# Patient Record
Sex: FEMALE | Race: WHITE | Employment: OTHER | URBAN - METROPOLITAN AREA
[De-identification: names, ages, dates, MRNs, and addresses within clinical notes are randomized per-mention and may not be internally consistent; named-entity substitution may affect disease eponyms.]

---

## 2017-12-21 PROBLEM — E43 SEVERE PROTEIN-CALORIE MALNUTRITION (HCC): Chronic | Status: ACTIVE | Noted: 2017-12-21

## 2017-12-21 PROBLEM — K52.9 ENTERITIS: Status: ACTIVE | Noted: 2017-12-21

## 2018-04-07 ENCOUNTER — HOSPITAL ENCOUNTER (EMERGENCY)
Age: 78
Discharge: HOME | End: 2018-04-07
Attending: EMERGENCY MEDICINE
Payer: MEDICARE

## 2018-04-07 ENCOUNTER — APPOINTMENT (OUTPATIENT)
Dept: ULTRASOUND IMAGING | Age: 78
End: 2018-04-07
Payer: MEDICARE

## 2018-04-07 ENCOUNTER — APPOINTMENT (OUTPATIENT)
Dept: GENERAL RADIOLOGY | Age: 78
End: 2018-04-07
Payer: MEDICARE

## 2018-04-07 VITALS
OXYGEN SATURATION: 96 % | BODY MASS INDEX: 21.38 KG/M2 | HEART RATE: 94 BPM | DIASTOLIC BLOOD PRESSURE: 57 MMHG | RESPIRATION RATE: 16 BRPM | HEIGHT: 66 IN | TEMPERATURE: 98.3 F | WEIGHT: 133 LBS | SYSTOLIC BLOOD PRESSURE: 115 MMHG

## 2018-04-07 DIAGNOSIS — I82.412 ACUTE DEEP VEIN THROMBOSIS (DVT) OF FEMORAL VEIN OF LEFT LOWER EXTREMITY (HCC): Primary | ICD-10-CM

## 2018-04-07 LAB
ALBUMIN SERPL-MCNC: 2.3 G/DL (ref 3.5–5.2)
ALP BLD-CCNC: 103 U/L (ref 35–104)
ALT SERPL-CCNC: 8 U/L (ref 0–32)
ANION GAP SERPL CALCULATED.3IONS-SCNC: 11 MMOL/L (ref 7–16)
AST SERPL-CCNC: 10 U/L (ref 0–31)
BILIRUB SERPL-MCNC: 0.3 MG/DL (ref 0–1.2)
BUN BLDV-MCNC: 10 MG/DL (ref 8–23)
CALCIUM SERPL-MCNC: 8.5 MG/DL (ref 8.6–10.2)
CEA: 5.2 NG/ML (ref 0–5.2)
CHLORIDE BLD-SCNC: 95 MMOL/L (ref 98–107)
CO2: 28 MMOL/L (ref 22–29)
CREAT SERPL-MCNC: 0.7 MG/DL (ref 0.5–1)
GFR AFRICAN AMERICAN: >60
GFR NON-AFRICAN AMERICAN: >60 ML/MIN/1.73
GLUCOSE BLD-MCNC: 99 MG/DL (ref 74–109)
HCT VFR BLD CALC: 33 % (ref 34–48)
HEMOGLOBIN: 10.7 G/DL (ref 11.5–15.5)
MCH RBC QN AUTO: 29.2 PG (ref 26–35)
MCHC RBC AUTO-ENTMCNC: 32.4 % (ref 32–34.5)
MCV RBC AUTO: 89.9 FL (ref 80–99.9)
PDW BLD-RTO: 15.2 FL (ref 11.5–15)
PLATELET # BLD: 329 E9/L (ref 130–450)
PMV BLD AUTO: 8.7 FL (ref 7–12)
POTASSIUM SERPL-SCNC: 3.3 MMOL/L (ref 3.5–5)
RBC # BLD: 3.67 E12/L (ref 3.5–5.5)
SODIUM BLD-SCNC: 134 MMOL/L (ref 132–146)
TOTAL PROTEIN: 5.7 G/DL (ref 6.4–8.3)
TSH SERPL DL<=0.05 MIU/L-ACNC: 4.62 UIU/ML (ref 0.27–4.2)
WBC # BLD: 10 E9/L (ref 4.5–11.5)

## 2018-04-07 PROCEDURE — 73502 X-RAY EXAM HIP UNI 2-3 VIEWS: CPT

## 2018-04-07 PROCEDURE — 85027 COMPLETE CBC AUTOMATED: CPT

## 2018-04-07 PROCEDURE — 6360000002 HC RX W HCPCS: Performed by: INTERNAL MEDICINE

## 2018-04-07 PROCEDURE — 80053 COMPREHEN METABOLIC PANEL: CPT

## 2018-04-07 PROCEDURE — 93971 EXTREMITY STUDY: CPT

## 2018-04-07 PROCEDURE — 82378 CARCINOEMBRYONIC ANTIGEN: CPT

## 2018-04-07 PROCEDURE — 99284 EMERGENCY DEPT VISIT MOD MDM: CPT

## 2018-04-07 PROCEDURE — 96372 THER/PROPH/DIAG INJ SC/IM: CPT

## 2018-04-07 PROCEDURE — 84443 ASSAY THYROID STIM HORMONE: CPT

## 2018-04-07 PROCEDURE — 6370000000 HC RX 637 (ALT 250 FOR IP): Performed by: EMERGENCY MEDICINE

## 2018-04-07 PROCEDURE — 36415 COLL VENOUS BLD VENIPUNCTURE: CPT

## 2018-04-07 RX ORDER — POTASSIUM CHLORIDE 20 MEQ/1
40 TABLET, EXTENDED RELEASE ORAL ONCE
Status: COMPLETED | OUTPATIENT
Start: 2018-04-07 | End: 2018-04-07

## 2018-04-07 RX ADMIN — POTASSIUM CHLORIDE 40 MEQ: 20 TABLET, EXTENDED RELEASE ORAL at 20:22

## 2018-04-07 RX ADMIN — APIXABAN 10 MG: 5 TABLET, FILM COATED ORAL at 20:45

## 2018-04-07 RX ADMIN — ENOXAPARIN SODIUM 60 MG: 60 INJECTION SUBCUTANEOUS at 19:34

## 2018-04-07 ASSESSMENT — ENCOUNTER SYMPTOMS
CHEST TIGHTNESS: 0
SHORTNESS OF BREATH: 0
COUGH: 0

## 2018-06-25 ENCOUNTER — OFFICE VISIT (OUTPATIENT)
Dept: SURGERY | Age: 78
End: 2018-06-25

## 2018-06-25 ENCOUNTER — APPOINTMENT (OUTPATIENT)
Dept: CT IMAGING | Age: 78
DRG: 329 | End: 2018-06-25
Attending: SURGERY
Payer: MEDICARE

## 2018-06-25 ENCOUNTER — HOSPITAL ENCOUNTER (INPATIENT)
Age: 78
LOS: 9 days | Discharge: NURSING, REHAB OR LONG-TERM CARE | DRG: 329 | End: 2018-07-04
Attending: SURGERY | Admitting: SURGERY
Payer: MEDICARE

## 2018-06-25 ENCOUNTER — APPOINTMENT (OUTPATIENT)
Dept: ULTRASOUND IMAGING | Age: 78
DRG: 329 | End: 2018-06-25
Attending: SURGERY
Payer: MEDICARE

## 2018-06-25 VITALS
SYSTOLIC BLOOD PRESSURE: 104 MMHG | HEIGHT: 66 IN | BODY MASS INDEX: 20.57 KG/M2 | DIASTOLIC BLOOD PRESSURE: 59 MMHG | RESPIRATION RATE: 12 BRPM | WEIGHT: 128 LBS | HEART RATE: 96 BPM | TEMPERATURE: 98.7 F

## 2018-06-25 DIAGNOSIS — K63.2 ENTERO-ENTERIC FISTULA: Primary | ICD-10-CM

## 2018-06-25 DIAGNOSIS — K55.9 ISCHEMIC BOWEL DISEASE (HCC): Primary | ICD-10-CM

## 2018-06-25 PROBLEM — T73.3XXA FATIGUE DUE TO EXCESSIVE EXERTION: Status: ACTIVE | Noted: 2018-06-25

## 2018-06-25 PROBLEM — R41.82 ALTERED MENTAL STATUS: Status: ACTIVE | Noted: 2018-06-25

## 2018-06-25 LAB
ANION GAP SERPL CALCULATED.3IONS-SCNC: 10 MMOL/L (ref 7–16)
BUN BLDV-MCNC: 9 MG/DL (ref 8–23)
CALCIUM SERPL-MCNC: 9.4 MG/DL (ref 8.6–10.2)
CHLORIDE BLD-SCNC: 93 MMOL/L (ref 98–107)
CO2: 30 MMOL/L (ref 22–29)
CREAT SERPL-MCNC: 0.6 MG/DL (ref 0.5–1)
EKG ATRIAL RATE: 82 BPM
EKG P AXIS: 70 DEGREES
EKG P-R INTERVAL: 122 MS
EKG Q-T INTERVAL: 404 MS
EKG QRS DURATION: 82 MS
EKG QTC CALCULATION (BAZETT): 472 MS
EKG R AXIS: 62 DEGREES
EKG T AXIS: 68 DEGREES
EKG VENTRICULAR RATE: 82 BPM
GFR AFRICAN AMERICAN: >60
GFR NON-AFRICAN AMERICAN: >60 ML/MIN/1.73
GLUCOSE BLD-MCNC: 103 MG/DL (ref 74–109)
HCT VFR BLD CALC: 29.5 % (ref 34–48)
HEMOGLOBIN: 9.3 G/DL (ref 11.5–15.5)
MCH RBC QN AUTO: 25.9 PG (ref 26–35)
MCHC RBC AUTO-ENTMCNC: 31.5 % (ref 32–34.5)
MCV RBC AUTO: 82.2 FL (ref 80–99.9)
PDW BLD-RTO: 16.6 FL (ref 11.5–15)
PLATELET # BLD: 558 E9/L (ref 130–450)
PMV BLD AUTO: 8.2 FL (ref 7–12)
POTASSIUM SERPL-SCNC: 3.9 MMOL/L (ref 3.5–5)
RBC # BLD: 3.59 E12/L (ref 3.5–5.5)
SODIUM BLD-SCNC: 133 MMOL/L (ref 132–146)
WBC # BLD: 12.7 E9/L (ref 4.5–11.5)

## 2018-06-25 PROCEDURE — 2060000000 HC ICU INTERMEDIATE R&B

## 2018-06-25 PROCEDURE — 74177 CT ABD & PELVIS W/CONTRAST: CPT

## 2018-06-25 PROCEDURE — 85027 COMPLETE CBC AUTOMATED: CPT

## 2018-06-25 PROCEDURE — 2580000003 HC RX 258: Performed by: SURGERY

## 2018-06-25 PROCEDURE — 93971 EXTREMITY STUDY: CPT

## 2018-06-25 PROCEDURE — 6370000000 HC RX 637 (ALT 250 FOR IP): Performed by: INTERNAL MEDICINE

## 2018-06-25 PROCEDURE — 99223 1ST HOSP IP/OBS HIGH 75: CPT | Performed by: SURGERY

## 2018-06-25 PROCEDURE — 6360000002 HC RX W HCPCS: Performed by: SURGERY

## 2018-06-25 PROCEDURE — C9113 INJ PANTOPRAZOLE SODIUM, VIA: HCPCS | Performed by: INTERNAL MEDICINE

## 2018-06-25 PROCEDURE — 80048 BASIC METABOLIC PNL TOTAL CA: CPT

## 2018-06-25 PROCEDURE — 99999 PR OFFICE/OUTPT VISIT,PROCEDURE ONLY: CPT | Performed by: SURGERY

## 2018-06-25 PROCEDURE — 6360000004 HC RX CONTRAST MEDICATION: Performed by: RADIOLOGY

## 2018-06-25 PROCEDURE — 93005 ELECTROCARDIOGRAM TRACING: CPT | Performed by: INTERNAL MEDICINE

## 2018-06-25 PROCEDURE — 36415 COLL VENOUS BLD VENIPUNCTURE: CPT

## 2018-06-25 PROCEDURE — 6360000002 HC RX W HCPCS: Performed by: INTERNAL MEDICINE

## 2018-06-25 RX ORDER — ONDANSETRON 2 MG/ML
4 INJECTION INTRAMUSCULAR; INTRAVENOUS EVERY 6 HOURS PRN
Status: DISCONTINUED | OUTPATIENT
Start: 2018-06-25 | End: 2018-07-04 | Stop reason: HOSPADM

## 2018-06-25 RX ORDER — ACETAMINOPHEN AND CODEINE PHOSPHATE 300; 30 MG/1; MG/1
0.5 TABLET ORAL 4 TIMES DAILY
Status: DISCONTINUED | OUTPATIENT
Start: 2018-06-25 | End: 2018-06-28

## 2018-06-25 RX ORDER — SODIUM CHLORIDE 0.9 % (FLUSH) 0.9 %
10 SYRINGE (ML) INJECTION EVERY 12 HOURS SCHEDULED
Status: DISCONTINUED | OUTPATIENT
Start: 2018-06-25 | End: 2018-07-04 | Stop reason: HOSPADM

## 2018-06-25 RX ORDER — ACETAMINOPHEN 325 MG/1
650 TABLET ORAL EVERY 4 HOURS PRN
Status: DISCONTINUED | OUTPATIENT
Start: 2018-06-25 | End: 2018-07-04 | Stop reason: HOSPADM

## 2018-06-25 RX ORDER — PANTOPRAZOLE SODIUM 40 MG/10ML
40 INJECTION, POWDER, LYOPHILIZED, FOR SOLUTION INTRAVENOUS DAILY
Status: DISCONTINUED | OUTPATIENT
Start: 2018-06-25 | End: 2018-07-04 | Stop reason: HOSPADM

## 2018-06-25 RX ORDER — SODIUM CHLORIDE, SODIUM LACTATE, POTASSIUM CHLORIDE, CALCIUM CHLORIDE 600; 310; 30; 20 MG/100ML; MG/100ML; MG/100ML; MG/100ML
INJECTION, SOLUTION INTRAVENOUS CONTINUOUS
Status: DISCONTINUED | OUTPATIENT
Start: 2018-06-25 | End: 2018-07-02

## 2018-06-25 RX ORDER — SODIUM CHLORIDE 0.9 % (FLUSH) 0.9 %
10 SYRINGE (ML) INJECTION PRN
Status: DISCONTINUED | OUTPATIENT
Start: 2018-06-25 | End: 2018-07-04 | Stop reason: HOSPADM

## 2018-06-25 RX ORDER — ZINC OXIDE
OINTMENT (GRAM) TOPICAL 2 TIMES DAILY PRN
Status: DISCONTINUED | OUTPATIENT
Start: 2018-06-25 | End: 2018-07-04 | Stop reason: HOSPADM

## 2018-06-25 RX ADMIN — IOPAMIDOL 80 ML: 755 INJECTION, SOLUTION INTRAVENOUS at 20:32

## 2018-06-25 RX ADMIN — ENOXAPARIN SODIUM 40 MG: 40 INJECTION SUBCUTANEOUS at 17:41

## 2018-06-25 RX ADMIN — IOHEXOL 50 ML: 240 INJECTION, SOLUTION INTRATHECAL; INTRAVASCULAR; INTRAVENOUS; ORAL at 20:31

## 2018-06-25 RX ADMIN — SERTRALINE HYDROCHLORIDE 50 MG: 50 TABLET ORAL at 21:38

## 2018-06-25 RX ADMIN — PANTOPRAZOLE SODIUM 40 MG: 40 INJECTION, POWDER, FOR SOLUTION INTRAVENOUS at 21:38

## 2018-06-25 RX ADMIN — ACETAMINOPHEN AND CODEINE PHOSPHATE 0.5 TABLET: 300; 30 TABLET ORAL at 21:38

## 2018-06-25 RX ADMIN — Medication 10 ML: at 21:38

## 2018-06-25 RX ADMIN — SODIUM CHLORIDE, POTASSIUM CHLORIDE, SODIUM LACTATE AND CALCIUM CHLORIDE: 600; 310; 30; 20 INJECTION, SOLUTION INTRAVENOUS at 17:41

## 2018-06-25 ASSESSMENT — PAIN DESCRIPTION - LOCATION
LOCATION: ABDOMEN;BACK;HIP;LEG
LOCATION: ABDOMEN;BACK;HIP

## 2018-06-25 ASSESSMENT — PAIN DESCRIPTION - DESCRIPTORS
DESCRIPTORS: ACHING;DISCOMFORT

## 2018-06-25 ASSESSMENT — PAIN SCALES - GENERAL
PAINLEVEL_OUTOF10: 4
PAINLEVEL_OUTOF10: 7
PAINLEVEL_OUTOF10: 4

## 2018-06-25 ASSESSMENT — PAIN DESCRIPTION - FREQUENCY
FREQUENCY: INTERMITTENT

## 2018-06-25 ASSESSMENT — PAIN DESCRIPTION - PAIN TYPE
TYPE: ACUTE PAIN;CHRONIC PAIN
TYPE: ACUTE PAIN;SURGICAL PAIN
TYPE: ACUTE PAIN;CHRONIC PAIN

## 2018-06-25 NOTE — FLOWSHEET NOTE
Inpatient Wound Care  Initial evaluation    Admit Date: 6/25/2018  2:41 PM    Reason for consult:  Skin care    Significant history:    Past Medical History:   Diagnosis Date    Cancer [facility])     Ischemic bowel disease ([facility] [address]     Macular degeneration     PAF (paroxysmal atrial fibrillation) ([facility] [address]     Tinnitus        Wound history:      Findings:    Pt is incontinent of urine  Had some stool leakage also  Coccyx is red blanchable  Inner groins sls red  Pt uses desitin at home    Pt has healed abd wound has a scar  Not open no redness    Impression:    Redness     Interventions in place:    She wears depends  She uses desitin at home  Pt uses a dsd over abd scar      Plan:  Cont desitin   Use waffle chair pad      Ivonne Velazquez 6/25/2018 3:51 PM

## 2018-06-25 NOTE — H&P
acetaminophen (TYLENOL) tablet 650 mg  650 mg Oral Q4H PRN Opal Bettencourt MD        ondansetron [facility]) injection 4 mg  4 mg Intravenous Q6H PRN Opal Bettencourt MD        enoxaparin (LOVENOX) injection 40 mg  40 mg Subcutaneous Daily Opal Bettencourt MD        lactated ringers infusion   Intravenous Continuous Opal Bettencourt MD           Allergies   Allergen Reactions    Aspirin Other (See Comments)     tinnitus    Tape Carson Galas Tape] Rash       The patient has a family history that is negative for severe cardiovascular or respiratory issues, negative for reaction to anesthesia. Social History     Social History    Marital status:      Spouse name: N/A    Number of children: N/A    Years of education: N/A     Occupational History    Not on file. Social History Main Topics    Smoking status: Former Smoker     Years: 20.00     Types: Cigarettes    Smokeless tobacco: Never Used      Comment: quit at age 44     Alcohol use No      Comment: socially    Drug use: No    Sexual activity: Not on file     Other Topics Concern    Not on file     Social History Narrative    Lives in [facility] with  (Glynn Cavazos / Bed). Worked in an office.                Review of Systems  Review of Systems -  General ROS: negative for - chills, fatigue or malaise  ENT ROS: negative for - hearing change, nasal congestion or nasal discharge  Allergy and Immunology ROS: negative for - hives, itchy/watery eyes or nasal congestion  Hematological and Lymphatic ROS: negative for - blood clots, blood transfusions, bruising or fatigue  Endocrine ROS: negative for - malaise/lethargy, mood swings, palpitations or polydipsia/polyuria  Respiratory ROS: negative for - sputum changes, stridor, tachypnea or wheezing  Cardiovascular ROS: negative for - irregular heartbeat, loss of consciousness, murmur or orthopnea  Gastrointestinal ROS: negative for - constipation, diarrhea, gas/bloating, heartburn or

## 2018-06-25 NOTE — CONSULTS
Consult Note    Reason for Consult:  Medical management    Requesting Physician:  Sneha Larson MD    HISTORY OF PRESENT ILLNESS:    The patient is a [de-identified] y.o. female who presents with a history of some increased lower abdominal pressure/discomfort for about 2 weeks. Patient is also had decreased appetite and increased diarrhea. She has a history of chronic loose stools. She recently noticed some drainage and what appears to be prolapse from her vagina. The patient's daughter is at the bedside and the case discussed. The patient has complained of some chills at home but no fever. She denies any chest pain or unusual shortness of breath. The case was discussed with both Dr. Jayme Walter over the phone today. Past Medical History:   Diagnosis Date    Cancer [facility])     colorectal     History of blood transfusion     Hx of blood clots     dvt, on eliquis    [facility]schem[facility] ([facility] [address]     Macular degeneration     MDRO (multiple drug resistant organisms) resistance     PAF (paroxysmal atrial fibrillation) (HCC)     Tinnitus        Past Surgical History:   Procedure Laterality Date    ABDOMEN SURGERY      colostomy & reversal    CHOLECYSTECTOMY  07    COLONOSCOPY      DILATATION, ESOPHAGUS      HAND SURGERY      carpel tunnel rigth hand    HYSTERECTOMY      ILEOSTOMY OR JEJUNOSTOMY      OTHER SURGICAL HISTORY  11/06/2017    diagnostic laparoscopy with exploratory laparotomy and reduction of internal hernia and closure of mesenteric defect, oversewing of multiple serosal tears    ALICIA AND BSO         Prior to Admission medications    Medication Sig Start Date End Date Taking?  Authorizing Provider   apixaban (ELIQUIS) 5 MG TABS tablet 10 mg BID for 1 week, then 5 mg BID  Patient taking differently: 5 mg 2 times daily 10 mg BID for 1 week, then 5 mg BID 4/7/18   Marisel Ellsworth,    acetaminophen (TYLENOL) 325 MG tablet Take 2 tablets by mouth every 4 hours as needed for Pain or Fever 12/26/17   i2O Water Lance Cox,    loperamide (IMODIUM) 2 MG capsule Take 2 capsules by mouth 3 times daily  Patient taking differently: Take 4 mg by mouth 4 times daily  12/26/17   Jennifer Fuentes DO   ondansetron (ZOFRAN) 8 MG tablet Take 0.5 tablets by mouth every 8 hours as needed for Nausea or Vomiting 12/26/17   Jennifer Fuentes DO   sertraline (ZOLOFT) 50 MG tablet Take 50 mg by mouth daily    Historical Provider, MD   pantoprazole (PROTONIX) 40 MG tablet Take 1 tablet by mouth every morning (before breakfast)  Patient taking differently: Take 40 mg by mouth as needed (heartburn)  12/20/17   Jennifer Fuentes DO       Scheduled Meds:   sodium chloride flush  10 mL Intravenous 2 times per day    enoxaparin  40 mg Subcutaneous Daily     Continuous Infusions:   lactated ringers 75 mL/hr at 06/25/18 1741     PRN Meds:zinc oxide, sodium chloride flush, acetaminophen, ondansetron    Allergies   Allergen Reactions    Aspirin Other (See Comments)     tinnitus    Tape Starling Geralds Tape] Rash       Social History     Social History    Marital status:      Spouse name: N/A    Number of children: N/A    Years of education: N/A     Occupational History    Not on file. Social History Main Topics    Smoking status: Former Smoker     Years: 20.00     Types: Cigarettes    Smokeless tobacco: Never Used      Comment: quit at age 44     Alcohol use No      Comment: socially    Drug use: No    Sexual activity: Not on file     Other Topics Concern    Not on file     Social History Narrative    Lives in [place] with  (Kathy Whitten / iD). Worked in an office.            Family History   Problem Relation Age of Onset    Arthritis Mother     Kidney Disease Father     Cancer Father        Review Of Systems:   CONSTITUTIONAL:  + chills  EYES:  negative  HEENT:  negative  RESPIRATORY:  negative  CARDIOVASCULAR:  negative  GASTROINTESTINAL:  positive for diarrhea and lower abdominal pain  GENITOURINARY:  positive for frequency,

## 2018-06-26 LAB
ALBUMIN SERPL-MCNC: 2.3 G/DL (ref 3.5–5.2)
ALP BLD-CCNC: 90 U/L (ref 35–104)
ALT SERPL-CCNC: 11 U/L (ref 0–32)
ANION GAP SERPL CALCULATED.3IONS-SCNC: 11 MMOL/L (ref 7–16)
AST SERPL-CCNC: 13 U/L (ref 0–31)
BILIRUB SERPL-MCNC: 0.3 MG/DL (ref 0–1.2)
BUN BLDV-MCNC: 7 MG/DL (ref 8–23)
CALCIUM SERPL-MCNC: 9 MG/DL (ref 8.6–10.2)
CHLORIDE BLD-SCNC: 97 MMOL/L (ref 98–107)
CO2: 28 MMOL/L (ref 22–29)
CREAT SERPL-MCNC: 0.7 MG/DL (ref 0.5–1)
GFR AFRICAN AMERICAN: >60
GFR NON-AFRICAN AMERICAN: >60 ML/MIN/1.73
GLUCOSE BLD-MCNC: 100 MG/DL (ref 74–109)
HCT VFR BLD CALC: 26.3 % (ref 34–48)
HCT VFR BLD CALC: 27.4 % (ref 34–48)
HEMOGLOBIN: 8.2 G/DL (ref 11.5–15.5)
HEMOGLOBIN: 8.5 G/DL (ref 11.5–15.5)
INR BLD: 1.3
IRON SATURATION: 13 % (ref 15–50)
IRON: 20 MCG/DL (ref 37–145)
MAGNESIUM: 2.2 MG/DL (ref 1.6–2.6)
MCH RBC QN AUTO: 25.8 PG (ref 26–35)
MCHC RBC AUTO-ENTMCNC: 31 % (ref 32–34.5)
MCV RBC AUTO: 83 FL (ref 80–99.9)
PDW BLD-RTO: 16.6 FL (ref 11.5–15)
PHOSPHORUS: 3.7 MG/DL (ref 2.5–4.5)
PLATELET # BLD: 530 E9/L (ref 130–450)
PMV BLD AUTO: 8.4 FL (ref 7–12)
POTASSIUM REFLEX MAGNESIUM: 3.2 MMOL/L (ref 3.5–5)
PREALBUMIN: 6 MG/DL (ref 20–40)
PROTHROMBIN TIME: 14.4 SEC (ref 9.3–12.4)
RBC # BLD: 3.3 E12/L (ref 3.5–5.5)
SODIUM BLD-SCNC: 136 MMOL/L (ref 132–146)
TOTAL IRON BINDING CAPACITY: 154 MCG/DL (ref 250–450)
TOTAL PROTEIN: 6.2 G/DL (ref 6.4–8.3)
WBC # BLD: 10.6 E9/L (ref 4.5–11.5)

## 2018-06-26 PROCEDURE — 2060000000 HC ICU INTERMEDIATE R&B

## 2018-06-26 PROCEDURE — 84134 ASSAY OF PREALBUMIN: CPT

## 2018-06-26 PROCEDURE — 36415 COLL VENOUS BLD VENIPUNCTURE: CPT

## 2018-06-26 PROCEDURE — 85014 HEMATOCRIT: CPT

## 2018-06-26 PROCEDURE — 85018 HEMOGLOBIN: CPT

## 2018-06-26 PROCEDURE — 80053 COMPREHEN METABOLIC PANEL: CPT

## 2018-06-26 PROCEDURE — 2580000003 HC RX 258: Performed by: SURGERY

## 2018-06-26 PROCEDURE — 6370000000 HC RX 637 (ALT 250 FOR IP): Performed by: INTERNAL MEDICINE

## 2018-06-26 PROCEDURE — 85027 COMPLETE CBC AUTOMATED: CPT

## 2018-06-26 PROCEDURE — 83550 IRON BINDING TEST: CPT

## 2018-06-26 PROCEDURE — 2580000003 HC RX 258: Performed by: INTERNAL MEDICINE

## 2018-06-26 PROCEDURE — 83735 ASSAY OF MAGNESIUM: CPT

## 2018-06-26 PROCEDURE — 85610 PROTHROMBIN TIME: CPT

## 2018-06-26 PROCEDURE — C9113 INJ PANTOPRAZOLE SODIUM, VIA: HCPCS | Performed by: INTERNAL MEDICINE

## 2018-06-26 PROCEDURE — 6360000002 HC RX W HCPCS: Performed by: INTERNAL MEDICINE

## 2018-06-26 PROCEDURE — 93306 TTE W/DOPPLER COMPLETE: CPT

## 2018-06-26 PROCEDURE — 83540 ASSAY OF IRON: CPT

## 2018-06-26 PROCEDURE — 84100 ASSAY OF PHOSPHORUS: CPT

## 2018-06-26 PROCEDURE — 99232 SBSQ HOSP IP/OBS MODERATE 35: CPT | Performed by: SURGERY

## 2018-06-26 RX ORDER — POTASSIUM CHLORIDE 20 MEQ/1
20 TABLET, EXTENDED RELEASE ORAL 2 TIMES DAILY WITH MEALS
Status: COMPLETED | OUTPATIENT
Start: 2018-06-26 | End: 2018-06-27

## 2018-06-26 RX ADMIN — ENOXAPARIN SODIUM 50 MG: 60 INJECTION SUBCUTANEOUS at 05:48

## 2018-06-26 RX ADMIN — IRON DEXTRAN 25 MG: 50 INJECTION INTRAMUSCULAR; INTRAVENOUS at 15:12

## 2018-06-26 RX ADMIN — PANTOPRAZOLE SODIUM 40 MG: 40 INJECTION, POWDER, FOR SOLUTION INTRAVENOUS at 09:54

## 2018-06-26 RX ADMIN — SODIUM CHLORIDE, POTASSIUM CHLORIDE, SODIUM LACTATE AND CALCIUM CHLORIDE: 600; 310; 30; 20 INJECTION, SOLUTION INTRAVENOUS at 22:26

## 2018-06-26 RX ADMIN — Medication 10 ML: at 09:54

## 2018-06-26 RX ADMIN — ACETAMINOPHEN AND CODEINE PHOSPHATE 0.5 TABLET: 300; 30 TABLET ORAL at 17:33

## 2018-06-26 RX ADMIN — ACETAMINOPHEN AND CODEINE PHOSPHATE 0.5 TABLET: 300; 30 TABLET ORAL at 09:47

## 2018-06-26 RX ADMIN — ACETAMINOPHEN AND CODEINE PHOSPHATE 0.5 TABLET: 300; 30 TABLET ORAL at 14:13

## 2018-06-26 RX ADMIN — IRON DEXTRAN 75 MG: 50 INJECTION INTRAMUSCULAR; INTRAVENOUS at 16:01

## 2018-06-26 RX ADMIN — SODIUM CHLORIDE, POTASSIUM CHLORIDE, SODIUM LACTATE AND CALCIUM CHLORIDE: 600; 310; 30; 20 INJECTION, SOLUTION INTRAVENOUS at 05:53

## 2018-06-26 RX ADMIN — ACETAMINOPHEN AND CODEINE PHOSPHATE 0.5 TABLET: 300; 30 TABLET ORAL at 20:39

## 2018-06-26 RX ADMIN — SERTRALINE HYDROCHLORIDE 50 MG: 50 TABLET ORAL at 09:47

## 2018-06-26 RX ADMIN — ENOXAPARIN SODIUM 50 MG: 60 INJECTION SUBCUTANEOUS at 17:33

## 2018-06-26 RX ADMIN — POTASSIUM CHLORIDE 20 MEQ: 20 TABLET, EXTENDED RELEASE ORAL at 14:13

## 2018-06-26 RX ADMIN — Medication 10 ML: at 20:38

## 2018-06-26 ASSESSMENT — PAIN SCALES - GENERAL
PAINLEVEL_OUTOF10: 3
PAINLEVEL_OUTOF10: 3
PAINLEVEL_OUTOF10: 10
PAINLEVEL_OUTOF10: 3

## 2018-06-26 ASSESSMENT — PAIN DESCRIPTION - DESCRIPTORS
DESCRIPTORS: ACHING;DISCOMFORT;SORE
DESCRIPTORS: BURNING;DISCOMFORT
DESCRIPTORS: SORE

## 2018-06-26 ASSESSMENT — PAIN DESCRIPTION - PAIN TYPE
TYPE: CHRONIC PAIN;SURGICAL PAIN
TYPE: CHRONIC PAIN
TYPE: CHRONIC PAIN

## 2018-06-26 ASSESSMENT — PAIN DESCRIPTION - LOCATION
LOCATION: LABIA
LOCATION: ABDOMEN
LOCATION: LABIA

## 2018-06-26 ASSESSMENT — PAIN DESCRIPTION - FREQUENCY: FREQUENCY: INTERMITTENT

## 2018-06-26 NOTE — CONSULTS
Cardiology Consult    The patient is a [de-identified] y.o. female who presents with a history of some increased lower abdominal pressure/discomfort for about 2 weeks. Patient is also had decreased appetite and increased diarrhea. She has a history of chronic loose stools. She recently noticed some drainage and what appears to be prolapse from her vagina. The patient's daughter is at the bedside and the case discussed. The patient has complained of some chills at home but no fever. She denies any chest pain or unusual shortness of breath. The case was discussed with both Dr. Pato Green over the phone today. Past Medical History        Past Medical History:   Diagnosis Date    Cancer [facility])       colorectal     History of blood transfusion      Hx of blood clots       dvt, on eliquis    Ischemic bowel disease ([address]      Macular degeneration      MDRO (multiple drug resistant organisms) resistance      PAF (paroxysmal atrial fibrillation) (HCC)      Tinnitus              Past Surgical History   Past Surgical History:   Procedure Laterality Date    ABDOMEN SURGERY         colostomy & reversal    CHOLECYSTECTOMY   07    COLONOSCOPY        DILATATION, ESOPHAGUS        HAND SURGERY         carpel tunnel rigth hand    HYSTERECTOMY        ILEOSTOMY OR JEJUNOSTOMY        OTHER SURGICAL HISTORY   11/06/2017     diagnostic laparoscopy with exploratory laparotomy and reduction of internal hernia and closure of mesenteric defect, oversewing of multiple serosal tears    ALICIA AND BSO                Home Medications           Prior to Admission medications    Medication Sig Start Date End Date Taking?  Authorizing Provider   apixaban (ELIQUIS) 5 MG TABS tablet 10 mg BID for 1 week, then 5 mg BID  Patient taking differently: 5 mg 2 times daily 10 mg BID for 1 week, then 5 mg BID 4/7/18     Bishop Beckford, DO   acetaminophen (TYLENOL) 325 MG tablet Take 2 tablets by mouth every 4 hours as needed for Pain or Fever 12/26/17     Huma Worrell,    loperamide (IMODIUM) 2 MG capsule Take 2 capsules by mouth 3 times daily  Patient taking differently: Take 4 mg by mouth 4 times daily  12/26/17     Huma Worrell DO   ondansetron (ZOFRAN) 8 MG tablet Take 0.5 tablets by mouth every 8 hours as needed for Nausea or Vomiting 12/26/17     Huma Worrell DO   sertraline (ZOLOFT) 50 MG tablet Take 50 mg by mouth daily       Historical Provider, MD   pantoprazole (PROTONIX) 40 MG tablet Take 1 tablet by mouth every morning (before breakfast)  Patient taking differently: Take 40 mg by mouth as needed (heartburn)  12/20/17     Huma Worrell DO            Scheduled Meds:  Scheduled Medications    sodium chloride flush  10 mL Intravenous 2 times per day    enoxaparin  40 mg Subcutaneous Daily         Continuous Infusions:  Infusions Meds    lactated ringers 75 mL/hr at 06/25/18 1741         PRN Meds:  PRN Medications   zinc oxide, sodium chloride flush, acetaminophen, ondansetron              Allergies   Allergen Reactions    Aspirin Other (See Comments)       tinnitus    Tape Miroslava Frock Tape] Rash         Social History   Social History            Social History    Marital status:        Spouse name: N/A    Number of children: N/A    Years of education: N/A          Occupational History    Not on file.             Social History Main Topics    Smoking status: Former Smoker       Years: 20.00       Types: Cigarettes    Smokeless tobacco: Never Used         Comment: quit at age 44     Alcohol use No         Comment: socially    Drug use: No    Sexual activity: Not on file           Other Topics Concern    Not on file          Social History Narrative     Lives in [facility] with  (Farida Come / Bed).     Worked in an office.                  Family History         Family History   Problem Relation Age of Onset    Arthritis Mother      Kidney Disease Father      Cancer Father              Review Of Systems:

## 2018-06-26 NOTE — PROGRESS NOTES
PHOS 3.7 06/26/2018     PT/INR:    Lab Results   Component Value Date    PROTIME 14.4 06/26/2018    INR 1.3 06/26/2018     Troponin:    Lab Results   Component Value Date    TROPONINI 0.01 12/21/2017     U/A:    Lab Results   Component Value Date    COLORU Yellow 12/24/2017    PROTEINU Negative 12/24/2017    PHUR 6.5 12/24/2017    WBCUA 10-20 12/24/2017    RBCUA NONE 12/24/2017    BACTERIA FEW 12/24/2017    CLARITYU Clear 12/24/2017    SPECGRAV <=1.005 12/24/2017    LEUKOCYTESUR LARGE 12/24/2017    UROBILINOGEN 0.2 12/24/2017    BILIRUBINUR Negative 12/24/2017    BLOODU Negative 12/24/2017    GLUCOSEU Negative 12/24/2017     HgBA1c:  No results found for: LABA1C  FLP:    Lab Results   Component Value Date    TRIG 100 11/21/2017    HDL 58.0 07/18/2013    LDLCALC 157 07/18/2013     TSH:    Lab Results   Component Value Date    TSH 4.620 04/07/2018     LIPASE:    Lab Results   Component Value Date    LIPASE 78 12/22/2017       No results for input(s): GLUMET in the last 72 hours. CT ABDOMEN PELVIS W IV CONTRAST Additional Contrast? Oral   Final Result   1. Abnormal appearance of the distal sigmoid colon and rectum with   abnormal wall thickening. Inflammatory changes extend to the adjacent   loops of small bowel and there is distal small bowel wall thickening. There is no small bowel obstruction. .   2. Perirectal phlegmon and development of multiple tiny perirectal   abscesses. The largest is seen on the left and measures 2 cm. 3. 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 .   4. Left hydroureteronephrosis. Due to the inflammatory process within   the pelvis the distal left ureter is obstructed and is not well seen. THIS IS AN ABNORMAL REPORT. PLEASE TAKE APPROPRIATE MEASURES. This report will be called to the floor by radiology personnel.       US DUP LOWER

## 2018-06-26 NOTE — CONSULTS
Department of Gynecology  Attending Consult Note      Reason for Consult:  Possible vaginal prolapse  Requesting Physician:  Dr Rosilyn Goldmann:   Altered mental status    History obtained from patient, electronic medical record    HISTORY OF PRESENT ILLNESS:                   The patient is a [de-identified] y.o. female with significant past medical history of rectal/colon cancer who presents with altered mental status and worsening physicial conditioning. She had a prior ALICIA-BSO with her cancer operation. She was seen by Dr Fang Koroma in the office and was noted to have fecal incontinence. On CT scan she was noted to have a colo-vaginal fistua. On exam, she has a swollen RIGHT labia and a small amount of stool in the vagina.   She had pelvic radiation post     Past Medical History:        Diagnosis Date    Cancer [facility])     colorectal     History of blood transfusion     Hx of blood clots     dvt, on eliquis    [facility] ([facility] [address]     Macular degeneration     MDRO (multiple drug resistant organisms) resistance     PAF (paroxysmal atrial fibrillation) (HCC)     Tinnitus      Past Surgical History:        Procedure Laterality Date    ABDOMEN SURGERY      colostomy & reversal    CHOLECYSTECTOMY  07    COLONOSCOPY      DILATATION, ESOPHAGUS      HAND SURGERY      carpel tunnel rigth hand    HYSTERECTOMY      ILEOSTOMY OR JEJUNOSTOMY      OTHER SURGICAL HISTORY  11/06/2017    diagnostic laparoscopy with exploratory laparotomy and reduction of internal hernia and closure of mesenteric defect, oversewing of multiple serosal tears    ALICIA AND BSO           OB History   No data available       Medications Prior to Admission:   Prescriptions Prior to Admission: apixaban (ELIQUIS) 5 MG TABS tablet, 10 mg BID for 1 week, then 5 mg BID (Patient taking differently: 5 mg 2 times daily 10 mg BID for 1 week, then 5 mg BID)  acetaminophen (TYLENOL) 325 MG tablet, Take 2 tablets by mouth every 4 hours as

## 2018-06-27 ENCOUNTER — APPOINTMENT (OUTPATIENT)
Dept: INTERVENTIONAL RADIOLOGY/VASCULAR | Age: 78
DRG: 329 | End: 2018-06-27
Attending: SURGERY
Payer: MEDICARE

## 2018-06-27 LAB
ANION GAP SERPL CALCULATED.3IONS-SCNC: 6 MMOL/L (ref 7–16)
ANISOCYTOSIS: ABNORMAL
BASOPHILS ABSOLUTE: 0 E9/L (ref 0–0.2)
BASOPHILS RELATIVE PERCENT: 0.2 % (ref 0–2)
BUN BLDV-MCNC: 7 MG/DL (ref 8–23)
CALCIUM SERPL-MCNC: 8.6 MG/DL (ref 8.6–10.2)
CHLORIDE BLD-SCNC: 100 MMOL/L (ref 98–107)
CO2: 31 MMOL/L (ref 22–29)
CREAT SERPL-MCNC: 0.6 MG/DL (ref 0.5–1)
EOSINOPHILS ABSOLUTE: 0 E9/L (ref 0.05–0.5)
EOSINOPHILS RELATIVE PERCENT: 0 % (ref 0–6)
GFR AFRICAN AMERICAN: >60
GFR NON-AFRICAN AMERICAN: >60 ML/MIN/1.73
GLUCOSE BLD-MCNC: 101 MG/DL (ref 74–109)
HCT VFR BLD CALC: 25.3 % (ref 34–48)
HEMOGLOBIN: 7.8 G/DL (ref 11.5–15.5)
HYPOCHROMIA: ABNORMAL
INR BLD: 1
LYMPHOCYTES ABSOLUTE: 0.84 E9/L (ref 1.5–4)
LYMPHOCYTES RELATIVE PERCENT: 14 % (ref 20–42)
MCH RBC QN AUTO: 25.9 PG (ref 26–35)
MCHC RBC AUTO-ENTMCNC: 30.8 % (ref 32–34.5)
MCV RBC AUTO: 84.1 FL (ref 80–99.9)
MONOCYTES ABSOLUTE: 0.48 E9/L (ref 0.1–0.95)
MONOCYTES RELATIVE PERCENT: 7.5 % (ref 2–12)
MYELOCYTE PERCENT: 0.5 % (ref 0–0)
NEUTROPHILS ABSOLUTE: 4.74 E9/L (ref 1.8–7.3)
NEUTROPHILS RELATIVE PERCENT: 78 % (ref 43–80)
PDW BLD-RTO: 16.6 FL (ref 11.5–15)
PLATELET # BLD: 447 E9/L (ref 130–450)
PMV BLD AUTO: 8.5 FL (ref 7–12)
POLYCHROMASIA: ABNORMAL
POTASSIUM SERPL-SCNC: 3.5 MMOL/L (ref 3.5–5)
PROTHROMBIN TIME: 11.8 SEC (ref 9.3–12.4)
RBC # BLD: 3.01 E12/L (ref 3.5–5.5)
SODIUM BLD-SCNC: 137 MMOL/L (ref 132–146)
WBC # BLD: 6 E9/L (ref 4.5–11.5)

## 2018-06-27 PROCEDURE — 02HV33Z INSERTION OF INFUSION DEVICE INTO SUPERIOR VENA CAVA, PERCUTANEOUS APPROACH: ICD-10-PCS | Performed by: RADIOLOGY

## 2018-06-27 PROCEDURE — 36415 COLL VENOUS BLD VENIPUNCTURE: CPT

## 2018-06-27 PROCEDURE — 87324 CLOSTRIDIUM AG IA: CPT

## 2018-06-27 PROCEDURE — 2580000003 HC RX 258: Performed by: INTERNAL MEDICINE

## 2018-06-27 PROCEDURE — C9113 INJ PANTOPRAZOLE SODIUM, VIA: HCPCS | Performed by: INTERNAL MEDICINE

## 2018-06-27 PROCEDURE — 6360000002 HC RX W HCPCS: Performed by: INTERNAL MEDICINE

## 2018-06-27 PROCEDURE — 6370000000 HC RX 637 (ALT 250 FOR IP): Performed by: INTERNAL MEDICINE

## 2018-06-27 PROCEDURE — 36569 INSJ PICC 5 YR+ W/O IMAGING: CPT | Performed by: RADIOLOGY

## 2018-06-27 PROCEDURE — B5181ZA FLUOROSCOPY OF SUPERIOR VENA CAVA USING LOW OSMOLAR CONTRAST, GUIDANCE: ICD-10-PCS | Performed by: RADIOLOGY

## 2018-06-27 PROCEDURE — 85025 COMPLETE CBC W/AUTO DIFF WBC: CPT

## 2018-06-27 PROCEDURE — 76937 US GUIDE VASCULAR ACCESS: CPT | Performed by: RADIOLOGY

## 2018-06-27 PROCEDURE — 2580000003 HC RX 258: Performed by: SURGERY

## 2018-06-27 PROCEDURE — 2060000000 HC ICU INTERMEDIATE R&B

## 2018-06-27 PROCEDURE — C1751 CATH, INF, PER/CENT/MIDLINE: HCPCS

## 2018-06-27 PROCEDURE — 94150 VITAL CAPACITY TEST: CPT

## 2018-06-27 PROCEDURE — 80048 BASIC METABOLIC PNL TOTAL CA: CPT

## 2018-06-27 PROCEDURE — 85610 PROTHROMBIN TIME: CPT

## 2018-06-27 PROCEDURE — B548ZZA ULTRASONOGRAPHY OF SUPERIOR VENA CAVA, GUIDANCE: ICD-10-PCS | Performed by: RADIOLOGY

## 2018-06-27 PROCEDURE — 77001 FLUOROGUIDE FOR VEIN DEVICE: CPT | Performed by: RADIOLOGY

## 2018-06-27 RX ORDER — HEPARIN SODIUM (PORCINE) LOCK FLUSH IV SOLN 100 UNIT/ML 100 UNIT/ML
100 SOLUTION INTRAVENOUS PRN
Status: DISCONTINUED | OUTPATIENT
Start: 2018-06-27 | End: 2018-07-04 | Stop reason: HOSPADM

## 2018-06-27 RX ORDER — POTASSIUM CHLORIDE 29.8 MG/ML
20 INJECTION INTRAVENOUS
Status: COMPLETED | OUTPATIENT
Start: 2018-06-28 | End: 2018-06-28

## 2018-06-27 RX ORDER — IPRATROPIUM BROMIDE AND ALBUTEROL SULFATE 2.5; .5 MG/3ML; MG/3ML
1 SOLUTION RESPIRATORY (INHALATION) EVERY 4 HOURS PRN
Status: DISCONTINUED | OUTPATIENT
Start: 2018-06-27 | End: 2018-07-04 | Stop reason: HOSPADM

## 2018-06-27 RX ORDER — METOPROLOL TARTRATE 5 MG/5ML
5 INJECTION INTRAVENOUS
Status: DISCONTINUED | OUTPATIENT
Start: 2018-06-28 | End: 2018-06-28

## 2018-06-27 RX ADMIN — Medication 10 ML: at 08:49

## 2018-06-27 RX ADMIN — PANTOPRAZOLE SODIUM 40 MG: 40 INJECTION, POWDER, FOR SOLUTION INTRAVENOUS at 08:49

## 2018-06-27 RX ADMIN — ACETAMINOPHEN AND CODEINE PHOSPHATE 0.5 TABLET: 300; 30 TABLET ORAL at 08:49

## 2018-06-27 RX ADMIN — IRON DEXTRAN 100 MG: 50 INJECTION INTRAMUSCULAR; INTRAVENOUS at 12:40

## 2018-06-27 RX ADMIN — ACETAMINOPHEN AND CODEINE PHOSPHATE 0.5 TABLET: 300; 30 TABLET ORAL at 18:28

## 2018-06-27 RX ADMIN — POTASSIUM CHLORIDE 20 MEQ: 20 TABLET, EXTENDED RELEASE ORAL at 08:49

## 2018-06-27 RX ADMIN — SERTRALINE HYDROCHLORIDE 50 MG: 50 TABLET ORAL at 08:49

## 2018-06-27 RX ADMIN — SODIUM CHLORIDE, POTASSIUM CHLORIDE, SODIUM LACTATE AND CALCIUM CHLORIDE: 600; 310; 30; 20 INJECTION, SOLUTION INTRAVENOUS at 19:38

## 2018-06-27 ASSESSMENT — PAIN SCALES - GENERAL
PAINLEVEL_OUTOF10: 2
PAINLEVEL_OUTOF10: 2
PAINLEVEL_OUTOF10: 0

## 2018-06-27 ASSESSMENT — PAIN DESCRIPTION - LOCATION: LOCATION: LABIA

## 2018-06-27 ASSESSMENT — PAIN DESCRIPTION - PAIN TYPE: TYPE: CHRONIC PAIN

## 2018-06-27 NOTE — PROGRESS NOTES
Inpatient Wound Care    Admit Date: 6/25/2018  2:41 PM    Reason for consult:  Skin care    Significant history:    Past Medical History:   Diagnosis Date    Cancer [facility])     colorectal     History of blood transfusion     Hx of blood clots     dvt, on eliquis    Ischemic bowel disease ([facility] [address]     Macular degeneration     MDRO (multiple drug resistant organisms) resistance     PAF (paroxysmal atrial fibrillation) (HCC)     Tinnitus        Wound history:      Findings:    Perineal area red moist inner buttocks red  Pt has fecal incontinence  Bilateral heels are pink      Impression:    redness    Interventions in place:    Low air loss bed      Plan:  Cont desitin prn      Calixto Navarro 6/27/2018 12:04 PM

## 2018-06-27 NOTE — PROGRESS NOTES
PT/INR:    Lab Results   Component Value Date    PROTIME 11.8 06/27/2018    INR 1.0 06/27/2018     Troponin:    Lab Results   Component Value Date    TROPONINI 0.01 12/21/2017     U/A:    Lab Results   Component Value Date    COLORU Yellow 12/24/2017    PROTEINU Negative 12/24/2017    PHUR 6.5 12/24/2017    WBCUA 10-20 12/24/2017    RBCUA NONE 12/24/2017    BACTERIA FEW 12/24/2017    CLARITYU Clear 12/24/2017    SPECGRAV <=1.005 12/24/2017    LEUKOCYTESUR LARGE 12/24/2017    UROBILINOGEN 0.2 12/24/2017    BILIRUBINUR Negative 12/24/2017    BLOODU Negative 12/24/2017    GLUCOSEU Negative 12/24/2017     HgBA1c:  No results found for: LABA1C  FLP:    Lab Results   Component Value Date    TRIG 100 11/21/2017    HDL 58.0 07/18/2013    LDLCALC 157 07/18/2013     TSH:    Lab Results   Component Value Date    TSH 4.620 04/07/2018     LIPASE:    Lab Results   Component Value Date    LIPASE 78 12/22/2017       No results for input(s): GLUMET in the last 72 hours. CT ABDOMEN PELVIS W IV CONTRAST Additional Contrast? Oral   Final Result   1. Abnormal appearance of the distal sigmoid colon and rectum with   abnormal wall thickening. Inflammatory changes extend to the adjacent   loops of small bowel and there is distal small bowel wall thickening. There is no small bowel obstruction. .   2. Perirectal phlegmon and development of multiple tiny perirectal   abscesses. The largest is seen on the left and measures 2 cm. 3. 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 .   4. Left hydroureteronephrosis. Due to the inflammatory process within   the pelvis the distal left ureter is obstructed and is not well seen. THIS IS AN ABNORMAL REPORT. PLEASE TAKE APPROPRIATE MEASURES. This report will be called to the floor by radiology personnel.       US DUP LOWER EXTREMITY LEFT JEFERSON Final Result   1. Minimal residual chronic deep venous thrombosis seen throughout the   veins of the left lower extremity. . Flow is now appreciated on today's   exam. No flow was noted on the patient's previous examination of   4/7/2018. IR FLUORO GUIDED CVA DEVICE PLACEMENT    (Results Pending)   IR ULTRASOUND GUIDANCE VASCULAR ACCESS    (Results Pending)        sodium chloride flush  10 mL Intravenous 2 times per day    enoxaparin  50 mg Subcutaneous BID    pantoprazole  40 mg Intravenous Daily    sertraline  50 mg Oral Daily    acetaminophen-codeine  0.5 tablet Oral 4x Daily        Assessment; Active Problems:    Acute colovaginal fistula    Iron deficiency anemia with chronic disease    Altered mental status    Fatigue due to excessive exertion    History of PAF (paroxysmal atrial fibrillation) (HCC)    Macular degeneration    Ischemic bowel disease (HCC)    Hx of left femoral DVT -improved    History of severe ischemic bowel with emergent bowel resection November 2017    History of Cancer -colorectal    Hypokalemia-resolved      Plan:   Dr. Kendy Mitchell on consult  IV iron infusion  Labs in a.m. See orders.         Suze Marinelli DO  11:11 AM  6/27/2018

## 2018-06-27 NOTE — PROCEDURES
[address]                                  ECHOCARDIOGRAM    PATIENT NAME: Shreyas Mederos                      :        1940  MED REC NO:   [id number]                            ROOM:       2353  ACCOUNT NO:   [de-identified]                           ADMIT DATE: 2018  PROVIDER:     Evi Cool MD    STUDY PERFORMED:  2018    INDICATIONS:  Preop surgical evaluation, paroxysmal atrial fibrillation,  protein and calorie malnutrition, dyspnea, chemotherapy history. 2D MEASUREMENTS:  Left ventricular outflow tract 1.9 cm, right ventricle  diastole 1.7. Left ventricle diastole 3.7, systole 2.5. Septal and  posterior wall thickness of the left ventricle 0.8 cm. Left atrial  dimension 2.4. Left atrial area 8.6 cm2. Right atrial area 7.6 cm2. Aortic root diameter 3.0 cm. Aortic valve cusp separation 1.7 cm. IMPRESSION:  1. All cardiac chamber sizes including aortic root size are within normal  limits. 2.  The left ventricle shows normal wall thicknesses. Ejection fraction is  mildly hyperdynamic at 68%. No focal wall motion abnormality is seen. There is diastolic filling dysfunction stage 1.  3.  The mitral valve is structurally normal, shows a maximal gradient of  5.2 mmHg, mitral valve area 3.9 cm2 by pressure half time. There is no  significant mitral stenosis. There is trace mitral regurgitation only. 4.  The aortic valve is structurally normal, shows a maximal gradient of  5.5 mmHg. Aortic valve area 2.5 cm2. No aortic stenosis nor insufficiency  is seen. 5.  There is no pulmonic stenosis nor insufficiency. There is trace  tricuspid regurgitation only. Pulmonary pressures estimated at 28 mmHg top  normal to perhaps mild pulmonary hypertension. 6.  No significant pericardial effusion nor any definite intracavitary mass  or thrombus or shunt is identified.         MASON Abigail Zuleta MD    D: 06/26/2018 23:34:54       T: 06/27/2018 0:24:01     [id number]  Job#: [id number]     Doc#: [id number]    CC:  DO Lashawn Dickerson MD Jody Grad, MD

## 2018-06-27 NOTE — PROGRESS NOTES
12/09/2017    BASOPCT 0.5 12/25/2017    MONOSABS 0.78 12/25/2017    LYMPHSABS 1.17 12/25/2017    EOSABS 0.01 12/25/2017    BASOSABS 0.04 12/25/2017     Platelets:    Lab Results   Component Value Date     06/26/2018     CMP:    Lab Results   Component Value Date     06/26/2018    K 3.2 06/26/2018    CL 97 06/26/2018    CO2 28 06/26/2018    BUN 7 06/26/2018    CREATININE 0.7 06/26/2018    GFRAA >60 06/26/2018    LABGLOM >60 06/26/2018    GLUCOSE 100 06/26/2018    PROT 6.2 06/26/2018    LABALBU 2.3 06/26/2018    CALCIUM 9.0 06/26/2018    BILITOT 0.3 06/26/2018    ALKPHOS 90 06/26/2018    AST 13 06/26/2018    ALT 11 06/26/2018     BMP:    Lab Results   Component Value Date     06/26/2018    K 3.2 06/26/2018    CL 97 06/26/2018    CO2 28 06/26/2018    BUN 7 06/26/2018    LABALBU 2.3 06/26/2018    CREATININE 0.7 06/26/2018    CALCIUM 9.0 06/26/2018    GFRAA >60 06/26/2018    LABGLOM >60 06/26/2018    GLUCOSE 100 06/26/2018     Hepatic Function Panel:    Lab Results   Component Value Date    ALKPHOS 90 06/26/2018    ALT 11 06/26/2018    AST 13 06/26/2018    PROT 6.2 06/26/2018    BILITOT 0.3 06/26/2018    BILIDIR 0.4 11/12/2017    IBILI 0.3 11/12/2017    LABALBU 2.3 06/26/2018     Albumin:    Lab Results   Component Value Date    LABALBU 2.3 06/26/2018     Calcium:    Lab Results   Component Value Date    CALCIUM 9.0 06/26/2018     Ionized Calcium:  No results found for: IONCA  Magnesium:    Lab Results   Component Value Date    MG 2.2 06/26/2018     Phosphorus:    Lab Results   Component Value Date    PHOS 3.7 06/26/2018     Last 3 Troponin:    Lab Results   Component Value Date    TROPONINI 0.01 12/21/2017    TROPONINI 0.02 12/19/2017    TROPONINI 0.01 11/06/2017     FLP:    Lab Results   Component Value Date    TRIG 100 11/21/2017    HDL 58.0 07/18/2013    LDLCALC 157 07/18/2013     TSH:    Lab Results   Component Value Date    TSH 4.620 04/07/2018        Patient Active Problem List   Diagnosis   

## 2018-06-28 ENCOUNTER — ANESTHESIA EVENT (OUTPATIENT)
Dept: OPERATING ROOM | Age: 78
DRG: 329 | End: 2018-06-28
Payer: MEDICARE

## 2018-06-28 ENCOUNTER — ANESTHESIA (OUTPATIENT)
Dept: OPERATING ROOM | Age: 78
DRG: 329 | End: 2018-06-28
Payer: MEDICARE

## 2018-06-28 VITALS
RESPIRATION RATE: 13 BRPM | TEMPERATURE: 97.2 F | DIASTOLIC BLOOD PRESSURE: 54 MMHG | SYSTOLIC BLOOD PRESSURE: 113 MMHG | OXYGEN SATURATION: 100 %

## 2018-06-28 LAB
ABO/RH: NORMAL
ALBUMIN SERPL-MCNC: 2.3 G/DL (ref 3.5–5.2)
ALP BLD-CCNC: 76 U/L (ref 35–104)
ALT SERPL-CCNC: 8 U/L (ref 0–32)
ANION GAP SERPL CALCULATED.3IONS-SCNC: 6 MMOL/L (ref 7–16)
ANISOCYTOSIS: ABNORMAL
ANTIBODY SCREEN: NORMAL
AST SERPL-CCNC: 8 U/L (ref 0–31)
BASOPHILS ABSOLUTE: 0 E9/L (ref 0–0.2)
BASOPHILS RELATIVE PERCENT: 0.1 % (ref 0–2)
BILIRUB SERPL-MCNC: 0.2 MG/DL (ref 0–1.2)
BLOOD BANK DISPENSE STATUS: NORMAL
BLOOD BANK PRODUCT CODE: NORMAL
BPU ID: NORMAL
BUN BLDV-MCNC: 5 MG/DL (ref 8–23)
CALCIUM SERPL-MCNC: 8.5 MG/DL (ref 8.6–10.2)
CHLORIDE BLD-SCNC: 103 MMOL/L (ref 98–107)
CO2: 29 MMOL/L (ref 22–29)
CREAT SERPL-MCNC: 0.5 MG/DL (ref 0.5–1)
DESCRIPTION BLOOD BANK: NORMAL
EOSINOPHILS ABSOLUTE: 0 E9/L (ref 0.05–0.5)
EOSINOPHILS RELATIVE PERCENT: 0 % (ref 0–6)
GFR AFRICAN AMERICAN: >60
GFR NON-AFRICAN AMERICAN: >60 ML/MIN/1.73
GLUCOSE BLD-MCNC: 93 MG/DL (ref 74–109)
HCT VFR BLD CALC: 25.8 % (ref 34–48)
HEMOGLOBIN: 7.9 G/DL (ref 11.5–15.5)
HYPOCHROMIA: ABNORMAL
IRON SATURATION: 27 % (ref 15–50)
IRON: 44 MCG/DL (ref 37–145)
LYMPHOCYTES ABSOLUTE: 0.78 E9/L (ref 1.5–4)
LYMPHOCYTES RELATIVE PERCENT: 9.1 % (ref 20–42)
MCH RBC QN AUTO: 25.6 PG (ref 26–35)
MCHC RBC AUTO-ENTMCNC: 30.6 % (ref 32–34.5)
MCV RBC AUTO: 83.8 FL (ref 80–99.9)
MONOCYTES ABSOLUTE: 0.35 E9/L (ref 0.1–0.95)
MONOCYTES RELATIVE PERCENT: 4.1 % (ref 2–12)
NEUTROPHILS ABSOLUTE: 7.57 E9/L (ref 1.8–7.3)
NEUTROPHILS RELATIVE PERCENT: 86.8 % (ref 43–80)
ORGANISM: ABNORMAL
PDW BLD-RTO: 16.6 FL (ref 11.5–15)
PLATELET # BLD: 462 E9/L (ref 130–450)
PMV BLD AUTO: 8.3 FL (ref 7–12)
POLYCHROMASIA: ABNORMAL
POTASSIUM SERPL-SCNC: 4.2 MMOL/L (ref 3.5–5)
RBC # BLD: 3.08 E12/L (ref 3.5–5.5)
SODIUM BLD-SCNC: 138 MMOL/L (ref 132–146)
TOTAL IRON BINDING CAPACITY: 165 MCG/DL (ref 250–450)
TOTAL PROTEIN: 5.5 G/DL (ref 6.4–8.3)
WBC # BLD: 8.7 E9/L (ref 4.5–11.5)

## 2018-06-28 PROCEDURE — 6360000002 HC RX W HCPCS: Performed by: SURGERY

## 2018-06-28 PROCEDURE — 83540 ASSAY OF IRON: CPT

## 2018-06-28 PROCEDURE — 83550 IRON BINDING TEST: CPT

## 2018-06-28 PROCEDURE — 94150 VITAL CAPACITY TEST: CPT

## 2018-06-28 PROCEDURE — 36415 COLL VENOUS BLD VENIPUNCTURE: CPT

## 2018-06-28 PROCEDURE — 6370000000 HC RX 637 (ALT 250 FOR IP): Performed by: INTERNAL MEDICINE

## 2018-06-28 PROCEDURE — 3600000014 HC SURGERY LEVEL 4 ADDTL 15MIN: Performed by: SURGERY

## 2018-06-28 PROCEDURE — 86900 BLOOD TYPING SEROLOGIC ABO: CPT

## 2018-06-28 PROCEDURE — 86850 RBC ANTIBODY SCREEN: CPT

## 2018-06-28 PROCEDURE — P9016 RBC LEUKOCYTES REDUCED: HCPCS

## 2018-06-28 PROCEDURE — 6360000002 HC RX W HCPCS: Performed by: INTERNAL MEDICINE

## 2018-06-28 PROCEDURE — 2060000000 HC ICU INTERMEDIATE R&B

## 2018-06-28 PROCEDURE — 2580000003 HC RX 258: Performed by: NURSE ANESTHETIST, CERTIFIED REGISTERED

## 2018-06-28 PROCEDURE — 3700000001 HC ADD 15 MINUTES (ANESTHESIA): Performed by: SURGERY

## 2018-06-28 PROCEDURE — 85025 COMPLETE CBC W/AUTO DIFF WBC: CPT

## 2018-06-28 PROCEDURE — 44188 LAP COLOSTOMY: CPT | Performed by: SURGERY

## 2018-06-28 PROCEDURE — 2709999900 HC NON-CHARGEABLE SUPPLY: Performed by: SURGERY

## 2018-06-28 PROCEDURE — 0D1N4Z4 BYPASS SIGMOID COLON TO CUTANEOUS, PERCUTANEOUS ENDOSCOPIC APPROACH: ICD-10-PCS | Performed by: SURGERY

## 2018-06-28 PROCEDURE — 86923 COMPATIBILITY TEST ELECTRIC: CPT

## 2018-06-28 PROCEDURE — 7100000000 HC PACU RECOVERY - FIRST 15 MIN: Performed by: SURGERY

## 2018-06-28 PROCEDURE — 36592 COLLECT BLOOD FROM PICC: CPT

## 2018-06-28 PROCEDURE — C9113 INJ PANTOPRAZOLE SODIUM, VIA: HCPCS | Performed by: INTERNAL MEDICINE

## 2018-06-28 PROCEDURE — 0DNW4ZZ RELEASE PERITONEUM, PERCUTANEOUS ENDOSCOPIC APPROACH: ICD-10-PCS | Performed by: SURGERY

## 2018-06-28 PROCEDURE — 80053 COMPREHEN METABOLIC PANEL: CPT

## 2018-06-28 PROCEDURE — 6370000000 HC RX 637 (ALT 250 FOR IP): Performed by: SURGERY

## 2018-06-28 PROCEDURE — 6360000002 HC RX W HCPCS: Performed by: NURSE ANESTHETIST, CERTIFIED REGISTERED

## 2018-06-28 PROCEDURE — 2500000003 HC RX 250 WO HCPCS: Performed by: SURGERY

## 2018-06-28 PROCEDURE — 2580000003 HC RX 258: Performed by: SURGERY

## 2018-06-28 PROCEDURE — 3600000004 HC SURGERY LEVEL 4 BASE: Performed by: SURGERY

## 2018-06-28 PROCEDURE — 2500000003 HC RX 250 WO HCPCS: Performed by: NURSE ANESTHETIST, CERTIFIED REGISTERED

## 2018-06-28 PROCEDURE — 7100000001 HC PACU RECOVERY - ADDTL 15 MIN: Performed by: SURGERY

## 2018-06-28 PROCEDURE — 3700000000 HC ANESTHESIA ATTENDED CARE: Performed by: SURGERY

## 2018-06-28 PROCEDURE — 86901 BLOOD TYPING SEROLOGIC RH(D): CPT

## 2018-06-28 RX ORDER — BUPIVACAINE HYDROCHLORIDE AND EPINEPHRINE 2.5; 5 MG/ML; UG/ML
INJECTION, SOLUTION EPIDURAL; INFILTRATION; INTRACAUDAL; PERINEURAL PRN
Status: DISCONTINUED | OUTPATIENT
Start: 2018-06-28 | End: 2018-06-28 | Stop reason: HOSPADM

## 2018-06-28 RX ORDER — TRAMADOL HYDROCHLORIDE 50 MG/1
25 TABLET ORAL EVERY 6 HOURS PRN
Status: DISCONTINUED | OUTPATIENT
Start: 2018-06-28 | End: 2018-07-04 | Stop reason: HOSPADM

## 2018-06-28 RX ORDER — METOPROLOL TARTRATE 5 MG/5ML
INJECTION INTRAVENOUS PRN
Status: DISCONTINUED | OUTPATIENT
Start: 2018-06-28 | End: 2018-06-28 | Stop reason: SDUPTHER

## 2018-06-28 RX ORDER — TRAMADOL HYDROCHLORIDE 50 MG/1
50 TABLET ORAL EVERY 6 HOURS PRN
Status: DISCONTINUED | OUTPATIENT
Start: 2018-06-28 | End: 2018-07-04 | Stop reason: HOSPADM

## 2018-06-28 RX ORDER — MORPHINE SULFATE 2 MG/ML
2 INJECTION, SOLUTION INTRAMUSCULAR; INTRAVENOUS EVERY 4 HOURS PRN
Status: DISCONTINUED | OUTPATIENT
Start: 2018-06-28 | End: 2018-07-04 | Stop reason: HOSPADM

## 2018-06-28 RX ORDER — MORPHINE SULFATE 4 MG/ML
4 INJECTION, SOLUTION INTRAMUSCULAR; INTRAVENOUS EVERY 4 HOURS PRN
Status: DISCONTINUED | OUTPATIENT
Start: 2018-06-28 | End: 2018-07-04 | Stop reason: HOSPADM

## 2018-06-28 RX ORDER — LIDOCAINE HYDROCHLORIDE 20 MG/ML
INJECTION, SOLUTION INFILTRATION; PERINEURAL PRN
Status: DISCONTINUED | OUTPATIENT
Start: 2018-06-28 | End: 2018-06-28 | Stop reason: SDUPTHER

## 2018-06-28 RX ORDER — ONDANSETRON 2 MG/ML
INJECTION INTRAMUSCULAR; INTRAVENOUS PRN
Status: DISCONTINUED | OUTPATIENT
Start: 2018-06-28 | End: 2018-06-28 | Stop reason: SDUPTHER

## 2018-06-28 RX ORDER — FENTANYL CITRATE 50 UG/ML
INJECTION, SOLUTION INTRAMUSCULAR; INTRAVENOUS PRN
Status: DISCONTINUED | OUTPATIENT
Start: 2018-06-28 | End: 2018-06-28 | Stop reason: SDUPTHER

## 2018-06-28 RX ORDER — 0.9 % SODIUM CHLORIDE 0.9 %
250 INTRAVENOUS SOLUTION INTRAVENOUS ONCE
Status: COMPLETED | OUTPATIENT
Start: 2018-06-28 | End: 2018-06-29

## 2018-06-28 RX ORDER — PROPOFOL 10 MG/ML
INJECTION, EMULSION INTRAVENOUS PRN
Status: DISCONTINUED | OUTPATIENT
Start: 2018-06-28 | End: 2018-06-28 | Stop reason: SDUPTHER

## 2018-06-28 RX ORDER — GLYCOPYRROLATE 1 MG/5 ML
SYRINGE (ML) INTRAVENOUS PRN
Status: DISCONTINUED | OUTPATIENT
Start: 2018-06-28 | End: 2018-06-28 | Stop reason: SDUPTHER

## 2018-06-28 RX ORDER — EPHEDRINE SULFATE 50 MG/ML
INJECTION INTRAVENOUS PRN
Status: DISCONTINUED | OUTPATIENT
Start: 2018-06-28 | End: 2018-06-28 | Stop reason: SDUPTHER

## 2018-06-28 RX ORDER — SODIUM CHLORIDE, SODIUM LACTATE, POTASSIUM CHLORIDE, CALCIUM CHLORIDE 600; 310; 30; 20 MG/100ML; MG/100ML; MG/100ML; MG/100ML
INJECTION, SOLUTION INTRAVENOUS CONTINUOUS PRN
Status: DISCONTINUED | OUTPATIENT
Start: 2018-06-28 | End: 2018-06-28 | Stop reason: SDUPTHER

## 2018-06-28 RX ORDER — METOPROLOL TARTRATE 5 MG/5ML
INJECTION INTRAVENOUS PRN
Status: DISCONTINUED | OUTPATIENT
Start: 2018-06-28 | End: 2018-06-28

## 2018-06-28 RX ORDER — METOPROLOL TARTRATE 5 MG/5ML
5 INJECTION INTRAVENOUS
Status: DISPENSED | OUTPATIENT
Start: 2018-06-28 | End: 2018-06-29

## 2018-06-28 RX ORDER — DEXAMETHASONE SODIUM PHOSPHATE 10 MG/ML
INJECTION, SOLUTION INTRAMUSCULAR; INTRAVENOUS PRN
Status: DISCONTINUED | OUTPATIENT
Start: 2018-06-28 | End: 2018-06-28 | Stop reason: SDUPTHER

## 2018-06-28 RX ORDER — NEOSTIGMINE METHYLSULFATE 0.5 MG/ML
INJECTION, SOLUTION INTRAVENOUS PRN
Status: DISCONTINUED | OUTPATIENT
Start: 2018-06-28 | End: 2018-06-28 | Stop reason: SDUPTHER

## 2018-06-28 RX ORDER — ROCURONIUM BROMIDE 10 MG/ML
INJECTION, SOLUTION INTRAVENOUS PRN
Status: DISCONTINUED | OUTPATIENT
Start: 2018-06-28 | End: 2018-06-28 | Stop reason: SDUPTHER

## 2018-06-28 RX ADMIN — METOPROLOL TARTRATE 5 MG: 5 INJECTION, SOLUTION INTRAVENOUS at 15:25

## 2018-06-28 RX ADMIN — DEXAMETHASONE SODIUM PHOSPHATE 10 MG: 10 INJECTION, SOLUTION INTRAMUSCULAR; INTRAVENOUS at 15:56

## 2018-06-28 RX ADMIN — LIDOCAINE HYDROCHLORIDE 100 MG: 20 INJECTION, SOLUTION INFILTRATION; PERINEURAL at 15:27

## 2018-06-28 RX ADMIN — POTASSIUM CHLORIDE 20 MEQ: 29.8 INJECTION, SOLUTION INTRAVENOUS at 02:03

## 2018-06-28 RX ADMIN — FENTANYL CITRATE 100 MCG: 50 INJECTION, SOLUTION INTRAMUSCULAR; INTRAVENOUS at 15:27

## 2018-06-28 RX ADMIN — PROPOFOL 140 MG: 10 INJECTION, EMULSION INTRAVENOUS at 15:27

## 2018-06-28 RX ADMIN — Medication 250 MG: at 22:33

## 2018-06-28 RX ADMIN — Medication 30 MG: at 15:27

## 2018-06-28 RX ADMIN — NEOSTIGMINE METHYLSULFATE 3 MG: 0.5 INJECTION INTRAVENOUS at 16:25

## 2018-06-28 RX ADMIN — Medication 10 ML: at 09:28

## 2018-06-28 RX ADMIN — SERTRALINE HYDROCHLORIDE 50 MG: 50 TABLET ORAL at 09:27

## 2018-06-28 RX ADMIN — PHENYLEPHRINE HYDROCHLORIDE 100 MCG: 10 INJECTION INTRAMUSCULAR; INTRAVENOUS; SUBCUTANEOUS at 15:36

## 2018-06-28 RX ADMIN — SODIUM CHLORIDE, POTASSIUM CHLORIDE, SODIUM LACTATE AND CALCIUM CHLORIDE: 600; 310; 30; 20 INJECTION, SOLUTION INTRAVENOUS at 18:27

## 2018-06-28 RX ADMIN — SODIUM CHLORIDE, POTASSIUM CHLORIDE, SODIUM LACTATE AND CALCIUM CHLORIDE: 600; 310; 30; 20 INJECTION, SOLUTION INTRAVENOUS at 15:20

## 2018-06-28 RX ADMIN — EPHEDRINE SULFATE 5 MG: 50 INJECTION, SOLUTION INTRAVENOUS at 15:53

## 2018-06-28 RX ADMIN — POTASSIUM CHLORIDE 20 MEQ: 29.8 INJECTION, SOLUTION INTRAVENOUS at 04:14

## 2018-06-28 RX ADMIN — PANTOPRAZOLE SODIUM 40 MG: 40 INJECTION, POWDER, FOR SOLUTION INTRAVENOUS at 09:28

## 2018-06-28 RX ADMIN — ENOXAPARIN SODIUM 50 MG: 60 INJECTION SUBCUTANEOUS at 18:26

## 2018-06-28 RX ADMIN — TRAMADOL HYDROCHLORIDE 50 MG: 50 TABLET, FILM COATED ORAL at 18:26

## 2018-06-28 RX ADMIN — SODIUM CHLORIDE, POTASSIUM CHLORIDE, SODIUM LACTATE AND CALCIUM CHLORIDE: 600; 310; 30; 20 INJECTION, SOLUTION INTRAVENOUS at 13:14

## 2018-06-28 RX ADMIN — MORPHINE SULFATE 2 MG: 2 INJECTION, SOLUTION INTRAMUSCULAR; INTRAVENOUS at 22:32

## 2018-06-28 RX ADMIN — PHENYLEPHRINE HYDROCHLORIDE 100 MCG: 10 INJECTION INTRAMUSCULAR; INTRAVENOUS; SUBCUTANEOUS at 15:33

## 2018-06-28 RX ADMIN — EPHEDRINE SULFATE 10 MG: 50 INJECTION, SOLUTION INTRAVENOUS at 15:56

## 2018-06-28 RX ADMIN — ONDANSETRON 4 MG: 2 INJECTION, SOLUTION INTRAMUSCULAR; INTRAVENOUS at 15:56

## 2018-06-28 RX ADMIN — Medication 10 ML: at 22:32

## 2018-06-28 RX ADMIN — Medication 0.6 MG: at 16:25

## 2018-06-28 RX ADMIN — ACETAMINOPHEN AND CODEINE PHOSPHATE 0.5 TABLET: 300; 30 TABLET ORAL at 09:27

## 2018-06-28 ASSESSMENT — PULMONARY FUNCTION TESTS
PIF_VALUE: 20
PIF_VALUE: 17
PIF_VALUE: 18
PIF_VALUE: 12
PIF_VALUE: 19
PIF_VALUE: 18
PIF_VALUE: 2
PIF_VALUE: 3
PIF_VALUE: 13
PIF_VALUE: 13
PIF_VALUE: 11
PIF_VALUE: 16
PIF_VALUE: 15
PIF_VALUE: 16
PIF_VALUE: 3
PIF_VALUE: 18
PIF_VALUE: 11
PIF_VALUE: 3
PIF_VALUE: 11
PIF_VALUE: 4
PIF_VALUE: 4
PIF_VALUE: 2
PIF_VALUE: 3
PIF_VALUE: 18
PIF_VALUE: 11
PIF_VALUE: 13
PIF_VALUE: 17
PIF_VALUE: 15
PIF_VALUE: 3
PIF_VALUE: 14
PIF_VALUE: 14
PIF_VALUE: 17
PIF_VALUE: 14
PIF_VALUE: 1
PIF_VALUE: 14
PIF_VALUE: 17
PIF_VALUE: 13
PIF_VALUE: 3
PIF_VALUE: 12
PIF_VALUE: 16
PIF_VALUE: 13
PIF_VALUE: 3
PIF_VALUE: 18
PIF_VALUE: 2
PIF_VALUE: 3
PIF_VALUE: 3
PIF_VALUE: 15
PIF_VALUE: 11
PIF_VALUE: 14
PIF_VALUE: 16
PIF_VALUE: 17
PIF_VALUE: 19
PIF_VALUE: 20
PIF_VALUE: 20
PIF_VALUE: 17
PIF_VALUE: 18
PIF_VALUE: 17
PIF_VALUE: 10
PIF_VALUE: 1
PIF_VALUE: 13
PIF_VALUE: 19
PIF_VALUE: 17
PIF_VALUE: 18
PIF_VALUE: 3
PIF_VALUE: 17
PIF_VALUE: 14
PIF_VALUE: 17
PIF_VALUE: 3
PIF_VALUE: 3

## 2018-06-28 ASSESSMENT — PAIN SCALES - GENERAL
PAINLEVEL_OUTOF10: 0
PAINLEVEL_OUTOF10: 7
PAINLEVEL_OUTOF10: 0
PAINLEVEL_OUTOF10: 10
PAINLEVEL_OUTOF10: 0
PAINLEVEL_OUTOF10: 6

## 2018-06-28 ASSESSMENT — PAIN DESCRIPTION - FREQUENCY: FREQUENCY: INTERMITTENT

## 2018-06-28 ASSESSMENT — PAIN DESCRIPTION - LOCATION: LOCATION: ABDOMEN

## 2018-06-28 ASSESSMENT — PAIN DESCRIPTION - DESCRIPTORS: DESCRIPTORS: DISCOMFORT;ACHING

## 2018-06-28 ASSESSMENT — PAIN DESCRIPTION - ORIENTATION: ORIENTATION: LOWER

## 2018-06-28 ASSESSMENT — PAIN DESCRIPTION - PAIN TYPE: TYPE: ACUTE PAIN;SURGICAL PAIN

## 2018-06-28 NOTE — PROGRESS NOTES
Patient seen and examined. There was no family at the bedside today. The patient has some mild lower abdominal discomfort. No complaints of unusual SOB, nausea, vomiting, chest pain, dizziness, diarrhea or constipation. BP (!) 148/67   Pulse 89   Temp 98.1 °F (36.7 °C) (Oral)   Resp 16   Ht 5' 6\" (1.676 m)   Wt 115 lb (52.2 kg)   SpO2 99%   BMI 18.56 kg/m²     HEENT: negative to gross visual examination  Heart: regular rate and rhythm  Lungs: Coarse breath sounds to auscultation without rhonchi or wheezing  Abdomen: soft, positive bowel sounds, mild discomfort on palpation in lower abdomen, no hepatosplenomegaly, mild guarding,no rebound, rigidity  Ext: no clubbing, cyanosis, erythema, edema  Neuro: alert and oriented.  No gross deficits    CBC with Differential:    Lab Results   Component Value Date    WBC 8.7 06/28/2018    RBC 3.08 06/28/2018    HGB 7.9 06/28/2018    HCT 25.8 06/28/2018     06/28/2018    MCV 83.8 06/28/2018    MCH 25.6 06/28/2018    MCHC 30.6 06/28/2018    RDW 16.6 06/28/2018    NRBC 0.5 11/30/2017    BLASTSPCT 1.0 11/06/2017    METASPCT 1.5 12/13/2017    LYMPHOPCT 9.1 06/28/2018    PROMYELOPCT 0.5 11/26/2017    MONOPCT 4.1 06/28/2018    MYELOPCT 0.5 06/27/2018    BASOPCT 0.1 06/28/2018    MONOSABS 0.35 06/28/2018    LYMPHSABS 0.78 06/28/2018    EOSABS 0.00 06/28/2018    BASOSABS 0.00 06/28/2018     CMP:    Lab Results   Component Value Date     06/28/2018    K 4.2 06/28/2018    K 3.2 06/26/2018     06/28/2018    CO2 29 06/28/2018    BUN 5 06/28/2018    CREATININE 0.5 06/28/2018    GFRAA >60 06/28/2018    LABGLOM >60 06/28/2018    GLUCOSE 93 06/28/2018    PROT 5.5 06/28/2018    LABALBU 2.3 06/28/2018    CALCIUM 8.5 06/28/2018    BILITOT 0.2 06/28/2018    ALKPHOS 76 06/28/2018    AST 8 06/28/2018    ALT 8 06/28/2018     Magnesium:    Lab Results   Component Value Date    MG 2.2 06/26/2018     Phosphorus:    Lab Results   Component Value Date    PHOS 3.7 06/26/2018

## 2018-06-28 NOTE — PROGRESS NOTES
PROMYELOPCT 0.5 11/26/2017    MONOPCT 7.5 06/27/2018    MYELOPCT 0.5 06/27/2018    BASOPCT 0.2 06/27/2018    MONOSABS 0.48 06/27/2018    LYMPHSABS 0.84 06/27/2018    EOSABS 0.00 06/27/2018    BASOSABS 0.00 06/27/2018     Platelets:    Lab Results   Component Value Date     06/27/2018     CMP:    Lab Results   Component Value Date     06/27/2018    K 3.5 06/27/2018    K 3.2 06/26/2018     06/27/2018    CO2 31 06/27/2018    BUN 7 06/27/2018    CREATININE 0.6 06/27/2018    GFRAA >60 06/27/2018    LABGLOM >60 06/27/2018    GLUCOSE 101 06/27/2018    PROT 6.2 06/26/2018    LABALBU 2.3 06/26/2018    CALCIUM 8.6 06/27/2018    BILITOT 0.3 06/26/2018    ALKPHOS 90 06/26/2018    AST 13 06/26/2018    ALT 11 06/26/2018     BMP:    Lab Results   Component Value Date     06/27/2018    K 3.5 06/27/2018    K 3.2 06/26/2018     06/27/2018    CO2 31 06/27/2018    BUN 7 06/27/2018    LABALBU 2.3 06/26/2018    CREATININE 0.6 06/27/2018    CALCIUM 8.6 06/27/2018    GFRAA >60 06/27/2018    LABGLOM >60 06/27/2018    GLUCOSE 101 06/27/2018     Hepatic Function Panel:    Lab Results   Component Value Date    ALKPHOS 90 06/26/2018    ALT 11 06/26/2018    AST 13 06/26/2018    PROT 6.2 06/26/2018    BILITOT 0.3 06/26/2018    BILIDIR 0.4 11/12/2017    IBILI 0.3 11/12/2017    LABALBU 2.3 06/26/2018     Albumin:    Lab Results   Component Value Date    LABALBU 2.3 06/26/2018     Calcium:    Lab Results   Component Value Date    CALCIUM 8.6 06/27/2018     Ionized Calcium:  No results found for: IONCA  Magnesium:    Lab Results   Component Value Date    MG 2.2 06/26/2018     Phosphorus:    Lab Results   Component Value Date    PHOS 3.7 06/26/2018     Last 3 Troponin:    Lab Results   Component Value Date    TROPONINI 0.01 12/21/2017    TROPONINI 0.02 12/19/2017    TROPONINI 0.01 11/06/2017     FLP:    Lab Results   Component Value Date    TRIG 100 11/21/2017    HDL 58.0 07/18/2013    LDLCALC 157 07/18/2013     TSH:

## 2018-06-29 LAB
ALBUMIN SERPL-MCNC: 2.2 G/DL (ref 3.5–5.2)
ALP BLD-CCNC: 71 U/L (ref 35–104)
ALT SERPL-CCNC: 8 U/L (ref 0–32)
ANION GAP SERPL CALCULATED.3IONS-SCNC: 10 MMOL/L (ref 7–16)
AST SERPL-CCNC: 11 U/L (ref 0–31)
BILIRUB SERPL-MCNC: 0.3 MG/DL (ref 0–1.2)
BUN BLDV-MCNC: 8 MG/DL (ref 8–23)
CALCIUM SERPL-MCNC: 8.5 MG/DL (ref 8.6–10.2)
CHLORIDE BLD-SCNC: 95 MMOL/L (ref 98–107)
CO2: 29 MMOL/L (ref 22–29)
CREAT SERPL-MCNC: 0.6 MG/DL (ref 0.5–1)
GFR AFRICAN AMERICAN: >60
GFR NON-AFRICAN AMERICAN: >60 ML/MIN/1.73
GLUCOSE BLD-MCNC: 109 MG/DL (ref 74–109)
HCT VFR BLD CALC: 28.8 % (ref 34–48)
HEMOGLOBIN: 9 G/DL (ref 11.5–15.5)
MCH RBC QN AUTO: 25.9 PG (ref 26–35)
MCHC RBC AUTO-ENTMCNC: 31.3 % (ref 32–34.5)
MCV RBC AUTO: 82.8 FL (ref 80–99.9)
PDW BLD-RTO: 17.2 FL (ref 11.5–15)
PLATELET # BLD: 423 E9/L (ref 130–450)
PMV BLD AUTO: 8.5 FL (ref 7–12)
POTASSIUM SERPL-SCNC: 3.6 MMOL/L (ref 3.5–5)
RBC # BLD: 3.48 E12/L (ref 3.5–5.5)
SODIUM BLD-SCNC: 134 MMOL/L (ref 132–146)
TOTAL PROTEIN: 5.2 G/DL (ref 6.4–8.3)
WBC # BLD: 12.1 E9/L (ref 4.5–11.5)

## 2018-06-29 PROCEDURE — 6370000000 HC RX 637 (ALT 250 FOR IP): Performed by: SURGERY

## 2018-06-29 PROCEDURE — 36415 COLL VENOUS BLD VENIPUNCTURE: CPT

## 2018-06-29 PROCEDURE — 6370000000 HC RX 637 (ALT 250 FOR IP): Performed by: INTERNAL MEDICINE

## 2018-06-29 PROCEDURE — G8982 BODY POS GOAL STATUS: HCPCS | Performed by: PHYSICAL THERAPIST

## 2018-06-29 PROCEDURE — 2580000003 HC RX 258: Performed by: INTERNAL MEDICINE

## 2018-06-29 PROCEDURE — C9113 INJ PANTOPRAZOLE SODIUM, VIA: HCPCS | Performed by: INTERNAL MEDICINE

## 2018-06-29 PROCEDURE — 2060000000 HC ICU INTERMEDIATE R&B

## 2018-06-29 PROCEDURE — 36430 TRANSFUSION BLD/BLD COMPNT: CPT

## 2018-06-29 PROCEDURE — 99024 POSTOP FOLLOW-UP VISIT: CPT | Performed by: SURGERY

## 2018-06-29 PROCEDURE — G8981 BODY POS CURRENT STATUS: HCPCS | Performed by: PHYSICAL THERAPIST

## 2018-06-29 PROCEDURE — G8988 SELF CARE GOAL STATUS: HCPCS

## 2018-06-29 PROCEDURE — 6360000002 HC RX W HCPCS: Performed by: SURGERY

## 2018-06-29 PROCEDURE — 97535 SELF CARE MNGMENT TRAINING: CPT

## 2018-06-29 PROCEDURE — 85027 COMPLETE CBC AUTOMATED: CPT

## 2018-06-29 PROCEDURE — 80053 COMPREHEN METABOLIC PANEL: CPT

## 2018-06-29 PROCEDURE — 94150 VITAL CAPACITY TEST: CPT

## 2018-06-29 PROCEDURE — 2580000003 HC RX 258: Performed by: SURGERY

## 2018-06-29 PROCEDURE — 97166 OT EVAL MOD COMPLEX 45 MIN: CPT

## 2018-06-29 PROCEDURE — 6360000002 HC RX W HCPCS: Performed by: INTERNAL MEDICINE

## 2018-06-29 PROCEDURE — 36592 COLLECT BLOOD FROM PICC: CPT

## 2018-06-29 PROCEDURE — G8987 SELF CARE CURRENT STATUS: HCPCS

## 2018-06-29 PROCEDURE — 97161 PT EVAL LOW COMPLEX 20 MIN: CPT | Performed by: PHYSICAL THERAPIST

## 2018-06-29 RX ORDER — FENTANYL 12 UG/H
1 PATCH TRANSDERMAL
Status: DISCONTINUED | OUTPATIENT
Start: 2018-06-29 | End: 2018-07-03

## 2018-06-29 RX ADMIN — Medication 10 ML: at 09:49

## 2018-06-29 RX ADMIN — SODIUM CHLORIDE, POTASSIUM CHLORIDE, SODIUM LACTATE AND CALCIUM CHLORIDE: 600; 310; 30; 20 INJECTION, SOLUTION INTRAVENOUS at 23:12

## 2018-06-29 RX ADMIN — MORPHINE SULFATE 2 MG: 2 INJECTION, SOLUTION INTRAMUSCULAR; INTRAVENOUS at 09:53

## 2018-06-29 RX ADMIN — SODIUM CHLORIDE 250 ML: 9 INJECTION, SOLUTION INTRAVENOUS at 00:05

## 2018-06-29 RX ADMIN — Medication 250 MG: at 04:57

## 2018-06-29 RX ADMIN — Medication 250 MG: at 11:21

## 2018-06-29 RX ADMIN — TRAMADOL HYDROCHLORIDE 25 MG: 50 TABLET, FILM COATED ORAL at 20:35

## 2018-06-29 RX ADMIN — ENOXAPARIN SODIUM 50 MG: 60 INJECTION SUBCUTANEOUS at 17:33

## 2018-06-29 RX ADMIN — TRAMADOL HYDROCHLORIDE 25 MG: 50 TABLET, FILM COATED ORAL at 02:36

## 2018-06-29 RX ADMIN — PANTOPRAZOLE SODIUM 40 MG: 40 INJECTION, POWDER, FOR SOLUTION INTRAVENOUS at 09:49

## 2018-06-29 RX ADMIN — ENOXAPARIN SODIUM 50 MG: 60 INJECTION SUBCUTANEOUS at 05:43

## 2018-06-29 RX ADMIN — Medication 250 MG: at 22:23

## 2018-06-29 RX ADMIN — Medication 250 MG: at 17:33

## 2018-06-29 RX ADMIN — MORPHINE SULFATE 2 MG: 2 INJECTION, SOLUTION INTRAMUSCULAR; INTRAVENOUS at 23:55

## 2018-06-29 RX ADMIN — TRAMADOL HYDROCHLORIDE 50 MG: 50 TABLET, FILM COATED ORAL at 11:22

## 2018-06-29 RX ADMIN — Medication 10 ML: at 23:55

## 2018-06-29 RX ADMIN — MORPHINE SULFATE 2 MG: 2 INJECTION, SOLUTION INTRAMUSCULAR; INTRAVENOUS at 18:24

## 2018-06-29 RX ADMIN — SODIUM CHLORIDE, POTASSIUM CHLORIDE, SODIUM LACTATE AND CALCIUM CHLORIDE: 600; 310; 30; 20 INJECTION, SOLUTION INTRAVENOUS at 11:21

## 2018-06-29 RX ADMIN — SERTRALINE HYDROCHLORIDE 50 MG: 50 TABLET ORAL at 09:49

## 2018-06-29 ASSESSMENT — PAIN SCALES - GENERAL
PAINLEVEL_OUTOF10: 0
PAINLEVEL_OUTOF10: 6
PAINLEVEL_OUTOF10: 6
PAINLEVEL_OUTOF10: 7
PAINLEVEL_OUTOF10: 7
PAINLEVEL_OUTOF10: 6
PAINLEVEL_OUTOF10: 6
PAINLEVEL_OUTOF10: 7

## 2018-06-29 ASSESSMENT — PAIN DESCRIPTION - FREQUENCY
FREQUENCY: INTERMITTENT
FREQUENCY: INTERMITTENT

## 2018-06-29 ASSESSMENT — PAIN DESCRIPTION - PAIN TYPE
TYPE: ACUTE PAIN;SURGICAL PAIN
TYPE: ACUTE PAIN;SURGICAL PAIN

## 2018-06-29 ASSESSMENT — PAIN DESCRIPTION - DESCRIPTORS
DESCRIPTORS: STABBING;DISCOMFORT;ACHING
DESCRIPTORS: STABBING;DISCOMFORT;ACHING

## 2018-06-29 ASSESSMENT — PAIN DESCRIPTION - ORIENTATION
ORIENTATION: LOWER
ORIENTATION: LOWER

## 2018-06-29 ASSESSMENT — PAIN DESCRIPTION - LOCATION
LOCATION: ABDOMEN
LOCATION: ABDOMEN

## 2018-06-29 NOTE — PROGRESS NOTES
Patient seen and examined. Patient's  was at the bedside today. The patient has some generalized upper body discomfort continuously and increase lower abdominal discomfort postop intermittently. No complaints of unusual SOB, nausea, vomiting, chest pain, dizziness, diarrhea or constipation. /61   Pulse 88   Temp 97.5 °F (36.4 °C) (Oral)   Resp 15   Ht 5' 6\" (1.676 m)   Wt 115 lb (52.2 kg)   SpO2 96%   BMI 18.56 kg/m²     HEENT: negative to gross visual examination  Heart: regular rate and rhythm  Lungs: Coarse breath sounds to auscultation without rhonchi or wheezing  Abdomen: Postop  Ext: no clubbing, cyanosis, erythema, edema  Neuro: alert and oriented.  No gross deficits    CBC with Differential:    Lab Results   Component Value Date    WBC 12.1 06/29/2018    RBC 3.48 06/29/2018    HGB 9.0 06/29/2018    HCT 28.8 06/29/2018     06/29/2018    MCV 82.8 06/29/2018    MCH 25.9 06/29/2018    MCHC 31.3 06/29/2018    RDW 17.2 06/29/2018    NRBC 0.5 11/30/2017    BLASTSPCT 1.0 11/06/2017    METASPCT 1.5 12/13/2017    LYMPHOPCT 9.1 06/28/2018    PROMYELOPCT 0.5 11/26/2017    MONOPCT 4.1 06/28/2018    MYELOPCT 0.5 06/27/2018    BASOPCT 0.1 06/28/2018    MONOSABS 0.35 06/28/2018    LYMPHSABS 0.78 06/28/2018    EOSABS 0.00 06/28/2018    BASOSABS 0.00 06/28/2018     CMP:    Lab Results   Component Value Date     06/29/2018    K 3.6 06/29/2018    K 3.2 06/26/2018    CL 95 06/29/2018    CO2 29 06/29/2018    BUN 8 06/29/2018    CREATININE 0.6 06/29/2018    GFRAA >60 06/29/2018    LABGLOM >60 06/29/2018    GLUCOSE 109 06/29/2018    PROT 5.2 06/29/2018    LABALBU 2.2 06/29/2018    CALCIUM 8.5 06/29/2018    BILITOT 0.3 06/29/2018    ALKPHOS 71 06/29/2018    AST 11 06/29/2018    ALT 8 06/29/2018     Magnesium:    Lab Results   Component Value Date    MG 2.2 06/26/2018     Phosphorus:    Lab Results   Component Value Date    PHOS 3.7 06/26/2018     PT/INR:    Lab Results   Component Value Date PROTIME 11.8 06/27/2018    INR 1.0 06/27/2018     Troponin:    Lab Results   Component Value Date    TROPONINI 0.01 12/21/2017     U/A:    Lab Results   Component Value Date    COLORU Yellow 12/24/2017    PROTEINU Negative 12/24/2017    PHUR 6.5 12/24/2017    WBCUA 10-20 12/24/2017    RBCUA NONE 12/24/2017    BACTERIA FEW 12/24/2017    CLARITYU Clear 12/24/2017    SPECGRAV <=1.005 12/24/2017    LEUKOCYTESUR LARGE 12/24/2017    UROBILINOGEN 0.2 12/24/2017    BILIRUBINUR Negative 12/24/2017    BLOODU Negative 12/24/2017    GLUCOSEU Negative 12/24/2017     HgBA1c:  No results found for: LABA1C  FLP:    Lab Results   Component Value Date    TRIG 100 11/21/2017    HDL 58.0 07/18/2013    LDLCALC 157 07/18/2013     TSH:    Lab Results   Component Value Date    TSH 4.620 04/07/2018     LIPASE:    Lab Results   Component Value Date    LIPASE 78 12/22/2017       No results for input(s): GLUMET in the last 72 hours. IR ULTRASOUND GUIDANCE VASCULAR ACCESS   Final Result   Ultrasound guidance was provided during placement of a   PICC line. IR FLUORO GUIDED CVA DEVICE PLACEMENT   Final Result   Successful placement of a 5 Japanese double-lumen Power   PICC line using ultrasound guidance via the right brachial  vein. CT ABDOMEN PELVIS W IV CONTRAST Additional Contrast? Oral   Final Result   1. Abnormal appearance of the distal sigmoid colon and rectum with   abnormal wall thickening. Inflammatory changes extend to the adjacent   loops of small bowel and there is distal small bowel wall thickening. There is no small bowel obstruction. .   2. Perirectal phlegmon and development of multiple tiny perirectal   abscesses. The largest is seen on the left and measures 2 cm. 3. 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 .   4. Left hydroureteronephrosis.  Due to the inflammatory process within   the pelvis the distal left ureter is obstructed and is not well seen. THIS IS AN ABNORMAL REPORT. PLEASE TAKE APPROPRIATE MEASURES. This report will be called to the floor by radiology personnel. US DUP LOWER EXTREMITY LEFT JEFERSON   Final Result   1. Minimal residual chronic deep venous thrombosis seen throughout the   veins of the left lower extremity. . Flow is now appreciated on today's   exam. No flow was noted on the patient's previous examination of   4/7/2018.  fentanyl  1 patch Transdermal Q72H    fentaNYL  1 patch Transdermal Q72H    vancomycin  250 mg Oral 4 times per day    sodium chloride flush  10 mL Intravenous 2 times per day    enoxaparin  50 mg Subcutaneous BID    pantoprazole  40 mg Intravenous Daily    sertraline  50 mg Oral Daily        Assessment; Active Problems:    Acute colovaginal fistula-Postop day 1    Iron deficiency anemia with chronic disease    Altered mental status    Fatigue due to excessive exertion    History of PAF (paroxysmal atrial fibrillation) (HCC)    Macular degeneration    Ischemic bowel disease (HCC)    Hx of left femoral DVT -improved    History of severe ischemic bowel with emergent bowel resection November 2017    History of Cancer -colorectal    Hypokalemia-resolved    + C diff       Plan:   Dr. Thais Hopkins on consult  Vancomycin orally started 6/28  Duragesic patch 25 mcg every 72 hours  Labs in a.m. See orders.         Wilson Kraft DO  1:24 PM  6/29/2018

## 2018-06-29 NOTE — OP NOTE
blunt dissection. Once this was freed up,  colon was then brought up to the abdominal wall and aperture was made  through _____ enlarging one of our trocar sites to approximately 2.5 cm. The colon was then matured to the abdominal wall with Vicryl sutures. Wounds were irrigated. The trocar sites were closed with 4-0 Monocryl and  Dermaflex was placed on the other incisions. There was an ostomy _____  placed on a new diverting colostomy.         Randall Evans MD    D: 06/28/2018 16:31:37       T: 06/29/2018 1:07:59     [email or web address]  Job#: [id number]     Doc#: [id number]    CC:

## 2018-06-29 NOTE — PLAN OF CARE
Problem: Falls - Risk of:  Goal: Will remain free from falls  Will remain free from falls   Outcome: Ongoing      Problem: Pain:  Goal: Pain level will decrease  Pain level will decrease   Outcome: Ongoing    Goal: Control of acute pain  Control of acute pain   Outcome: Ongoing      Problem: Fluid Volume - Imbalance:  Goal: Absence of imbalanced fluid volume signs and symptoms  Absence of imbalanced fluid volume signs and symptoms   Outcome: Ongoing

## 2018-06-29 NOTE — PROGRESS NOTES
Occupational Therapy  Occupational Therapy Initial Assessment    Date:2018  Patient Name: Celi Mckinley  MRN: [id number]  : 1940  ROOM #: 0629/0629-01    Placement recommendation: subacute  Vs HHOT with  supervision and assist   Equipment prescriptions needed:     AM-PAC Daily Activity Inpatient   How much help for putting on and taking off regular lower body clothing?: Total  How much help for Bathing?: A Lot  How much help for Toileting?: A Lot  How much help for putting on and taking off regular upper body clothing?: A Lot  How much help for taking care of personal grooming?: A Lot  How much help for eating meals?: A Little  AM-PAC Inpatient Daily Activity Raw Score: 12  AM-PAC Inpatient ADL T-Scale Score : 30.6  ADL Inpatient CMS 0-100% Score: 66.57  ADL Inpatient CMS G-Code Modifier : CL      Diagnosis / Problem List: No diagnosis found. Patient Active Problem List   Diagnosis    [facility] [address]    Pneumatosis intestinalis    Macular degeneration    Enteritis    Severe protein-calorie malnutrition (HCC)    Macular degeneration    PAF (paroxysmal atrial fibrillation) (HCC)    Ischemic bowel disease (HCC)    Altered mental status    Fatigue due to excessive exertion    PAF (paroxysmal atrial fibrillation) ([address]    Macular degeneration    Ischemic bowel disease ([address]    Hx of blood clots    Cancer ([address]      Past Medical History:   Past Medical History:   Diagnosis Date    Cancer ([address]     colorectal     History of blood transfusion     Hx of blood clots     dvt, on eliquis    Ischemic bowel disease ([address]     Macular degeneration     MDRO (multiple drug resistant organisms) resistance     PAF (paroxysmal atrial fibrillation) (HCC)     Tinnitus          The admitting diagnosis and active problem list as listed above have been reviewed prior to the initiation of this evaluation. Nursing cleared the patient for evaluation. Patient is agreeable to evaluation.     Precautions:

## 2018-06-29 NOTE — PROGRESS NOTES
Physical Therapy    Physical Therapy     Room # 0629/0629-01   PHYSICAL THERAPY INITIAL EVALUATION  Tentative placement recommendation: subacute vs home p.t. pending progress and patient's family's ability to assist.      Equipment prescriptions needed:   none  Plan of care: Patient will be seen  daily for therapeutic exercise, functional retraining, endurance activities, balance exercises, family and patient education. AM-PAC Basic Mobility    Key: total(1); a lot (2); a little (3): none (4)  How much help from another person is needed. ..  2 1. Turning from your back to your side while in a flat bed without using bed rails?    2 2. Moving from lying on your back to sitting on the side of a flat bed w/o using bed rails? 2 3. Moving to and from a bed to a chair or wheelchair?     2 4. Standing up from a chair using your arms?    1 5. To walk in a hospital room?    1 6. Climbing 3-5 steps with a railing? Raw score:  10/24    Order: evaluate and treat     No diagnosis found.   Past medical history:       Diagnosis Date    Cancer [facility])     colorectal     History of blood transfusion     Hx of blood clots     dvt, on eliquis    Ischemic [facility] disease ([facility] [address]     Macular degeneration     MDRO (multiple drug resistant organisms) resistance     PAF (paroxysmal atrial fibrillation) (HCC)     Tinnitus    ;      Procedure Laterality Date    ABDOMEN SURGERY      colostomy & reversal    CHOLECYSTECTOMY  07    COLONOSCOPY      DILATATION, ESOPHAGUS      HAND SURGERY      carpel tunnel rigth hand    HYSTERECTOMY      ILEOSTOMY OR JEJUNOSTOMY      OTHER SURGICAL HISTORY  11/06/2017    diagnostic laparoscopy with exploratory laparotomy and reduction of internal hernia and closure of mesenteric defect, oversewing of multiple serosal tears    ME LAP,SURG,COLECT,TOT,W/PROCTECT,W/ILEOST N/A 6/28/2018    LAPAROSCOPIC DIVERTING COLOSTOMY performed by Fabien Shetty MD at [address] [place] [address] The admitting diagnosis and active problem list as listed above have been reviewed prior to the initiation of this evaluation. Nursing cleared the patient for evaluation. In bed only  Patient is agreeable to evaluation. Precautions: falls  , s/p colostomy-colostomy (have emptied prior to treatment to avoid tension on seal)    Social history: Patient lives with family   in a single family home with No steps to enter      However one flight to bed and bath  Equipment owned: straight cane,  wheeled walker    Mentation: alert, cooperative, oriented x 3 and follows directions,  hard of hearing   Prior level of function: indep with wheeled walker   ROM: within functional limits   STRENGTH: 3/5  PAIN: (measured on a visual analog scale with 0=no pain and 10=excruciating pain) 3/10. FUNCTIONAL ASSESSMENT   Bed Mobility- roll mod a    Edema: yes - Left lower extremity  Previous dvt  Endurance: poor fatigues easily    Treatment: evaluation;  Performed a/arom all extremities/joints/planes  Patient/family education:effects of immobilty      Rehab Potential: good  for baseline  Patients Goal: get back home  ASSESSMENT  Patient exhibits decreased  strength, endurance,  balance, coordination impairing functional mobility. Goals to be met in 3 days. Bed mobility-Minimal assists    Transfers-Minimal assists    Ambulate with wheeled walker min a for 40 feet  Patient will be able to dangle with min assist for 10 minutes     Increase strength in affected muscle groups by 1/3 grade  Increase balance to allow for improvement towards functional goals. Increase endurance to allow for improvement towards functional goals.

## 2018-06-29 NOTE — FLOWSHEET NOTE
Inpatient Wound Care    Admit Date: 6/25/2018  2:41 PM    Reason for consult:  Skin care    Significant history:    Past Medical History:   Diagnosis Date    Cancer [facility])     colorectal     History of blood transfusion     Hx of blood clots     dvt, on eliquis    Ischemic bowel disease ([address]     Macular degeneration     MDRO (multiple drug resistant organisms) resistance     PAF (paroxysmal atrial fibrillation) (HCC)     Tinnitus        Wound history:      Findings:       06/29/18 1026   Skin Integrity Site 3   Skin Integrity Location 3 Redness   Location 3 coccyx   Skin Integrity Site 4   Skin Integrity Location 4 Redness   Location 4 bilateral buttocks   inner buttocks is red worse on left side  Coccyx is red blanchabel  Pt skin is fragile  Pt cont to have some rectal tan drainage  Gemini rectal area red        Impression:    Redness excoriation buttocks     Interventions in place:    Low air loss bed  Premium pad  Explained to pt need to turn  inst on pressure relief measures  Informed Dr Coco Boo re drainage and skin redness      Plan:  Cont low air loss bed  Comfort glide turning system  Forma heel protectors    Cont destitin      Lizet Celeste 6/29/2018 10:30 AM

## 2018-06-29 NOTE — PROGRESS NOTES
WBC 8.7 06/28/2018    RBC 3.08 06/28/2018    HGB 7.9 06/28/2018    HCT 25.8 06/28/2018     06/28/2018    MCV 83.8 06/28/2018    MCH 25.6 06/28/2018    MCHC 30.6 06/28/2018    RDW 16.6 06/28/2018    NRBC 0.5 11/30/2017    BLASTSPCT 1.0 11/06/2017    METASPCT 1.5 12/13/2017    LYMPHOPCT 9.1 06/28/2018    PROMYELOPCT 0.5 11/26/2017    MONOPCT 4.1 06/28/2018    MYELOPCT 0.5 06/27/2018    BASOPCT 0.1 06/28/2018    MONOSABS 0.35 06/28/2018    LYMPHSABS 0.78 06/28/2018    EOSABS 0.00 06/28/2018    BASOSABS 0.00 06/28/2018     Platelets:    Lab Results   Component Value Date     06/28/2018     CMP:    Lab Results   Component Value Date     06/28/2018    K 4.2 06/28/2018    K 3.2 06/26/2018     06/28/2018    CO2 29 06/28/2018    BUN 5 06/28/2018    CREATININE 0.5 06/28/2018    GFRAA >60 06/28/2018    LABGLOM >60 06/28/2018    GLUCOSE 93 06/28/2018    PROT 5.5 06/28/2018    LABALBU 2.3 06/28/2018    CALCIUM 8.5 06/28/2018    BILITOT 0.2 06/28/2018    ALKPHOS 76 06/28/2018    AST 8 06/28/2018    ALT 8 06/28/2018     BMP:    Lab Results   Component Value Date     06/28/2018    K 4.2 06/28/2018    K 3.2 06/26/2018     06/28/2018    CO2 29 06/28/2018    BUN 5 06/28/2018    LABALBU 2.3 06/28/2018    CREATININE 0.5 06/28/2018    CALCIUM 8.5 06/28/2018    GFRAA >60 06/28/2018    LABGLOM >60 06/28/2018    GLUCOSE 93 06/28/2018     Hepatic Function Panel:    Lab Results   Component Value Date    ALKPHOS 76 06/28/2018    ALT 8 06/28/2018    AST 8 06/28/2018    PROT 5.5 06/28/2018    BILITOT 0.2 06/28/2018    BILIDIR 0.4 11/12/2017    IBILI 0.3 11/12/2017    LABALBU 2.3 06/28/2018     Albumin:    Lab Results   Component Value Date    LABALBU 2.3 06/28/2018     Calcium:    Lab Results   Component Value Date    CALCIUM 8.5 06/28/2018     Ionized Calcium:  No results found for: IONCA  Magnesium:    Lab Results   Component Value Date    MG 2.2 06/26/2018     Phosphorus:    Lab Results   Component Value Date    PHOS 3.7 06/26/2018     Last 3 Troponin:    Lab Results   Component Value Date    TROPONINI 0.01 12/21/2017    TROPONINI 0.02 12/19/2017    TROPONINI 0.01 11/06/2017     FLP:    Lab Results   Component Value Date    TRIG 100 11/21/2017    HDL 58.0 07/18/2013    LDLCALC 157 07/18/2013     TSH:    Lab Results   Component Value Date    TSH 4.620 04/07/2018        Patient Active Problem List   Diagnosis    [facility] [address]    Pneumatosis intestinalis    Macular degeneration    Enteritis    Severe protein-calorie malnutrition (HCC)    Macular degeneration    PAF (paroxysmal atrial fibrillation) (HCC)    Ischemic bowel disease (HCC)    Altered mental status    Fatigue due to excessive exertion    PAF (paroxysmal atrial fibrillation) (HCC)    Macular degeneration    Ischemic bowel disease ([facility] [address]    Hx of blood clots    Cancer (HCC)     Assessment/Plan:  1. Colovaginal fistula  2. Rectal Ca s/p radiation/chemo/resection [facility]  3. S/P ischemic bowel with emergency intervention Dr Paris Davies 11/17  4. History of proximal atrial fibrillation currently sinus rhythm  5. History of extensive DVT left leg-April 2018  6. History of ischemic bowel with resection in November 2017  7. History of prior tobacco use  8. Normocytic anemia  9. Hypoalbuminemia-Protein and calorie malnutrition  10. Failure to thrive  11. Anemia - multifactorial; likely iron deficiency  12. High dose immodium use for chronic loose stool/incontinence  13.  R/O hypophosphatemia     -IV fluids  -Moe Olivera; Terri Yee on case   -Labs in a.m.  -Repeat venous Doppler of left leg  -Resume home meds  -Pending CT of the abdomen and pelvis and further eval by Dr Paris Davies and Dr Olimpia Aranda  Stat BMP; CBC  Echocardiogram reviewed; good EF; no significant valvular dz  Ekg stable  See orders  Discussed with Dr Kyle Olivera and Dr Paris Davies    Had a  diverting Colostomy today with Dr Paris Davies with good results  Anemia worse-transfuse 1 unit  Hopefully will heal well with improved nutrition  Consider short term hyperalimentation-protein  Had preop metoprolol  No A Fib thus far; K+ better; iron better

## 2018-06-29 NOTE — PROGRESS NOTES
Value Date    MG 2.2 06/26/2018     Phosphorus:    Lab Results   Component Value Date    PHOS 3.7 06/26/2018     Last 3 Troponin:    Lab Results   Component Value Date    TROPONINI 0.01 12/21/2017    TROPONINI 0.02 12/19/2017    TROPONINI 0.01 11/06/2017     FLP:    Lab Results   Component Value Date    TRIG 100 11/21/2017    HDL 58.0 07/18/2013    LDLCALC 157 07/18/2013     TSH:    Lab Results   Component Value Date    TSH 4.620 04/07/2018        Patient Active Problem List   Diagnosis    Rectal cancer ([facility] [address]    Pneumatosis intestinalis    Macular degeneration    Enteritis    Severe protein-calorie malnutrition (HCC)    Macular degeneration    PAF (paroxysmal atrial fibrillation) (HCC)    Ischemic bowel disease (HCC)    Altered mental status    Fatigue due to excessive exertion    PAF (paroxysmal atrial fibrillation) (HCC)    Macular degeneration    Ischemic bowel disease ([facility] [address]    Hx of blood clots    Cancer (HCC)     Assessment/Plan:  1. Colovaginal fistula  2. Rectal Ca s/p radiation/chemo/resection [facility]  3. S/P ischemic bowel with emergency intervention Dr Dm Hill 11/17  4. History of proximal atrial fibrillation currently sinus rhythm  5. History of extensive DVT left leg-April 2018  6. History of ischemic bowel with resection in November 2017  7. History of prior tobacco use  8. Normocytic anemia  9. Hypoalbuminemia-Protein and calorie malnutrition  10. Failure to thrive  11. Anemia - multifactorial; likely iron deficiency  12. High dose immodium use for chronic loose stool/incontinence  13.  R/O hypophosphatemia     -IV fluids  -Moe Tracey; Sen Ugarte on case   -Labs in a.m.  -Repeat venous Doppler of left leg  -Resume home meds  -Pending CT of the abdomen and pelvis and further eval by Dr Dm Hill and Dr Coffey Given  Stat BMP; CBC  Echocardiogram reviewed; good EF; no significant valvular dz  Ekg stable  See orders  Discussed with Dr Hugo Tracey and Dr Dm Hill    Had a  diverting Colostomy with Dr Dm Hill

## 2018-06-29 NOTE — CARE COORDINATION
Case Management: Social Work/Discharge Planning:     SW met with Pt and Pt's  Jez Faria) at bedside and introduced self and role. Pt resided with her  and used ww to ambulate PTA. Hx of [address] [employer] [address] SW reviewed PT recommendation for [facility] vs [facility], and Pt's  stated discharge plan was undetermined at this time. SW will continue to follow and remain available to assist with transition of care needs.    Electronically signed by DUSTY Funk on 6/29/2018 at 3:26 PM

## 2018-06-30 LAB
ANION GAP SERPL CALCULATED.3IONS-SCNC: 6 MMOL/L (ref 7–16)
ANISOCYTOSIS: ABNORMAL
BASOPHILS ABSOLUTE: 0 E9/L (ref 0–0.2)
BASOPHILS RELATIVE PERCENT: 0 % (ref 0–2)
BUN BLDV-MCNC: 10 MG/DL (ref 8–23)
CALCIUM SERPL-MCNC: 8.6 MG/DL (ref 8.6–10.2)
CHLORIDE BLD-SCNC: 100 MMOL/L (ref 98–107)
CO2: 31 MMOL/L (ref 22–29)
CREAT SERPL-MCNC: 0.6 MG/DL (ref 0.5–1)
EOSINOPHILS ABSOLUTE: 0 E9/L (ref 0.05–0.5)
EOSINOPHILS RELATIVE PERCENT: 0 % (ref 0–6)
GFR AFRICAN AMERICAN: >60
GFR NON-AFRICAN AMERICAN: >60 ML/MIN/1.73
GLUCOSE BLD-MCNC: 113 MG/DL (ref 74–109)
HCT VFR BLD CALC: 26.5 % (ref 34–48)
HEMOGLOBIN: 8.4 G/DL (ref 11.5–15.5)
HYPOCHROMIA: ABNORMAL
LYMPHOCYTES ABSOLUTE: 1.51 E9/L (ref 1.5–4)
LYMPHOCYTES RELATIVE PERCENT: 14 % (ref 20–42)
MCH RBC QN AUTO: 25.8 PG (ref 26–35)
MCHC RBC AUTO-ENTMCNC: 31.7 % (ref 32–34.5)
MCV RBC AUTO: 81.5 FL (ref 80–99.9)
MONOCYTES ABSOLUTE: 0.43 E9/L (ref 0.1–0.95)
MONOCYTES RELATIVE PERCENT: 4 % (ref 2–12)
NEUTROPHILS ABSOLUTE: 8.86 E9/L (ref 1.8–7.3)
NEUTROPHILS RELATIVE PERCENT: 82 % (ref 43–80)
PDW BLD-RTO: 17.3 FL (ref 11.5–15)
PLATELET # BLD: 380 E9/L (ref 130–450)
PMV BLD AUTO: 8.5 FL (ref 7–12)
POLYCHROMASIA: ABNORMAL
POTASSIUM SERPL-SCNC: 3.3 MMOL/L (ref 3.5–5)
RBC # BLD: 3.25 E12/L (ref 3.5–5.5)
SODIUM BLD-SCNC: 137 MMOL/L (ref 132–146)
TRIGL SERPL-MCNC: 88 MG/DL (ref 0–149)
WBC # BLD: 10.8 E9/L (ref 4.5–11.5)

## 2018-06-30 PROCEDURE — 84478 ASSAY OF TRIGLYCERIDES: CPT

## 2018-06-30 PROCEDURE — 6370000000 HC RX 637 (ALT 250 FOR IP): Performed by: SURGERY

## 2018-06-30 PROCEDURE — C9113 INJ PANTOPRAZOLE SODIUM, VIA: HCPCS | Performed by: INTERNAL MEDICINE

## 2018-06-30 PROCEDURE — 6360000002 HC RX W HCPCS: Performed by: INTERNAL MEDICINE

## 2018-06-30 PROCEDURE — 2580000003 HC RX 258: Performed by: INTERNAL MEDICINE

## 2018-06-30 PROCEDURE — 2580000003 HC RX 258: Performed by: SURGERY

## 2018-06-30 PROCEDURE — 85025 COMPLETE CBC W/AUTO DIFF WBC: CPT

## 2018-06-30 PROCEDURE — 36415 COLL VENOUS BLD VENIPUNCTURE: CPT

## 2018-06-30 PROCEDURE — 2060000000 HC ICU INTERMEDIATE R&B

## 2018-06-30 PROCEDURE — 80048 BASIC METABOLIC PNL TOTAL CA: CPT

## 2018-06-30 PROCEDURE — 2500000003 HC RX 250 WO HCPCS: Performed by: INTERNAL MEDICINE

## 2018-06-30 PROCEDURE — 6370000000 HC RX 637 (ALT 250 FOR IP): Performed by: INTERNAL MEDICINE

## 2018-06-30 PROCEDURE — 6360000002 HC RX W HCPCS: Performed by: SURGERY

## 2018-06-30 RX ORDER — POTASSIUM CHLORIDE 20 MEQ/1
20 TABLET, EXTENDED RELEASE ORAL ONCE
Status: COMPLETED | OUTPATIENT
Start: 2018-06-30 | End: 2018-06-30

## 2018-06-30 RX ADMIN — Medication 250 MG: at 05:00

## 2018-06-30 RX ADMIN — BENZOCAINE AND MENTHOL: 15; 3.6 LOZENGE ORAL at 14:09

## 2018-06-30 RX ADMIN — Medication 10 ML: at 09:31

## 2018-06-30 RX ADMIN — Medication 250 MG: at 16:31

## 2018-06-30 RX ADMIN — POTASSIUM CHLORIDE: 2 INJECTION, SOLUTION, CONCENTRATE INTRAVENOUS at 17:40

## 2018-06-30 RX ADMIN — SODIUM CHLORIDE, POTASSIUM CHLORIDE, SODIUM LACTATE AND CALCIUM CHLORIDE: 600; 310; 30; 20 INJECTION, SOLUTION INTRAVENOUS at 14:07

## 2018-06-30 RX ADMIN — PANTOPRAZOLE SODIUM 40 MG: 40 INJECTION, POWDER, FOR SOLUTION INTRAVENOUS at 09:31

## 2018-06-30 RX ADMIN — ENOXAPARIN SODIUM 50 MG: 60 INJECTION SUBCUTANEOUS at 18:44

## 2018-06-30 RX ADMIN — Medication 250 MG: at 11:17

## 2018-06-30 RX ADMIN — POTASSIUM CHLORIDE 20 MEQ: 20 TABLET, EXTENDED RELEASE ORAL at 11:18

## 2018-06-30 RX ADMIN — ONDANSETRON 4 MG: 2 INJECTION, SOLUTION INTRAMUSCULAR; INTRAVENOUS at 19:59

## 2018-06-30 RX ADMIN — Medication 250 MG: at 23:00

## 2018-06-30 RX ADMIN — Medication 10 ML: at 20:01

## 2018-06-30 RX ADMIN — I.V. FAT EMULSION 250 ML: 20 EMULSION INTRAVENOUS at 18:32

## 2018-06-30 RX ADMIN — SERTRALINE HYDROCHLORIDE 50 MG: 50 TABLET ORAL at 09:31

## 2018-06-30 RX ADMIN — ENOXAPARIN SODIUM 50 MG: 60 INJECTION SUBCUTANEOUS at 05:43

## 2018-06-30 ASSESSMENT — PAIN SCALES - GENERAL
PAINLEVEL_OUTOF10: 0
PAINLEVEL_OUTOF10: 0

## 2018-06-30 NOTE — PLAN OF CARE
Problem: Falls - Risk of:  Goal: Will remain free from falls  Will remain free from falls   Outcome: Met This Shift    Goal: Absence of physical injury  Absence of physical injury   Outcome: Met This Shift      Problem: Pain:  Goal: Pain level will decrease  Pain level will decrease   Outcome: Met This Shift    Goal: Control of acute pain  Control of acute pain   Outcome: Met This Shift    Goal: Control of chronic pain  Control of chronic pain   Outcome: Met This Shift      Problem: Fluid Volume - Imbalance:  Goal: Absence of imbalanced fluid volume signs and symptoms  Absence of imbalanced fluid volume signs and symptoms   Outcome: Met This Shift

## 2018-06-30 NOTE — PROGRESS NOTES
Subjective: The patient is awake and alert. No problems overnight. Denies chest pain, angina, and dyspnea. Denies abdominal pain. Anorexic. No nausea or vomiting.     Current Facility-Administered Medications: PN-Adult 2-in-1 Central Line (Standard), , Intravenous, Continuous TPN  fat emulsion 20 % infusion 250 mL, 250 mL, Intravenous, Daily  benzocaine-menthol (CEPACOL SORE THROAT) lozenge, , Oral, Q2H PRN  fentaNYL (DURAGESIC) 12 MCG/HR 1 patch, 1 patch, Transdermal, Q72H  [START ON 7/2/2018] fentanyl (DURAGESIC) patch REMOVAL, 1 patch, Transdermal, Q72H  vancomycin (VANCOCIN) oral solution 250 mg, 250 mg, Oral, 4 times per day  traMADol (ULTRAM) tablet 25 mg, 25 mg, Oral, Q6H PRN **OR** traMADol (ULTRAM) tablet 50 mg, 50 mg, Oral, Q6H PRN  morphine (PF) injection 2 mg, 2 mg, Intravenous, Q4H PRN **OR** morphine (PF) injection 4 mg, 4 mg, Intravenous, Q4H PRN  ipratropium-albuterol (DUONEB) nebulizer solution 1 ampule, 1 ampule, Inhalation, Q4H PRN  heparin flush 100 UNIT/ML injection 100 Units, 100 Units, Intercatheter, PRN  zinc oxide (DESITIN) 40 % ointment, , Topical, BID PRN  sodium chloride flush 0.9 % injection 10 mL, 10 mL, Intravenous, 2 times per day  sodium chloride flush 0.9 % injection 10 mL, 10 mL, Intravenous, PRN  acetaminophen (TYLENOL) tablet 650 mg, 650 mg, Oral, Q4H PRN  ondansetron (ZOFRAN) injection 4 mg, 4 mg, Intravenous, Q6H PRN  lactated ringers infusion, , Intravenous, Continuous  enoxaparin (LOVENOX) injection 50 mg, 50 mg, Subcutaneous, BID  pantoprazole (PROTONIX) injection 40 mg, 40 mg, Intravenous, Daily  sertraline (ZOLOFT) tablet 50 mg, 50 mg, Oral, Daily  perflutren lipid microspheres (DEFINITY) injection 1.65 mg, 1.5 mL, Intravenous, ONCE PRN    Objective:    BP (!) 118/58   Pulse 92   Temp 98.2 °F (36.8 °C) (Oral)   Resp 16   Ht 5' 6\" (1.676 m)   Wt 115 lb 12.8 oz (52.5 kg)   SpO2 97%   BMI 18.69 kg/m²   In: 1121.3 [I.V.:1121.3]  Out: 1000    In: 1121.3   Out:

## 2018-06-30 NOTE — PROGRESS NOTES
inflammatory process within   the pelvis the distal left ureter is obstructed and is not well seen. THIS IS AN ABNORMAL REPORT. PLEASE TAKE APPROPRIATE MEASURES. This report will be called to the floor by radiology personnel. US DUP LOWER EXTREMITY LEFT JEFERSON   Final Result   1. Minimal residual chronic deep venous thrombosis seen throughout the   veins of the left lower extremity. . Flow is now appreciated on today's   exam. No flow was noted on the patient's previous examination of   4/7/2018.  potassium chloride  20 mEq Oral Once    fentaNYL  1 patch Transdermal Q72H    [START ON 7/2/2018] fentanyl  1 patch Transdermal Q72H    vancomycin  250 mg Oral 4 times per day    sodium chloride flush  10 mL Intravenous 2 times per day    enoxaparin  50 mg Subcutaneous BID    pantoprazole  40 mg Intravenous Daily    sertraline  50 mg Oral Daily        Assessment; Active Problems:    Acute colovaginal fistula-Postop day 1    Iron deficiency anemia with chronic disease    Altered mental status    Fatigue due to excessive exertion    History of PAF (paroxysmal atrial fibrillation) (HCC)    Macular degeneration    Ischemic bowel disease (HCC)    Hx of left femoral DVT -improved    History of severe ischemic bowel with emergent bowel resection November 2017    History of Cancer -colorectal    Hypokalemia-resolved    + C diff       Plan:   Dr. Bijan Rausch on consult  Start TPN  Oral calorie count on Monday  Vancomycin orally started 6/28  Duragesic patch 25 mcg every 72 hours  Labs in a.m. See orders.         Carol Neumann DO  10:56 AM  6/30/2018

## 2018-07-01 LAB
ALBUMIN SERPL-MCNC: 1.8 G/DL (ref 3.5–5.2)
ALP BLD-CCNC: 63 U/L (ref 35–104)
ALT SERPL-CCNC: 7 U/L (ref 0–32)
ANION GAP SERPL CALCULATED.3IONS-SCNC: 9 MMOL/L (ref 7–16)
AST SERPL-CCNC: 9 U/L (ref 0–31)
BILIRUB SERPL-MCNC: <0.2 MG/DL (ref 0–1.2)
BUN BLDV-MCNC: 8 MG/DL (ref 8–23)
CALCIUM SERPL-MCNC: 8.2 MG/DL (ref 8.6–10.2)
CHLORIDE BLD-SCNC: 98 MMOL/L (ref 98–107)
CO2: 31 MMOL/L (ref 22–29)
CREAT SERPL-MCNC: 0.5 MG/DL (ref 0.5–1)
GFR AFRICAN AMERICAN: >60
GFR NON-AFRICAN AMERICAN: >60 ML/MIN/1.73
GLUCOSE BLD-MCNC: 98 MG/DL (ref 74–109)
HCT VFR BLD CALC: 24.9 % (ref 34–48)
HEMOGLOBIN: 8.6 G/DL (ref 11.5–15.5)
MAGNESIUM: 1.8 MG/DL (ref 1.6–2.6)
MCH RBC QN AUTO: 29.6 PG (ref 26–35)
MCHC RBC AUTO-ENTMCNC: 34.5 % (ref 32–34.5)
MCV RBC AUTO: 85.6 FL (ref 80–99.9)
METER GLUCOSE: 121 MG/DL (ref 70–110)
METER GLUCOSE: 146 MG/DL (ref 70–110)
PDW BLD-RTO: 17.6 FL (ref 11.5–15)
PHOSPHORUS: 1.9 MG/DL (ref 2.5–4.5)
PLATELET # BLD: 374 E9/L (ref 130–450)
PMV BLD AUTO: 8.8 FL (ref 7–12)
POTASSIUM SERPL-SCNC: 3.8 MMOL/L (ref 3.5–5)
RBC # BLD: 2.91 E12/L (ref 3.5–5.5)
SODIUM BLD-SCNC: 138 MMOL/L (ref 132–146)
TOTAL PROTEIN: 5 G/DL (ref 6.4–8.3)
WBC # BLD: 8.3 E9/L (ref 4.5–11.5)

## 2018-07-01 PROCEDURE — 85027 COMPLETE CBC AUTOMATED: CPT

## 2018-07-01 PROCEDURE — C9113 INJ PANTOPRAZOLE SODIUM, VIA: HCPCS | Performed by: INTERNAL MEDICINE

## 2018-07-01 PROCEDURE — 6370000000 HC RX 637 (ALT 250 FOR IP): Performed by: INTERNAL MEDICINE

## 2018-07-01 PROCEDURE — 36592 COLLECT BLOOD FROM PICC: CPT

## 2018-07-01 PROCEDURE — 84100 ASSAY OF PHOSPHORUS: CPT

## 2018-07-01 PROCEDURE — 6360000002 HC RX W HCPCS: Performed by: INTERNAL MEDICINE

## 2018-07-01 PROCEDURE — 2580000003 HC RX 258: Performed by: SURGERY

## 2018-07-01 PROCEDURE — 2060000000 HC ICU INTERMEDIATE R&B

## 2018-07-01 PROCEDURE — 36415 COLL VENOUS BLD VENIPUNCTURE: CPT

## 2018-07-01 PROCEDURE — 82962 GLUCOSE BLOOD TEST: CPT

## 2018-07-01 PROCEDURE — 80053 COMPREHEN METABOLIC PANEL: CPT

## 2018-07-01 PROCEDURE — 83735 ASSAY OF MAGNESIUM: CPT

## 2018-07-01 PROCEDURE — 2500000003 HC RX 250 WO HCPCS: Performed by: INTERNAL MEDICINE

## 2018-07-01 RX ORDER — FUROSEMIDE 10 MG/ML
20 INJECTION INTRAMUSCULAR; INTRAVENOUS ONCE
Status: COMPLETED | OUTPATIENT
Start: 2018-07-01 | End: 2018-07-01

## 2018-07-01 RX ADMIN — Medication 10 ML: at 09:13

## 2018-07-01 RX ADMIN — Medication 250 MG: at 22:55

## 2018-07-01 RX ADMIN — I.V. FAT EMULSION 250 ML: 20 EMULSION INTRAVENOUS at 18:25

## 2018-07-01 RX ADMIN — PANTOPRAZOLE SODIUM 40 MG: 40 INJECTION, POWDER, FOR SOLUTION INTRAVENOUS at 09:13

## 2018-07-01 RX ADMIN — ENOXAPARIN SODIUM 50 MG: 60 INJECTION SUBCUTANEOUS at 18:31

## 2018-07-01 RX ADMIN — Medication 250 MG: at 16:20

## 2018-07-01 RX ADMIN — FUROSEMIDE 20 MG: 10 INJECTION, SOLUTION INTRAMUSCULAR; INTRAVENOUS at 16:20

## 2018-07-01 RX ADMIN — SERTRALINE HYDROCHLORIDE 50 MG: 50 TABLET ORAL at 09:13

## 2018-07-01 RX ADMIN — POTASSIUM CHLORIDE: 2 INJECTION, SOLUTION, CONCENTRATE INTRAVENOUS at 18:23

## 2018-07-01 RX ADMIN — Medication 250 MG: at 03:56

## 2018-07-01 RX ADMIN — ENOXAPARIN SODIUM 50 MG: 60 INJECTION SUBCUTANEOUS at 06:17

## 2018-07-01 RX ADMIN — Medication 250 MG: at 11:25

## 2018-07-01 ASSESSMENT — PAIN SCALES - GENERAL
PAINLEVEL_OUTOF10: 6
PAINLEVEL_OUTOF10: 0
PAINLEVEL_OUTOF10: 3

## 2018-07-01 ASSESSMENT — PAIN DESCRIPTION - PAIN TYPE
TYPE: SURGICAL PAIN
TYPE: CHRONIC PAIN;ACUTE PAIN

## 2018-07-01 ASSESSMENT — PAIN DESCRIPTION - LOCATION
LOCATION: ABDOMEN
LOCATION: ABDOMEN

## 2018-07-01 ASSESSMENT — PAIN DESCRIPTION - FREQUENCY: FREQUENCY: INTERMITTENT

## 2018-07-01 ASSESSMENT — PAIN DESCRIPTION - ORIENTATION: ORIENTATION: LOWER

## 2018-07-01 NOTE — PROGRESS NOTES
(1.676 m)   Wt 115 lb 12.8 oz (52.5 kg)   SpO2 94%   BMI 18.69 kg/m²   In: 2257.6 [I.V.:1442.9]  Out: 2250    In: 2257.6   Out: 2250 [Urine:2250]   RRR, no murmurs, no gallops, or rubs.   CTA bilaterally, no wheeze, rales or rhonchi  bowel sounds present, tender, nondistended, no masses  Colostomy in place  No clubbing, cyanosis, or edema  No neuro changes     CBC with Differential:    Lab Results   Component Value Date    WBC 8.3 07/01/2018    RBC 2.91 07/01/2018    HGB 8.6 07/01/2018    HCT 24.9 07/01/2018     07/01/2018    MCV 85.6 07/01/2018    MCH 29.6 07/01/2018    MCHC 34.5 07/01/2018    RDW 17.6 07/01/2018    NRBC 0.5 11/30/2017    BLASTSPCT 1.0 11/06/2017    METASPCT 1.5 12/13/2017    LYMPHOPCT 14.0 06/30/2018    PROMYELOPCT 0.5 11/26/2017    MONOPCT 4.0 06/30/2018    MYELOPCT 0.5 06/27/2018    BASOPCT 0.0 06/30/2018    MONOSABS 0.43 06/30/2018    LYMPHSABS 1.51 06/30/2018    EOSABS 0.00 06/30/2018    BASOSABS 0.00 06/30/2018     Platelets:    Lab Results   Component Value Date     07/01/2018     CMP:    Lab Results   Component Value Date     07/01/2018    K 3.8 07/01/2018    K 3.2 06/26/2018    CL 98 07/01/2018    CO2 31 07/01/2018    BUN 8 07/01/2018    CREATININE 0.5 07/01/2018    GFRAA >60 07/01/2018    LABGLOM >60 07/01/2018    GLUCOSE 98 07/01/2018    PROT 5.0 07/01/2018    LABALBU 1.8 07/01/2018    CALCIUM 8.2 07/01/2018    BILITOT <0.2 07/01/2018    ALKPHOS 63 07/01/2018    AST 9 07/01/2018    ALT 7 07/01/2018     BMP:    Lab Results   Component Value Date     07/01/2018    K 3.8 07/01/2018    K 3.2 06/26/2018    CL 98 07/01/2018    CO2 31 07/01/2018    BUN 8 07/01/2018    LABALBU 1.8 07/01/2018    CREATININE 0.5 07/01/2018    CALCIUM 8.2 07/01/2018    GFRAA >60 07/01/2018    LABGLOM >60 07/01/2018    GLUCOSE 98 07/01/2018     Hepatic Function Panel:    Lab Results   Component Value Date    ALKPHOS 63 07/01/2018    ALT 7 07/01/2018    AST 9 07/01/2018    PROT 5.0 07/01/2018    BILITOT <0.2 07/01/2018    BILIDIR 0.4 11/12/2017    IBILI 0.3 11/12/2017    LABALBU 1.8 07/01/2018     Albumin:    Lab Results   Component Value Date    LABALBU 1.8 07/01/2018     Calcium:    Lab Results   Component Value Date    CALCIUM 8.2 07/01/2018     Ionized Calcium:  No results found for: IONCA  Magnesium:    Lab Results   Component Value Date    MG 1.8 07/01/2018     Phosphorus:    Lab Results   Component Value Date    PHOS 1.9 07/01/2018     Last 3 Troponin:    Lab Results   Component Value Date    TROPONINI 0.01 12/21/2017    TROPONINI 0.02 12/19/2017    TROPONINI 0.01 11/06/2017     FLP:    Lab Results   Component Value Date    TRIG 88 06/30/2018    HDL 58.0 07/18/2013    LDLCALC 157 07/18/2013     TSH:    Lab Results   Component Value Date    TSH 4.620 04/07/2018        Patient Active Problem List   Diagnosis    Rectal cancer ([address]    Pneumatosis intestinalis    Macular degeneration    Enteritis    Severe protein-calorie malnutrition (HCC)    Macular degeneration    PAF (paroxysmal atrial fibrillation) (HCC)    Ischemic bowel disease (HCC)    Altered mental status    Fatigue due to excessive exertion    PAF (paroxysmal atrial fibrillation) (HCC)    Macular degeneration    Ischemic bowel disease ([address]    Hx of blood clots    Cancer (HCC)     Assessment/Plan:  1. Colovaginal fistula  2. Rectal Ca s/p radiation/chemo/resection [facility]  3. S/P ischemic bowel with emergency intervention Dr Michele Sherwood 11/17  4. History of proximal atrial fibrillation currently sinus rhythm  5. History of extensive DVT left leg-April 2018  6. History of ischemic bowel with resection in November 2017  7. History of prior tobacco use  8. Normocytic anemia  9. Hypoalbuminemia-Protein and calorie malnutrition  10. Failure to thrive  11. Inability to ambulate;weakness  12. Anemia - multifactorial; likely iron deficiency  13.  High dose immodium use for chronic loose stool/incontinence       -IV fluids  -Drs

## 2018-07-01 NOTE — PROGRESS NOTES
07/01/2018     PT/INR:    Lab Results   Component Value Date    PROTIME 11.8 06/27/2018    INR 1.0 06/27/2018     Troponin:    Lab Results   Component Value Date    TROPONINI 0.01 12/21/2017     U/A:    Lab Results   Component Value Date    COLORU Yellow 12/24/2017    PROTEINU Negative 12/24/2017    PHUR 6.5 12/24/2017    WBCUA 10-20 12/24/2017    RBCUA NONE 12/24/2017    BACTERIA FEW 12/24/2017    CLARITYU Clear 12/24/2017    SPECGRAV <=1.005 12/24/2017    LEUKOCYTESUR LARGE 12/24/2017    UROBILINOGEN 0.2 12/24/2017    BILIRUBINUR Negative 12/24/2017    BLOODU Negative 12/24/2017    GLUCOSEU Negative 12/24/2017     HgBA1c:  No results found for: LABA1C  FLP:    Lab Results   Component Value Date    TRIG 88 06/30/2018    HDL 58.0 07/18/2013    LDLCALC 157 07/18/2013     TSH:    Lab Results   Component Value Date    TSH 4.620 04/07/2018     LIPASE:    Lab Results   Component Value Date    LIPASE 78 12/22/2017       Recent Labs      07/01/18   1235   GLUMET  121*       IR ULTRASOUND GUIDANCE VASCULAR ACCESS   Final Result   Ultrasound guidance was provided during placement of a   PICC line. IR FLUORO GUIDED CVA DEVICE PLACEMENT   Final Result   Successful placement of a 5 Setswana double-lumen Power   PICC line using ultrasound guidance via the right brachial  vein. CT ABDOMEN PELVIS W IV CONTRAST Additional Contrast? Oral   Final Result   1. Abnormal appearance of the distal sigmoid colon and rectum with   abnormal wall thickening. Inflammatory changes extend to the adjacent   loops of small bowel and there is distal small bowel wall thickening. There is no small bowel obstruction. .   2. Perirectal phlegmon and development of multiple tiny perirectal   abscesses. The largest is seen on the left and measures 2 cm.    3. There is a colovaginal fistula and there is oral contrast extending   from the right lateral aspect of the rectum with direct communication   with the posterior wall of the vaginal

## 2018-07-01 NOTE — PROGRESS NOTES
General Surgery Progress Note  Kathryn Hoskins MD, MS    Patient's Name/Date of Birth: Alex Murphy / 1940    Date: July 1, 2018     Surgeon: Alyce Nelson MD    Chief Complaint: abd pain    Patient Active Problem List   Diagnosis    Rectal cancer ([address]    Pneumatosis intestinalis    Macular degeneration    Enteritis    Severe protein-calorie malnutrition ([address]    Macular degeneration    PAF (paroxysmal atrial fibrillation) ([address]    Ischemic bowel disease ([address]    Altered mental status    Fatigue due to excessive exertion    PAF (paroxysmal atrial fibrillation) ([address]    Macular degeneration    Ischemic bowel disease ([address]    Hx of blood clots    Cancer ([address]       Subjective: Complains only of trouble eating and describes oral discomfort. Some belching, no nausea    Objective:  /66   Pulse 91   Temp 98 °F (36.7 °C) (Oral)   Resp 16   Ht 5' 6\" (1.676 m)   Wt 115 lb 12.8 oz (52.5 kg)   SpO2 94%   BMI 18.69 kg/m²   Labs:  Recent Labs      06/29/18   0735  06/30/18   0538  07/01/18   0400   WBC  12.1*  10.8  8.3   HGB  9.0*  8.4*  8.6*   HCT  28.8*  26.5*  24.9*     Lab Results   Component Value Date    CREATININE 0.5 07/01/2018    BUN 8 07/01/2018     07/01/2018    K 3.8 07/01/2018    CL 98 07/01/2018    CO2 31 (H) 07/01/2018     No results for input(s): LIPASE, AMYLASE in the last 72 hours.       General appearance:  NAD  Head: NCAT, PERRLA, EOMI, red conjunctiva  Neck: supple, no masses  Lungs: Equal chest rise bilateral  Heart: Reg rate  Abdomen: soft, mild, tender appropriately, normotympanic, no guarding, no peritoneal signs, incision C/D/I, LLQ stoma with stool and air in bag  Skin; no lesions  Gu: no cva tenderness  Extremities: extremities normal, atraumatic, no cyanosis or edema      Assessment/Plan:  Alex Murphy is a [de-identified] y.o. female with rectal anastamotic leak, pelvic abscess, rectovaginal fistula, s/p diverting colostomy, expected postop

## 2018-07-02 ENCOUNTER — APPOINTMENT (OUTPATIENT)
Dept: GENERAL RADIOLOGY | Age: 78
DRG: 329 | End: 2018-07-02
Attending: SURGERY
Payer: MEDICARE

## 2018-07-02 LAB
ABO/RH: NORMAL
ALBUMIN SERPL-MCNC: 2.1 G/DL (ref 3.5–5.2)
ALP BLD-CCNC: 66 U/L (ref 35–104)
ALT SERPL-CCNC: 8 U/L (ref 0–32)
ANION GAP SERPL CALCULATED.3IONS-SCNC: 6 MMOL/L (ref 7–16)
ANTIBODY SCREEN: NORMAL
AST SERPL-CCNC: 14 U/L (ref 0–31)
BILIRUB SERPL-MCNC: <0.2 MG/DL (ref 0–1.2)
BUN BLDV-MCNC: 10 MG/DL (ref 8–23)
CALCIUM SERPL-MCNC: 8.3 MG/DL (ref 8.6–10.2)
CHLORIDE BLD-SCNC: 96 MMOL/L (ref 98–107)
CO2: 34 MMOL/L (ref 22–29)
CREAT SERPL-MCNC: 0.4 MG/DL (ref 0.5–1)
GFR AFRICAN AMERICAN: >60
GFR NON-AFRICAN AMERICAN: >60 ML/MIN/1.73
GLUCOSE BLD-MCNC: 115 MG/DL (ref 74–109)
HCT VFR BLD CALC: 26.6 % (ref 34–48)
HEMOGLOBIN: 8 G/DL (ref 11.5–15.5)
MCH RBC QN AUTO: 25 PG (ref 26–35)
MCHC RBC AUTO-ENTMCNC: 30.1 % (ref 32–34.5)
MCV RBC AUTO: 83.1 FL (ref 80–99.9)
METER GLUCOSE: 134 MG/DL (ref 70–110)
METER GLUCOSE: 145 MG/DL (ref 70–110)
METER GLUCOSE: 158 MG/DL (ref 70–110)
PDW BLD-RTO: 17.5 FL (ref 11.5–15)
PLATELET # BLD: 327 E9/L (ref 130–450)
PMV BLD AUTO: 8.7 FL (ref 7–12)
POTASSIUM SERPL-SCNC: 3.7 MMOL/L (ref 3.5–5)
RBC # BLD: 3.2 E12/L (ref 3.5–5.5)
SODIUM BLD-SCNC: 136 MMOL/L (ref 132–146)
TOTAL PROTEIN: 5.4 G/DL (ref 6.4–8.3)
WBC # BLD: 10.5 E9/L (ref 4.5–11.5)

## 2018-07-02 PROCEDURE — C9113 INJ PANTOPRAZOLE SODIUM, VIA: HCPCS | Performed by: INTERNAL MEDICINE

## 2018-07-02 PROCEDURE — 2500000003 HC RX 250 WO HCPCS: Performed by: INTERNAL MEDICINE

## 2018-07-02 PROCEDURE — 86850 RBC ANTIBODY SCREEN: CPT

## 2018-07-02 PROCEDURE — 36415 COLL VENOUS BLD VENIPUNCTURE: CPT

## 2018-07-02 PROCEDURE — 2580000003 HC RX 258: Performed by: SURGERY

## 2018-07-02 PROCEDURE — 6370000000 HC RX 637 (ALT 250 FOR IP): Performed by: INTERNAL MEDICINE

## 2018-07-02 PROCEDURE — 92611 MOTION FLUOROSCOPY/SWALLOW: CPT | Performed by: SPEECH-LANGUAGE PATHOLOGIST

## 2018-07-02 PROCEDURE — 82962 GLUCOSE BLOOD TEST: CPT

## 2018-07-02 PROCEDURE — 86901 BLOOD TYPING SEROLOGIC RH(D): CPT

## 2018-07-02 PROCEDURE — 2580000003 HC RX 258: Performed by: INTERNAL MEDICINE

## 2018-07-02 PROCEDURE — 86900 BLOOD TYPING SEROLOGIC ABO: CPT

## 2018-07-02 PROCEDURE — 80053 COMPREHEN METABOLIC PANEL: CPT

## 2018-07-02 PROCEDURE — 6360000002 HC RX W HCPCS: Performed by: INTERNAL MEDICINE

## 2018-07-02 PROCEDURE — 92526 ORAL FUNCTION THERAPY: CPT | Performed by: SPEECH-LANGUAGE PATHOLOGIST

## 2018-07-02 PROCEDURE — 74230 X-RAY XM SWLNG FUNCJ C+: CPT

## 2018-07-02 PROCEDURE — 2060000000 HC ICU INTERMEDIATE R&B

## 2018-07-02 PROCEDURE — 85027 COMPLETE CBC AUTOMATED: CPT

## 2018-07-02 RX ORDER — 0.9 % SODIUM CHLORIDE 0.9 %
250 INTRAVENOUS SOLUTION INTRAVENOUS ONCE
Status: COMPLETED | OUTPATIENT
Start: 2018-07-02 | End: 2018-07-03

## 2018-07-02 RX ADMIN — Medication 250 MG: at 11:41

## 2018-07-02 RX ADMIN — METRONIDAZOLE 500 MG: 500 INJECTION, SOLUTION INTRAVENOUS at 23:57

## 2018-07-02 RX ADMIN — POTASSIUM CHLORIDE: 2 INJECTION, SOLUTION, CONCENTRATE INTRAVENOUS at 17:29

## 2018-07-02 RX ADMIN — Medication 10 ML: at 09:21

## 2018-07-02 RX ADMIN — Medication 10 ML: at 23:58

## 2018-07-02 RX ADMIN — METRONIDAZOLE 500 MG: 500 INJECTION, SOLUTION INTRAVENOUS at 13:45

## 2018-07-02 RX ADMIN — ENOXAPARIN SODIUM 50 MG: 60 INJECTION SUBCUTANEOUS at 17:29

## 2018-07-02 RX ADMIN — BARIUM SULFATE 88 G: 960 POWDER, FOR SUSPENSION ORAL at 10:31

## 2018-07-02 RX ADMIN — SODIUM CHLORIDE, POTASSIUM CHLORIDE, SODIUM LACTATE AND CALCIUM CHLORIDE: 600; 310; 30; 20 INJECTION, SOLUTION INTRAVENOUS at 05:46

## 2018-07-02 RX ADMIN — PANTOPRAZOLE SODIUM 40 MG: 40 INJECTION, POWDER, FOR SOLUTION INTRAVENOUS at 09:20

## 2018-07-02 RX ADMIN — SERTRALINE HYDROCHLORIDE 50 MG: 50 TABLET ORAL at 09:20

## 2018-07-02 RX ADMIN — DARBEPOETIN ALFA 100 MCG: 100 INJECTION, SOLUTION INTRAVENOUS; SUBCUTANEOUS at 23:59

## 2018-07-02 RX ADMIN — BARIUM SULFATE 45 G: 0.6 CREAM ORAL at 10:30

## 2018-07-02 RX ADMIN — Medication 250 MG: at 04:37

## 2018-07-02 RX ADMIN — ENOXAPARIN SODIUM 50 MG: 60 INJECTION SUBCUTANEOUS at 05:46

## 2018-07-02 RX ADMIN — Medication 250 MG: at 17:29

## 2018-07-02 RX ADMIN — I.V. FAT EMULSION 250 ML: 20 EMULSION INTRAVENOUS at 23:56

## 2018-07-02 ASSESSMENT — PAIN SCALES - GENERAL
PAINLEVEL_OUTOF10: 4
PAINLEVEL_OUTOF10: 0
PAINLEVEL_OUTOF10: 8

## 2018-07-02 ASSESSMENT — PAIN DESCRIPTION - FREQUENCY
FREQUENCY: INTERMITTENT
FREQUENCY: INTERMITTENT

## 2018-07-02 ASSESSMENT — PAIN DESCRIPTION - PAIN TYPE: TYPE: SURGICAL PAIN

## 2018-07-02 ASSESSMENT — PAIN DESCRIPTION - LOCATION: LOCATION: ABDOMEN

## 2018-07-02 NOTE — PROGRESS NOTES
Inpatient wound care note  Buttocks coccyx area red  Inner groins are red  Pt cont to have brown stool from vaginial area    Using purwick system    Pt onlow airloss bed  Has turning system inplace  inst pt re need to turn in bed

## 2018-07-02 NOTE — PROGRESS NOTES
Physical Therapy    0629/0629-01    Patient unavailable for physical therapy treatment due to OOR 9:45 AM.

## 2018-07-02 NOTE — CARE COORDINATION
Case Management: Social Work/Discharge Planning:     Per rounds, SW requested [facility] review to determine if Pt meets LTAC criteria as possible discharge plan. SW to follow up with Pt and Pt's family regarding preference of LTAC facility if Pt meets criteria and is accepted.   Electronically signed by DUSTY Cervantes on 7/2/2018 at 2:08 PM

## 2018-07-02 NOTE — PROGRESS NOTES
Esophageal clearance in the upright position could not be assessed due to logistical reasons not related to physiologic impairment. Laryngeal Penetration and Aspiration:  Aspiration was not observed in today's study with Cookie, Pudding-thick, Nectar-thick, Thin. Trace penetration was observed with small cup sip of thin liquid, but cleared by the end of the swallow. Penetration was not observed with a larger, more natural cup sip of thin liquid wash following solid cookie trial. Penetration was not observed with other trial consistencies. SUBJECTIVE ASSESSMENT/PATIENT REPORT:  Symptoms reported by patient: (check all that apply)  Drooling             Coughing           Choking           extubated             Pain during swallowing             Globus sensation           History of aspiration             and/or pneumonia Difficulty swallowing pills               Comments: Patient reported difficulty with taste, not her swallow function.     Mental status:    Alert          x Responsive          x  Cooperative          x    Reduced cognition               Lethargic               Impulsive             Uncooperative               Combative            Unresponsive             Current respiratory status:  Room air             Nasal cannula          x O2 mask            Non-rebreather mask             Tracheostomy,             room air Tracheostomy,             supplemental O2  BiPAP           Vent dependent             Current nutritional status:  [x] Oral   [] NPO  Oral mechanism examination:     [x] Adequate lingual/labial strength [] Generalized oral weakness    [] Left labiobuccal weakness  [] Right labiobuccal weakness    [] Left lingual deviation  [] Right lingual deviation     [] Oral apraxia             [] CNT  Dentition:    [x] Natural    [x] Missing teeth/teeth in poor repair    [] Edentulous    [] Dentures    [] Implants  Vocal quality prior to PO intake:    [x] WFL  [] Weak  [] Wet  Factors (paroxysmal atrial fibrillation) (HCC)    Macular degeneration    [facility] ([facility]    Hx of blood clots    Cancer ([facility]       Malnutrition indicators have been identified?   no  Dietitian consulted? no     Leti Bell  Speech Language Pathology Student    Robert Munoz MSCCC/SLP  Speech Language Pathologist  [phone]

## 2018-07-02 NOTE — PROGRESS NOTES
ET Nurse  Admit Date: 6/25/2018  2:41 PM    Reason for consult:  New colostomy    Significant history:    Past Medical History:   Diagnosis Date    Cancer [facility])     colorectal     History of blood transfusion     Hx of blood clots     dvt, on eliquis    Ischemic [facility] disease [facility]     Macular degeneration     MDRO (multiple drug resistant organisms) resistance     PAF (paroxysmal atrial fibrillation) (HCC)     Tinnitus        Stoma assessment:    Stoma pink yellowish color  Peristomal skin is sl red    Plan:    Applied one piece lainey custom cut pouch 8651  Pt states she may want a 2 piece system  Will cont to teach  Pt did have an ostomy before      Marzena Griffin 7/2/2018 12:17 PM

## 2018-07-02 NOTE — PROGRESS NOTES
BMI 19.32 kg/m²   In: 2366.5 [P.O.:240]  Out: 1000    In: 2366.5   Out: 1000 [Urine:1000]   RRR, no murmurs, no gallops, or rubs.   CTA bilaterally, no wheeze, rales or rhonchi  bowel sounds present, tender, nondistended, no masses  Colostomy in place  No clubbing, cyanosis, or edema  No neuro changes     CBC with Differential:    Lab Results   Component Value Date    WBC 10.5 07/02/2018    RBC 3.20 07/02/2018    HGB 8.0 07/02/2018    HCT 26.6 07/02/2018     07/02/2018    MCV 83.1 07/02/2018    MCH 25.0 07/02/2018    MCHC 30.1 07/02/2018    RDW 17.5 07/02/2018    NRBC 0.5 11/30/2017    BLASTSPCT 1.0 11/06/2017    METASPCT 1.5 12/13/2017    LYMPHOPCT 14.0 06/30/2018    PROMYELOPCT 0.5 11/26/2017    MONOPCT 4.0 06/30/2018    MYELOPCT 0.5 06/27/2018    BASOPCT 0.0 06/30/2018    MONOSABS 0.43 06/30/2018    LYMPHSABS 1.51 06/30/2018    EOSABS 0.00 06/30/2018    BASOSABS 0.00 06/30/2018     Platelets:    Lab Results   Component Value Date     07/02/2018     CMP:    Lab Results   Component Value Date     07/02/2018    K 3.7 07/02/2018    K 3.2 06/26/2018    CL 96 07/02/2018    CO2 34 07/02/2018    BUN 10 07/02/2018    CREATININE 0.4 07/02/2018    GFRAA >60 07/02/2018    LABGLOM >60 07/02/2018    GLUCOSE 115 07/02/2018    PROT 5.4 07/02/2018    LABALBU 2.1 07/02/2018    CALCIUM 8.3 07/02/2018    BILITOT <0.2 07/02/2018    ALKPHOS 66 07/02/2018    AST 14 07/02/2018    ALT 8 07/02/2018     BMP:    Lab Results   Component Value Date     07/02/2018    K 3.7 07/02/2018    K 3.2 06/26/2018    CL 96 07/02/2018    CO2 34 07/02/2018    BUN 10 07/02/2018    LABALBU 2.1 07/02/2018    CREATININE 0.4 07/02/2018    CALCIUM 8.3 07/02/2018    GFRAA >60 07/02/2018    LABGLOM >60 07/02/2018    GLUCOSE 115 07/02/2018     Hepatic Function Panel:    Lab Results   Component Value Date    ALKPHOS 66 07/02/2018    ALT 8 07/02/2018    AST 14 07/02/2018    PROT 5.4 07/02/2018    BILITOT <0.2 07/02/2018    BILIDIR 0.4 11/12/2017    IBILI 0.3 11/12/2017    LABALBU 2.1 07/02/2018     Albumin:    Lab Results   Component Value Date    LABALBU 2.1 07/02/2018     Calcium:    Lab Results   Component Value Date    CALCIUM 8.3 07/02/2018     Ionized Calcium:  No results found for: IONCA  Magnesium:    Lab Results   Component Value Date    MG 1.8 07/01/2018     Phosphorus:    Lab Results   Component Value Date    PHOS 1.9 07/01/2018     Last 3 Troponin:    Lab Results   Component Value Date    TROPONINI 0.01 12/21/2017    TROPONINI 0.02 12/19/2017    TROPONINI 0.01 11/06/2017     FLP:    Lab Results   Component Value Date    TRIG 88 06/30/2018    HDL 58.0 07/18/2013    LDLCALC 157 07/18/2013     TSH:    Lab Results   Component Value Date    TSH 4.620 04/07/2018        Patient Active Problem List   Diagnosis    Rectal cancer ([address]    Pneumatosis intestinalis    Macular degeneration    Enteritis    Severe protein-calorie malnutrition (HCC)    Macular degeneration    PAF (paroxysmal atrial fibrillation) (HCC)    Ischemic bowel disease (HCC)    Altered mental status    Fatigue due to excessive exertion    PAF (paroxysmal atrial fibrillation) (HCC)    Macular degeneration    Ischemic bowel disease ([address]    Hx of blood clots    Cancer (HCC)     Assessment/Plan:  1. Colovaginal fistula  2. Rectal Ca s/p radiation/chemo/resection [facility]  3. S/P ischemic bowel with emergency intervention Dr Beverly Castro 11/17  4. History of proximal atrial fibrillation currently sinus rhythm  5. History of extensive DVT left leg-April 2018  6. History of ischemic bowel with resection in November 2017  7. History of prior tobacco use  8. Normocytic anemia; worsening  9. Hypoalbuminemia-Protein and calorie malnutrition  10. Failure to thrive  11. Inability to ambulate;weakness  12. Anemia - multifactorial; likely iron deficiency  13.  High dose immodium use for chronic loose stool/incontinence       -IV fluids  -Moe Harris on case   -Labs in a.m.  -Repeat venous Doppler of left leg  -Resume home meds  -Pending CT of the abdomen and pelvis and further eval by Dr Estela Peterson and Dr Giovanny Shook  Stat BMP; CBC  Echocardiogram reviewed; good EF; no significant valvular dz  Ekg stable  See orders  Discussed with Dr Bernhard Aase and Dr Estela Peterson    Had a  diverting Colostomy with Dr Estela Peterson with good results  Anemia worsening; BM suppression  Hopefully will heal well with improved nutrition  Hyperalimentation started; severe-protein/calorie malnutrition  No A Fib thus far; K+ better; iron better  Will transition back to eliquis once gi function stabilizes  Inability to ambulate  Failure to thrive at home  Consider marinol for anorexia  Will need LTAC/rehab for 4-6 weeks  Transfuse 1n unit  Procrit

## 2018-07-03 LAB
ANION GAP SERPL CALCULATED.3IONS-SCNC: 7 MMOL/L (ref 7–16)
BASOPHILS ABSOLUTE: 0.02 E9/L (ref 0–0.2)
BASOPHILS RELATIVE PERCENT: 0.2 % (ref 0–2)
BLOOD BANK DISPENSE STATUS: NORMAL
BLOOD BANK PRODUCT CODE: NORMAL
BPU ID: NORMAL
BUN BLDV-MCNC: 12 MG/DL (ref 8–23)
CALCIUM SERPL-MCNC: 8.1 MG/DL (ref 8.6–10.2)
CHLORIDE BLD-SCNC: 98 MMOL/L (ref 98–107)
CO2: 30 MMOL/L (ref 22–29)
CREAT SERPL-MCNC: 0.4 MG/DL (ref 0.5–1)
DESCRIPTION BLOOD BANK: NORMAL
EOSINOPHILS ABSOLUTE: 0.01 E9/L (ref 0.05–0.5)
EOSINOPHILS RELATIVE PERCENT: 0.1 % (ref 0–6)
GFR AFRICAN AMERICAN: >60
GFR NON-AFRICAN AMERICAN: >60 ML/MIN/1.73
GLUCOSE BLD-MCNC: 102 MG/DL (ref 74–109)
HCT VFR BLD CALC: 29.9 % (ref 34–48)
HEMOGLOBIN: 9.8 G/DL (ref 11.5–15.5)
IMMATURE GRANULOCYTES #: 0.18 E9/L
IMMATURE GRANULOCYTES %: 1.6 % (ref 0–5)
LYMPHOCYTES ABSOLUTE: 1.69 E9/L (ref 1.5–4)
LYMPHOCYTES RELATIVE PERCENT: 15.1 % (ref 20–42)
MCH RBC QN AUTO: 27 PG (ref 26–35)
MCHC RBC AUTO-ENTMCNC: 32.8 % (ref 32–34.5)
MCV RBC AUTO: 82.4 FL (ref 80–99.9)
METER GLUCOSE: 103 MG/DL (ref 70–110)
METER GLUCOSE: 127 MG/DL (ref 70–110)
METER GLUCOSE: 129 MG/DL (ref 70–110)
METER GLUCOSE: 165 MG/DL (ref 70–110)
MONOCYTES ABSOLUTE: 0.71 E9/L (ref 0.1–0.95)
MONOCYTES RELATIVE PERCENT: 6.3 % (ref 2–12)
NEUTROPHILS ABSOLUTE: 8.61 E9/L (ref 1.8–7.3)
NEUTROPHILS RELATIVE PERCENT: 76.7 % (ref 43–80)
PDW BLD-RTO: 16.5 FL (ref 11.5–15)
PLATELET # BLD: 313 E9/L (ref 130–450)
PMV BLD AUTO: 8.4 FL (ref 7–12)
POTASSIUM SERPL-SCNC: 3.4 MMOL/L (ref 3.5–5)
RBC # BLD: 3.63 E12/L (ref 3.5–5.5)
SODIUM BLD-SCNC: 135 MMOL/L (ref 132–146)
WBC # BLD: 11.2 E9/L (ref 4.5–11.5)

## 2018-07-03 PROCEDURE — C9113 INJ PANTOPRAZOLE SODIUM, VIA: HCPCS | Performed by: INTERNAL MEDICINE

## 2018-07-03 PROCEDURE — 80048 BASIC METABOLIC PNL TOTAL CA: CPT

## 2018-07-03 PROCEDURE — 6370000000 HC RX 637 (ALT 250 FOR IP): Performed by: INTERNAL MEDICINE

## 2018-07-03 PROCEDURE — 36592 COLLECT BLOOD FROM PICC: CPT

## 2018-07-03 PROCEDURE — 36415 COLL VENOUS BLD VENIPUNCTURE: CPT

## 2018-07-03 PROCEDURE — 36430 TRANSFUSION BLD/BLD COMPNT: CPT

## 2018-07-03 PROCEDURE — 2500000003 HC RX 250 WO HCPCS: Performed by: INTERNAL MEDICINE

## 2018-07-03 PROCEDURE — 6360000002 HC RX W HCPCS: Performed by: INTERNAL MEDICINE

## 2018-07-03 PROCEDURE — 82962 GLUCOSE BLOOD TEST: CPT

## 2018-07-03 PROCEDURE — 97110 THERAPEUTIC EXERCISES: CPT

## 2018-07-03 PROCEDURE — 86923 COMPATIBILITY TEST ELECTRIC: CPT

## 2018-07-03 PROCEDURE — 2060000000 HC ICU INTERMEDIATE R&B

## 2018-07-03 PROCEDURE — 85025 COMPLETE CBC W/AUTO DIFF WBC: CPT

## 2018-07-03 PROCEDURE — 2580000003 HC RX 258: Performed by: SURGERY

## 2018-07-03 PROCEDURE — P9016 RBC LEUKOCYTES REDUCED: HCPCS

## 2018-07-03 PROCEDURE — 2580000003 HC RX 258: Performed by: INTERNAL MEDICINE

## 2018-07-03 RX ADMIN — SODIUM CHLORIDE 250 ML: 9 INJECTION, SOLUTION INTRAVENOUS at 02:45

## 2018-07-03 RX ADMIN — Medication 250 MG: at 00:00

## 2018-07-03 RX ADMIN — I.V. FAT EMULSION 250 ML: 20 EMULSION INTRAVENOUS at 18:08

## 2018-07-03 RX ADMIN — Medication 10 ML: at 09:25

## 2018-07-03 RX ADMIN — POTASSIUM CHLORIDE: 2 INJECTION, SOLUTION, CONCENTRATE INTRAVENOUS at 18:08

## 2018-07-03 RX ADMIN — METRONIDAZOLE 500 MG: 500 INJECTION, SOLUTION INTRAVENOUS at 09:26

## 2018-07-03 RX ADMIN — SERTRALINE HYDROCHLORIDE 50 MG: 50 TABLET ORAL at 09:26

## 2018-07-03 RX ADMIN — Medication 250 MG: at 09:26

## 2018-07-03 RX ADMIN — METRONIDAZOLE 500 MG: 500 INJECTION, SOLUTION INTRAVENOUS at 16:36

## 2018-07-03 RX ADMIN — Medication 250 MG: at 04:28

## 2018-07-03 RX ADMIN — ENOXAPARIN SODIUM 50 MG: 60 INJECTION SUBCUTANEOUS at 18:08

## 2018-07-03 RX ADMIN — ENOXAPARIN SODIUM 50 MG: 60 INJECTION SUBCUTANEOUS at 06:48

## 2018-07-03 RX ADMIN — Medication 250 MG: at 16:36

## 2018-07-03 RX ADMIN — PANTOPRAZOLE SODIUM 40 MG: 40 INJECTION, POWDER, FOR SOLUTION INTRAVENOUS at 09:26

## 2018-07-03 ASSESSMENT — PAIN SCALES - GENERAL
PAINLEVEL_OUTOF10: 0

## 2018-07-03 NOTE — PROGRESS NOTES
11/12/2017    LABALBU 2.1 07/02/2018     Albumin:    Lab Results   Component Value Date    LABALBU 2.1 07/02/2018     Calcium:    Lab Results   Component Value Date    CALCIUM 8.1 07/03/2018     Ionized Calcium:  No results found for: IONCA  Magnesium:    Lab Results   Component Value Date    MG 1.8 07/01/2018     Phosphorus:    Lab Results   Component Value Date    PHOS 1.9 07/01/2018     Last 3 Troponin:    Lab Results   Component Value Date    TROPONINI 0.01 12/21/2017    TROPONINI 0.02 12/19/2017    TROPONINI 0.01 11/06/2017     FLP:    Lab Results   Component Value Date    TRIG 88 06/30/2018    HDL 58.0 07/18/2013    LDLCALC 157 07/18/2013     TSH:    Lab Results   Component Value Date    TSH 4.620 04/07/2018        Patient Active Problem List   Diagnosis    [facility] ([facility]    Pneumatosis intestinalis    Macular degeneration    Enteritis    Severe protein-calorie malnutrition (HCC)    Macular degeneration    PAF (paroxysmal atrial fibrillation) (HCC)    Ischemic bowel disease (HCC)    Altered mental status    Fatigue due to excessive exertion    PAF (paroxysmal atrial fibrillation) (HCC)    Macular degeneration    Ischemic bowel disease ([address]    Hx of blood clots    Cancer (HCC)     Assessment/Plan:  1. Colovaginal fistula  2. Rectal Ca s/p radiation/chemo/resection [facility]  3. S/P ischemic bowel with emergency intervention Dr Dakota Bush 11/17  4. History of proximal atrial fibrillation currently sinus rhythm  5. History of extensive DVT left leg-April 2018  6. History of ischemic bowel with resection in November 2017  7. History of prior tobacco use  8. Normocytic anemia; worsening  9. Hypoalbuminemia-Protein and calorie malnutrition  10. Failure to thrive  11. Inability to ambulate;weakness  12. Anemia - multifactorial; likely iron deficiency  13.  High dose immodium use for chronic loose stool/incontinence       -IV fluids  -Moe Davis on case   -Labs in a.m.  -Repeat venous Doppler

## 2018-07-03 NOTE — PROGRESS NOTES
ET Nurse  Admit Date: 6/25/2018  2:41 PM    Reason for consult:  colostomy    Significant history:    Past Medical History:   Diagnosis Date    Cancer [facility])     colorectal     History of blood transfusion     Hx of blood clots     dvt, on eliquis    Ischemic bowel disease ([address]     Macular degeneration     MDRO (multiple drug resistant organisms) resistance     PAF (paroxysmal atrial fibrillation) (HCC)     Tinnitus        Stoma assessment:    Stoma pale red  Functioning for soft stool    Pt cont to have a sm amt of stool from vaginal area  periarea sl red    Cont to use destitin  Pt remains on a hospital bed    Plan:    Gave supplies for discharge  [place] 2 piece 21/4 wafer [id number]  [place] pouch 21/4 [id number]  Ostomy paste    Plan for pt to go to [address] [place] [address] 7/3/2018 2:17 PM

## 2018-07-03 NOTE — PROGRESS NOTES
Patient family at the bedside stated they removed the fentanyl patch, stating she is too lethargic. Dr. Sang Lea on floor, family notified doctor of their concern and requested it be discontinued.      Naheed Jett RN, BSN

## 2018-07-03 NOTE — PROGRESS NOTES
Patient seen and examined. Patient's daughter and  was at the bedside today. The patient has continued lower abdominal discomfort today-mostly gas pains and cramping and essentially no appetite. The patient was very sleepy and groggy today so the fentanyl patch was removed even that was only 12 mcg Swallowing study was done today and was normal.  No complaints of unusual SOB,  vomiting, chest pain, dizziness or constipation. BP (!) 144/79   Pulse 95   Temp 98.4 °F (36.9 °C) (Oral)   Resp 16   Ht 5' 6\" (1.676 m)   Wt 119 lb 3.2 oz (54.1 kg)   SpO2 95%   BMI 19.24 kg/m²     HEENT: negative to gross visual examination  Heart: regular rate and rhythm  Lungs: Coarse breath sounds to auscultation without rhonchi or wheezing  Abdomen: Postop, hypoactive bowel sounds, mild distention,+ discomfort  Ext: no clubbing, cyanosis, erythema, edema  Neuro: alert and oriented X 3.  No gross deficits    CBC with Differential:    Lab Results   Component Value Date    WBC 11.2 07/03/2018    RBC 3.63 07/03/2018    HGB 9.8 07/03/2018    HCT 29.9 07/03/2018     07/03/2018    MCV 82.4 07/03/2018    MCH 27.0 07/03/2018    MCHC 32.8 07/03/2018    RDW 16.5 07/03/2018    NRBC 0.5 11/30/2017    BLASTSPCT 1.0 11/06/2017    METASPCT 1.5 12/13/2017    LYMPHOPCT 15.1 07/03/2018    PROMYELOPCT 0.5 11/26/2017    MONOPCT 6.3 07/03/2018    MYELOPCT 0.5 06/27/2018    BASOPCT 0.2 07/03/2018    MONOSABS 0.71 07/03/2018    LYMPHSABS 1.69 07/03/2018    EOSABS 0.01 07/03/2018    BASOSABS 0.02 07/03/2018     CMP:    Lab Results   Component Value Date     07/03/2018    K 3.4 07/03/2018    K 3.2 06/26/2018    CL 98 07/03/2018    CO2 30 07/03/2018    BUN 12 07/03/2018    CREATININE 0.4 07/03/2018    GFRAA >60 07/03/2018    LABGLOM >60 07/03/2018    GLUCOSE 102 07/03/2018    PROT 5.4 07/02/2018    LABALBU 2.1 07/02/2018    CALCIUM 8.1 07/03/2018    BILITOT <0.2 07/02/2018    ALKPHOS 66 07/02/2018    AST 14 07/02/2018    ALT 8 07/02/2018     Magnesium:    Lab Results   Component Value Date    MG 1.8 07/01/2018     Phosphorus:    Lab Results   Component Value Date    PHOS 1.9 07/01/2018     PT/INR:    Lab Results   Component Value Date    PROTIME 11.8 06/27/2018    INR 1.0 06/27/2018     Troponin:    Lab Results   Component Value Date    TROPONINI 0.01 12/21/2017     U/A:    Lab Results   Component Value Date    COLORU Yellow 12/24/2017    PROTEINU Negative 12/24/2017    PHUR 6.5 12/24/2017    WBCUA 10-20 12/24/2017    RBCUA NONE 12/24/2017    BACTERIA FEW 12/24/2017    CLARITYU Clear 12/24/2017    SPECGRAV <=1.005 12/24/2017    LEUKOCYTESUR LARGE 12/24/2017    UROBILINOGEN 0.2 12/24/2017    BILIRUBINUR Negative 12/24/2017    BLOODU Negative 12/24/2017    GLUCOSEU Negative 12/24/2017     HgBA1c:  No results found for: LABA1C  FLP:    Lab Results   Component Value Date    TRIG 88 06/30/2018    HDL 58.0 07/18/2013    LDLCALC 157 07/18/2013     TSH:    Lab Results   Component Value Date    TSH 4.620 04/07/2018     LIPASE:    Lab Results   Component Value Date    LIPASE 78 12/22/2017       Recent Labs      07/01/18   1235  07/01/18   1646  07/02/18   0440  07/02/18   1142  07/02/18   2353  07/03/18   0647  07/03/18   1150   GLUMET  121*  146*  145*  158*  134*  103  129*       FL MODIFIED BARIUM SWALLOW W VIDEO   Final Result   1. No evidence of aspiration. 2. Please see speech therapist's report for a detailed description of   findings. IR ULTRASOUND GUIDANCE VASCULAR ACCESS   Final Result   Ultrasound guidance was provided during placement of a   PICC line. IR FLUORO GUIDED CVA DEVICE PLACEMENT   Final Result   Successful placement of a 5 [place] double-lumen Power   PICC line using ultrasound guidance via the right brachial  vein. CT ABDOMEN PELVIS W IV CONTRAST Additional Contrast? Oral   Final Result   1. Abnormal appearance of the distal sigmoid colon and rectum with   abnormal wall thickening.  Inflammatory count on Monday  Vancomycin orally started 6/28  Duragesic patch 25 mcg every 72 hours  Labs in a.m. Transfer to [facility] if ok with other Dr    See orders.         Cara Cruz DO  2:04 PM  7/3/2018

## 2018-07-03 NOTE — PLAN OF CARE
Problem: Falls - Risk of:  Goal: Will remain free from falls  Will remain free from falls   Outcome: Met This Shift    Goal: Absence of physical injury  Absence of physical injury   Outcome: Met This Shift      Problem: Risk for Impaired Skin Integrity  Goal: Tissue integrity - skin and mucous membranes  Structural intactness and normal physiological function of skin and  mucous membranes.    Outcome: Met This Shift      Problem: Pain:  Goal: Pain level will decrease  Pain level will decrease   Outcome: Met This Shift    Goal: Control of acute pain  Control of acute pain   Outcome: Met This Shift    Goal: Control of chronic pain  Control of chronic pain   Outcome: Met This Shift      Problem: Fluid Volume - Imbalance:  Goal: Absence of imbalanced fluid volume signs and symptoms  Absence of imbalanced fluid volume signs and symptoms   Outcome: Met This Shift      Problem: Nutrition  Goal: Optimal nutrition therapy  Outcome: Met This Shift

## 2018-07-03 NOTE — PROGRESS NOTES
of fruit. Comment: Call light left by patient. A: Roni. Exercises with encouragement to participate. P: Continue with physical therapy   Skyler Dempsey PTA    Goals to be met in 3 days. Bed mobility-Minimal assists    Transfers-Minimal assists    Ambulate with wheeled walker min a for 40 feet  Patient will be able to dangle with min assist for 10 minutes     Increase strength in affected muscle groups by 1/3 grade  Increase balance to allow for improvement towards functional goals. Increase endurance to allow for improvement towards functional goals.

## 2018-07-03 NOTE — PROGRESS NOTES
Patient skin intact, redness on sacrum, coccyx, and julieta- area. Patient continues to have stool from vagina, and mixed in with urine, but seems to be clearing up.      Martell Richardson RN, BSN

## 2018-07-03 NOTE — PROGRESS NOTES
GENERAL SURGERY  DAILY PROGRESS NOTE  7/3/2018    Subjective:  No issues overnight. Feels much better today. Denies pain. Having increased ostomy output. Cleared by SLP for gen diet. Objective:  /74   Pulse 97   Temp 98.8 °F (37.1 °C) (Oral)   Resp 16   Ht 5' 6\" (1.676 m)   Wt 119 lb 3.2 oz (54.1 kg)   SpO2 95%   BMI 19.24 kg/m²     GENERAL:  Laying in bed, awake, alert, cooperative, no apparent distress  HEAD: Normocephalic, atraumatic  EYES: No sclera icterus, pupils equal  LUNGS:  No increased work of breathing  CARDIOVASCULAR:  Reg rate  ABDOMEN:  Soft, TTP near stoma site, ostomy in left abdomen stoma pink and patent liquid stool and air in bag. non-distended  EXTREMITIES: No edema or swelling  SKIN: Warm and dry    Assessment/Plan:  [de-identified] y.o. female s/p lap YUMIKO and diverting colostomy for rectovaginal fistula, post op ileus resolved  - Severe protein calorie malnutrition: cleared by SLP for gen diet. Dietary consult for supplements, continue short term TPN, start to wean as tolerates diet. - Acute blood loss anemia: transfused 1 unit per cards yesterday. Trend hgb.   - C.diff colitis: continue PO vanc, flagyl  - hx a fib: cards consulted on therapeutic lovenox. - DVT: Therapeutic lovenox, rpt US shows improvement  - Deconditioning: PT/OT consult, will likely need rehab at D/c  - Pain control: Fent patch, morphine prn, tramadol  - enterostomal therapy consulted for new colostomy.    - trend labs and replace electrolytes    Electronically signed by Vandana Gabriel MD on 7/3/2018 at 6:46 AM

## 2018-07-04 VITALS
OXYGEN SATURATION: 97 % | RESPIRATION RATE: 18 BRPM | BODY MASS INDEX: 19.48 KG/M2 | HEART RATE: 90 BPM | WEIGHT: 121.2 LBS | HEIGHT: 66 IN | TEMPERATURE: 98 F | SYSTOLIC BLOOD PRESSURE: 122 MMHG | DIASTOLIC BLOOD PRESSURE: 71 MMHG

## 2018-07-04 LAB
ANION GAP SERPL CALCULATED.3IONS-SCNC: 10 MMOL/L (ref 7–16)
BUN BLDV-MCNC: 10 MG/DL (ref 8–23)
CALCIUM SERPL-MCNC: 8.2 MG/DL (ref 8.6–10.2)
CHLORIDE BLD-SCNC: 101 MMOL/L (ref 98–107)
CO2: 28 MMOL/L (ref 22–29)
CREAT SERPL-MCNC: 0.4 MG/DL (ref 0.5–1)
GFR AFRICAN AMERICAN: >60
GFR NON-AFRICAN AMERICAN: >60 ML/MIN/1.73
GLUCOSE BLD-MCNC: 111 MG/DL (ref 74–109)
MAGNESIUM: 2 MG/DL (ref 1.6–2.6)
METER GLUCOSE: 132 MG/DL (ref 70–110)
METER GLUCOSE: 142 MG/DL (ref 70–110)
PHOSPHORUS: 2.1 MG/DL (ref 2.5–4.5)
POTASSIUM SERPL-SCNC: 3 MMOL/L (ref 3.5–5)
SODIUM BLD-SCNC: 139 MMOL/L (ref 132–146)

## 2018-07-04 PROCEDURE — 36415 COLL VENOUS BLD VENIPUNCTURE: CPT

## 2018-07-04 PROCEDURE — 80048 BASIC METABOLIC PNL TOTAL CA: CPT

## 2018-07-04 PROCEDURE — 6370000000 HC RX 637 (ALT 250 FOR IP): Performed by: INTERNAL MEDICINE

## 2018-07-04 PROCEDURE — 82962 GLUCOSE BLOOD TEST: CPT

## 2018-07-04 PROCEDURE — 2580000003 HC RX 258: Performed by: SURGERY

## 2018-07-04 PROCEDURE — 84100 ASSAY OF PHOSPHORUS: CPT

## 2018-07-04 PROCEDURE — 83735 ASSAY OF MAGNESIUM: CPT

## 2018-07-04 PROCEDURE — C9113 INJ PANTOPRAZOLE SODIUM, VIA: HCPCS | Performed by: INTERNAL MEDICINE

## 2018-07-04 PROCEDURE — 99024 POSTOP FOLLOW-UP VISIT: CPT | Performed by: SURGERY

## 2018-07-04 PROCEDURE — 6370000000 HC RX 637 (ALT 250 FOR IP): Performed by: SURGERY

## 2018-07-04 PROCEDURE — 2500000003 HC RX 250 WO HCPCS: Performed by: INTERNAL MEDICINE

## 2018-07-04 PROCEDURE — 6360000002 HC RX W HCPCS: Performed by: INTERNAL MEDICINE

## 2018-07-04 RX ORDER — ZINC OXIDE
OINTMENT (GRAM) TOPICAL
Status: ON HOLD | DISCHARGE
Start: 2018-07-04 | End: 2018-10-29 | Stop reason: HOSPADM

## 2018-07-04 RX ORDER — MORPHINE SULFATE 4 MG/ML
4 INJECTION, SOLUTION INTRAMUSCULAR; INTRAVENOUS EVERY 4 HOURS PRN
Refills: 0 | Status: SHIPPED | OUTPATIENT
Start: 2018-07-04 | End: 2018-07-13

## 2018-07-04 RX ORDER — POTASSIUM CHLORIDE 7.45 MG/ML
10 INJECTION INTRAVENOUS
Status: DISPENSED | OUTPATIENT
Start: 2018-07-04 | End: 2018-07-04

## 2018-07-04 RX ORDER — ONDANSETRON 2 MG/ML
4 INJECTION INTRAMUSCULAR; INTRAVENOUS EVERY 6 HOURS PRN
Qty: 84 ML | Status: ON HOLD | DISCHARGE
Start: 2018-07-04 | End: 2018-10-29 | Stop reason: HOSPADM

## 2018-07-04 RX ORDER — TRAMADOL HYDROCHLORIDE 50 MG/1
25 TABLET ORAL EVERY 6 HOURS PRN
Status: SHIPPED | OUTPATIENT
Start: 2018-07-04 | End: 2018-07-14

## 2018-07-04 RX ORDER — IPRATROPIUM BROMIDE AND ALBUTEROL SULFATE 2.5; .5 MG/3ML; MG/3ML
3 SOLUTION RESPIRATORY (INHALATION) EVERY 4 HOURS PRN
Qty: 360 ML | Refills: 0 | Status: ON HOLD
Start: 2018-07-04 | End: 2018-10-29 | Stop reason: HOSPADM

## 2018-07-04 RX ORDER — TRAMADOL HYDROCHLORIDE 50 MG/1
50 TABLET ORAL EVERY 6 HOURS PRN
Status: SHIPPED | OUTPATIENT
Start: 2018-07-04 | End: 2018-07-14

## 2018-07-04 RX ORDER — PANTOPRAZOLE SODIUM 40 MG/10ML
40 INJECTION, POWDER, LYOPHILIZED, FOR SOLUTION INTRAVENOUS DAILY
Status: ON HOLD | DISCHARGE
Start: 2018-07-05 | End: 2018-10-29 | Stop reason: HOSPADM

## 2018-07-04 RX ORDER — MORPHINE SULFATE 2 MG/ML
2 INJECTION, SOLUTION INTRAMUSCULAR; INTRAVENOUS EVERY 4 HOURS PRN
Refills: 0 | Status: SHIPPED | OUTPATIENT
Start: 2018-07-04 | End: 2018-07-13

## 2018-07-04 RX ADMIN — POTASSIUM CHLORIDE 10 MEQ: 10 INJECTION, SOLUTION INTRAVENOUS at 17:46

## 2018-07-04 RX ADMIN — Medication 250 MG: at 05:27

## 2018-07-04 RX ADMIN — POTASSIUM CHLORIDE 10 MEQ: 10 INJECTION, SOLUTION INTRAVENOUS at 15:58

## 2018-07-04 RX ADMIN — Medication 250 MG: at 17:47

## 2018-07-04 RX ADMIN — ENOXAPARIN SODIUM 50 MG: 60 INJECTION SUBCUTANEOUS at 05:28

## 2018-07-04 RX ADMIN — Medication 250 MG: at 09:54

## 2018-07-04 RX ADMIN — SERTRALINE HYDROCHLORIDE 50 MG: 50 TABLET ORAL at 09:53

## 2018-07-04 RX ADMIN — PANTOPRAZOLE SODIUM 40 MG: 40 INJECTION, POWDER, FOR SOLUTION INTRAVENOUS at 09:53

## 2018-07-04 RX ADMIN — Medication: at 09:53

## 2018-07-04 RX ADMIN — METRONIDAZOLE 500 MG: 500 INJECTION, SOLUTION INTRAVENOUS at 09:53

## 2018-07-04 RX ADMIN — METRONIDAZOLE 500 MG: 500 INJECTION, SOLUTION INTRAVENOUS at 00:43

## 2018-07-04 RX ADMIN — Medication 10 ML: at 09:53

## 2018-07-04 NOTE — PROGRESS NOTES
distal small bowel wall thickening. There is no small bowel obstruction. .   2. Perirectal phlegmon and development of multiple tiny perirectal   abscesses. The largest is seen on the left and measures 2 cm. 3. 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 .   4. Left hydroureteronephrosis. Due to the inflammatory process within   the pelvis the distal left ureter is obstructed and is not well seen. THIS IS AN ABNORMAL REPORT. PLEASE TAKE APPROPRIATE MEASURES. This report will be called to the floor by radiology personnel. US DUP LOWER EXTREMITY LEFT JEFERSON   Final Result   1. Minimal residual chronic deep venous thrombosis seen throughout the   veins of the left lower extremity. . Flow is now appreciated on today's   exam. No flow was noted on the patient's previous examination of   4/7/2018.  potassium chloride  10 mEq Intravenous Q1H    metroNIDAZOLE  500 mg Intravenous Q8H    darbepoetin latia-polysorbate  100 mcg Subcutaneous Weekly    vancomycin  250 mg Oral 4 times per day    sodium chloride flush  10 mL Intravenous 2 times per day    enoxaparin  50 mg Subcutaneous BID    pantoprazole  40 mg Intravenous Daily    sertraline  50 mg Oral Daily        Assessment;   Active Problems:    Acute colovaginal fistula-Postop day 1    Iron deficiency anemia with chronic disease    Altered mental status-Resolved    Fatigue/weakness    Severe protein, caloric malnutrition    History of PAF (paroxysmal atrial fibrillation) (HCC)    Macular degeneration    Ischemic bowel disease (HCC)    Hx of left femoral DVT -improved    History of severe ischemic bowel with emergent bowel resection November 2017    History of Cancer -colorectal    Hypokalemia-resolved    + C diff    Hypokalemia      Plan:   Dr. Torres Alu on consult  TPN- 75 cc/hr and

## 2018-07-18 ENCOUNTER — ANESTHESIA (OUTPATIENT)
Dept: ENDOSCOPY | Age: 78
End: 2018-07-18

## 2018-07-18 ENCOUNTER — ANESTHESIA EVENT (OUTPATIENT)
Dept: ENDOSCOPY | Age: 78
End: 2018-07-18

## 2018-07-18 VITALS — OXYGEN SATURATION: 100 % | SYSTOLIC BLOOD PRESSURE: 116 MMHG | DIASTOLIC BLOOD PRESSURE: 61 MMHG

## 2018-07-18 PROCEDURE — 2580000003 HC RX 258: Performed by: NURSE ANESTHETIST, CERTIFIED REGISTERED

## 2018-07-18 PROCEDURE — 6360000002 HC RX W HCPCS: Performed by: NURSE ANESTHETIST, CERTIFIED REGISTERED

## 2018-07-18 RX ORDER — PROPOFOL 10 MG/ML
INJECTION, EMULSION INTRAVENOUS PRN
Status: DISCONTINUED | OUTPATIENT
Start: 2018-07-18 | End: 2018-07-18 | Stop reason: SDUPTHER

## 2018-07-18 RX ORDER — SODIUM CHLORIDE 9 MG/ML
INJECTION, SOLUTION INTRAVENOUS CONTINUOUS PRN
Status: DISCONTINUED | OUTPATIENT
Start: 2018-07-18 | End: 2018-07-18 | Stop reason: SDUPTHER

## 2018-07-18 RX ADMIN — PROPOFOL 100 MG: 10 INJECTION, EMULSION INTRAVENOUS at 09:41

## 2018-07-18 RX ADMIN — SODIUM CHLORIDE: 9 INJECTION, SOLUTION INTRAVENOUS at 09:41

## 2018-07-18 NOTE — ANESTHESIA PRE PROCEDURE
Department of Anesthesiology  Preprocedure Note       Name:  Catie Turner   Age:  [de-identified] y.o.  :  1940                                          MRN:  [id number]         Date:  2018      Surgeon: Helena Guillen):  Jennifer Fuentes DO    Procedure: Procedure(s):  EGD ESOPHAGOGASTRODUODENOSCOPY       Medications prior to admission:   Prior to Admission medications    Medication Sig Start Date End Date Taking? Authorizing Provider   ipratropium-albuterol (DUONEB) 0.5-2.5 (3) MG/3ML SOLN nebulizer solution Inhale 3 mLs into the lungs every 4 hours as needed for Shortness of Breath 18   Jennifer Fuentes DO   enoxaparin (LOVENOX) 60 MG/0.6ML injection Inject 0.5 mLs into the skin 2 times daily 18   Jennifer Fuentes DO   ondansetron [facility]) 4 MG/2ML injection Infuse 2 mLs intravenously every 6 hours as needed for Nausea 18   Jennifer Fuentes DO   zinc oxide (DESITIN) 40 % ointment Apply topically as needed. 18   Jennifer Fuentes DO   darbepoetin latia-polysorbate (ARANESP) 100 MCG/0.5ML SOSY injection Inject 0.5 mLs into the skin once a week 18   Jennifer Fuentes DO   metroNIDAZOLE in NaCl (FLAGYL) 5 mg/mL IVPB Infuse 500 mg intravenously every 8 hours 18   Jennifer Fuentes DO   vancomycin (VANCOCIN) 50 mg/mL oral solution Take 5 mLs by mouth every 8 hours 18   Jennifer Fuentes DO   pantoprazole (PROTONIX) 40 MG injection Infuse 40 mg intravenously daily 18   Jennifer Fuentes DO   acetaminophen (TYLENOL) 325 MG tablet Take 2 tablets by mouth every 4 hours as needed for Pain or Fever 17   Jennifer Fuentes DO   sertraline (ZOLOFT) 50 MG tablet Take 50 mg by mouth daily    Historical Provider, MD       Current medications:    No current facility-administered medications for this encounter. Allergies:     Allergies   Allergen Reactions    Aspirin Other (See Comments)     tinnitus    Tape Starling Geralds Tape] Rash       Problem List:    Patient Active Problem List   Diagnosis Code    Rectal cancer ([facility] C20    Pneumatosis intestinalis K63.89    Macular degeneration H35.30    Enteritis K52.9    Severe protein-calorie malnutrition (HCC) E43    Macular degeneration H35.30    PAF (paroxysmal atrial fibrillation) (HCC) I48.0    Ischemic bowel disease (HCC) K55.9    Altered mental status R41.82    Fatigue due to excessive exertion T73. 3XXA    PAF (paroxysmal atrial fibrillation) (HCC) I48.0    Macular degeneration H35.30    Ischemic bowel disease (HCC) K55.9    Hx of blood clots Z86.718    Cancer (HCC) C80.1       Past Medical History:        Diagnosis Date    Cancer ([facility]     colorectal     History of blood transfusion     Hx of blood clots     dvt, on eliquis    [facility]     Macular degeneration     MDRO (multiple drug resistant organisms) resistance     PAF (paroxysmal atrial fibrillation) (HCC)     Tinnitus        Past Surgical History:        Procedure Laterality Date    ABDOMEN SURGERY      colostomy & reversal    CHOLECYSTECTOMY  07    COLONOSCOPY      DILATATION, ESOPHAGUS      HAND SURGERY      carpel tunnel rigth hand    HYSTERECTOMY      ILEOSTOMY OR JEJUNOSTOMY      OTHER SURGICAL HISTORY  11/06/2017    diagnostic laparoscopy with exploratory laparotomy and reduction of internal hernia and closure of mesenteric defect, oversewing of multiple serosal tears    SC LAP,SURG,COLECT,TOT,W/PROCTECT,W/ILEOST N/A 6/28/2018    LAPAROSCOPIC DIVERTING COLOSTOMY performed by Yuliet Vargas MD at [facility]         Social History:    Social History   Substance Use Topics    Smoking status: Former Smoker     Years: 20.00     Types: Cigarettes    Smokeless tobacco: Never Used      Comment: quit at age 44     Alcohol use No      Comment: socially                                Counseling given: Not Answered      Vital Signs (Current): There were no vitals filed for this visit.                                            BP Readings from Last 3 Encounters:   07/04/18 122/71   06/28/18 (!) 113/54   06/25/18 (!) 104/59       NPO Status:                                                                                 BMI:   Wt Readings from Last 3 Encounters:   07/04/18 121 lb 3.2 oz (55 kg)   06/25/18 128 lb (58.1 kg)   04/07/18 133 lb (60.3 kg)     There is no height or weight on file to calculate BMI.    CBC:   Lab Results   Component Value Date    WBC 6.0 07/18/2018    RBC 4.09 07/18/2018    HGB 11.0 07/18/2018    HCT 36.2 07/18/2018    MCV 88.5 07/18/2018    RDW 20.9 07/18/2018     07/18/2018       CMP:   Lab Results   Component Value Date     07/18/2018    K 4.7 07/18/2018    K 3.2 06/26/2018    CL 99 07/18/2018    CO2 30 07/18/2018    BUN 31 07/18/2018    CREATININE 0.6 07/18/2018    GFRAA >60 07/18/2018    LABGLOM >60 07/18/2018    GLUCOSE 135 07/18/2018    PROT 6.7 07/18/2018    CALCIUM 9.3 07/18/2018    BILITOT 0.2 07/18/2018    ALKPHOS 109 07/18/2018    AST 12 07/18/2018    ALT 8 07/18/2018       POC Tests: No results for input(s): POCGLU, POCNA, POCK, POCCL, POCBUN, POCHEMO, POCHCT in the last 72 hours.     Coags:   Lab Results   Component Value Date    PROTIME 10.8 07/18/2018    INR 1.0 07/18/2018    APTT 32.5 11/12/2017       HCG (If Applicable): No results found for: PREGTESTUR, PREGSERUM, HCG, HCGQUANT     ABGs: No results found for: PHART, PO2ART, VBE6MYO, AXM8RCL, BEART, T4KRPSNA     Type & Screen (If Applicable):  No results found for: MAIRA [facility]    Anesthesia Evaluation  Patient summary reviewed and Nursing notes reviewed no history of anesthetic complications:   Airway: Mallampati: II  TM distance: >3 FB   Neck ROM: full  Mouth opening: > = 3 FB Dental: normal exam         Pulmonary:Negative Pulmonary ROS breath sounds clear to auscultation                             Cardiovascular:    (+) dysrhythmias (PAF (paroxysmal atrial fibrillation)):,         Rhythm: regular  Rate: normal

## 2018-08-17 ENCOUNTER — HOSPITAL ENCOUNTER (OUTPATIENT)
Age: 78
Discharge: HOME | End: 2018-08-19
Payer: MEDICARE

## 2018-08-17 LAB
ALBUMIN SERPL-MCNC: 3.3 G/DL (ref 3.5–5.2)
ALP BLD-CCNC: 102 U/L (ref 35–104)
ALT SERPL-CCNC: 12 U/L (ref 0–32)
ANION GAP SERPL CALCULATED.3IONS-SCNC: 12 MMOL/L (ref 7–16)
AST SERPL-CCNC: 12 U/L (ref 0–31)
BILIRUB SERPL-MCNC: 0.3 MG/DL (ref 0–1.2)
BUN BLDV-MCNC: 12 MG/DL (ref 8–23)
CALCIUM SERPL-MCNC: 10 MG/DL (ref 8.6–10.2)
CHLORIDE BLD-SCNC: 102 MMOL/L (ref 98–107)
CO2: 28 MMOL/L (ref 22–29)
CREAT SERPL-MCNC: 0.8 MG/DL (ref 0.5–1)
GFR AFRICAN AMERICAN: >60
GFR NON-AFRICAN AMERICAN: >60 ML/MIN/1.73
GLUCOSE BLD-MCNC: 92 MG/DL (ref 74–109)
HCT VFR BLD CALC: 32.7 % (ref 34–48)
HEMOGLOBIN: 10.2 G/DL (ref 11.5–15.5)
MCH RBC QN AUTO: 28.1 PG (ref 26–35)
MCHC RBC AUTO-ENTMCNC: 31.2 % (ref 32–34.5)
MCV RBC AUTO: 90.1 FL (ref 80–99.9)
PDW BLD-RTO: 18.3 FL (ref 11.5–15)
PLATELET # BLD: 318 E9/L (ref 130–450)
PMV BLD AUTO: 9.9 FL (ref 7–12)
POTASSIUM SERPL-SCNC: 3.8 MMOL/L (ref 3.5–5)
RBC # BLD: 3.63 E12/L (ref 3.5–5.5)
SODIUM BLD-SCNC: 142 MMOL/L (ref 132–146)
TOTAL PROTEIN: 7 G/DL (ref 6.4–8.3)
WBC # BLD: 8.4 E9/L (ref 4.5–11.5)

## 2018-08-17 PROCEDURE — 85027 COMPLETE CBC AUTOMATED: CPT

## 2018-08-17 PROCEDURE — 36415 COLL VENOUS BLD VENIPUNCTURE: CPT

## 2018-08-17 PROCEDURE — 80053 COMPREHEN METABOLIC PANEL: CPT

## 2018-09-16 ENCOUNTER — HOSPITAL ENCOUNTER (OUTPATIENT)
Age: 78
Discharge: HOME | End: 2018-09-18
Payer: MEDICARE

## 2018-09-16 LAB
ALBUMIN SERPL-MCNC: 2.9 G/DL (ref 3.5–5.2)
ALP BLD-CCNC: 80 U/L (ref 35–104)
ALT SERPL-CCNC: 5 U/L (ref 0–32)
ANION GAP SERPL CALCULATED.3IONS-SCNC: 14 MMOL/L (ref 7–16)
AST SERPL-CCNC: 11 U/L (ref 0–31)
BASOPHILS ABSOLUTE: 0.01 E9/L (ref 0–0.2)
BASOPHILS RELATIVE PERCENT: 0.1 % (ref 0–2)
BILIRUB SERPL-MCNC: 0.3 MG/DL (ref 0–1.2)
BUN BLDV-MCNC: 10 MG/DL (ref 8–23)
CALCIUM SERPL-MCNC: 9.5 MG/DL (ref 8.6–10.2)
CHLORIDE BLD-SCNC: 96 MMOL/L (ref 98–107)
CO2: 26 MMOL/L (ref 22–29)
CREAT SERPL-MCNC: 0.7 MG/DL (ref 0.5–1)
EOSINOPHILS ABSOLUTE: 0 E9/L (ref 0.05–0.5)
EOSINOPHILS RELATIVE PERCENT: 0 % (ref 0–6)
GFR AFRICAN AMERICAN: >60
GFR NON-AFRICAN AMERICAN: >60 ML/MIN/1.73
GLUCOSE BLD-MCNC: 96 MG/DL (ref 74–109)
HCT VFR BLD CALC: 34.4 % (ref 34–48)
HEMOGLOBIN: 10.6 G/DL (ref 11.5–15.5)
IMMATURE GRANULOCYTES #: 0.07 E9/L
IMMATURE GRANULOCYTES %: 0.6 % (ref 0–5)
LYMPHOCYTES ABSOLUTE: 1.44 E9/L (ref 1.5–4)
LYMPHOCYTES RELATIVE PERCENT: 12.5 % (ref 20–42)
MCH RBC QN AUTO: 26.7 PG (ref 26–35)
MCHC RBC AUTO-ENTMCNC: 30.8 % (ref 32–34.5)
MCV RBC AUTO: 86.6 FL (ref 80–99.9)
MONOCYTES ABSOLUTE: 1.15 E9/L (ref 0.1–0.95)
MONOCYTES RELATIVE PERCENT: 10 % (ref 2–12)
NEUTROPHILS ABSOLUTE: 8.83 E9/L (ref 1.8–7.3)
NEUTROPHILS RELATIVE PERCENT: 76.8 % (ref 43–80)
PDW BLD-RTO: 16.2 FL (ref 11.5–15)
PLATELET # BLD: 447 E9/L (ref 130–450)
PMV BLD AUTO: 8.9 FL (ref 7–12)
POTASSIUM SERPL-SCNC: 3.9 MMOL/L (ref 3.5–5)
RBC # BLD: 3.97 E12/L (ref 3.5–5.5)
SODIUM BLD-SCNC: 136 MMOL/L (ref 132–146)
TOTAL PROTEIN: 6.8 G/DL (ref 6.4–8.3)
WBC # BLD: 11.5 E9/L (ref 4.5–11.5)

## 2018-09-16 PROCEDURE — 80053 COMPREHEN METABOLIC PANEL: CPT

## 2018-09-16 PROCEDURE — 85025 COMPLETE CBC W/AUTO DIFF WBC: CPT

## 2018-10-16 ENCOUNTER — HOSPITAL ENCOUNTER (INPATIENT)
Age: 78
LOS: 12 days | Discharge: NURSING, REHAB OR LONG-TERM CARE | DRG: 329 | End: 2018-10-29
Attending: EMERGENCY MEDICINE | Admitting: INTERNAL MEDICINE
Payer: MEDICARE

## 2018-10-16 DIAGNOSIS — K63.1 PERFORATED SIGMOID COLON (HCC): Primary | ICD-10-CM

## 2018-10-16 LAB
EKG ATRIAL RATE: 87 BPM
EKG P AXIS: 65 DEGREES
EKG P-R INTERVAL: 106 MS
EKG Q-T INTERVAL: 368 MS
EKG QRS DURATION: 74 MS
EKG QTC CALCULATION (BAZETT): 442 MS
EKG R AXIS: 64 DEGREES
EKG T AXIS: 89 DEGREES
EKG VENTRICULAR RATE: 87 BPM

## 2018-10-16 PROCEDURE — 36415 COLL VENOUS BLD VENIPUNCTURE: CPT

## 2018-10-16 PROCEDURE — 82565 ASSAY OF CREATININE: CPT

## 2018-10-16 PROCEDURE — 84132 ASSAY OF SERUM POTASSIUM: CPT

## 2018-10-16 PROCEDURE — 2580000003 HC RX 258: Performed by: EMERGENCY MEDICINE

## 2018-10-16 PROCEDURE — 82435 ASSAY OF BLOOD CHLORIDE: CPT

## 2018-10-16 PROCEDURE — 84520 ASSAY OF UREA NITROGEN: CPT

## 2018-10-16 PROCEDURE — 94761 N-INVAS EAR/PLS OXIMETRY MLT: CPT

## 2018-10-16 PROCEDURE — 85025 COMPLETE CBC W/AUTO DIFF WBC: CPT

## 2018-10-16 PROCEDURE — 83605 ASSAY OF LACTIC ACID: CPT

## 2018-10-16 PROCEDURE — 83690 ASSAY OF LIPASE: CPT

## 2018-10-16 PROCEDURE — 82374 ASSAY BLOOD CARBON DIOXIDE: CPT

## 2018-10-16 PROCEDURE — 80053 COMPREHEN METABOLIC PANEL: CPT

## 2018-10-16 PROCEDURE — 99285 EMERGENCY DEPT VISIT HI MDM: CPT

## 2018-10-16 PROCEDURE — 85014 HEMATOCRIT: CPT

## 2018-10-16 PROCEDURE — 84484 ASSAY OF TROPONIN QUANT: CPT

## 2018-10-16 PROCEDURE — 96372 THER/PROPH/DIAG INJ SC/IM: CPT

## 2018-10-16 PROCEDURE — 82947 ASSAY GLUCOSE BLOOD QUANT: CPT

## 2018-10-16 PROCEDURE — 87040 BLOOD CULTURE FOR BACTERIA: CPT

## 2018-10-16 PROCEDURE — 84295 ASSAY OF SERUM SODIUM: CPT

## 2018-10-16 RX ORDER — 0.9 % SODIUM CHLORIDE 0.9 %
1000 INTRAVENOUS SOLUTION INTRAVENOUS ONCE
Status: COMPLETED | OUTPATIENT
Start: 2018-10-16 | End: 2018-10-17

## 2018-10-16 RX ORDER — PROMETHAZINE HYDROCHLORIDE 25 MG/ML
25 INJECTION, SOLUTION INTRAMUSCULAR; INTRAVENOUS ONCE
Status: COMPLETED | OUTPATIENT
Start: 2018-10-16 | End: 2018-10-17

## 2018-10-16 RX ADMIN — SODIUM CHLORIDE 1000 ML: 900 INJECTION, SOLUTION INTRAVENOUS at 23:16

## 2018-10-16 ASSESSMENT — ENCOUNTER SYMPTOMS
CHEST TIGHTNESS: 0
NAUSEA: 1
DIARRHEA: 0
COUGH: 0
VOMITING: 1
ABDOMINAL DISTENTION: 1
WHEEZING: 0
BACK PAIN: 0
ABDOMINAL PAIN: 1
SORE THROAT: 0
SHORTNESS OF BREATH: 0

## 2018-10-16 ASSESSMENT — PAIN DESCRIPTION - DESCRIPTORS: DESCRIPTORS: PRESSURE

## 2018-10-16 ASSESSMENT — PAIN DESCRIPTION - PAIN TYPE: TYPE: ACUTE PAIN

## 2018-10-16 ASSESSMENT — PAIN DESCRIPTION - ORIENTATION: ORIENTATION: LOWER

## 2018-10-16 ASSESSMENT — PAIN SCALES - GENERAL: PAINLEVEL_OUTOF10: 1

## 2018-10-16 ASSESSMENT — PAIN DESCRIPTION - LOCATION: LOCATION: ABDOMEN

## 2018-10-17 ENCOUNTER — APPOINTMENT (OUTPATIENT)
Dept: CT IMAGING | Age: 78
DRG: 329 | End: 2018-10-17
Payer: MEDICARE

## 2018-10-17 ENCOUNTER — APPOINTMENT (OUTPATIENT)
Dept: GENERAL RADIOLOGY | Age: 78
DRG: 329 | End: 2018-10-17
Payer: MEDICARE

## 2018-10-17 PROBLEM — K63.1 PERFORATION OF SIGMOID COLON (HCC): Status: ACTIVE | Noted: 2018-10-17

## 2018-10-17 LAB
ALBUMIN SERPL-MCNC: 2.5 G/DL (ref 3.5–5.2)
ALP BLD-CCNC: 179 U/L (ref 35–104)
ALT SERPL-CCNC: 28 U/L (ref 0–32)
ANION GAP SERPL CALCULATED.3IONS-SCNC: 14 MMOL/L (ref 7–16)
ANION GAP SERPL CALCULATED.3IONS-SCNC: 15 MMOL/L (ref 7–16)
ANISOCYTOSIS: ABNORMAL
ANISOCYTOSIS: ABNORMAL
AST SERPL-CCNC: 32 U/L (ref 0–31)
BASOPHILS ABSOLUTE: 0 E9/L (ref 0–0.2)
BASOPHILS ABSOLUTE: 0 E9/L (ref 0–0.2)
BASOPHILS RELATIVE PERCENT: 0.3 % (ref 0–2)
BASOPHILS RELATIVE PERCENT: 0.7 % (ref 0–2)
BILIRUB SERPL-MCNC: 0.3 MG/DL (ref 0–1.2)
BUN BLDV-MCNC: 25 MG/DL (ref 8–23)
BUN BLDV-MCNC: 25 MG/DL (ref 8–23)
CALCIUM SERPL-MCNC: 9.1 MG/DL (ref 8.6–10.2)
CALCIUM SERPL-MCNC: 9.6 MG/DL (ref 8.6–10.2)
CHLORIDE BLD-SCNC: 94 MMOL/L (ref 98–107)
CHLORIDE BLD-SCNC: 95 MMOL/L (ref 98–107)
CO2: 24 MMOL/L (ref 22–29)
CO2: 25 MMOL/L (ref 22–29)
CREAT SERPL-MCNC: 0.5 MG/DL (ref 0.5–1)
CREAT SERPL-MCNC: 0.7 MG/DL (ref 0.5–1)
EOSINOPHILS ABSOLUTE: 0 E9/L (ref 0.05–0.5)
EOSINOPHILS ABSOLUTE: 0 E9/L (ref 0.05–0.5)
EOSINOPHILS RELATIVE PERCENT: 0 % (ref 0–6)
EOSINOPHILS RELATIVE PERCENT: 0.1 % (ref 0–6)
GFR AFRICAN AMERICAN: >60
GFR AFRICAN AMERICAN: >60
GFR NON-AFRICAN AMERICAN: >60 ML/MIN/1.73
GFR NON-AFRICAN AMERICAN: >60 ML/MIN/1.73
GLUCOSE BLD-MCNC: 121 MG/DL (ref 74–109)
GLUCOSE BLD-MCNC: 124 MG/DL (ref 74–109)
HCT VFR BLD CALC: 26.8 % (ref 34–48)
HCT VFR BLD CALC: 29.1 % (ref 34–48)
HEMOGLOBIN: 8.7 G/DL (ref 11.5–15.5)
HEMOGLOBIN: 9.3 G/DL (ref 11.5–15.5)
HYPOCHROMIA: ABNORMAL
LACTIC ACID: 0.7 MMOL/L (ref 0.5–2.2)
LIPASE: 12 U/L (ref 13–60)
LYMPHOCYTES ABSOLUTE: 0.95 E9/L (ref 1.5–4)
LYMPHOCYTES ABSOLUTE: 1.47 E9/L (ref 1.5–4)
LYMPHOCYTES RELATIVE PERCENT: 6.1 % (ref 20–42)
LYMPHOCYTES RELATIVE PERCENT: 8.6 % (ref 20–42)
MAGNESIUM: 2 MG/DL (ref 1.6–2.6)
MCH RBC QN AUTO: 26 PG (ref 26–35)
MCH RBC QN AUTO: 26.3 PG (ref 26–35)
MCHC RBC AUTO-ENTMCNC: 32 % (ref 32–34.5)
MCHC RBC AUTO-ENTMCNC: 32.5 % (ref 32–34.5)
MCV RBC AUTO: 81 FL (ref 80–99.9)
MCV RBC AUTO: 81.3 FL (ref 80–99.9)
MONOCYTES ABSOLUTE: 0.79 E9/L (ref 0.1–0.95)
MONOCYTES ABSOLUTE: 1.14 E9/L (ref 0.1–0.95)
MONOCYTES RELATIVE PERCENT: 5.2 % (ref 2–12)
MONOCYTES RELATIVE PERCENT: 6.9 % (ref 2–12)
NEUTROPHILS ABSOLUTE: 13.86 E9/L (ref 1.8–7.3)
NEUTROPHILS ABSOLUTE: 14.06 E9/L (ref 1.8–7.3)
NEUTROPHILS RELATIVE PERCENT: 84.5 % (ref 43–80)
NEUTROPHILS RELATIVE PERCENT: 88.7 % (ref 43–80)
OVALOCYTES: ABNORMAL
PDW BLD-RTO: 16 FL (ref 11.5–15)
PDW BLD-RTO: 16 FL (ref 11.5–15)
PLATELET # BLD: 409 E9/L (ref 130–450)
PLATELET # BLD: 463 E9/L (ref 130–450)
PMV BLD AUTO: 9.4 FL (ref 7–12)
PMV BLD AUTO: 9.4 FL (ref 7–12)
POIKILOCYTES: ABNORMAL
POLYCHROMASIA: ABNORMAL
POTASSIUM REFLEX MAGNESIUM: 3.1 MMOL/L (ref 3.5–5)
POTASSIUM SERPL-SCNC: 2.9 MMOL/L (ref 3.5–5)
RBC # BLD: 3.31 E12/L (ref 3.5–5.5)
RBC # BLD: 3.58 E12/L (ref 3.5–5.5)
SODIUM BLD-SCNC: 133 MMOL/L (ref 132–146)
SODIUM BLD-SCNC: 134 MMOL/L (ref 132–146)
TARGET CELLS: ABNORMAL
TOTAL PROTEIN: 6.4 G/DL (ref 6.4–8.3)
TROPONIN: <0.01 NG/ML (ref 0–0.03)
VACUOLATED NEUTROPHILS: ABNORMAL
WBC # BLD: 15.8 E9/L (ref 4.5–11.5)
WBC # BLD: 16.3 E9/L (ref 4.5–11.5)

## 2018-10-17 PROCEDURE — 94150 VITAL CAPACITY TEST: CPT

## 2018-10-17 PROCEDURE — 96365 THER/PROPH/DIAG IV INF INIT: CPT

## 2018-10-17 PROCEDURE — 2580000003 HC RX 258: Performed by: SURGERY

## 2018-10-17 PROCEDURE — 2060000000 HC ICU INTERMEDIATE R&B

## 2018-10-17 PROCEDURE — 36415 COLL VENOUS BLD VENIPUNCTURE: CPT

## 2018-10-17 PROCEDURE — C9113 INJ PANTOPRAZOLE SODIUM, VIA: HCPCS | Performed by: SURGERY

## 2018-10-17 PROCEDURE — 2500000003 HC RX 250 WO HCPCS: Performed by: SURGERY

## 2018-10-17 PROCEDURE — 6360000002 HC RX W HCPCS: Performed by: INTERNAL MEDICINE

## 2018-10-17 PROCEDURE — 2580000003 HC RX 258: Performed by: EMERGENCY MEDICINE

## 2018-10-17 PROCEDURE — 80048 BASIC METABOLIC PNL TOTAL CA: CPT

## 2018-10-17 PROCEDURE — 2500000003 HC RX 250 WO HCPCS: Performed by: INTERNAL MEDICINE

## 2018-10-17 PROCEDURE — 2500000003 HC RX 250 WO HCPCS: Performed by: EMERGENCY MEDICINE

## 2018-10-17 PROCEDURE — 6360000002 HC RX W HCPCS: Performed by: EMERGENCY MEDICINE

## 2018-10-17 PROCEDURE — 74177 CT ABD & PELVIS W/CONTRAST: CPT

## 2018-10-17 PROCEDURE — 71045 X-RAY EXAM CHEST 1 VIEW: CPT

## 2018-10-17 PROCEDURE — 6360000004 HC RX CONTRAST MEDICATION: Performed by: RADIOLOGY

## 2018-10-17 PROCEDURE — 83735 ASSAY OF MAGNESIUM: CPT

## 2018-10-17 PROCEDURE — 6360000002 HC RX W HCPCS: Performed by: SURGERY

## 2018-10-17 PROCEDURE — 99222 1ST HOSP IP/OBS MODERATE 55: CPT | Performed by: SURGERY

## 2018-10-17 PROCEDURE — 85025 COMPLETE CBC W/AUTO DIFF WBC: CPT

## 2018-10-17 RX ORDER — SODIUM CHLORIDE 9 MG/ML
INJECTION, SOLUTION INTRAVENOUS CONTINUOUS
Status: DISCONTINUED | OUTPATIENT
Start: 2018-10-17 | End: 2018-10-17

## 2018-10-17 RX ORDER — SODIUM CHLORIDE 0.9 % (FLUSH) 0.9 %
10 SYRINGE (ML) INJECTION EVERY 12 HOURS SCHEDULED
Status: DISCONTINUED | OUTPATIENT
Start: 2018-10-17 | End: 2018-10-17 | Stop reason: SDUPTHER

## 2018-10-17 RX ORDER — SODIUM CHLORIDE, SODIUM LACTATE, POTASSIUM CHLORIDE, CALCIUM CHLORIDE 600; 310; 30; 20 MG/100ML; MG/100ML; MG/100ML; MG/100ML
INJECTION, SOLUTION INTRAVENOUS CONTINUOUS
Status: DISCONTINUED | OUTPATIENT
Start: 2018-10-17 | End: 2018-10-17

## 2018-10-17 RX ORDER — FENTANYL CITRATE 50 UG/ML
25 INJECTION, SOLUTION INTRAMUSCULAR; INTRAVENOUS ONCE
Status: COMPLETED | OUTPATIENT
Start: 2018-10-17 | End: 2018-10-17

## 2018-10-17 RX ORDER — MORPHINE SULFATE 2 MG/ML
2 INJECTION, SOLUTION INTRAMUSCULAR; INTRAVENOUS
Status: DISCONTINUED | OUTPATIENT
Start: 2018-10-17 | End: 2018-10-20 | Stop reason: CLARIF

## 2018-10-17 RX ORDER — MORPHINE SULFATE 4 MG/ML
4 INJECTION, SOLUTION INTRAMUSCULAR; INTRAVENOUS
Status: DISCONTINUED | OUTPATIENT
Start: 2018-10-17 | End: 2018-10-20 | Stop reason: CLARIF

## 2018-10-17 RX ORDER — IPRATROPIUM BROMIDE AND ALBUTEROL SULFATE 2.5; .5 MG/3ML; MG/3ML
1 SOLUTION RESPIRATORY (INHALATION) EVERY 4 HOURS PRN
Status: DISCONTINUED | OUTPATIENT
Start: 2018-10-17 | End: 2018-10-29 | Stop reason: HOSPADM

## 2018-10-17 RX ORDER — SODIUM CHLORIDE 0.9 % (FLUSH) 0.9 %
10 SYRINGE (ML) INJECTION PRN
Status: DISCONTINUED | OUTPATIENT
Start: 2018-10-17 | End: 2018-10-17 | Stop reason: SDUPTHER

## 2018-10-17 RX ORDER — SODIUM CHLORIDE 0.9 % (FLUSH) 0.9 %
10 SYRINGE (ML) INJECTION PRN
Status: DISCONTINUED | OUTPATIENT
Start: 2018-10-17 | End: 2018-10-29 | Stop reason: HOSPADM

## 2018-10-17 RX ORDER — ACETAMINOPHEN 325 MG/1
650 TABLET ORAL EVERY 4 HOURS PRN
Status: DISCONTINUED | OUTPATIENT
Start: 2018-10-17 | End: 2018-10-17 | Stop reason: SDUPTHER

## 2018-10-17 RX ORDER — SODIUM CHLORIDE 0.9 % (FLUSH) 0.9 %
10 SYRINGE (ML) INJECTION EVERY 12 HOURS SCHEDULED
Status: DISCONTINUED | OUTPATIENT
Start: 2018-10-17 | End: 2018-10-29 | Stop reason: HOSPADM

## 2018-10-17 RX ORDER — PANTOPRAZOLE SODIUM 40 MG/10ML
40 INJECTION, POWDER, LYOPHILIZED, FOR SOLUTION INTRAVENOUS DAILY
Status: DISCONTINUED | OUTPATIENT
Start: 2018-10-17 | End: 2018-10-29 | Stop reason: HOSPADM

## 2018-10-17 RX ORDER — DEXTROSE, SODIUM CHLORIDE, AND POTASSIUM CHLORIDE 5; .9; .15 G/100ML; G/100ML; G/100ML
INJECTION INTRAVENOUS CONTINUOUS
Status: DISCONTINUED | OUTPATIENT
Start: 2018-10-17 | End: 2018-10-25 | Stop reason: CLARIF

## 2018-10-17 RX ORDER — POTASSIUM CHLORIDE 7.45 MG/ML
10 INJECTION INTRAVENOUS ONCE
Status: COMPLETED | OUTPATIENT
Start: 2018-10-17 | End: 2018-10-17

## 2018-10-17 RX ORDER — POTASSIUM CHLORIDE 7.45 MG/ML
10 INJECTION INTRAVENOUS
Status: COMPLETED | OUTPATIENT
Start: 2018-10-17 | End: 2018-10-17

## 2018-10-17 RX ORDER — ACETAMINOPHEN 325 MG/1
650 TABLET ORAL EVERY 4 HOURS PRN
Status: DISCONTINUED | OUTPATIENT
Start: 2018-10-17 | End: 2018-10-29 | Stop reason: HOSPADM

## 2018-10-17 RX ORDER — ONDANSETRON 2 MG/ML
4 INJECTION INTRAMUSCULAR; INTRAVENOUS EVERY 6 HOURS PRN
Status: DISCONTINUED | OUTPATIENT
Start: 2018-10-17 | End: 2018-10-29 | Stop reason: HOSPADM

## 2018-10-17 RX ORDER — HEPARIN SODIUM 5000 [USP'U]/ML
5000 INJECTION, SOLUTION INTRAVENOUS; SUBCUTANEOUS EVERY 8 HOURS SCHEDULED
Status: DISCONTINUED | OUTPATIENT
Start: 2018-10-17 | End: 2018-10-29 | Stop reason: HOSPADM

## 2018-10-17 RX ORDER — 0.9 % SODIUM CHLORIDE 0.9 %
10 VIAL (ML) INJECTION DAILY
Status: DISCONTINUED | OUTPATIENT
Start: 2018-10-17 | End: 2018-10-29 | Stop reason: HOSPADM

## 2018-10-17 RX ADMIN — TAZOBACTAM SODIUM AND PIPERACILLIN SODIUM 3.38 G: 375; 3 INJECTION, SOLUTION INTRAVENOUS at 08:20

## 2018-10-17 RX ADMIN — Medication 10 ML: at 21:13

## 2018-10-17 RX ADMIN — HEPARIN SODIUM 5000 UNITS: 5000 INJECTION INTRAVENOUS; SUBCUTANEOUS at 06:49

## 2018-10-17 RX ADMIN — POTASSIUM CHLORIDE, DEXTROSE MONOHYDRATE AND SODIUM CHLORIDE: 150; 5; 900 INJECTION, SOLUTION INTRAVENOUS at 11:22

## 2018-10-17 RX ADMIN — HEPARIN SODIUM 5000 UNITS: 5000 INJECTION INTRAVENOUS; SUBCUTANEOUS at 13:09

## 2018-10-17 RX ADMIN — TAZOBACTAM SODIUM AND PIPERACILLIN SODIUM 3.38 G: 375; 3 INJECTION, SOLUTION INTRAVENOUS at 16:27

## 2018-10-17 RX ADMIN — PANTOPRAZOLE SODIUM 40 MG: 40 INJECTION, POWDER, FOR SOLUTION INTRAVENOUS at 08:20

## 2018-10-17 RX ADMIN — IOHEXOL 50 ML: 240 INJECTION, SOLUTION INTRATHECAL; INTRAVASCULAR; INTRAVENOUS; ORAL at 01:22

## 2018-10-17 RX ADMIN — Medication 10 ML: at 08:21

## 2018-10-17 RX ADMIN — PROMETHAZINE HYDROCHLORIDE 25 MG: 25 INJECTION INTRAMUSCULAR; INTRAVENOUS at 00:19

## 2018-10-17 RX ADMIN — CEFTRIAXONE 2 G: 2 INJECTION, POWDER, FOR SOLUTION INTRAMUSCULAR; INTRAVENOUS at 04:03

## 2018-10-17 RX ADMIN — IOPAMIDOL 80 ML: 755 INJECTION, SOLUTION INTRAVENOUS at 01:23

## 2018-10-17 RX ADMIN — METRONIDAZOLE 500 MG: 500 INJECTION, SOLUTION INTRAVENOUS at 06:29

## 2018-10-17 RX ADMIN — FENTANYL CITRATE 25 MCG: 50 INJECTION, SOLUTION INTRAMUSCULAR; INTRAVENOUS at 04:11

## 2018-10-17 RX ADMIN — MORPHINE SULFATE 2 MG: 2 INJECTION, SOLUTION INTRAMUSCULAR; INTRAVENOUS at 13:13

## 2018-10-17 RX ADMIN — POTASSIUM CHLORIDE 10 MEQ: 7.46 INJECTION, SOLUTION INTRAVENOUS at 12:25

## 2018-10-17 RX ADMIN — HEPARIN SODIUM 5000 UNITS: 5000 INJECTION INTRAVENOUS; SUBCUTANEOUS at 21:13

## 2018-10-17 RX ADMIN — POTASSIUM CHLORIDE 10 MEQ: 7.46 INJECTION, SOLUTION INTRAVENOUS at 11:22

## 2018-10-17 RX ADMIN — POTASSIUM CHLORIDE 10 MEQ: 7.46 INJECTION, SOLUTION INTRAVENOUS at 01:32

## 2018-10-17 RX ADMIN — SODIUM CHLORIDE: 9 INJECTION, SOLUTION INTRAVENOUS at 06:29

## 2018-10-17 ASSESSMENT — PAIN SCALES - GENERAL
PAINLEVEL_OUTOF10: 10
PAINLEVEL_OUTOF10: 0
PAINLEVEL_OUTOF10: 0
PAINLEVEL_OUTOF10: 7

## 2018-10-17 NOTE — PROGRESS NOTES
Nutrition Assessment    Type and Reason for Visit: Initial, Consult, Positive Nutrition Screen    Nutrition Recommendations: Continue NPO    Malnutrition Assessment:  · Malnutrition Status: Meets the criteria for severe malnutrition  · Context: Acute illness or injury  · Findings of the 6 clinical characteristics of malnutrition (Minimum of 2 out of 6 clinical characteristics is required to make the diagnosis of moderate or severe Protein Calorie Malnutrition based on AND/ASPEN Guidelines):  1. Energy Intake-Less than or equal to 50%,  (x 2 days)    2. Weight Loss-No significant weight loss, in 3 months  3. Fat Loss-Severe subcutaneous fat loss, Orbital  4. Muscle Loss-Severe muscle mass loss, Temples (temporalis muscle), Clavicles (pectoralis and deltoids), Thigh (quadriceps), Calf (gastrocnemius)  5. Fluid Accumulation-No significant fluid accumulation   6.   Strength-Not measured    Nutrition Diagnosis:   · Problem: Severe malnutrition, in context of acute illness or injury  · Etiology: related to Insufficient energy/nutrient consumption     Signs and symptoms:  as evidenced by NPO status due to medical condition, GI abnormality, Nausea, Vomiting, Severe muscle loss, Severe loss of subcutaneous fat    Nutrition Assessment:  · Subjective Assessment: pt p/w decreased appetite, N/V, CT of abd showed large perforation of distal sigmoid colon, family at bedside, K: 2.9L, Lipase 12L    · Nutrition-Focused Physical Findings: +alert, oriented, abd distended, +gastrosostomy, surgical scar, tenderness, +NG tube, edema WDL, skin-redness-coccyx, -I/O  · Wound Type: None  · Current Nutrition Therapies:  · Oral Diet Orders: NPO   · Anthropometric Measures:  · Ht: 5' 6\" (167.6 cm)   · Current Body Wt: 120 lb (54.4 kg) (10/17 BS)  · Admission Body Wt: 120 lb (54.4 kg) (10/17 BS)  · Usual Body Wt: 121 lb (54.9 kg) (7/4/18 actual BS)  · % Weight Change: no significant wt loss within the past 3 monthsIdeal Body Wt: 130 lb

## 2018-10-17 NOTE — H&P
General Surgery History and Physical  [facility]    Patient's Name/Date of Birth: Sis Tapia / 1940    Date: October 17, 2018     Surgeon: Cornell Perdomo M.D.    PCP: Ozell Gilford, MD     Chief Complaint: nausea, vomiting, weakness    HPI:   Sis Tapia is a [de-identified] y.o. female who presents for evaluation of nausea, vomiting, abdominal distention, weakness. Timing is constant, radiation to lower abdomen, alleviated by none and started several weeks ago and severity is 7/10. Complicated hx of rectal CA with LAR in [facility], takedown ileostomy. She presented one week postop in Nov 2017 with ischemic bowel and underwent exlap with reduction of internal hernia. She developed enterocutaneus fistula and complicated hospital course. Eventually developed rectovaginal fistula secondary to distal rectal anastomotic leak and had diverting loop colostomy in July. She subsequently has recovered and has had decreasing stomal output for a few months. She has started passing stool from her rectum over last few weeks and developed weakness and feeling poorly for the last week. SHe presented to ER after nausea, vomiting. CT shows narrowing of her loop colostomy and stool in rectal vault with anastamotic leak persisting. NG placed with 1200 cc out overnight. Stoma is working. Family at bedside.      Patient Active Problem List   Diagnosis    Rectal cancer ([address]    Pneumatosis intestinalis    Macular degeneration    Enteritis    Severe protein-calorie malnutrition (HCC)    Macular degeneration    PAF (paroxysmal atrial fibrillation) (HCC)    Ischemic bowel disease (HCC)    Altered mental status    Fatigue due to excessive exertion    PAF (paroxysmal atrial fibrillation) ([address]    Macular degeneration    Ischemic bowel disease ([address]    Hx of blood clots    Cancer (HCC)    Perforation of sigmoid colon ([address]       Past Medical History:   Diagnosis Date    Cancer ([address]     colorectal     History of Bed).    Worked in an office.                Review of Systems  Review of Systems -  General ROS: negative for - chills, fatigue or malaise  ENT ROS: negative for - hearing change, nasal congestion or nasal discharge  Allergy and Immunology ROS: negative for - hives, itchy/watery eyes or nasal congestion  Hematological and Lymphatic ROS: negative for - blood clots, blood transfusions, bruising or fatigue  Endocrine ROS: negative for - malaise/lethargy, mood swings, palpitations or polydipsia/polyuria  Respiratory ROS: negative for - sputum changes, stridor, tachypnea or wheezing  Cardiovascular ROS: negative for - irregular heartbeat, loss of consciousness, murmur or orthopnea  Gastrointestinal ROS: negative for - heartburn or hematemesis  Genito-Urinary ROS: negative for -  genital discharge, genital ulcers or hematuria  Musculoskeletal ROS: negative for - gait disturbance, muscle pain or muscular weakness  Psych/Soc ROS: Neg suicidal ideation or visual/auditory hallucinations    Physical exam:   BP (!) 118/56   Pulse 88   Temp 97.8 °F (36.6 °C)   Resp 20   Ht 5' 6\" (1.676 m)   Wt 123 lb (55.8 kg)   SpO2 98%   BMI 19.85 kg/m²   General appearance:  NAD  Head: NCAT, PERRLA, EOMI, red conjunctiva  Neck: supple, no masses  Lungs: Equal chest rise bilateral  Heart: Reg rate  Abdomen: soft, minimal lower tender, mild distended, stoma narrowing LLQ, stool and air in bag  Rectal: No bleeding  Skin; no lesions  Gu: no cva tenderness  Extremities: atraumatic  Psychosocial: No auditory or visual hallucinations      Radiology:  CT abdomen/pelvis: Perforated viscus with contained pelvic free air, narrowing of stoma with ileus vs partial SBO    Assessment:  Saba Kidd is a [de-identified] y.o. female with pelvic abscess, anastomotic leak, malfunctioning stoma  Patient Active Problem List   Diagnosis    [facility] ([facility]    Pneumatosis intestinalis    Macular degeneration    Enteritis    Severe protein-calorie malnutrition

## 2018-10-17 NOTE — ED PROVIDER NOTES
Chief complaint:  Abdominal pain    HPI history provided by the patient and EMS and family  Patient with a history of bowel obstruction and has a colostomy bag. Has had diminished output from the colostomy bag today with increasing abdominal distention with some discomfort as well as some nausea and vomiting. No chest pain, palpitations or shortness of breath. No fevers. No dysuria. No flank pain. Medicated with Zofran by EMS prior to arrival some improvement in the nausea. Review of Systems   Constitutional: Negative for chills, diaphoresis, fatigue and fever. HENT: Negative for congestion and sore throat. Respiratory: Negative for cough, chest tightness, shortness of breath and wheezing. Cardiovascular: Negative for chest pain, palpitations and leg swelling. Gastrointestinal: Positive for abdominal distention, abdominal pain, nausea and vomiting. Negative for diarrhea. Genitourinary: Negative for dysuria, flank pain and frequency. Musculoskeletal: Negative for arthralgias, back pain, myalgias, neck pain and neck stiffness. Skin: Negative for rash and wound. Neurological: Negative for syncope, weakness and headaches. Hematological: Negative for adenopathy. All other systems reviewed and are negative. Physical Exam   Constitutional: She is oriented to person, place, and time. She appears well-developed and well-nourished. Non-toxic appearance. She does not have a sickly appearance. She does not appear ill. No distress. HENT:   Head: Normocephalic and atraumatic. Eyes: Pupils are equal, round, and reactive to light. No scleral icterus. Neck: Normal range of motion. Neck supple. Cardiovascular: Normal rate, regular rhythm and normal heart sounds. No murmur heard. Pulmonary/Chest: Effort normal and breath sounds normal. No accessory muscle usage. No respiratory distress. She has no decreased breath sounds. She has no wheezes. She has no rhonchi. She has no rales.  She exhibits

## 2018-10-18 ENCOUNTER — ANESTHESIA EVENT (OUTPATIENT)
Dept: OPERATING ROOM | Age: 78
DRG: 329 | End: 2018-10-18
Payer: MEDICARE

## 2018-10-18 ENCOUNTER — ANESTHESIA (OUTPATIENT)
Dept: OPERATING ROOM | Age: 78
DRG: 329 | End: 2018-10-18
Payer: MEDICARE

## 2018-10-18 VITALS
RESPIRATION RATE: 15 BRPM | DIASTOLIC BLOOD PRESSURE: 62 MMHG | SYSTOLIC BLOOD PRESSURE: 107 MMHG | OXYGEN SATURATION: 100 %

## 2018-10-18 LAB
ALBUMIN SERPL-MCNC: 2.8 G/DL (ref 3.5–5.2)
ALP BLD-CCNC: 152 U/L (ref 35–104)
ALT SERPL-CCNC: 18 U/L (ref 0–32)
ANION GAP SERPL CALCULATED.3IONS-SCNC: 11 MMOL/L (ref 7–16)
ANISOCYTOSIS: ABNORMAL
AST SERPL-CCNC: 14 U/L (ref 0–31)
BACTERIA: ABNORMAL /HPF
BASOPHILS ABSOLUTE: 0 E9/L (ref 0–0.2)
BASOPHILS RELATIVE PERCENT: 0.4 % (ref 0–2)
BILIRUB SERPL-MCNC: 0.3 MG/DL (ref 0–1.2)
BILIRUBIN URINE: NEGATIVE
BLOOD, URINE: NEGATIVE
BUN BLDV-MCNC: 23 MG/DL (ref 8–23)
CALCIUM SERPL-MCNC: 9.2 MG/DL (ref 8.6–10.2)
CHLORIDE BLD-SCNC: 100 MMOL/L (ref 98–107)
CLARITY: CLEAR
CO2: 27 MMOL/L (ref 22–29)
COLOR: YELLOW
CREAT SERPL-MCNC: 0.8 MG/DL (ref 0.5–1)
EOSINOPHILS ABSOLUTE: 0 E9/L (ref 0.05–0.5)
EOSINOPHILS RELATIVE PERCENT: 0.1 % (ref 0–6)
EPITHELIAL CELLS, UA: ABNORMAL /HPF
GFR AFRICAN AMERICAN: >60
GFR NON-AFRICAN AMERICAN: >60 ML/MIN/1.73
GLUCOSE BLD-MCNC: 118 MG/DL (ref 74–109)
GLUCOSE URINE: NEGATIVE MG/DL
HCT VFR BLD CALC: 29 % (ref 34–48)
HEMOGLOBIN: 9.2 G/DL (ref 11.5–15.5)
HYPOCHROMIA: ABNORMAL
KETONES, URINE: ABNORMAL MG/DL
LEUKOCYTE ESTERASE, URINE: NEGATIVE
LYMPHOCYTES ABSOLUTE: 0.62 E9/L (ref 1.5–4)
LYMPHOCYTES RELATIVE PERCENT: 4.3 % (ref 20–42)
MAGNESIUM: 2.1 MG/DL (ref 1.6–2.6)
MCH RBC QN AUTO: 26.1 PG (ref 26–35)
MCHC RBC AUTO-ENTMCNC: 31.7 % (ref 32–34.5)
MCV RBC AUTO: 82.4 FL (ref 80–99.9)
METAMYELOCYTES RELATIVE PERCENT: 0.9 % (ref 0–1)
MONOCYTES ABSOLUTE: 0.78 E9/L (ref 0.1–0.95)
MONOCYTES RELATIVE PERCENT: 5.2 % (ref 2–12)
MYELOCYTE PERCENT: 1.7 % (ref 0–0)
NEUTROPHILS ABSOLUTE: 14.04 E9/L (ref 1.8–7.3)
NEUTROPHILS RELATIVE PERCENT: 87.8 % (ref 43–80)
NITRITE, URINE: NEGATIVE
OVALOCYTES: ABNORMAL
PDW BLD-RTO: 16.4 FL (ref 11.5–15)
PH UA: 5 (ref 5–9)
PHOSPHORUS: 2.7 MG/DL (ref 2.5–4.5)
PLATELET # BLD: 451 E9/L (ref 130–450)
PMV BLD AUTO: 8.7 FL (ref 7–12)
POIKILOCYTES: ABNORMAL
POLYCHROMASIA: ABNORMAL
POTASSIUM SERPL-SCNC: 3.3 MMOL/L (ref 3.5–5)
PROTEIN UA: 30 MG/DL
RBC # BLD: 3.52 E12/L (ref 3.5–5.5)
RBC UA: ABNORMAL /HPF (ref 0–2)
SODIUM BLD-SCNC: 138 MMOL/L (ref 132–146)
SPECIFIC GRAVITY UA: 1.02 (ref 1–1.03)
TARGET CELLS: ABNORMAL
TOTAL PROTEIN: 6.7 G/DL (ref 6.4–8.3)
UROBILINOGEN, URINE: 0.2 E.U./DL
WBC # BLD: 15.6 E9/L (ref 4.5–11.5)
WBC UA: ABNORMAL /HPF (ref 0–5)

## 2018-10-18 PROCEDURE — 2720000010 HC SURG SUPPLY STERILE: Performed by: SURGERY

## 2018-10-18 PROCEDURE — 6360000002 HC RX W HCPCS: Performed by: SURGERY

## 2018-10-18 PROCEDURE — 0DBM4ZZ EXCISION OF DESCENDING COLON, PERCUTANEOUS ENDOSCOPIC APPROACH: ICD-10-PCS | Performed by: SURGERY

## 2018-10-18 PROCEDURE — 2580000003 HC RX 258: Performed by: NURSE ANESTHETIST, CERTIFIED REGISTERED

## 2018-10-18 PROCEDURE — 3700000000 HC ANESTHESIA ATTENDED CARE: Performed by: SURGERY

## 2018-10-18 PROCEDURE — 81001 URINALYSIS AUTO W/SCOPE: CPT

## 2018-10-18 PROCEDURE — 0DNU4ZZ RELEASE OMENTUM, PERCUTANEOUS ENDOSCOPIC APPROACH: ICD-10-PCS | Performed by: SURGERY

## 2018-10-18 PROCEDURE — 0D9W30Z DRAINAGE OF PERITONEUM WITH DRAINAGE DEVICE, PERCUTANEOUS APPROACH: ICD-10-PCS | Performed by: SURGERY

## 2018-10-18 PROCEDURE — 2580000003 HC RX 258: Performed by: SURGERY

## 2018-10-18 PROCEDURE — 85025 COMPLETE CBC W/AUTO DIFF WBC: CPT

## 2018-10-18 PROCEDURE — 3700000001 HC ADD 15 MINUTES (ANESTHESIA): Performed by: SURGERY

## 2018-10-18 PROCEDURE — C9113 INJ PANTOPRAZOLE SODIUM, VIA: HCPCS | Performed by: SURGERY

## 2018-10-18 PROCEDURE — 2060000000 HC ICU INTERMEDIATE R&B

## 2018-10-18 PROCEDURE — 0D1L4Z4 BYPASS TRANSVERSE COLON TO CUTANEOUS, PERCUTANEOUS ENDOSCOPIC APPROACH: ICD-10-PCS | Performed by: SURGERY

## 2018-10-18 PROCEDURE — 87186 SC STD MICRODIL/AGAR DIL: CPT

## 2018-10-18 PROCEDURE — 7100000001 HC PACU RECOVERY - ADDTL 15 MIN: Performed by: SURGERY

## 2018-10-18 PROCEDURE — 36415 COLL VENOUS BLD VENIPUNCTURE: CPT

## 2018-10-18 PROCEDURE — 6370000000 HC RX 637 (ALT 250 FOR IP): Performed by: INTERNAL MEDICINE

## 2018-10-18 PROCEDURE — 2500000003 HC RX 250 WO HCPCS: Performed by: NURSE ANESTHETIST, CERTIFIED REGISTERED

## 2018-10-18 PROCEDURE — 83735 ASSAY OF MAGNESIUM: CPT

## 2018-10-18 PROCEDURE — 84100 ASSAY OF PHOSPHORUS: CPT

## 2018-10-18 PROCEDURE — 87070 CULTURE OTHR SPECIMN AEROBIC: CPT

## 2018-10-18 PROCEDURE — 0DNE4ZZ RELEASE LARGE INTESTINE, PERCUTANEOUS ENDOSCOPIC APPROACH: ICD-10-PCS | Performed by: SURGERY

## 2018-10-18 PROCEDURE — 88304 TISSUE EXAM BY PATHOLOGIST: CPT

## 2018-10-18 PROCEDURE — 2500000003 HC RX 250 WO HCPCS: Performed by: SURGERY

## 2018-10-18 PROCEDURE — 3600000014 HC SURGERY LEVEL 4 ADDTL 15MIN: Performed by: SURGERY

## 2018-10-18 PROCEDURE — 51702 INSERT TEMP BLADDER CATH: CPT

## 2018-10-18 PROCEDURE — 6360000002 HC RX W HCPCS: Performed by: INTERNAL MEDICINE

## 2018-10-18 PROCEDURE — 3600000004 HC SURGERY LEVEL 4 BASE: Performed by: SURGERY

## 2018-10-18 PROCEDURE — 87077 CULTURE AEROBIC IDENTIFY: CPT

## 2018-10-18 PROCEDURE — 2709999900 HC NON-CHARGEABLE SUPPLY: Performed by: SURGERY

## 2018-10-18 PROCEDURE — 87205 SMEAR GRAM STAIN: CPT

## 2018-10-18 PROCEDURE — 44204 LAPARO PARTIAL COLECTOMY: CPT | Performed by: SURGERY

## 2018-10-18 PROCEDURE — 80053 COMPREHEN METABOLIC PANEL: CPT

## 2018-10-18 PROCEDURE — 45000 DRAINAGE OF PELVIC ABSCESS: CPT | Performed by: SURGERY

## 2018-10-18 PROCEDURE — 6360000002 HC RX W HCPCS: Performed by: NURSE ANESTHETIST, CERTIFIED REGISTERED

## 2018-10-18 PROCEDURE — 0DNW4ZZ RELEASE PERITONEUM, PERCUTANEOUS ENDOSCOPIC APPROACH: ICD-10-PCS | Performed by: SURGERY

## 2018-10-18 PROCEDURE — 2780000010 HC IMPLANT OTHER: Performed by: SURGERY

## 2018-10-18 PROCEDURE — 7100000000 HC PACU RECOVERY - FIRST 15 MIN: Performed by: SURGERY

## 2018-10-18 RX ORDER — FENTANYL CITRATE 50 UG/ML
INJECTION, SOLUTION INTRAMUSCULAR; INTRAVENOUS PRN
Status: DISCONTINUED | OUTPATIENT
Start: 2018-10-18 | End: 2018-10-18 | Stop reason: SDUPTHER

## 2018-10-18 RX ORDER — POTASSIUM CHLORIDE 7.45 MG/ML
10 INJECTION INTRAVENOUS
Status: DISPENSED | OUTPATIENT
Start: 2018-10-18 | End: 2018-10-18

## 2018-10-18 RX ORDER — BUPIVACAINE HYDROCHLORIDE AND EPINEPHRINE 2.5; 5 MG/ML; UG/ML
INJECTION, SOLUTION EPIDURAL; INFILTRATION; INTRACAUDAL; PERINEURAL PRN
Status: DISCONTINUED | OUTPATIENT
Start: 2018-10-18 | End: 2018-10-18 | Stop reason: HOSPADM

## 2018-10-18 RX ORDER — FENTANYL CITRATE 50 UG/ML
50 INJECTION, SOLUTION INTRAMUSCULAR; INTRAVENOUS EVERY 5 MIN PRN
Status: DISCONTINUED | OUTPATIENT
Start: 2018-10-18 | End: 2018-10-18 | Stop reason: HOSPADM

## 2018-10-18 RX ORDER — PROPOFOL 10 MG/ML
INJECTION, EMULSION INTRAVENOUS PRN
Status: DISCONTINUED | OUTPATIENT
Start: 2018-10-18 | End: 2018-10-18 | Stop reason: SDUPTHER

## 2018-10-18 RX ORDER — DEXAMETHASONE SODIUM PHOSPHATE 10 MG/ML
INJECTION, SOLUTION INTRAMUSCULAR; INTRAVENOUS PRN
Status: DISCONTINUED | OUTPATIENT
Start: 2018-10-18 | End: 2018-10-18 | Stop reason: SDUPTHER

## 2018-10-18 RX ORDER — FENTANYL CITRATE 50 UG/ML
25 INJECTION, SOLUTION INTRAMUSCULAR; INTRAVENOUS EVERY 5 MIN PRN
Status: DISCONTINUED | OUTPATIENT
Start: 2018-10-18 | End: 2018-10-18 | Stop reason: HOSPADM

## 2018-10-18 RX ORDER — ROCURONIUM BROMIDE 10 MG/ML
INJECTION, SOLUTION INTRAVENOUS PRN
Status: DISCONTINUED | OUTPATIENT
Start: 2018-10-18 | End: 2018-10-18 | Stop reason: SDUPTHER

## 2018-10-18 RX ORDER — SODIUM CHLORIDE 9 MG/ML
INJECTION, SOLUTION INTRAVENOUS CONTINUOUS PRN
Status: DISCONTINUED | OUTPATIENT
Start: 2018-10-18 | End: 2018-10-18 | Stop reason: SDUPTHER

## 2018-10-18 RX ORDER — ONDANSETRON 2 MG/ML
INJECTION INTRAMUSCULAR; INTRAVENOUS PRN
Status: DISCONTINUED | OUTPATIENT
Start: 2018-10-18 | End: 2018-10-18 | Stop reason: SDUPTHER

## 2018-10-18 RX ORDER — ONDANSETRON 2 MG/ML
4 INJECTION INTRAMUSCULAR; INTRAVENOUS
Status: DISCONTINUED | OUTPATIENT
Start: 2018-10-18 | End: 2018-10-18 | Stop reason: HOSPADM

## 2018-10-18 RX ORDER — MIDAZOLAM HYDROCHLORIDE 1 MG/ML
INJECTION INTRAMUSCULAR; INTRAVENOUS PRN
Status: DISCONTINUED | OUTPATIENT
Start: 2018-10-18 | End: 2018-10-18 | Stop reason: SDUPTHER

## 2018-10-18 RX ORDER — SODIUM CHLORIDE, SODIUM LACTATE, POTASSIUM CHLORIDE, CALCIUM CHLORIDE 600; 310; 30; 20 MG/100ML; MG/100ML; MG/100ML; MG/100ML
INJECTION, SOLUTION INTRAVENOUS CONTINUOUS PRN
Status: DISCONTINUED | OUTPATIENT
Start: 2018-10-18 | End: 2018-10-18 | Stop reason: SDUPTHER

## 2018-10-18 RX ADMIN — HEPARIN SODIUM 5000 UNITS: 5000 INJECTION INTRAVENOUS; SUBCUTANEOUS at 22:55

## 2018-10-18 RX ADMIN — MORPHINE SULFATE 2 MG: 2 INJECTION, SOLUTION INTRAMUSCULAR; INTRAVENOUS at 06:58

## 2018-10-18 RX ADMIN — PHENYLEPHRINE HYDROCHLORIDE 100 MCG: 10 INJECTION INTRAVENOUS at 16:50

## 2018-10-18 RX ADMIN — TAZOBACTAM SODIUM AND PIPERACILLIN SODIUM 3.38 G: 375; 3 INJECTION, SOLUTION INTRAVENOUS at 08:39

## 2018-10-18 RX ADMIN — TAZOBACTAM SODIUM AND PIPERACILLIN SODIUM 3.38 G: 375; 3 INJECTION, SOLUTION INTRAVENOUS at 22:00

## 2018-10-18 RX ADMIN — MORPHINE SULFATE 2 MG: 2 INJECTION, SOLUTION INTRAMUSCULAR; INTRAVENOUS at 22:56

## 2018-10-18 RX ADMIN — LIDOCAINE HYDROCHLORIDE 60 MG: 20 INJECTION, SOLUTION INTRAVENOUS at 15:20

## 2018-10-18 RX ADMIN — POTASSIUM CHLORIDE 10 MEQ: 7.46 INJECTION, SOLUTION INTRAVENOUS at 14:06

## 2018-10-18 RX ADMIN — PROPOFOL 50 MG: 10 INJECTION, EMULSION INTRAVENOUS at 15:20

## 2018-10-18 RX ADMIN — DEXAMETHASONE SODIUM PHOSPHATE 10 MG: 10 INJECTION, SOLUTION INTRAMUSCULAR; INTRAVENOUS at 15:48

## 2018-10-18 RX ADMIN — Medication 10 ML: at 20:21

## 2018-10-18 RX ADMIN — Medication 10 ML: at 08:39

## 2018-10-18 RX ADMIN — PHENYLEPHRINE HYDROCHLORIDE 100 MCG: 10 INJECTION INTRAVENOUS at 15:57

## 2018-10-18 RX ADMIN — FENTANYL CITRATE 50 MCG: 50 INJECTION, SOLUTION INTRAMUSCULAR; INTRAVENOUS at 16:01

## 2018-10-18 RX ADMIN — SERTRALINE HYDROCHLORIDE 50 MG: 50 TABLET ORAL at 08:39

## 2018-10-18 RX ADMIN — SODIUM CHLORIDE, POTASSIUM CHLORIDE, SODIUM LACTATE AND CALCIUM CHLORIDE: 600; 310; 30; 20 INJECTION, SOLUTION INTRAVENOUS at 16:00

## 2018-10-18 RX ADMIN — PHENYLEPHRINE HYDROCHLORIDE 100 MCG: 10 INJECTION INTRAVENOUS at 15:45

## 2018-10-18 RX ADMIN — SODIUM CHLORIDE, POTASSIUM CHLORIDE, SODIUM LACTATE AND CALCIUM CHLORIDE: 600; 310; 30; 20 INJECTION, SOLUTION INTRAVENOUS at 15:16

## 2018-10-18 RX ADMIN — PHENYLEPHRINE HYDROCHLORIDE 100 MCG: 10 INJECTION INTRAVENOUS at 16:11

## 2018-10-18 RX ADMIN — MIDAZOLAM 2 MG: 1 INJECTION INTRAMUSCULAR; INTRAVENOUS at 15:18

## 2018-10-18 RX ADMIN — FENTANYL CITRATE 50 MCG: 50 INJECTION, SOLUTION INTRAMUSCULAR; INTRAVENOUS at 16:15

## 2018-10-18 RX ADMIN — TAZOBACTAM SODIUM AND PIPERACILLIN SODIUM 3.38 G: 375; 3 INJECTION, SOLUTION INTRAVENOUS at 00:25

## 2018-10-18 RX ADMIN — SODIUM CHLORIDE: 9 INJECTION, SOLUTION INTRAVENOUS at 16:40

## 2018-10-18 RX ADMIN — ONDANSETRON HYDROCHLORIDE 4 MG: 2 INJECTION, SOLUTION INTRAMUSCULAR; INTRAVENOUS at 17:01

## 2018-10-18 RX ADMIN — ROCURONIUM BROMIDE 10 MG: 10 INJECTION, SOLUTION INTRAVENOUS at 15:37

## 2018-10-18 RX ADMIN — PANTOPRAZOLE SODIUM 40 MG: 40 INJECTION, POWDER, FOR SOLUTION INTRAVENOUS at 08:39

## 2018-10-18 RX ADMIN — FENTANYL CITRATE 50 MCG: 50 INJECTION, SOLUTION INTRAMUSCULAR; INTRAVENOUS at 15:37

## 2018-10-18 RX ADMIN — Medication 10 ML: at 22:56

## 2018-10-18 RX ADMIN — PHENYLEPHRINE HYDROCHLORIDE 100 MCG: 10 INJECTION INTRAVENOUS at 15:31

## 2018-10-18 RX ADMIN — FENTANYL CITRATE 100 MCG: 50 INJECTION, SOLUTION INTRAMUSCULAR; INTRAVENOUS at 15:20

## 2018-10-18 RX ADMIN — MORPHINE SULFATE 2 MG: 2 INJECTION, SOLUTION INTRAMUSCULAR; INTRAVENOUS at 20:21

## 2018-10-18 RX ADMIN — ROCURONIUM BROMIDE 20 MG: 10 INJECTION, SOLUTION INTRAVENOUS at 15:20

## 2018-10-18 RX ADMIN — Medication 10 ML: at 08:40

## 2018-10-18 ASSESSMENT — PULMONARY FUNCTION TESTS
PIF_VALUE: 0
PIF_VALUE: 25
PIF_VALUE: 25
PIF_VALUE: 27
PIF_VALUE: 27
PIF_VALUE: 12
PIF_VALUE: 13
PIF_VALUE: 25
PIF_VALUE: 17
PIF_VALUE: 20
PIF_VALUE: 32
PIF_VALUE: 12
PIF_VALUE: 14
PIF_VALUE: 13
PIF_VALUE: 17
PIF_VALUE: 26
PIF_VALUE: 21
PIF_VALUE: 17
PIF_VALUE: 0
PIF_VALUE: 26
PIF_VALUE: 27
PIF_VALUE: 16
PIF_VALUE: 12
PIF_VALUE: 26
PIF_VALUE: 17
PIF_VALUE: 27
PIF_VALUE: 13
PIF_VALUE: 28
PIF_VALUE: 28
PIF_VALUE: 27
PIF_VALUE: 26
PIF_VALUE: 12
PIF_VALUE: 14
PIF_VALUE: 19
PIF_VALUE: 14
PIF_VALUE: 24
PIF_VALUE: 14
PIF_VALUE: 10
PIF_VALUE: 22
PIF_VALUE: 16
PIF_VALUE: 24
PIF_VALUE: 17
PIF_VALUE: 25
PIF_VALUE: 18
PIF_VALUE: 16
PIF_VALUE: 20
PIF_VALUE: 1
PIF_VALUE: 25
PIF_VALUE: 12
PIF_VALUE: 0
PIF_VALUE: 19
PIF_VALUE: 12
PIF_VALUE: 17
PIF_VALUE: 25
PIF_VALUE: 31
PIF_VALUE: 25
PIF_VALUE: 0
PIF_VALUE: 26
PIF_VALUE: 12
PIF_VALUE: 17
PIF_VALUE: 13
PIF_VALUE: 26
PIF_VALUE: 0
PIF_VALUE: 17
PIF_VALUE: 0
PIF_VALUE: 26
PIF_VALUE: 18
PIF_VALUE: 26
PIF_VALUE: 14
PIF_VALUE: 5
PIF_VALUE: 14
PIF_VALUE: 27
PIF_VALUE: 16
PIF_VALUE: 18
PIF_VALUE: 4
PIF_VALUE: 18
PIF_VALUE: 13
PIF_VALUE: 0
PIF_VALUE: 17
PIF_VALUE: 25
PIF_VALUE: 26
PIF_VALUE: 19
PIF_VALUE: 11
PIF_VALUE: 26
PIF_VALUE: 26
PIF_VALUE: 4
PIF_VALUE: 26
PIF_VALUE: 12
PIF_VALUE: 1
PIF_VALUE: 24
PIF_VALUE: 2
PIF_VALUE: 25
PIF_VALUE: 20
PIF_VALUE: 18
PIF_VALUE: 17
PIF_VALUE: 25
PIF_VALUE: 26
PIF_VALUE: 10
PIF_VALUE: 11
PIF_VALUE: 17
PIF_VALUE: 26
PIF_VALUE: 26
PIF_VALUE: 14
PIF_VALUE: 4
PIF_VALUE: 2
PIF_VALUE: 27

## 2018-10-18 ASSESSMENT — PAIN DESCRIPTION - PAIN TYPE
TYPE: ACUTE PAIN
TYPE: ACUTE PAIN

## 2018-10-18 ASSESSMENT — PAIN SCALES - GENERAL
PAINLEVEL_OUTOF10: 5
PAINLEVEL_OUTOF10: 0
PAINLEVEL_OUTOF10: 5
PAINLEVEL_OUTOF10: 0
PAINLEVEL_OUTOF10: 6
PAINLEVEL_OUTOF10: 7
PAINLEVEL_OUTOF10: 0
PAINLEVEL_OUTOF10: 6
PAINLEVEL_OUTOF10: 0

## 2018-10-18 ASSESSMENT — PAIN DESCRIPTION - LOCATION
LOCATION: ABDOMEN
LOCATION: ABDOMEN

## 2018-10-18 ASSESSMENT — PAIN DESCRIPTION - ORIENTATION
ORIENTATION: LOWER
ORIENTATION: LOWER

## 2018-10-18 ASSESSMENT — PAIN DESCRIPTION - DESCRIPTORS
DESCRIPTORS: SHARP
DESCRIPTORS: SHARP

## 2018-10-18 ASSESSMENT — PAIN DESCRIPTION - FREQUENCY
FREQUENCY: INTERMITTENT
FREQUENCY: INTERMITTENT

## 2018-10-18 NOTE — ANESTHESIA PRE PROCEDURE
Induction: intravenous. MIPS: Postoperative opioids intended and Prophylactic antiemetics administered. Anesthetic plan and risks discussed with patient. Plan discussed with CRNA.               Sapphire Fischer MD  Staff Anesthesiologist  3:17 PM      Sapphire Fischer MD   10/18/2018

## 2018-10-19 LAB
ANION GAP SERPL CALCULATED.3IONS-SCNC: 10 MMOL/L (ref 7–16)
ANISOCYTOSIS: ABNORMAL
BASOPHILS ABSOLUTE: 0 E9/L (ref 0–0.2)
BASOPHILS RELATIVE PERCENT: 0.2 % (ref 0–2)
BUN BLDV-MCNC: 16 MG/DL (ref 8–23)
CALCIUM SERPL-MCNC: 8.9 MG/DL (ref 8.6–10.2)
CHLORIDE BLD-SCNC: 102 MMOL/L (ref 98–107)
CO2: 27 MMOL/L (ref 22–29)
CREAT SERPL-MCNC: 0.7 MG/DL (ref 0.5–1)
EOSINOPHILS ABSOLUTE: 0 E9/L (ref 0.05–0.5)
EOSINOPHILS RELATIVE PERCENT: 0 % (ref 0–6)
GFR AFRICAN AMERICAN: >60
GFR NON-AFRICAN AMERICAN: >60 ML/MIN/1.73
GLUCOSE BLD-MCNC: 139 MG/DL (ref 74–109)
HCT VFR BLD CALC: 30.3 % (ref 34–48)
HEMOGLOBIN: 9.4 G/DL (ref 11.5–15.5)
LYMPHOCYTES ABSOLUTE: 2.33 E9/L (ref 1.5–4)
LYMPHOCYTES RELATIVE PERCENT: 11.3 % (ref 20–42)
MCH RBC QN AUTO: 26.5 PG (ref 26–35)
MCHC RBC AUTO-ENTMCNC: 31 % (ref 32–34.5)
MCV RBC AUTO: 85.4 FL (ref 80–99.9)
MONOCYTES ABSOLUTE: 1.27 E9/L (ref 0.1–0.95)
MONOCYTES RELATIVE PERCENT: 6.1 % (ref 2–12)
NEUTROPHILS ABSOLUTE: 17.6 E9/L (ref 1.8–7.3)
NEUTROPHILS RELATIVE PERCENT: 82.6 % (ref 43–80)
PDW BLD-RTO: 16.6 FL (ref 11.5–15)
PLATELET # BLD: 532 E9/L (ref 130–450)
PMV BLD AUTO: 9.3 FL (ref 7–12)
POLYCHROMASIA: ABNORMAL
POTASSIUM SERPL-SCNC: 4.2 MMOL/L (ref 3.5–5)
RBC # BLD: 3.55 E12/L (ref 3.5–5.5)
SODIUM BLD-SCNC: 139 MMOL/L (ref 132–146)
WBC # BLD: 21.2 E9/L (ref 4.5–11.5)

## 2018-10-19 PROCEDURE — 80048 BASIC METABOLIC PNL TOTAL CA: CPT

## 2018-10-19 PROCEDURE — 6370000000 HC RX 637 (ALT 250 FOR IP): Performed by: INTERNAL MEDICINE

## 2018-10-19 PROCEDURE — C9113 INJ PANTOPRAZOLE SODIUM, VIA: HCPCS | Performed by: SURGERY

## 2018-10-19 PROCEDURE — 36415 COLL VENOUS BLD VENIPUNCTURE: CPT

## 2018-10-19 PROCEDURE — 85025 COMPLETE CBC W/AUTO DIFF WBC: CPT

## 2018-10-19 PROCEDURE — 6370000000 HC RX 637 (ALT 250 FOR IP): Performed by: SURGERY

## 2018-10-19 PROCEDURE — 2500000003 HC RX 250 WO HCPCS: Performed by: SURGERY

## 2018-10-19 PROCEDURE — 6360000002 HC RX W HCPCS: Performed by: SURGERY

## 2018-10-19 PROCEDURE — 2500000003 HC RX 250 WO HCPCS: Performed by: INTERNAL MEDICINE

## 2018-10-19 PROCEDURE — 2060000000 HC ICU INTERMEDIATE R&B

## 2018-10-19 PROCEDURE — 2580000003 HC RX 258: Performed by: SURGERY

## 2018-10-19 RX ADMIN — HEPARIN SODIUM 5000 UNITS: 5000 INJECTION INTRAVENOUS; SUBCUTANEOUS at 22:47

## 2018-10-19 RX ADMIN — Medication 10 ML: at 08:35

## 2018-10-19 RX ADMIN — Medication 10 ML: at 19:51

## 2018-10-19 RX ADMIN — POTASSIUM CHLORIDE, DEXTROSE MONOHYDRATE AND SODIUM CHLORIDE: 150; 5; 900 INJECTION, SOLUTION INTRAVENOUS at 03:32

## 2018-10-19 RX ADMIN — HEPARIN SODIUM 5000 UNITS: 5000 INJECTION INTRAVENOUS; SUBCUTANEOUS at 14:40

## 2018-10-19 RX ADMIN — MORPHINE SULFATE 2 MG: 2 INJECTION, SOLUTION INTRAMUSCULAR; INTRAVENOUS at 19:50

## 2018-10-19 RX ADMIN — MAGNESIUM HYDROXIDE 30 ML: 400 SUSPENSION ORAL at 11:44

## 2018-10-19 RX ADMIN — HEPARIN SODIUM 5000 UNITS: 5000 INJECTION INTRAVENOUS; SUBCUTANEOUS at 06:15

## 2018-10-19 RX ADMIN — PANTOPRAZOLE SODIUM 40 MG: 40 INJECTION, POWDER, FOR SOLUTION INTRAVENOUS at 08:29

## 2018-10-19 RX ADMIN — MORPHINE SULFATE 2 MG: 2 INJECTION, SOLUTION INTRAMUSCULAR; INTRAVENOUS at 08:29

## 2018-10-19 RX ADMIN — TAZOBACTAM SODIUM AND PIPERACILLIN SODIUM 3.38 G: 375; 3 INJECTION, SOLUTION INTRAVENOUS at 15:05

## 2018-10-19 RX ADMIN — TAZOBACTAM SODIUM AND PIPERACILLIN SODIUM 3.38 G: 375; 3 INJECTION, SOLUTION INTRAVENOUS at 23:40

## 2018-10-19 RX ADMIN — SERTRALINE HYDROCHLORIDE 50 MG: 50 TABLET ORAL at 08:29

## 2018-10-19 RX ADMIN — MORPHINE SULFATE 2 MG: 2 INJECTION, SOLUTION INTRAMUSCULAR; INTRAVENOUS at 03:32

## 2018-10-19 RX ADMIN — POTASSIUM CHLORIDE, DEXTROSE MONOHYDRATE AND SODIUM CHLORIDE: 150; 5; 900 INJECTION, SOLUTION INTRAVENOUS at 19:50

## 2018-10-19 RX ADMIN — TAZOBACTAM SODIUM AND PIPERACILLIN SODIUM 3.38 G: 375; 3 INJECTION, SOLUTION INTRAVENOUS at 06:15

## 2018-10-19 RX ADMIN — Medication 10 ML: at 03:33

## 2018-10-19 ASSESSMENT — PAIN SCALES - GENERAL
PAINLEVEL_OUTOF10: 4
PAINLEVEL_OUTOF10: 7
PAINLEVEL_OUTOF10: 6

## 2018-10-19 ASSESSMENT — PAIN DESCRIPTION - PAIN TYPE: TYPE: ACUTE PAIN

## 2018-10-19 ASSESSMENT — PAIN DESCRIPTION - FREQUENCY: FREQUENCY: INTERMITTENT

## 2018-10-19 ASSESSMENT — PAIN DESCRIPTION - LOCATION: LOCATION: ABDOMEN

## 2018-10-19 ASSESSMENT — PAIN DESCRIPTION - ORIENTATION: ORIENTATION: LOWER

## 2018-10-19 ASSESSMENT — PAIN DESCRIPTION - DESCRIPTORS: DESCRIPTORS: SHARP

## 2018-10-19 NOTE — PROGRESS NOTES
Intravenous Q8H    sodium chloride flush  10 mL Intravenous 2 times per day    pantoprazole  40 mg Intravenous Daily    And    sodium chloride (PF)  10 mL Intravenous Daily    heparin (porcine)  5,000 Units Subcutaneous 3 times per day    sertraline  50 mg Oral Daily    butamben-tetracaine-benzocaine  1 spray Topical Once        Assessment; Active Problems:    Severe protein-calorie malnutrition (HCC)    Perforation of sigmoid - Surgery 10/18/18    PAF (paroxysmal atrial fibrillation) (HCC)    Ischemic bowel disease (HCC)    Hx of blood clots    Cancer ([facility]      Plan:   Diet for general surgery  IVF  Labs in am    See orders.         Howard Mercer DO  3:35 PM  10/19/2018

## 2018-10-19 NOTE — PROGRESS NOTES
10/18/2018    HCT 29.0 10/18/2018    MCV 82.4 10/18/2018    MCH 26.1 10/18/2018    MCHC 31.7 10/18/2018    RDW 16.4 10/18/2018     10/18/2018    MPV 8.7 10/18/2018     CBC with Differential:    Lab Results   Component Value Date    WBC 15.6 10/18/2018    RBC 3.52 10/18/2018    HGB 9.2 10/18/2018    HCT 29.0 10/18/2018     10/18/2018    MCV 82.4 10/18/2018    MCH 26.1 10/18/2018    MCHC 31.7 10/18/2018    RDW 16.4 10/18/2018    NRBC 0.5 11/30/2017    BLASTSPCT 1.0 11/06/2017    METASPCT 0.9 10/18/2018    LYMPHOPCT 4.3 10/18/2018    PROMYELOPCT 0.5 11/26/2017    MONOPCT 5.2 10/18/2018    MYELOPCT 1.7 10/18/2018    BASOPCT 0.4 10/18/2018    MONOSABS 0.78 10/18/2018    LYMPHSABS 0.62 10/18/2018    EOSABS 0.00 10/18/2018    BASOSABS 0.00 10/18/2018     Hemoglobin/Hematocrit:    Lab Results   Component Value Date    HGB 9.2 10/18/2018    HCT 29.0 10/18/2018     CMP:    Lab Results   Component Value Date     10/18/2018    K 3.3 10/18/2018    K 3.1 10/17/2018     10/18/2018    CO2 27 10/18/2018    BUN 23 10/18/2018    CREATININE 0.8 10/18/2018    GFRAA >60 10/18/2018    LABGLOM >60 10/18/2018    GLUCOSE 118 10/18/2018    PROT 6.7 10/18/2018    LABALBU 2.8 10/18/2018    CALCIUM 9.2 10/18/2018    BILITOT 0.3 10/18/2018    ALKPHOS 152 10/18/2018    AST 14 10/18/2018    ALT 18 10/18/2018     BMP:    Lab Results   Component Value Date     10/18/2018    K 3.3 10/18/2018    K 3.1 10/17/2018     10/18/2018    CO2 27 10/18/2018    BUN 23 10/18/2018    LABALBU 2.8 10/18/2018    CREATININE 0.8 10/18/2018    CALCIUM 9.2 10/18/2018    GFRAA >60 10/18/2018    LABGLOM >60 10/18/2018    GLUCOSE 118 10/18/2018     Hepatic Function Panel:    Lab Results   Component Value Date    ALKPHOS 152 10/18/2018    ALT 18 10/18/2018    AST 14 10/18/2018    PROT 6.7 10/18/2018    BILITOT 0.3 10/18/2018    BILIDIR 0.4 11/12/2017    IBILI 0.3 11/12/2017    LABALBU 2.8 10/18/2018     Albumin:    Lab Results   Component

## 2018-10-19 NOTE — OP NOTE
[address]                               OPERATIVE REPORT    PATIENT NAME: Prashanth Johnson                      :         1940  MED REC NO:   [id number]                            ROOM:       0630  ACCOUNT NO:   [de-identified]                           ADMIT DATE:  10/16/2018  PROVIDER:     Funmilayo Frye MD    DATE OF PROCEDURE:  10/18/2018    PREOPERATIVE DIAGNOSES:  Sepsis, pelvic abscess, rectovaginal fistula. POSTOPERATIVE DIAGNOSES:  Sepsis, pelvic abscess, rectovaginal  fistula. PROCEDURE PERFORMED:  Laparoscopic revision of a loop colostomy with  partial descending colon resection, end colostomy, extensive lysis of  adhesions greater than an hour, drainage of pelvic abscess. ANESTHESIA:  General endotracheal.    SURGEON:  Funmilayo Frye MD    ASSISTANT:  None. COMPLICATIONS:  None. ESTIMATED BLOOD LOSS:  10 mL. FLUIDS:  Crystalloid. SPECIMENS:  Descending colon. DESCRIPTION OF PROCEDURE:  An 70-year-old female with a history of  extensive complications following a low anterior resection in outside  hospital.  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 She then was institutionalized for most of the  year and subsequently had been recently discharged to home about five  weeks ago and developed weakness, fatigue, poor appetite, nausea, and  vomiting and was brought into the ER, found to have an ileus with a  pelvic abscess and a perforated sigmoid colon with evidence of pelvic  abscess and rectovaginal fistula recurrence.   Her CT also confirmed  that her loop colostomy

## 2018-10-19 NOTE — CONSULTS
problems. History of atrial fibrillation  Gastrointestinal: negative for abdominal pain, diarrhea, nausea and vomiting. History of cancer of the colon History of ischemic bowel disease. Derm: negative for rash and skin lesion(s)  Neurological: negative for seizures and tremors. She has had macular degeneration  Endocrine: negative for diabetic symptoms including polydipsia and polyuria  : As above in the HPI, otherwise negative  All other reviews are negative  Musculoskeletal: She has had hand surgery for carpal tunnel right hand    Physical Exam:     Vitals:  BP (!) 131/53   Pulse 89   Temp 97.4 °F (36.3 °C) (Oral)   Resp 18   Ht 5' 6\" (1.676 m)   Wt 120 lb (54.4 kg)   SpO2 94%   BMI 19.37 kg/m²     General:  The patient is somnolent. She is frail looking HEENT:  Nasogastric tube is intact Normal lips, teeth, and gums. No nasal discharge. Neck:  Supple, no masses. Heart:  RRR  Lungs:  No audible wheezing. Respirations symmetric and non-labored. Abdomen:  soft, nontender, no masses, no organomegaly, no peritoneal signs  Extremities:  No clubbing, cyanosis, or edema  Skin:  Warm and dry, no open lesions or rashes  Neuro:  There are no motor or sensory deficits in the 4 quadrant extremities   Rectal: deferred  Genitalia:  Deferred    Labs:     Recent Labs      10/17/18   0935  10/18/18   0538  10/19/18   0812   WBC  16.3*  15.6*  21.2*   RBC  3.58  3.52  3.55   HGB  9.3*  9.2*  9.4*   HCT  29.1*  29.0*  30.3*   MCV  81.3  82.4  85.4   MCH  26.0  26.1  26.5   MCHC  32.0  31.7*  31.0*   RDW  16.0*  16.4*  16.6*   PLT  463*  451*  532*   MPV  9.4  8.7  9.3         Recent Labs      10/17/18   0935  10/18/18   0538  10/19/18   0812   CREATININE  0.7  0.8  0.7         Assessment:  Muriel Martins [de-identified] y.o. female     Situational retention/ difficult catheter placement    Plan:    Leave the catheter until she is ambulatory    Electronically signed by Eun Escalante MD on 10/19/2018 at 1:14 PM
intact. Assessment/Plan:  1. Possible sigmoid perforation  2. Normocytic anemia  3. Hypokalemia  4. History of colovaginal fistula with surgery on 7/3/18  5. History of PAF and currently sinus rhythm  6. History of ischemic bowel  7. History of left femoral DVT  8.  History of colorectal CVA    -IV fluids of D5 normal saline with 20 KCl  -Labs in a.m.  -Pending final CT results  -Consider TPN  -Home meds reviewed  -We'll discuss case with general surgery and Dr. Bhavya Menendez    Electronically signed by Madai Pizarro DO  on 10/17/18 at 10:14 AM

## 2018-10-20 ENCOUNTER — APPOINTMENT (OUTPATIENT)
Dept: GENERAL RADIOLOGY | Age: 78
DRG: 329 | End: 2018-10-20
Payer: MEDICARE

## 2018-10-20 LAB
ANION GAP SERPL CALCULATED.3IONS-SCNC: 8 MMOL/L (ref 7–16)
ANISOCYTOSIS: ABNORMAL
B.E.: 0.2 MMOL/L (ref -3–3)
BASOPHILS ABSOLUTE: 0 E9/L (ref 0–0.2)
BASOPHILS RELATIVE PERCENT: 0.3 % (ref 0–2)
BUN BLDV-MCNC: 11 MG/DL (ref 8–23)
CALCIUM SERPL-MCNC: 8.5 MG/DL (ref 8.6–10.2)
CHLORIDE BLD-SCNC: 102 MMOL/L (ref 98–107)
CO2: 24 MMOL/L (ref 22–29)
COHB: 2.5 % (ref 0–1.5)
CREAT SERPL-MCNC: 0.6 MG/DL (ref 0.5–1)
CRITICAL: ABNORMAL
DATE ANALYZED: ABNORMAL
DATE OF COLLECTION: ABNORMAL
EOSINOPHILS ABSOLUTE: 0 E9/L (ref 0.05–0.5)
EOSINOPHILS RELATIVE PERCENT: 0 % (ref 0–6)
GFR AFRICAN AMERICAN: >60
GFR NON-AFRICAN AMERICAN: >60 ML/MIN/1.73
GLUCOSE BLD-MCNC: 130 MG/DL (ref 74–109)
HCO3: 22.4 MMOL/L (ref 22–26)
HCT VFR BLD CALC: 27.2 % (ref 34–48)
HEMOGLOBIN: 8.5 G/DL (ref 11.5–15.5)
HHB: 5.4 % (ref 0–5)
LAB: ABNORMAL
LYMPHOCYTES ABSOLUTE: 1.79 E9/L (ref 1.5–4)
LYMPHOCYTES RELATIVE PERCENT: 6.9 % (ref 20–42)
Lab: ABNORMAL
MCH RBC QN AUTO: 25.8 PG (ref 26–35)
MCHC RBC AUTO-ENTMCNC: 31.3 % (ref 32–34.5)
MCV RBC AUTO: 82.7 FL (ref 80–99.9)
METHB: 0.3 % (ref 0–1.5)
MODE: ABNORMAL
MONOCYTES ABSOLUTE: 1.78 E9/L (ref 0.1–0.95)
MONOCYTES RELATIVE PERCENT: 6.9 % (ref 2–12)
NEUTROPHILS ABSOLUTE: 21.93 E9/L (ref 1.8–7.3)
NEUTROPHILS RELATIVE PERCENT: 86.2 % (ref 43–80)
O2 CONTENT: 13.7 ML/DL
O2 SATURATION: 94.4 % (ref 92–98.5)
O2HB: 91.8 % (ref 94–97)
OPERATOR ID: 30
OVALOCYTES: ABNORMAL
PATIENT TEMP: 37 C
PCO2: 28.5 MMHG (ref 35–45)
PDW BLD-RTO: 16.5 FL (ref 11.5–15)
PH BLOOD GAS: 7.51 (ref 7.35–7.45)
PLATELET # BLD: 489 E9/L (ref 130–450)
PMV BLD AUTO: 9 FL (ref 7–12)
PO2: 67.6 MMHG (ref 60–100)
POIKILOCYTES: ABNORMAL
POLYCHROMASIA: ABNORMAL
POTASSIUM SERPL-SCNC: 4 MMOL/L (ref 3.5–5)
RBC # BLD: 3.29 E12/L (ref 3.5–5.5)
SODIUM BLD-SCNC: 134 MMOL/L (ref 132–146)
SOURCE, BLOOD GAS: ABNORMAL
TEAR DROP CELLS: ABNORMAL
THB: 10.6 G/DL (ref 11.5–16.5)
TIME ANALYZED: 2004
WBC # BLD: 25.5 E9/L (ref 4.5–11.5)

## 2018-10-20 PROCEDURE — 82805 BLOOD GASES W/O2 SATURATION: CPT

## 2018-10-20 PROCEDURE — 80048 BASIC METABOLIC PNL TOTAL CA: CPT

## 2018-10-20 PROCEDURE — 6360000002 HC RX W HCPCS: Performed by: SURGERY

## 2018-10-20 PROCEDURE — 6370000000 HC RX 637 (ALT 250 FOR IP): Performed by: INTERNAL MEDICINE

## 2018-10-20 PROCEDURE — 2580000003 HC RX 258: Performed by: INTERNAL MEDICINE

## 2018-10-20 PROCEDURE — 2500000003 HC RX 250 WO HCPCS: Performed by: INTERNAL MEDICINE

## 2018-10-20 PROCEDURE — 2500000003 HC RX 250 WO HCPCS: Performed by: SURGERY

## 2018-10-20 PROCEDURE — 74018 RADEX ABDOMEN 1 VIEW: CPT

## 2018-10-20 PROCEDURE — 36600 WITHDRAWAL OF ARTERIAL BLOOD: CPT

## 2018-10-20 PROCEDURE — C9113 INJ PANTOPRAZOLE SODIUM, VIA: HCPCS | Performed by: SURGERY

## 2018-10-20 PROCEDURE — 2580000003 HC RX 258: Performed by: SURGERY

## 2018-10-20 PROCEDURE — 36415 COLL VENOUS BLD VENIPUNCTURE: CPT

## 2018-10-20 PROCEDURE — 2000000000 HC ICU R&B

## 2018-10-20 PROCEDURE — 99024 POSTOP FOLLOW-UP VISIT: CPT | Performed by: SURGERY

## 2018-10-20 PROCEDURE — 6360000002 HC RX W HCPCS: Performed by: INTERNAL MEDICINE

## 2018-10-20 PROCEDURE — 85025 COMPLETE CBC W/AUTO DIFF WBC: CPT

## 2018-10-20 RX ORDER — MORPHINE SULFATE 2 MG/ML
2 INJECTION, SOLUTION INTRAMUSCULAR; INTRAVENOUS
Status: DISCONTINUED | OUTPATIENT
Start: 2018-10-20 | End: 2018-10-29 | Stop reason: HOSPADM

## 2018-10-20 RX ORDER — MORPHINE SULFATE 4 MG/ML
4 INJECTION, SOLUTION INTRAMUSCULAR; INTRAVENOUS
Status: DISCONTINUED | OUTPATIENT
Start: 2018-10-20 | End: 2018-10-29 | Stop reason: HOSPADM

## 2018-10-20 RX ORDER — FLUCONAZOLE 2 MG/ML
200 INJECTION, SOLUTION INTRAVENOUS EVERY 24 HOURS
Status: DISCONTINUED | OUTPATIENT
Start: 2018-10-20 | End: 2018-10-29 | Stop reason: HOSPADM

## 2018-10-20 RX ORDER — 0.9 % SODIUM CHLORIDE 0.9 %
250 INTRAVENOUS SOLUTION INTRAVENOUS ONCE
Status: COMPLETED | OUTPATIENT
Start: 2018-10-20 | End: 2018-10-20

## 2018-10-20 RX ORDER — PROMETHAZINE HYDROCHLORIDE 25 MG/ML
12.5 INJECTION, SOLUTION INTRAMUSCULAR; INTRAVENOUS EVERY 6 HOURS PRN
Status: DISCONTINUED | OUTPATIENT
Start: 2018-10-20 | End: 2018-10-29 | Stop reason: HOSPADM

## 2018-10-20 RX ADMIN — Medication 10 ML: at 08:43

## 2018-10-20 RX ADMIN — PANTOPRAZOLE SODIUM 40 MG: 40 INJECTION, POWDER, FOR SOLUTION INTRAVENOUS at 08:42

## 2018-10-20 RX ADMIN — MORPHINE SULFATE 2 MG: 2 INJECTION, SOLUTION INTRAMUSCULAR; INTRAVENOUS at 03:29

## 2018-10-20 RX ADMIN — MEROPENEM 1 G: 1 INJECTION, POWDER, FOR SOLUTION INTRAVENOUS at 20:36

## 2018-10-20 RX ADMIN — POTASSIUM CHLORIDE, DEXTROSE MONOHYDRATE AND SODIUM CHLORIDE: 150; 5; 900 INJECTION, SOLUTION INTRAVENOUS at 22:41

## 2018-10-20 RX ADMIN — HEPARIN SODIUM 5000 UNITS: 5000 INJECTION INTRAVENOUS; SUBCUTANEOUS at 22:26

## 2018-10-20 RX ADMIN — MORPHINE SULFATE 2 MG: 2 INJECTION, SOLUTION INTRAMUSCULAR; INTRAVENOUS at 17:59

## 2018-10-20 RX ADMIN — Medication 10 ML: at 03:39

## 2018-10-20 RX ADMIN — POTASSIUM CHLORIDE, DEXTROSE MONOHYDRATE AND SODIUM CHLORIDE: 150; 5; 900 INJECTION, SOLUTION INTRAVENOUS at 09:35

## 2018-10-20 RX ADMIN — TAZOBACTAM SODIUM AND PIPERACILLIN SODIUM 3.38 G: 375; 3 INJECTION, SOLUTION INTRAVENOUS at 06:14

## 2018-10-20 RX ADMIN — ONDANSETRON 4 MG: 2 INJECTION INTRAMUSCULAR; INTRAVENOUS at 03:39

## 2018-10-20 RX ADMIN — POTASSIUM CHLORIDE, DEXTROSE MONOHYDRATE AND SODIUM CHLORIDE: 150; 5; 900 INJECTION, SOLUTION INTRAVENOUS at 17:41

## 2018-10-20 RX ADMIN — SERTRALINE HYDROCHLORIDE 50 MG: 50 TABLET ORAL at 08:42

## 2018-10-20 RX ADMIN — TRIMETHOBENZAMIDE HYDROCHLORIDE 200 MG: 100 INJECTION INTRAMUSCULAR at 13:34

## 2018-10-20 RX ADMIN — Medication 10 ML: at 03:29

## 2018-10-20 RX ADMIN — Medication 10 ML: at 20:57

## 2018-10-20 RX ADMIN — HEPARIN SODIUM 5000 UNITS: 5000 INJECTION INTRAVENOUS; SUBCUTANEOUS at 15:03

## 2018-10-20 RX ADMIN — ONDANSETRON 4 MG: 2 INJECTION INTRAMUSCULAR; INTRAVENOUS at 17:32

## 2018-10-20 RX ADMIN — FLUCONAZOLE 200 MG: 2 INJECTION, SOLUTION INTRAVENOUS at 21:15

## 2018-10-20 RX ADMIN — ONDANSETRON 4 MG: 2 INJECTION INTRAMUSCULAR; INTRAVENOUS at 09:36

## 2018-10-20 RX ADMIN — PROMETHAZINE HYDROCHLORIDE 12.5 MG: 25 INJECTION, SOLUTION INTRAMUSCULAR; INTRAVENOUS at 11:12

## 2018-10-20 RX ADMIN — TAZOBACTAM SODIUM AND PIPERACILLIN SODIUM 3.38 G: 375; 3 INJECTION, SOLUTION INTRAVENOUS at 14:53

## 2018-10-20 RX ADMIN — SODIUM CHLORIDE 250 ML: 9 INJECTION, SOLUTION INTRAVENOUS at 14:53

## 2018-10-20 RX ADMIN — MORPHINE SULFATE 2 MG: 2 INJECTION, SOLUTION INTRAMUSCULAR; INTRAVENOUS at 08:44

## 2018-10-20 RX ADMIN — HEPARIN SODIUM 5000 UNITS: 5000 INJECTION INTRAVENOUS; SUBCUTANEOUS at 06:15

## 2018-10-20 ASSESSMENT — PAIN SCALES - GENERAL
PAINLEVEL_OUTOF10: 6
PAINLEVEL_OUTOF10: 5
PAINLEVEL_OUTOF10: 7
PAINLEVEL_OUTOF10: 0
PAINLEVEL_OUTOF10: 6
PAINLEVEL_OUTOF10: 6

## 2018-10-20 ASSESSMENT — ENCOUNTER SYMPTOMS
COUGH: 0
ABDOMINAL PAIN: 1
SHORTNESS OF BREATH: 0
DIARRHEA: 0
VOMITING: 0
NAUSEA: 0

## 2018-10-20 ASSESSMENT — PAIN DESCRIPTION - LOCATION
LOCATION: BACK
LOCATION: ABDOMEN;BACK
LOCATION: ABDOMEN

## 2018-10-20 ASSESSMENT — PAIN DESCRIPTION - ORIENTATION: ORIENTATION: LOWER

## 2018-10-20 ASSESSMENT — PAIN DESCRIPTION - PAIN TYPE
TYPE: ACUTE PAIN

## 2018-10-20 ASSESSMENT — PAIN DESCRIPTION - FREQUENCY: FREQUENCY: INTERMITTENT

## 2018-10-20 ASSESSMENT — PAIN DESCRIPTION - DESCRIPTORS: DESCRIPTORS: SHARP

## 2018-10-20 NOTE — PROGRESS NOTES
revision post op ileus    P: antiemetics, replace ngt if no resolution    Chilango Keith MD  10/20/2018

## 2018-10-21 ENCOUNTER — APPOINTMENT (OUTPATIENT)
Dept: GENERAL RADIOLOGY | Age: 78
DRG: 329 | End: 2018-10-21
Payer: MEDICARE

## 2018-10-21 LAB
ALBUMIN SERPL-MCNC: 2.1 G/DL (ref 3.5–5.2)
ALP BLD-CCNC: 131 U/L (ref 35–104)
ALT SERPL-CCNC: 11 U/L (ref 0–32)
ANION GAP SERPL CALCULATED.3IONS-SCNC: 9 MMOL/L (ref 7–16)
AST SERPL-CCNC: 19 U/L (ref 0–31)
BASOPHILS ABSOLUTE: 0 E9/L (ref 0–0.2)
BASOPHILS RELATIVE PERCENT: 0.4 % (ref 0–2)
BILIRUB SERPL-MCNC: 0.4 MG/DL (ref 0–1.2)
BUN BLDV-MCNC: 10 MG/DL (ref 8–23)
CALCIUM SERPL-MCNC: 8.6 MG/DL (ref 8.6–10.2)
CHLORIDE BLD-SCNC: 106 MMOL/L (ref 98–107)
CO2: 23 MMOL/L (ref 22–29)
CREAT SERPL-MCNC: 0.6 MG/DL (ref 0.5–1)
CULTURE SURGICAL: ABNORMAL
EOSINOPHILS ABSOLUTE: 0 E9/L (ref 0.05–0.5)
EOSINOPHILS RELATIVE PERCENT: 0 % (ref 0–6)
GFR AFRICAN AMERICAN: >60
GFR NON-AFRICAN AMERICAN: >60 ML/MIN/1.73
GLUCOSE BLD-MCNC: 121 MG/DL (ref 74–109)
GRAM STAIN RESULT: ABNORMAL
HCT VFR BLD CALC: 27.3 % (ref 34–48)
HEMOGLOBIN: 8.3 G/DL (ref 11.5–15.5)
HYPOCHROMIA: ABNORMAL
LYMPHOCYTES ABSOLUTE: 1.13 E9/L (ref 1.5–4)
LYMPHOCYTES RELATIVE PERCENT: 6.1 % (ref 20–42)
MCH RBC QN AUTO: 25.8 PG (ref 26–35)
MCHC RBC AUTO-ENTMCNC: 30.4 % (ref 32–34.5)
MCV RBC AUTO: 84.8 FL (ref 80–99.9)
MONOCYTES ABSOLUTE: 0.38 E9/L (ref 0.1–0.95)
MONOCYTES RELATIVE PERCENT: 1.7 % (ref 2–12)
NEUTROPHILS ABSOLUTE: 17.39 E9/L (ref 1.8–7.3)
NEUTROPHILS RELATIVE PERCENT: 92.2 % (ref 43–80)
ORGANISM: ABNORMAL
ORGANISM: ABNORMAL
PDW BLD-RTO: 18.8 FL (ref 11.5–15)
PLATELET # BLD: 395 E9/L (ref 130–450)
PMV BLD AUTO: 10.5 FL (ref 7–12)
POIKILOCYTES: ABNORMAL
POLYCHROMASIA: ABNORMAL
POTASSIUM SERPL-SCNC: 4.1 MMOL/L (ref 3.5–5)
RBC # BLD: 3.22 E12/L (ref 3.5–5.5)
SODIUM BLD-SCNC: 138 MMOL/L (ref 132–146)
TOTAL PROTEIN: 6.1 G/DL (ref 6.4–8.3)
WBC # BLD: 18.9 E9/L (ref 4.5–11.5)

## 2018-10-21 PROCEDURE — 6360000002 HC RX W HCPCS: Performed by: INTERNAL MEDICINE

## 2018-10-21 PROCEDURE — 2580000003 HC RX 258: Performed by: SURGERY

## 2018-10-21 PROCEDURE — 71045 X-RAY EXAM CHEST 1 VIEW: CPT

## 2018-10-21 PROCEDURE — 2500000003 HC RX 250 WO HCPCS: Performed by: INTERNAL MEDICINE

## 2018-10-21 PROCEDURE — 2580000003 HC RX 258: Performed by: INTERNAL MEDICINE

## 2018-10-21 PROCEDURE — 6370000000 HC RX 637 (ALT 250 FOR IP): Performed by: INTERNAL MEDICINE

## 2018-10-21 PROCEDURE — 80053 COMPREHEN METABOLIC PANEL: CPT

## 2018-10-21 PROCEDURE — 36415 COLL VENOUS BLD VENIPUNCTURE: CPT

## 2018-10-21 PROCEDURE — 2000000000 HC ICU R&B

## 2018-10-21 PROCEDURE — 85025 COMPLETE CBC W/AUTO DIFF WBC: CPT

## 2018-10-21 PROCEDURE — 99024 POSTOP FOLLOW-UP VISIT: CPT | Performed by: SURGERY

## 2018-10-21 PROCEDURE — 6360000002 HC RX W HCPCS: Performed by: SURGERY

## 2018-10-21 PROCEDURE — C9113 INJ PANTOPRAZOLE SODIUM, VIA: HCPCS | Performed by: SURGERY

## 2018-10-21 PROCEDURE — 94640 AIRWAY INHALATION TREATMENT: CPT

## 2018-10-21 RX ORDER — IPRATROPIUM BROMIDE AND ALBUTEROL SULFATE 2.5; .5 MG/3ML; MG/3ML
1 SOLUTION RESPIRATORY (INHALATION)
Status: DISCONTINUED | OUTPATIENT
Start: 2018-10-21 | End: 2018-10-23

## 2018-10-21 RX ORDER — CHLORPROMAZINE HYDROCHLORIDE 25 MG/ML
10 INJECTION INTRAMUSCULAR 3 TIMES DAILY PRN
Status: DISCONTINUED | OUTPATIENT
Start: 2018-10-21 | End: 2018-10-29 | Stop reason: HOSPADM

## 2018-10-21 RX ADMIN — HEPARIN SODIUM 5000 UNITS: 5000 INJECTION INTRAVENOUS; SUBCUTANEOUS at 06:17

## 2018-10-21 RX ADMIN — Medication 10 ML: at 21:30

## 2018-10-21 RX ADMIN — SERTRALINE HYDROCHLORIDE 50 MG: 50 TABLET ORAL at 08:57

## 2018-10-21 RX ADMIN — POTASSIUM CHLORIDE, DEXTROSE MONOHYDRATE AND SODIUM CHLORIDE: 150; 5; 900 INJECTION, SOLUTION INTRAVENOUS at 12:55

## 2018-10-21 RX ADMIN — Medication 10 ML: at 08:58

## 2018-10-21 RX ADMIN — HEPARIN SODIUM 5000 UNITS: 5000 INJECTION INTRAVENOUS; SUBCUTANEOUS at 21:28

## 2018-10-21 RX ADMIN — CHLORPROMAZINE HYDROCHLORIDE 10 MG: 25 INJECTION INTRAMUSCULAR at 14:22

## 2018-10-21 RX ADMIN — POTASSIUM CHLORIDE, DEXTROSE MONOHYDRATE AND SODIUM CHLORIDE: 150; 5; 900 INJECTION, SOLUTION INTRAVENOUS at 21:19

## 2018-10-21 RX ADMIN — IPRATROPIUM BROMIDE AND ALBUTEROL SULFATE 1 AMPULE: .5; 3 SOLUTION RESPIRATORY (INHALATION) at 13:05

## 2018-10-21 RX ADMIN — IPRATROPIUM BROMIDE AND ALBUTEROL SULFATE 1 AMPULE: .5; 3 SOLUTION RESPIRATORY (INHALATION) at 16:46

## 2018-10-21 RX ADMIN — MEROPENEM 1 G: 1 INJECTION, POWDER, FOR SOLUTION INTRAVENOUS at 10:55

## 2018-10-21 RX ADMIN — MEROPENEM 1 G: 1 INJECTION, POWDER, FOR SOLUTION INTRAVENOUS at 17:58

## 2018-10-21 RX ADMIN — POTASSIUM CHLORIDE, DEXTROSE MONOHYDRATE AND SODIUM CHLORIDE: 150; 5; 900 INJECTION, SOLUTION INTRAVENOUS at 06:17

## 2018-10-21 RX ADMIN — MEROPENEM 1 G: 1 INJECTION, POWDER, FOR SOLUTION INTRAVENOUS at 02:22

## 2018-10-21 RX ADMIN — PANTOPRAZOLE SODIUM 40 MG: 40 INJECTION, POWDER, FOR SOLUTION INTRAVENOUS at 08:57

## 2018-10-21 RX ADMIN — HEPARIN SODIUM 5000 UNITS: 5000 INJECTION INTRAVENOUS; SUBCUTANEOUS at 14:22

## 2018-10-21 RX ADMIN — FLUCONAZOLE 200 MG: 2 INJECTION, SOLUTION INTRAVENOUS at 18:30

## 2018-10-21 ASSESSMENT — PAIN SCALES - GENERAL
PAINLEVEL_OUTOF10: 0

## 2018-10-21 NOTE — PROGRESS NOTES
2.1 10/18/2018     Phosphorus:    Lab Results   Component Value Date    PHOS 2.7 10/18/2018     PT/INR:    Lab Results   Component Value Date    PROTIME 10.8 07/18/2018    INR 1.0 07/18/2018     Troponin:    Lab Results   Component Value Date    TROPONINI <0.01 10/16/2018     U/A:    Lab Results   Component Value Date    COLORU Yellow 10/18/2018    PROTEINU 30 10/18/2018    PHUR 5.0 10/18/2018    WBCUA 1-3 10/18/2018    RBCUA NONE 10/18/2018    BACTERIA MODERATE 10/18/2018    CLARITYU Clear 10/18/2018    SPECGRAV 1.025 10/18/2018    LEUKOCYTESUR Negative 10/18/2018    UROBILINOGEN 0.2 10/18/2018    BILIRUBINUR Negative 10/18/2018    BLOODU Negative 10/18/2018    GLUCOSEU Negative 10/18/2018     HgBA1c:  No results found for: LABA1C  FLP:    Lab Results   Component Value Date    TRIG 88 06/30/2018    HDL 58.0 07/18/2013    LDLCALC 157 07/18/2013     TSH:    Lab Results   Component Value Date    TSH 4.620 04/07/2018     LIPASE:    Lab Results   Component Value Date    LIPASE 12 10/16/2018       No results for input(s): GLUMET in the last 72 hours. XR CHEST PORTABLE   Final Result   Tip of the NG tube is above the gastroesophageal junction. Gastric distention. XR ABDOMEN (KUB) (SINGLE AP VIEW)   Final Result   1. Distended loops of small bowel which are similar in the degree of   distention when compared to 10/17/2018 CT scan. 2. Residual contrast within the ascending colon from the CT scan   performed on the same day. The colon is not distended. RADIOLOGY REPORT   Final Result      CT ABDOMEN PELVIS W IV CONTRAST Additional Contrast? Oral   Final Result   1. Postsurgical changes from a left midabdominal colostomy with   contrast and stool within the colon distal to the ostomy. 2. Redemonstration of diffuse infiltration of the perirectal and   presacral soft tissues with interval increase in number and size of   air-filled collections, with a collection extending into the left   piriformis muscle. 3. Mildly distended loops of small bowel, possibly related to ileus. 4. Diffuse increased sclerosis of the sacrum with periosteal reaction   along the presacral collection, suggestive of chronic osteomyelitis. There are insufficiency fractures through the superior endplate of S1   and left sacral ala. 5. Nondisplaced fracture with increased sclerosis within the left   pubis. 6. Moderate bilateral hydroureteronephrosis without a definite   obstructing calculus. The distal ureters are not well visualized. Findings were discussed with Dr. Stone Ceja on 10/17/2018 at   9:34 AM.      XR Chest Abdomen Ng Placement   Final Result   Nasogastric tube tip in appropriate position.  meropenem  1 g Intravenous Q8H    fluconazole  200 mg Intravenous Q24H    sodium chloride flush  10 mL Intravenous 2 times per day    pantoprazole  40 mg Intravenous Daily    And    sodium chloride (PF)  10 mL Intravenous Daily    heparin (porcine)  5,000 Units Subcutaneous 3 times per day    sertraline  50 mg Oral Daily    butamben-tetracaine-benzocaine  1 spray Topical Once        Assessment; Active Problems:    Severe protein-calorie malnutrition (HCC)    Nausea and vomiting postop-Ileus    Acute mild hypoxic respiratory distress-possible aspiration    Perforation of sigmoid - Surgery 10/18/18    PAF (paroxysmal atrial fibrillation) (HCC)    Ischemic bowel disease (HCC)    Hx of blood clots    Cancer - colorectal          Plan:   Chest x-ray portable now  Consult ID with patient having peritonitis and positive ESBL E. coli  IVF  O2 nasal cannula  DuoNeb aerosols  Labs in am  Increase IV fluids  Thorazine IM when necessary      See orders.         Brandi Morales DO  12:32 PM  10/21/2018

## 2018-10-21 NOTE — PROGRESS NOTES
rhythm  6. History of extensive DVT left leg-April 2018  7. History of ischemic bowel with resection in November 2017  8. History of prior tobacco use  9. Normocytic anemia  10. Hypoalbuminemia-Protein and calorie malnutrition  11. Failure to thrive  12.  Anemia - multifactorial; likely iron deficiency    Ileus/SBO persists  Will need Hyperalimentation/PICC tomorrow  IV antibiotics; diflucan per Dr Amaro Cornea  Cardio-respiratory better with NG and increased fluids  Transferred to ICU  ID Dr Shoshana Amador notes reviewed  Advanced NG; now appropriate

## 2018-10-22 ENCOUNTER — APPOINTMENT (OUTPATIENT)
Dept: GENERAL RADIOLOGY | Age: 78
DRG: 329 | End: 2018-10-22
Payer: MEDICARE

## 2018-10-22 LAB
ANION GAP SERPL CALCULATED.3IONS-SCNC: 9 MMOL/L (ref 7–16)
BASOPHILS ABSOLUTE: 0.02 E9/L (ref 0–0.2)
BASOPHILS RELATIVE PERCENT: 0.1 % (ref 0–2)
BLOOD CULTURE, ROUTINE: NORMAL
BUN BLDV-MCNC: 6 MG/DL (ref 8–23)
CALCIUM SERPL-MCNC: 8.2 MG/DL (ref 8.6–10.2)
CHLORIDE BLD-SCNC: 111 MMOL/L (ref 98–107)
CO2: 22 MMOL/L (ref 22–29)
CREAT SERPL-MCNC: 0.5 MG/DL (ref 0.5–1)
CULTURE, BLOOD 2: NORMAL
EOSINOPHILS ABSOLUTE: 0.03 E9/L (ref 0.05–0.5)
EOSINOPHILS RELATIVE PERCENT: 0.2 % (ref 0–6)
GFR AFRICAN AMERICAN: >60
GFR NON-AFRICAN AMERICAN: >60 ML/MIN/1.73
GLUCOSE BLD-MCNC: 142 MG/DL (ref 74–109)
HCT VFR BLD CALC: 26.3 % (ref 34–48)
HEMOGLOBIN: 7.9 G/DL (ref 11.5–15.5)
IMMATURE GRANULOCYTES #: 0.5 E9/L
IMMATURE GRANULOCYTES %: 2.9 % (ref 0–5)
INR BLD: 1.1
LYMPHOCYTES ABSOLUTE: 1.04 E9/L (ref 1.5–4)
LYMPHOCYTES RELATIVE PERCENT: 6 % (ref 20–42)
MCH RBC QN AUTO: 25.4 PG (ref 26–35)
MCHC RBC AUTO-ENTMCNC: 30 % (ref 32–34.5)
MCV RBC AUTO: 84.6 FL (ref 80–99.9)
MONOCYTES ABSOLUTE: 0.73 E9/L (ref 0.1–0.95)
MONOCYTES RELATIVE PERCENT: 4.2 % (ref 2–12)
NEUTROPHILS ABSOLUTE: 15.09 E9/L (ref 1.8–7.3)
NEUTROPHILS RELATIVE PERCENT: 86.6 % (ref 43–80)
PDW BLD-RTO: 16.7 FL (ref 11.5–15)
PLATELET # BLD: 396 E9/L (ref 130–450)
PMV BLD AUTO: 9 FL (ref 7–12)
POTASSIUM SERPL-SCNC: 4 MMOL/L (ref 3.5–5)
PROTHROMBIN TIME: 12.4 SEC (ref 9.3–12.4)
RBC # BLD: 3.11 E12/L (ref 3.5–5.5)
SODIUM BLD-SCNC: 142 MMOL/L (ref 132–146)
WBC # BLD: 17.4 E9/L (ref 4.5–11.5)

## 2018-10-22 PROCEDURE — 6360000002 HC RX W HCPCS: Performed by: SURGERY

## 2018-10-22 PROCEDURE — 80048 BASIC METABOLIC PNL TOTAL CA: CPT

## 2018-10-22 PROCEDURE — 85610 PROTHROMBIN TIME: CPT

## 2018-10-22 PROCEDURE — G8988 SELF CARE GOAL STATUS: HCPCS

## 2018-10-22 PROCEDURE — 6370000000 HC RX 637 (ALT 250 FOR IP): Performed by: INTERNAL MEDICINE

## 2018-10-22 PROCEDURE — 6360000004 HC RX CONTRAST MEDICATION: Performed by: SURGERY

## 2018-10-22 PROCEDURE — 02HV33Z INSERTION OF INFUSION DEVICE INTO SUPERIOR VENA CAVA, PERCUTANEOUS APPROACH: ICD-10-PCS | Performed by: RADIOLOGY

## 2018-10-22 PROCEDURE — 6360000002 HC RX W HCPCS: Performed by: INTERNAL MEDICINE

## 2018-10-22 PROCEDURE — 2500000003 HC RX 250 WO HCPCS: Performed by: INTERNAL MEDICINE

## 2018-10-22 PROCEDURE — G8987 SELF CARE CURRENT STATUS: HCPCS

## 2018-10-22 PROCEDURE — 97530 THERAPEUTIC ACTIVITIES: CPT

## 2018-10-22 PROCEDURE — 36415 COLL VENOUS BLD VENIPUNCTURE: CPT

## 2018-10-22 PROCEDURE — C9113 INJ PANTOPRAZOLE SODIUM, VIA: HCPCS | Performed by: SURGERY

## 2018-10-22 PROCEDURE — 97162 PT EVAL MOD COMPLEX 30 MIN: CPT | Performed by: PHYSICAL THERAPIST

## 2018-10-22 PROCEDURE — 2580000003 HC RX 258: Performed by: INTERNAL MEDICINE

## 2018-10-22 PROCEDURE — 2700000000 HC OXYGEN THERAPY PER DAY

## 2018-10-22 PROCEDURE — 2000000000 HC ICU R&B

## 2018-10-22 PROCEDURE — 2580000003 HC RX 258: Performed by: SURGERY

## 2018-10-22 PROCEDURE — G8981 BODY POS CURRENT STATUS: HCPCS | Performed by: PHYSICAL THERAPIST

## 2018-10-22 PROCEDURE — 85025 COMPLETE CBC W/AUTO DIFF WBC: CPT

## 2018-10-22 PROCEDURE — G8982 BODY POS GOAL STATUS: HCPCS | Performed by: PHYSICAL THERAPIST

## 2018-10-22 PROCEDURE — 97166 OT EVAL MOD COMPLEX 45 MIN: CPT

## 2018-10-22 PROCEDURE — 94640 AIRWAY INHALATION TREATMENT: CPT

## 2018-10-22 PROCEDURE — 97035 APP MDLTY 1+ULTRASOUND EA 15: CPT

## 2018-10-22 PROCEDURE — 74250 X-RAY XM SM INT 1CNTRST STD: CPT

## 2018-10-22 PROCEDURE — B518ZZA FLUOROSCOPY OF SUPERIOR VENA CAVA, GUIDANCE: ICD-10-PCS | Performed by: RADIOLOGY

## 2018-10-22 RX ORDER — SODIUM CHLORIDE 0.9 % (FLUSH) 0.9 %
10 SYRINGE (ML) INJECTION EVERY 12 HOURS SCHEDULED
Status: DISCONTINUED | OUTPATIENT
Start: 2018-10-22 | End: 2018-10-29 | Stop reason: HOSPADM

## 2018-10-22 RX ORDER — SODIUM CHLORIDE 0.9 % (FLUSH) 0.9 %
10 SYRINGE (ML) INJECTION PRN
Status: DISCONTINUED | OUTPATIENT
Start: 2018-10-22 | End: 2018-10-29 | Stop reason: HOSPADM

## 2018-10-22 RX ORDER — HEPARIN SODIUM (PORCINE) LOCK FLUSH IV SOLN 100 UNIT/ML 100 UNIT/ML
3 SOLUTION INTRAVENOUS EVERY 12 HOURS SCHEDULED
Status: DISCONTINUED | OUTPATIENT
Start: 2018-10-22 | End: 2018-10-29 | Stop reason: HOSPADM

## 2018-10-22 RX ORDER — HEPARIN SODIUM (PORCINE) LOCK FLUSH IV SOLN 100 UNIT/ML 100 UNIT/ML
3 SOLUTION INTRAVENOUS PRN
Status: DISCONTINUED | OUTPATIENT
Start: 2018-10-22 | End: 2018-10-29 | Stop reason: HOSPADM

## 2018-10-22 RX ADMIN — MEROPENEM 1 G: 1 INJECTION, POWDER, FOR SOLUTION INTRAVENOUS at 02:54

## 2018-10-22 RX ADMIN — MORPHINE SULFATE 2 MG: 2 INJECTION, SOLUTION INTRAMUSCULAR; INTRAVENOUS at 08:57

## 2018-10-22 RX ADMIN — POTASSIUM CHLORIDE, DEXTROSE MONOHYDRATE AND SODIUM CHLORIDE: 150; 5; 900 INJECTION, SOLUTION INTRAVENOUS at 15:11

## 2018-10-22 RX ADMIN — IPRATROPIUM BROMIDE AND ALBUTEROL SULFATE 1 AMPULE: .5; 3 SOLUTION RESPIRATORY (INHALATION) at 12:45

## 2018-10-22 RX ADMIN — IPRATROPIUM BROMIDE AND ALBUTEROL SULFATE 1 AMPULE: .5; 3 SOLUTION RESPIRATORY (INHALATION) at 04:58

## 2018-10-22 RX ADMIN — HEPARIN SODIUM 5000 UNITS: 5000 INJECTION INTRAVENOUS; SUBCUTANEOUS at 14:12

## 2018-10-22 RX ADMIN — HEPARIN SODIUM 5000 UNITS: 5000 INJECTION INTRAVENOUS; SUBCUTANEOUS at 06:05

## 2018-10-22 RX ADMIN — FLUCONAZOLE 200 MG: 2 INJECTION, SOLUTION INTRAVENOUS at 18:55

## 2018-10-22 RX ADMIN — DIATRIZOATE MEGLUMINE AND DIATRIZOATE SODIUM 240 ML: 660; 100 LIQUID ORAL; RECTAL at 09:35

## 2018-10-22 RX ADMIN — MEROPENEM 1 G: 1 INJECTION, POWDER, FOR SOLUTION INTRAVENOUS at 18:26

## 2018-10-22 RX ADMIN — POTASSIUM CHLORIDE, DEXTROSE MONOHYDRATE AND SODIUM CHLORIDE: 150; 5; 900 INJECTION, SOLUTION INTRAVENOUS at 04:14

## 2018-10-22 RX ADMIN — MEROPENEM 1 G: 1 INJECTION, POWDER, FOR SOLUTION INTRAVENOUS at 11:35

## 2018-10-22 RX ADMIN — Medication 10 ML: at 08:57

## 2018-10-22 RX ADMIN — HEPARIN SODIUM 5000 UNITS: 5000 INJECTION INTRAVENOUS; SUBCUTANEOUS at 22:46

## 2018-10-22 RX ADMIN — SERTRALINE HYDROCHLORIDE 50 MG: 50 TABLET ORAL at 08:57

## 2018-10-22 RX ADMIN — IPRATROPIUM BROMIDE AND ALBUTEROL SULFATE 1 AMPULE: .5; 3 SOLUTION RESPIRATORY (INHALATION) at 16:22

## 2018-10-22 RX ADMIN — PANTOPRAZOLE SODIUM 40 MG: 40 INJECTION, POWDER, FOR SOLUTION INTRAVENOUS at 08:56

## 2018-10-22 ASSESSMENT — PAIN SCALES - GENERAL
PAINLEVEL_OUTOF10: 0
PAINLEVEL_OUTOF10: 3
PAINLEVEL_OUTOF10: 3
PAINLEVEL_OUTOF10: 0
PAINLEVEL_OUTOF10: 7
PAINLEVEL_OUTOF10: 0

## 2018-10-22 ASSESSMENT — PAIN DESCRIPTION - LOCATION
LOCATION: ABDOMEN
LOCATION: ABDOMEN

## 2018-10-22 ASSESSMENT — PAIN DESCRIPTION - PAIN TYPE
TYPE: ACUTE PAIN
TYPE: ACUTE PAIN

## 2018-10-23 ENCOUNTER — APPOINTMENT (OUTPATIENT)
Dept: INTERVENTIONAL RADIOLOGY/VASCULAR | Age: 78
DRG: 329 | End: 2018-10-23
Payer: MEDICARE

## 2018-10-23 LAB
ANION GAP SERPL CALCULATED.3IONS-SCNC: 9 MMOL/L (ref 7–16)
BASOPHILS ABSOLUTE: 0.02 E9/L (ref 0–0.2)
BASOPHILS RELATIVE PERCENT: 0.1 % (ref 0–2)
BUN BLDV-MCNC: 5 MG/DL (ref 8–23)
CALCIUM SERPL-MCNC: 8.3 MG/DL (ref 8.6–10.2)
CHLORIDE BLD-SCNC: 105 MMOL/L (ref 98–107)
CO2: 25 MMOL/L (ref 22–29)
CREAT SERPL-MCNC: 0.5 MG/DL (ref 0.5–1)
EOSINOPHILS ABSOLUTE: 0.02 E9/L (ref 0.05–0.5)
EOSINOPHILS RELATIVE PERCENT: 0.1 % (ref 0–6)
GFR AFRICAN AMERICAN: >60
GFR NON-AFRICAN AMERICAN: >60 ML/MIN/1.73
GLUCOSE BLD-MCNC: 102 MG/DL (ref 74–109)
HCT VFR BLD CALC: 26.5 % (ref 34–48)
HEMOGLOBIN: 8.1 G/DL (ref 11.5–15.5)
IMMATURE GRANULOCYTES #: 0.31 E9/L
IMMATURE GRANULOCYTES %: 2 % (ref 0–5)
LYMPHOCYTES ABSOLUTE: 1.2 E9/L (ref 1.5–4)
LYMPHOCYTES RELATIVE PERCENT: 7.8 % (ref 20–42)
MCH RBC QN AUTO: 25.7 PG (ref 26–35)
MCHC RBC AUTO-ENTMCNC: 30.6 % (ref 32–34.5)
MCV RBC AUTO: 84.1 FL (ref 80–99.9)
MONOCYTES ABSOLUTE: 0.67 E9/L (ref 0.1–0.95)
MONOCYTES RELATIVE PERCENT: 4.4 % (ref 2–12)
NEUTROPHILS ABSOLUTE: 13.13 E9/L (ref 1.8–7.3)
NEUTROPHILS RELATIVE PERCENT: 85.6 % (ref 43–80)
PDW BLD-RTO: 17 FL (ref 11.5–15)
PLATELET # BLD: 413 E9/L (ref 130–450)
PMV BLD AUTO: 9 FL (ref 7–12)
POTASSIUM SERPL-SCNC: 3.8 MMOL/L (ref 3.5–5)
RBC # BLD: 3.15 E12/L (ref 3.5–5.5)
SODIUM BLD-SCNC: 139 MMOL/L (ref 132–146)
TRIGL SERPL-MCNC: 89 MG/DL (ref 0–149)
WBC # BLD: 15.4 E9/L (ref 4.5–11.5)

## 2018-10-23 PROCEDURE — C9113 INJ PANTOPRAZOLE SODIUM, VIA: HCPCS | Performed by: SURGERY

## 2018-10-23 PROCEDURE — 36569 INSJ PICC 5 YR+ W/O IMAGING: CPT | Performed by: RADIOLOGY

## 2018-10-23 PROCEDURE — 2500000003 HC RX 250 WO HCPCS: Performed by: INTERNAL MEDICINE

## 2018-10-23 PROCEDURE — C1751 CATH, INF, PER/CENT/MIDLINE: HCPCS

## 2018-10-23 PROCEDURE — 2000000000 HC ICU R&B

## 2018-10-23 PROCEDURE — 77001 FLUOROGUIDE FOR VEIN DEVICE: CPT | Performed by: RADIOLOGY

## 2018-10-23 PROCEDURE — 6370000000 HC RX 637 (ALT 250 FOR IP): Performed by: INTERNAL MEDICINE

## 2018-10-23 PROCEDURE — 84478 ASSAY OF TRIGLYCERIDES: CPT

## 2018-10-23 PROCEDURE — 6360000002 HC RX W HCPCS: Performed by: INTERNAL MEDICINE

## 2018-10-23 PROCEDURE — 36592 COLLECT BLOOD FROM PICC: CPT

## 2018-10-23 PROCEDURE — 87324 CLOSTRIDIUM AG IA: CPT

## 2018-10-23 PROCEDURE — 94640 AIRWAY INHALATION TREATMENT: CPT

## 2018-10-23 PROCEDURE — 2580000003 HC RX 258: Performed by: SURGERY

## 2018-10-23 PROCEDURE — 6360000002 HC RX W HCPCS: Performed by: SURGERY

## 2018-10-23 PROCEDURE — 85025 COMPLETE CBC W/AUTO DIFF WBC: CPT

## 2018-10-23 PROCEDURE — 2580000003 HC RX 258: Performed by: INTERNAL MEDICINE

## 2018-10-23 PROCEDURE — 80048 BASIC METABOLIC PNL TOTAL CA: CPT

## 2018-10-23 PROCEDURE — 97110 THERAPEUTIC EXERCISES: CPT

## 2018-10-23 PROCEDURE — 36415 COLL VENOUS BLD VENIPUNCTURE: CPT

## 2018-10-23 PROCEDURE — 76937 US GUIDE VASCULAR ACCESS: CPT | Performed by: RADIOLOGY

## 2018-10-23 PROCEDURE — 2700000000 HC OXYGEN THERAPY PER DAY

## 2018-10-23 PROCEDURE — 2500000003 HC RX 250 WO HCPCS: Performed by: RADIOLOGY

## 2018-10-23 RX ORDER — LIDOCAINE HYDROCHLORIDE 10 MG/ML
10 INJECTION, SOLUTION INFILTRATION; PERINEURAL ONCE
Status: DISCONTINUED | OUTPATIENT
Start: 2018-10-23 | End: 2018-10-23 | Stop reason: ALTCHOICE

## 2018-10-23 RX ORDER — HEPARIN SODIUM (PORCINE) LOCK FLUSH IV SOLN 100 UNIT/ML 100 UNIT/ML
100 SOLUTION INTRAVENOUS PRN
Status: DISCONTINUED | OUTPATIENT
Start: 2018-10-23 | End: 2018-10-29 | Stop reason: HOSPADM

## 2018-10-23 RX ADMIN — MEROPENEM 1 G: 1 INJECTION, POWDER, FOR SOLUTION INTRAVENOUS at 02:42

## 2018-10-23 RX ADMIN — FLUCONAZOLE 200 MG: 2 INJECTION, SOLUTION INTRAVENOUS at 18:28

## 2018-10-23 RX ADMIN — Medication 300 UNITS: at 22:58

## 2018-10-23 RX ADMIN — Medication 10 ML: at 22:57

## 2018-10-23 RX ADMIN — IPRATROPIUM BROMIDE AND ALBUTEROL SULFATE 1 AMPULE: .5; 3 SOLUTION RESPIRATORY (INHALATION) at 17:26

## 2018-10-23 RX ADMIN — POTASSIUM CHLORIDE, DEXTROSE MONOHYDRATE AND SODIUM CHLORIDE: 150; 5; 900 INJECTION, SOLUTION INTRAVENOUS at 16:13

## 2018-10-23 RX ADMIN — Medication 10 ML: at 08:45

## 2018-10-23 RX ADMIN — LIDOCAINE HYDROCHLORIDE 10 ML: 10 INJECTION, SOLUTION INFILTRATION; PERINEURAL at 09:51

## 2018-10-23 RX ADMIN — IPRATROPIUM BROMIDE AND ALBUTEROL SULFATE 1 AMPULE: .5; 3 SOLUTION RESPIRATORY (INHALATION) at 14:07

## 2018-10-23 RX ADMIN — IPRATROPIUM BROMIDE AND ALBUTEROL SULFATE 1 AMPULE: .5; 3 SOLUTION RESPIRATORY (INHALATION) at 10:18

## 2018-10-23 RX ADMIN — MEROPENEM 1 G: 1 INJECTION, POWDER, FOR SOLUTION INTRAVENOUS at 11:58

## 2018-10-23 RX ADMIN — HEPARIN SODIUM 5000 UNITS: 5000 INJECTION INTRAVENOUS; SUBCUTANEOUS at 16:25

## 2018-10-23 RX ADMIN — HEPARIN SODIUM 5000 UNITS: 5000 INJECTION INTRAVENOUS; SUBCUTANEOUS at 07:25

## 2018-10-23 RX ADMIN — IPRATROPIUM BROMIDE AND ALBUTEROL SULFATE 1 AMPULE: .5; 3 SOLUTION RESPIRATORY (INHALATION) at 05:06

## 2018-10-23 RX ADMIN — PANTOPRAZOLE SODIUM 40 MG: 40 INJECTION, POWDER, FOR SOLUTION INTRAVENOUS at 08:45

## 2018-10-23 RX ADMIN — MEROPENEM 1 G: 1 INJECTION, POWDER, FOR SOLUTION INTRAVENOUS at 17:50

## 2018-10-23 RX ADMIN — SERTRALINE HYDROCHLORIDE 50 MG: 50 TABLET ORAL at 08:45

## 2018-10-23 RX ADMIN — POTASSIUM CHLORIDE: 2 INJECTION, SOLUTION, CONCENTRATE INTRAVENOUS at 17:50

## 2018-10-23 RX ADMIN — HEPARIN SODIUM 5000 UNITS: 5000 INJECTION INTRAVENOUS; SUBCUTANEOUS at 22:57

## 2018-10-23 ASSESSMENT — PAIN SCALES - GENERAL
PAINLEVEL_OUTOF10: 0
PAINLEVEL_OUTOF10: 10
PAINLEVEL_OUTOF10: 0

## 2018-10-23 NOTE — PROGRESS NOTES
postop-Ileus    Acute mild hypoxic respiratory distress-possible aspiration    Perforation of sigmoid - Surgery 10/18/18    History of PAF (paroxysmal atrial fibrillation) ([facility]    Ischemic bowel disease (HCC)    Hx of DVT left leg- 4/7/18    Cancer - colorectal          Plan:     Consult ID,General surgery,IM  IVF-decrease rate with TPN started  O2 nasal cannula  DuoNeb aerosols  Labs in am  Increase IV fluids  Thorazine IM when necessary  DVT prophylaxis  Start TPN-  Day 7 of no nutrition      See orders.         Jared Vang DO  11:43 AM  10/23/2018

## 2018-10-23 NOTE — PROGRESS NOTES
ET Nurse  Admit Date: 10/16/2018 10:39 PM    Reason for consult:  colostomy    Significant history:    Past Medical History:   Diagnosis Date    Cancer [facility])     colorectal     History of blood transfusion     Hx of blood clots     dvt, on eliquis    Ischem[facility] bowel disease ([facility]     Macular degeneration     MDRO (multiple drug resistant organisms) resistance     PAF (paroxysmal atrial fibrillation) (HCC)     Tinnitus        Stoma assessment:    Stoma good red color functioning for liquid stool  Peristomal skin is clear  Pt had sm amt of brown drainage rectally    Plan:    Cont one piece system  inst pt on use on closure system    Trever Robertson 10/23/2018 1:12 PM

## 2018-10-23 NOTE — PROGRESS NOTES
[address] [place] [address] [facility]  Progress Note    Seeing pt for atbx peritonitis/ abd abscess  Chief complain:  She has no pain      SUBJECTIVE:  Patient is awake, alert , tolerating antibiotics well   Taking clears   has ngt  DIARRHEA PER RECTUM    ROS:  Constitutional:  denies fever , chills   Cardiovascular: denies chest pain or palpitation   Gastrointestinal: . Denies abdominal pain, diarrhea, no nausea or vomiting  Genitourinary:      Denies  dysuria or burning micturition  Musculoskeletal: no aches or pain   Allergic/Immunologic:  No rash or itching     OBJECTIVE:    Vitals:    10/23/18 0500 10/23/18 0600 10/23/18 0613 10/23/18 0847   BP: 127/76      Pulse: 86 89 90 85   Resp: 14 15 18 15   Temp:       TempSrc:       SpO2: 97% 100% 100% 97%   Weight:       Height:           HEENT :         unremarkable  Thin pale   Heart:             RRR,  No murmurs, no gallops  Lungs:            clear to auscultation, no wheezing , no rales  Abdomen:       soft, non tender, bowel sounds present, +NG tube  osotomy DISTENDED  Extremites:     No edema, no ulcers,  Skin:                Normal turgor,normal texture  Lines :             TLC              Wong catheter :   Present    Drain serosang                     sodium chloride flush  10 mL Intravenous 2 times per day    heparin flush  3 mL Intravenous 2 times per day    ipratropium-albuterol  1 ampule Inhalation Q4H WA    meropenem  1 g Intravenous Q8H    fluconazole  200 mg Intravenous Q24H    sodium chloride flush  10 mL Intravenous 2 times per day    pantoprazole  40 mg Intravenous Daily    And    sodium chloride (PF)  10 mL Intravenous Daily    heparin (porcine)  5,000 Units Subcutaneous 3 times per day    sertraline  50 mg Oral Daily    butamben-tetracaine-benzocaine  1 spray Topical Once       LABS    CBC:   Recent Labs      10/21/18   0615  10/22/18   0610  10/23/18   0515   WBC  18.9*  17.4*  15.4*   HGB  8.3*  7.9*  8.1*   HCT 27.3*  26.3*  26.5*   PLT  395  396  413       BMP:    Recent Labs      10/21/18   0615  10/22/18   0610  10/23/18   0515   NA  138  142  139   K  4.1  4.0  3.8   CL  106  111*  105   CO2  23  22  25   BUN  10  6*  5*   CREATININE  0.6  0.5  0.5   GLUCOSE  121*  142*  102         Hepatic Function Panel:    Lab Results   Component Value Date    ALKPHOS 131 10/21/2018    ALT 11 10/21/2018    AST 19 10/21/2018    PROT 6.1 10/21/2018    BILITOT 0.4 10/21/2018    BILIDIR 0.4 11/12/2017    IBILI 0.3 11/12/2017    LABALBU 2.1 10/21/2018       Lab Results   Component Value Date    SEDRATE 82 (H) 08/06/2018       . No results found for: CRP      Microbiology :  Narrative     Source: ABD       Site: pelvic acid ischemic bowel             Culture & Susceptibility     ESCHERICHIA COLI     Antibiotic Interpretation BIANCA Unit   Confirmatory Extended Spectrum Beta-Lactamase  Pos mcg/mL   amoxicillin-clavulanate Intermediate =^16 mcg/mL   ampicillin Resistant >=^32 mcg/mL   aztreonam Resistant >=^64 mcg/mL   ceFAZolin Resistant >=^64 mcg/mL   cefTRIAXone Resistant >=^64 mcg/mL   cefepime Resistant >=^64 mcg/mL   doripenem Sensitive <=^0.12 mcg/mL   gentamicin Resistant >=^16 mcg/mL   imipenem Sensitive <=^0.25 mcg/mL   levofloxacin Resistant >=^8 mcg/mL   meropenem Sensitive <=^0.25 mcg/mL   piperacillin-tazobactam Sensitive <=^4 mcg/mL   tigecycline Sensitive <=^0.5 mcg/mL   tobramycin Intermediate =^8 mcg/mL   trimethoprim-sulfamethoxazole Sensitive <=^20 mcg/mL         PROTEUS HAUSERI     Antibiotic Interpretation BIANCA Unit   amoxicillin-clavulanate Sensitive =^4 mcg/mL   ampicillin Resistant >=^32 mcg/mL   ceFAZolin Resistant >=^64 mcg/mL   cefTRIAXone Sensitive <=^1 mcg/mL   cefepime Sensitive <=^1 mcg/mL   gentamicin Sensitive <=^1 mcg/mL   imipenem Sensitive =^2 mcg/mL   levofloxacin Sensitive <=^0.12 mcg/mL   piperacillin-tazobactam Sensitive <=^4 mcg/mL   trimethoprim-sulfamethoxazole Sensitive <=^20 mcg/mL         Lab and Collection       Radiology :  FL SMALL BOWEL FOLLOW THROUGH ONLY   Final Result   1. There are no findings to suggest bowel obstruction. Contrast is   seen throughout the entire small bowel and contrast was emptied from   the ostomy bag several times. 2. Dilated loop of small bowel within the left hemiabdomen which was   noted on the patient's previous CT scan of 10/17/2018 and is   unchanged. XR Chest Abdomen Ng Placement   Final Result   Nasogastric tube tip in appropriate position. XR CHEST PORTABLE   Final Result   Tip of the NG tube is above the gastroesophageal junction. Gastric distention. XR ABDOMEN (KUB) (SINGLE AP VIEW)   Final Result   1. Distended loops of small bowel which are similar in the degree of   distention when compared to 10/17/2018 CT scan. 2. Residual contrast within the ascending colon from the CT scan   performed on the same day. The colon is not distended. RADIOLOGY REPORT   Final Result      CT ABDOMEN PELVIS W IV CONTRAST Additional Contrast? Oral   Final Result   1. Postsurgical changes from a left midabdominal colostomy with   contrast and stool within the colon distal to the ostomy. 2. Redemonstration of diffuse infiltration of the perirectal and   presacral soft tissues with interval increase in number and size of   air-filled collections, with a collection extending into the left   piriformis muscle. 3. Mildly distended loops of small bowel, possibly related to ileus. 4. Diffuse increased sclerosis of the sacrum with periosteal reaction   along the presacral collection, suggestive of chronic osteomyelitis. There are insufficiency fractures through the superior endplate of S1   and left sacral ala. 5. Nondisplaced fracture with increased sclerosis within the left   pubis. 6. Moderate bilateral hydroureteronephrosis without a definite   obstructing calculus. The distal ureters are not well visualized.       Findings were discussed with

## 2018-10-23 NOTE — PROGRESS NOTES
10/22/2018    BILITOT 0.4 10/21/2018    ALKPHOS 131 10/21/2018    AST 19 10/21/2018    ALT 11 10/21/2018     BMP:    Lab Results   Component Value Date     10/22/2018    K 4.0 10/22/2018    K 3.1 10/17/2018     10/22/2018    CO2 22 10/22/2018    BUN 6 10/22/2018    LABALBU 2.1 10/21/2018    CREATININE 0.5 10/22/2018    CALCIUM 8.2 10/22/2018    GFRAA >60 10/22/2018    LABGLOM >60 10/22/2018    GLUCOSE 142 10/22/2018     Hepatic Function Panel:    Lab Results   Component Value Date    ALKPHOS 131 10/21/2018    ALT 11 10/21/2018    AST 19 10/21/2018    PROT 6.1 10/21/2018    BILITOT 0.4 10/21/2018    BILIDIR 0.4 11/12/2017    IBILI 0.3 11/12/2017    LABALBU 2.1 10/21/2018     Albumin:    Lab Results   Component Value Date    LABALBU 2.1 10/21/2018     Calcium:    Lab Results   Component Value Date    CALCIUM 8.2 10/22/2018     Magnesium:    Lab Results   Component Value Date    MG 2.1 10/18/2018     Phosphorus:    Lab Results   Component Value Date    PHOS 2.7 10/18/2018     Last 3 Troponin:    Lab Results   Component Value Date    TROPONINI <0.01 10/16/2018    TROPONINI 0.01 12/21/2017    TROPONINI 0.02 12/19/2017     FLP:    Lab Results   Component Value Date    TRIG 88 06/30/2018    HDL 58.0 07/18/2013    LDLCALC 157 07/18/2013     TSH:    Lab Results   Component Value Date    TSH 4.620 04/07/2018        Patient Active Problem List   Diagnosis    [facility]al [facility] ([facility]    Pneumatosis intestinalis    Macular degeneration    Enteritis    Severe protein-calorie malnutrition (HCC)    Macular degeneration    PAF (paroxysmal atrial fibrillation) (HCC)    Ischemic bowel disease (HCC)    Altered mental status    Fatigue due to excessive exertion    PAF (paroxysmal atrial fibrillation) (HCC)    Macular degeneration    Ischemic bowel disease ([facility]    Hx of blood clots    Cancer (HCC)    Perforation of sigmoid colon ([facility]    PAF (paroxysmal atrial fibrillation) ([facility]    Ischemic bowel disease ([facility]  Hx of blood clots    Cancer (HCC)       Assessment/Plan:  1. Post-op day 4 Acute bowel obstruction/ileus; dehisced loop colostomy; now has end-colostomy  2. Pelvic chronic abcess  3. Rectal Ca s/p radiation/chemo/resection [facility]  4. S/P ischemic bowel with emergency intervention Dr THURSTON 11/6/2017 for pneumatosis intestinalis following ileostomy takedown at [facility]  5. History of proximal atrial fibrillation currently sinus rhythm  6. History of extensive DVT left leg-April 2018  7. History of ischemic bowel with resection in November 2017  8. History of prior tobacco use  9. Normocytic anemia  10. Hypoalbuminemia-Protein and calorie malnutrition  11. Failure to thrive  12.  Anemia - multifactorial; likely iron deficiency      Will need Hyperalimentation/PICC tomorrow  IV antibiotics; diflucan per Dr Rosa Guaman  Cardio-respiratory better with NG and increased fluids  Transferred to ICU  ID Dr Fredis Maxwell notes reviewed  Advanced NG; now appropriate  Start hyperalimentation post picc  SBFT shows no obstruction;Ileus persists

## 2018-10-24 LAB
ABO/RH: NORMAL
ALBUMIN SERPL-MCNC: 2 G/DL (ref 3.5–5.2)
ALP BLD-CCNC: 164 U/L (ref 35–104)
ALT SERPL-CCNC: 21 U/L (ref 0–32)
ANION GAP SERPL CALCULATED.3IONS-SCNC: 9 MMOL/L (ref 7–16)
ANTIBODY SCREEN: NORMAL
AST SERPL-CCNC: 32 U/L (ref 0–31)
BASOPHILS ABSOLUTE: 0.02 E9/L (ref 0–0.2)
BASOPHILS RELATIVE PERCENT: 0.1 % (ref 0–2)
BILIRUB SERPL-MCNC: 0.3 MG/DL (ref 0–1.2)
BLOOD BANK DISPENSE STATUS: NORMAL
BLOOD BANK DISPENSE STATUS: NORMAL
BLOOD BANK PRODUCT CODE: NORMAL
BLOOD BANK PRODUCT CODE: NORMAL
BPU ID: NORMAL
BPU ID: NORMAL
BUN BLDV-MCNC: 5 MG/DL (ref 8–23)
C DIFFICILE TOXIN, EIA: NORMAL
CALCIUM SERPL-MCNC: 7.6 MG/DL (ref 8.6–10.2)
CHLORIDE BLD-SCNC: 101 MMOL/L (ref 98–107)
CO2: 24 MMOL/L (ref 22–29)
CREAT SERPL-MCNC: 0.4 MG/DL (ref 0.5–1)
DESCRIPTION BLOOD BANK: NORMAL
DESCRIPTION BLOOD BANK: NORMAL
EOSINOPHILS ABSOLUTE: 0.02 E9/L (ref 0.05–0.5)
EOSINOPHILS RELATIVE PERCENT: 0.1 % (ref 0–6)
GFR AFRICAN AMERICAN: >60
GFR NON-AFRICAN AMERICAN: >60 ML/MIN/1.73
GLUCOSE BLD-MCNC: 306 MG/DL (ref 74–109)
HCT VFR BLD CALC: 23 % (ref 34–48)
HEMOGLOBIN: 7.3 G/DL (ref 11.5–15.5)
IMMATURE GRANULOCYTES #: 0.22 E9/L
IMMATURE GRANULOCYTES %: 1.4 % (ref 0–5)
LYMPHOCYTES ABSOLUTE: 1.16 E9/L (ref 1.5–4)
LYMPHOCYTES RELATIVE PERCENT: 7.6 % (ref 20–42)
MAGNESIUM: 1.7 MG/DL (ref 1.6–2.6)
MCH RBC QN AUTO: 26.1 PG (ref 26–35)
MCHC RBC AUTO-ENTMCNC: 31.7 % (ref 32–34.5)
MCV RBC AUTO: 82.1 FL (ref 80–99.9)
METER GLUCOSE: 118 MG/DL (ref 70–110)
METER GLUCOSE: 120 MG/DL (ref 70–110)
METER GLUCOSE: 121 MG/DL (ref 70–110)
MONOCYTES ABSOLUTE: 0.7 E9/L (ref 0.1–0.95)
MONOCYTES RELATIVE PERCENT: 4.6 % (ref 2–12)
NEUTROPHILS ABSOLUTE: 13.13 E9/L (ref 1.8–7.3)
NEUTROPHILS RELATIVE PERCENT: 86.2 % (ref 43–80)
PDW BLD-RTO: 17 FL (ref 11.5–15)
PHOSPHORUS: 2.1 MG/DL (ref 2.5–4.5)
PLATELET # BLD: 390 E9/L (ref 130–450)
PMV BLD AUTO: 9.1 FL (ref 7–12)
POTASSIUM SERPL-SCNC: 4.5 MMOL/L (ref 3.5–5)
PREALBUMIN: 6 MG/DL (ref 20–40)
RBC # BLD: 2.8 E12/L (ref 3.5–5.5)
SODIUM BLD-SCNC: 134 MMOL/L (ref 132–146)
TOTAL PROTEIN: 5.4 G/DL (ref 6.4–8.3)
WBC # BLD: 15.3 E9/L (ref 4.5–11.5)

## 2018-10-24 PROCEDURE — 6360000002 HC RX W HCPCS: Performed by: RADIOLOGY

## 2018-10-24 PROCEDURE — 36415 COLL VENOUS BLD VENIPUNCTURE: CPT

## 2018-10-24 PROCEDURE — 6360000002 HC RX W HCPCS: Performed by: INTERNAL MEDICINE

## 2018-10-24 PROCEDURE — P9016 RBC LEUKOCYTES REDUCED: HCPCS

## 2018-10-24 PROCEDURE — 97110 THERAPEUTIC EXERCISES: CPT | Performed by: PHYSICAL THERAPIST

## 2018-10-24 PROCEDURE — 86900 BLOOD TYPING SEROLOGIC ABO: CPT

## 2018-10-24 PROCEDURE — 2580000003 HC RX 258: Performed by: SURGERY

## 2018-10-24 PROCEDURE — 86901 BLOOD TYPING SEROLOGIC RH(D): CPT

## 2018-10-24 PROCEDURE — 80053 COMPREHEN METABOLIC PANEL: CPT

## 2018-10-24 PROCEDURE — 84134 ASSAY OF PREALBUMIN: CPT

## 2018-10-24 PROCEDURE — 84100 ASSAY OF PHOSPHORUS: CPT

## 2018-10-24 PROCEDURE — 2000000000 HC ICU R&B

## 2018-10-24 PROCEDURE — 36592 COLLECT BLOOD FROM PICC: CPT

## 2018-10-24 PROCEDURE — 2580000003 HC RX 258: Performed by: INTERNAL MEDICINE

## 2018-10-24 PROCEDURE — 97530 THERAPEUTIC ACTIVITIES: CPT | Performed by: PHYSICAL THERAPIST

## 2018-10-24 PROCEDURE — 6370000000 HC RX 637 (ALT 250 FOR IP): Performed by: INTERNAL MEDICINE

## 2018-10-24 PROCEDURE — 2500000003 HC RX 250 WO HCPCS: Performed by: INTERNAL MEDICINE

## 2018-10-24 PROCEDURE — 83735 ASSAY OF MAGNESIUM: CPT

## 2018-10-24 PROCEDURE — 6360000002 HC RX W HCPCS: Performed by: SURGERY

## 2018-10-24 PROCEDURE — C9113 INJ PANTOPRAZOLE SODIUM, VIA: HCPCS | Performed by: SURGERY

## 2018-10-24 PROCEDURE — 82962 GLUCOSE BLOOD TEST: CPT

## 2018-10-24 PROCEDURE — 36430 TRANSFUSION BLD/BLD COMPNT: CPT

## 2018-10-24 PROCEDURE — 86850 RBC ANTIBODY SCREEN: CPT

## 2018-10-24 PROCEDURE — 85025 COMPLETE CBC W/AUTO DIFF WBC: CPT

## 2018-10-24 PROCEDURE — 86923 COMPATIBILITY TEST ELECTRIC: CPT

## 2018-10-24 PROCEDURE — 6370000000 HC RX 637 (ALT 250 FOR IP): Performed by: SURGERY

## 2018-10-24 RX ORDER — DOCUSATE SODIUM 100 MG/1
100 CAPSULE, LIQUID FILLED ORAL DAILY
Status: DISCONTINUED | OUTPATIENT
Start: 2018-10-24 | End: 2018-10-29 | Stop reason: HOSPADM

## 2018-10-24 RX ORDER — SENNA PLUS 8.6 MG/1
1 TABLET ORAL NIGHTLY
Status: DISCONTINUED | OUTPATIENT
Start: 2018-10-24 | End: 2018-10-29 | Stop reason: HOSPADM

## 2018-10-24 RX ORDER — 0.9 % SODIUM CHLORIDE 0.9 %
250 INTRAVENOUS SOLUTION INTRAVENOUS ONCE
Status: COMPLETED | OUTPATIENT
Start: 2018-10-24 | End: 2018-10-25

## 2018-10-24 RX ADMIN — SENNA 8.6 MG: 8.6 TABLET, COATED ORAL at 20:36

## 2018-10-24 RX ADMIN — Medication 10 ML: at 09:38

## 2018-10-24 RX ADMIN — Medication 300 UNITS: at 09:36

## 2018-10-24 RX ADMIN — Medication 10 ML: at 20:37

## 2018-10-24 RX ADMIN — PANTOPRAZOLE SODIUM 40 MG: 40 INJECTION, POWDER, FOR SOLUTION INTRAVENOUS at 09:35

## 2018-10-24 RX ADMIN — FLUCONAZOLE 200 MG: 2 INJECTION, SOLUTION INTRAVENOUS at 20:36

## 2018-10-24 RX ADMIN — HEPARIN SODIUM 5000 UNITS: 5000 INJECTION INTRAVENOUS; SUBCUTANEOUS at 22:31

## 2018-10-24 RX ADMIN — SODIUM CHLORIDE 250 ML: 9 INJECTION, SOLUTION INTRAVENOUS at 18:13

## 2018-10-24 RX ADMIN — MEROPENEM 1 G: 1 INJECTION, POWDER, FOR SOLUTION INTRAVENOUS at 18:15

## 2018-10-24 RX ADMIN — Medication 10 ML: at 09:37

## 2018-10-24 RX ADMIN — MEROPENEM 1 G: 1 INJECTION, POWDER, FOR SOLUTION INTRAVENOUS at 09:59

## 2018-10-24 RX ADMIN — HEPARIN SODIUM 5000 UNITS: 5000 INJECTION INTRAVENOUS; SUBCUTANEOUS at 06:44

## 2018-10-24 RX ADMIN — ONDANSETRON 4 MG: 2 INJECTION INTRAMUSCULAR; INTRAVENOUS at 22:31

## 2018-10-24 RX ADMIN — HEPARIN SODIUM 5000 UNITS: 5000 INJECTION INTRAVENOUS; SUBCUTANEOUS at 14:38

## 2018-10-24 RX ADMIN — MEROPENEM 1 G: 1 INJECTION, POWDER, FOR SOLUTION INTRAVENOUS at 03:27

## 2018-10-24 RX ADMIN — Medication 100 UNITS: at 09:35

## 2018-10-24 RX ADMIN — POTASSIUM CHLORIDE: 2 INJECTION, SOLUTION, CONCENTRATE INTRAVENOUS at 18:05

## 2018-10-24 RX ADMIN — SERTRALINE HYDROCHLORIDE 50 MG: 50 TABLET ORAL at 09:35

## 2018-10-24 ASSESSMENT — PAIN SCALES - GENERAL
PAINLEVEL_OUTOF10: 0

## 2018-10-24 NOTE — PROGRESS NOTES
fractures through the superior endplate of S1   and left sacral ala. 5. Nondisplaced fracture with increased sclerosis within the left   pubis. 6. Moderate bilateral hydroureteronephrosis without a definite   obstructing calculus. The distal ureters are not well visualized. Findings were discussed with Dr. Funmilayo Frye on 10/17/2018 at   9:34 AM.      XR Chest Abdomen Ng Placement   Final Result   Nasogastric tube tip in appropriate position.             ASSESSMENT:   Patrici Gentleman a [de-identified] y.o. female 10/18 lap YUMIKO with sigmoid resection and end colostomy drainage of pelvic abscess  H/o colon ca   H/o E-C fistula  H/o cdiff  Leucocytosis resolving  WBC15.3 slowly  Anemia    10/18 pelvic fluid ESBL E. Coli/Proteus hauseri    PLAN:  Continue  meropenem and diflucan   Check for CDIFF pending  Contact isolation  ESBL   no changes       Kris Glaser MD     10/24/2018  12:10 PM

## 2018-10-24 NOTE — PROGRESS NOTES
Subjective: The patient is awake and asking for food. Hypoxemia resolved. Urine output improved. HR better sinus with PAC's; NSPSVT.  No A Fib    Current Facility-Administered Medications: heparin flush 100 UNIT/ML injection 100 Units, 100 Units, Intercatheter, PRN  PN-Adult  3-in-1 Central Line (Standard), , Intravenous, Continuous TPN  diatrizoate meglumine-sodium (GASTROGRAFIN) 66-10 % solution 240 mL, 240 mL, Per NG tube, ONCE PRN  sodium chloride flush 0.9 % injection 10 mL, 10 mL, Intravenous, 2 times per day  sodium chloride flush 0.9 % injection 10 mL, 10 mL, Intravenous, PRN  heparin flush 100 UNIT/ML injection 300 Units, 3 mL, Intravenous, 2 times per day  heparin flush 100 UNIT/ML injection 300 Units, 3 mL, Intravenous, PRN  ipratropium-albuterol (DUONEB) nebulizer solution 1 ampule, 1 ampule, Inhalation, Q4H WA  chlorproMAZINE (THORAZINE) injection 10 mg, 10 mg, Intramuscular, TID PRN  promethazine (PHENERGAN) injection 12.5 mg, 12.5 mg, Intramuscular, Q6H PRN  trimethobenzamide (TIGAN) injection 200 mg, 200 mg, Intramuscular, Q6H PRN  meropenem (MERREM) 1 g in sodium chloride 0.9 % 100 mL IVPB (mini-bag), 1 g, Intravenous, Q8H  fluconazole (DIFLUCAN) 200 mg in 0.9 % NaCl 100 mL premix IVPB, 200 mg, Intravenous, Q24H  morphine injection 2 mg, 2 mg, Intravenous, Q2H PRN **OR** morphine (PF) injection 4 mg, 4 mg, Intravenous, Q2H PRN  sodium chloride flush 0.9 % injection 10 mL, 10 mL, Intravenous, 2 times per day  sodium chloride flush 0.9 % injection 10 mL, 10 mL, Intravenous, PRN  acetaminophen (TYLENOL) tablet 650 mg, 650 mg, Oral, Q4H PRN  ondansetron (ZOFRAN) injection 4 mg, 4 mg, Intravenous, Q6H PRN  pantoprazole (PROTONIX) injection 40 mg, 40 mg, Intravenous, Daily **AND** sodium chloride (PF) 0.9 % injection 10 mL, 10 mL, Intravenous, Daily  heparin (porcine) injection 5,000 Units, 5,000 Units, Subcutaneous, 3 times per day  dextrose 5 % and 0.9 % NaCl with KCl 20 mEq infusion, , Intravenous, Continuous  sertraline (ZOLOFT) tablet 50 mg, 50 mg, Oral, Daily  ipratropium-albuterol (DUONEB) nebulizer solution 1 ampule, 1 ampule, Inhalation, Q4H PRN  butamben-tetracaine-benzocaine (CETACAINE) spray 1 spray, 1 spray, Topical, Once  benzocaine-menthol (CEPACOL SORE THROAT) lozenge 1 lozenge, 1 lozenge, Oral, Q2H PRN    Objective:    BP (!) 129/59   Pulse 98   Temp 97.9 °F (36.6 °C) (Oral)   Resp 15   Ht 5' 6\" (1.676 m)   Wt 120 lb (54.4 kg)   SpO2 95%   BMI 19.37 kg/m²   In: 1640 [P.O.:360; I.V.:1180]  Out: 2410    In: 1640   Out: 2410 [Urine:1300; Drains:10]   RRR, 1/6 MR murmurs, no gallops, or rubs.   CTA bilaterally, no wheeze, rales or rhonchi  bowel sounds present, nontender, distended, no masses  No clubbing, cyanosis, or edema  No neuro changes     CBC:   Lab Results   Component Value Date    WBC 15.4 10/23/2018    RBC 3.15 10/23/2018    HGB 8.1 10/23/2018    HCT 26.5 10/23/2018    MCV 84.1 10/23/2018    MCH 25.7 10/23/2018    MCHC 30.6 10/23/2018    RDW 17.0 10/23/2018     10/23/2018    MPV 9.0 10/23/2018     CBC with Differential:    Lab Results   Component Value Date    WBC 15.4 10/23/2018    RBC 3.15 10/23/2018    HGB 8.1 10/23/2018    HCT 26.5 10/23/2018     10/23/2018    MCV 84.1 10/23/2018    MCH 25.7 10/23/2018    MCHC 30.6 10/23/2018    RDW 17.0 10/23/2018    NRBC 0.5 11/30/2017    BLASTSPCT 1.0 11/06/2017    METASPCT 0.9 10/18/2018    LYMPHOPCT 7.8 10/23/2018    PROMYELOPCT 0.5 11/26/2017    MONOPCT 4.4 10/23/2018    MYELOPCT 1.7 10/18/2018    BASOPCT 0.1 10/23/2018    MONOSABS 0.67 10/23/2018    LYMPHSABS 1.20 10/23/2018    EOSABS 0.02 10/23/2018    BASOSABS 0.02 10/23/2018     Hemoglobin/Hematocrit:    Lab Results   Component Value Date    HGB 8.1 10/23/2018    HCT 26.5 10/23/2018     CMP:    Lab Results   Component Value Date     10/23/2018    K 3.8 10/23/2018    K 3.1 10/17/2018     10/23/2018    CO2 25 10/23/2018    BUN 5 10/23/2018    CREATININE 0.5 10/23/2018    GFRAA >60 10/23/2018    LABGLOM >60 10/23/2018    GLUCOSE 102 10/23/2018    PROT 6.1 10/21/2018    LABALBU 2.1 10/21/2018    CALCIUM 8.3 10/23/2018    BILITOT 0.4 10/21/2018    ALKPHOS 131 10/21/2018    AST 19 10/21/2018    ALT 11 10/21/2018     BMP:    Lab Results   Component Value Date     10/23/2018    K 3.8 10/23/2018    K 3.1 10/17/2018     10/23/2018    CO2 25 10/23/2018    BUN 5 10/23/2018    LABALBU 2.1 10/21/2018    CREATININE 0.5 10/23/2018    CALCIUM 8.3 10/23/2018    GFRAA >60 10/23/2018    LABGLOM >60 10/23/2018    GLUCOSE 102 10/23/2018     Hepatic Function Panel:    Lab Results   Component Value Date    ALKPHOS 131 10/21/2018    ALT 11 10/21/2018    AST 19 10/21/2018    PROT 6.1 10/21/2018    BILITOT 0.4 10/21/2018    BILIDIR 0.4 11/12/2017    IBILI 0.3 11/12/2017    LABALBU 2.1 10/21/2018     Albumin:    Lab Results   Component Value Date    LABALBU 2.1 10/21/2018     Calcium:    Lab Results   Component Value Date    CALCIUM 8.3 10/23/2018     Magnesium:    Lab Results   Component Value Date    MG 2.1 10/18/2018     Phosphorus:    Lab Results   Component Value Date    PHOS 2.7 10/18/2018     Last 3 Troponin:    Lab Results   Component Value Date    TROPONINI <0.01 10/16/2018    TROPONINI 0.01 12/21/2017    TROPONINI 0.02 12/19/2017     FLP:    Lab Results   Component Value Date    TRIG 89 10/23/2018    HDL 58.0 07/18/2013    LDLCALC 157 07/18/2013     TSH:    Lab Results   Component Value Date    TSH 4.620 04/07/2018        Patient Active Problem List   Diagnosis    [facility]    Pneumatosis intestinalis    Macular degeneration    Enteritis    Severe protein-calorie malnutrition (HCC)    Macular degeneration    PAF (paroxysmal atrial fibrillation) (HCC)    Ischemic bowel disease (HCC)    Altered mental status    Fatigue due to excessive exertion    [facility]) [facility] [address]    Macular degeneration    Ischemic bowel disease ([facility]    Hx

## 2018-10-24 NOTE — PROGRESS NOTES
Patient seen and examined. The patient continues to complain of some mid right sided abdominal discomfort. NGT removed 10/23. .  The patient has good UO. Patient denies any hiccups this a.m. Patient on RA has O2 saturation 95% at rest.  Patient still has leukocytosis with hemoglobin 7.3. No complaints of chest pain, dizziness.     /69   Pulse 95   Temp 98.3 °F (36.8 °C) (Oral)   Resp 20   Ht 5' 6\" (1.676 m)   Wt 120 lb (54.4 kg)   SpO2 95%   BMI 19.37 kg/m²     HEENT: nornal  Heart: regular rate and rhythm with PAC's  Lungs: Coarse breath sounds  Abdomen: Post op abdomen with mild distention and hypoactive bowel sounds  Ext: no clubbing, cyanosis, erythema, edema  Neuro: alert and oriented X 3, + [place]    CBC with Differential:    Lab Results   Component Value Date    WBC 15.3 10/24/2018    RBC 2.80 10/24/2018    HGB 7.3 10/24/2018    HCT 23.0 10/24/2018     10/24/2018    MCV 82.1 10/24/2018    MCH 26.1 10/24/2018    MCHC 31.7 10/24/2018    RDW 17.0 10/24/2018    NRBC 0.5 11/30/2017    BLASTSPCT 1.0 11/06/2017    METASPCT 0.9 10/18/2018    LYMPHOPCT 7.6 10/24/2018    PROMYELOPCT 0.5 11/26/2017    MONOPCT 4.6 10/24/2018    MYELOPCT 1.7 10/18/2018    BASOPCT 0.1 10/24/2018    MONOSABS 0.70 10/24/2018    LYMPHSABS 1.16 10/24/2018    EOSABS 0.02 10/24/2018    BASOSABS 0.02 10/24/2018     CMP:    Lab Results   Component Value Date     10/24/2018    K 4.5 10/24/2018    K 3.1 10/17/2018     10/24/2018    CO2 24 10/24/2018    BUN 5 10/24/2018    CREATININE 0.4 10/24/2018    GFRAA >60 10/24/2018    LABGLOM >60 10/24/2018    GLUCOSE 306 10/24/2018    PROT 5.4 10/24/2018    LABALBU 2.0 10/24/2018    CALCIUM 7.6 10/24/2018    BILITOT 0.3 10/24/2018    ALKPHOS 164 10/24/2018    AST 32 10/24/2018    ALT 21 10/24/2018     Magnesium:    Lab Results   Component Value Date    MG 1.7 10/24/2018     Phosphorus:    Lab Results   Component Value Date    PHOS 2.1 10/24/2018     PT/INR:    Lab Results Component Value Date    PROTIME 12.4 10/22/2018    INR 1.1 10/22/2018     Troponin:    Lab Results   Component Value Date    TROPONINI <0.01 10/16/2018     U/A:    Lab Results   Component Value Date    COLORU Yellow 10/18/2018    PROTEINU 30 10/18/2018    PHUR 5.0 10/18/2018    WBCUA 1-3 10/18/2018    RBCUA NONE 10/18/2018    BACTERIA MODERATE 10/18/2018    CLARITYU Clear 10/18/2018    SPECGRAV 1.025 10/18/2018    LEUKOCYTESUR Negative 10/18/2018    UROBILINOGEN 0.2 10/18/2018    BILIRUBINUR Negative 10/18/2018    BLOODU Negative 10/18/2018    GLUCOSEU Negative 10/18/2018     HgBA1c:  No results found for: LABA1C  FLP:    Lab Results   Component Value Date    TRIG 89 10/23/2018    HDL 58.0 07/18/2013    LDLCALC 157 07/18/2013     TSH:    Lab Results   Component Value Date    TSH 4.620 04/07/2018     LIPASE:    Lab Results   Component Value Date    LIPASE 12 10/16/2018       No results for input(s): GLUMET in the last 72 hours. IR ULTRASOUND GUIDANCE VASCULAR ACCESS   Final Result   Ultrasound guidance was provided during placement of a   PICC line. IR FLUORO GUIDED CVA DEVICE PLACEMENT   Final Result   Successful placement of a 5 [place] double-lumen Power   PICC line using ultrasound guidance via the right brachial  vein. FL SMALL BOWEL FOLLOW THROUGH ONLY   Final Result   1. There are no findings to suggest bowel obstruction. Contrast is   seen throughout the entire small bowel and contrast was emptied from   the ostomy bag several times. 2. Dilated loop of small bowel within the left hemiabdomen which was   noted on the patient's previous CT scan of 10/17/2018 and is   unchanged. XR Chest Abdomen Ng Placement   Final Result   Nasogastric tube tip in appropriate position. XR CHEST PORTABLE   Final Result   Tip of the NG tube is above the gastroesophageal junction. Gastric distention. XR ABDOMEN (KUB) (SINGLE AP VIEW)   Final Result   1.  Distended loops of small bowel which are similar in the degree of   distention when compared to 10/17/2018 CT scan. 2. Residual contrast within the ascending colon from the CT scan   performed on the same day. The colon is not distended. RADIOLOGY REPORT   Final Result      CT ABDOMEN PELVIS W IV CONTRAST Additional Contrast? Oral   Final Result   1. Postsurgical changes from a left midabdominal colostomy with   contrast and stool within the colon distal to the ostomy. 2. Redemonstration of diffuse infiltration of the perirectal and   presacral soft tissues with interval increase in number and size of   air-filled collections, with a collection extending into the left   piriformis muscle. 3. Mildly distended loops of small bowel, possibly related to ileus. 4. Diffuse increased sclerosis of the sacrum with periosteal reaction   along the presacral collection, suggestive of chronic osteomyelitis. There are insufficiency fractures through the superior endplate of S1   and left sacral ala. 5. Nondisplaced fracture with increased sclerosis within the left   pubis. 6. Moderate bilateral hydroureteronephrosis without a definite   obstructing calculus. The distal ureters are not well visualized. Findings were discussed with Dr. Veto Zuniga on 10/17/2018 at   9:34 AM.      XR Chest Abdomen Ng Placement   Final Result   Nasogastric tube tip in appropriate position.            insulin lispro  0-6 Units Subcutaneous TID WC    insulin lispro  0-3 Units Subcutaneous Nightly    sodium chloride flush  10 mL Intravenous 2 times per day    heparin flush  3 mL Intravenous 2 times per day    meropenem  1 g Intravenous Q8H    fluconazole  200 mg Intravenous Q24H    sodium chloride flush  10 mL Intravenous 2 times per day    pantoprazole  40 mg Intravenous Daily    And    sodium chloride (PF)  10 mL Intravenous Daily    heparin (porcine)  5,000 Units Subcutaneous 3 times per day    sertraline  50 mg Oral Daily   

## 2018-10-24 NOTE — PROGRESS NOTES
Scooting-Moderate assistance         Sit to supine-Maximal assistance        Transfers-Sit to stand-na     Gait: na    Treatment: sat eob x 13 min, performed ankle pumps, long arc quad,    x 15-20 reps. Balance ex's for midline. rerturned to bed rolled bilaterally for hygiene and bed linen change. Max x 2 to scoot to hob. Comment: Call light left by patient. A: Improved functional mobility, fatigues quickly. motivated    P: Continue with physical therapy     Tyra Oden PT    Goals to be met in 5 days. Bed mobility-Minimal assists    Transfers-Minimal assists    Ambulate with wheeled walker 50 feet supervision   Patient will be able to dangle with no assist for  15 minutes  Increase rom in affected joints by 10%  Increase strength in affected muscle groups by 1/3 grade  Increase balance to allow for improvement towards functional goals.   Increase endurance to allow for improvement towards functional goals.

## 2018-10-24 NOTE — PROGRESS NOTES
Inpatient wound care note    Coccyx sacral buttocks melissa cleft remains red and light purpish color  Areas gerson  Heels are sl red    inst pt need for constant turns and to stay off her coccyx    She cont to have liquid drainage from the rectum  Cleansed gently    Reapplied the mepilex to coccyx are  Applied protective ointment to perirectal area    Pt is on a low air loss bed

## 2018-10-25 LAB
ANION GAP SERPL CALCULATED.3IONS-SCNC: 9 MMOL/L (ref 7–16)
BASOPHILS ABSOLUTE: 0.02 E9/L (ref 0–0.2)
BASOPHILS RELATIVE PERCENT: 0.1 % (ref 0–2)
BUN BLDV-MCNC: 7 MG/DL (ref 8–23)
CALCIUM SERPL-MCNC: 7.8 MG/DL (ref 8.6–10.2)
CHLORIDE BLD-SCNC: 98 MMOL/L (ref 98–107)
CO2: 25 MMOL/L (ref 22–29)
CREAT SERPL-MCNC: 0.4 MG/DL (ref 0.5–1)
EOSINOPHILS ABSOLUTE: 0.02 E9/L (ref 0.05–0.5)
EOSINOPHILS RELATIVE PERCENT: 0.1 % (ref 0–6)
GFR AFRICAN AMERICAN: >60
GFR NON-AFRICAN AMERICAN: >60 ML/MIN/1.73
GLUCOSE BLD-MCNC: 120 MG/DL (ref 74–109)
HCT VFR BLD CALC: 29.7 % (ref 34–48)
HEMOGLOBIN: 9.6 G/DL (ref 11.5–15.5)
IMMATURE GRANULOCYTES #: 0.22 E9/L
IMMATURE GRANULOCYTES %: 1.6 % (ref 0–5)
LYMPHOCYTES ABSOLUTE: 1.21 E9/L (ref 1.5–4)
LYMPHOCYTES RELATIVE PERCENT: 8.8 % (ref 20–42)
MAGNESIUM: 1.8 MG/DL (ref 1.6–2.6)
MCH RBC QN AUTO: 26.5 PG (ref 26–35)
MCHC RBC AUTO-ENTMCNC: 32.3 % (ref 32–34.5)
MCV RBC AUTO: 82 FL (ref 80–99.9)
METER GLUCOSE: 108 MG/DL (ref 70–110)
METER GLUCOSE: 114 MG/DL (ref 70–110)
METER GLUCOSE: 121 MG/DL (ref 70–110)
MONOCYTES ABSOLUTE: 0.71 E9/L (ref 0.1–0.95)
MONOCYTES RELATIVE PERCENT: 5.2 % (ref 2–12)
NEUTROPHILS ABSOLUTE: 11.53 E9/L (ref 1.8–7.3)
NEUTROPHILS RELATIVE PERCENT: 84.2 % (ref 43–80)
PDW BLD-RTO: 16.3 FL (ref 11.5–15)
PHOSPHORUS: 1.6 MG/DL (ref 2.5–4.5)
PLATELET # BLD: 366 E9/L (ref 130–450)
PMV BLD AUTO: 9.3 FL (ref 7–12)
POTASSIUM SERPL-SCNC: 3.5 MMOL/L (ref 3.5–5)
RBC # BLD: 3.62 E12/L (ref 3.5–5.5)
SODIUM BLD-SCNC: 132 MMOL/L (ref 132–146)
WBC # BLD: 13.7 E9/L (ref 4.5–11.5)

## 2018-10-25 PROCEDURE — 6370000000 HC RX 637 (ALT 250 FOR IP): Performed by: SURGERY

## 2018-10-25 PROCEDURE — 2580000003 HC RX 258: Performed by: INTERNAL MEDICINE

## 2018-10-25 PROCEDURE — 82962 GLUCOSE BLOOD TEST: CPT

## 2018-10-25 PROCEDURE — 83735 ASSAY OF MAGNESIUM: CPT

## 2018-10-25 PROCEDURE — 97116 GAIT TRAINING THERAPY: CPT

## 2018-10-25 PROCEDURE — C9113 INJ PANTOPRAZOLE SODIUM, VIA: HCPCS | Performed by: SURGERY

## 2018-10-25 PROCEDURE — 85025 COMPLETE CBC W/AUTO DIFF WBC: CPT

## 2018-10-25 PROCEDURE — 84100 ASSAY OF PHOSPHORUS: CPT

## 2018-10-25 PROCEDURE — 2500000003 HC RX 250 WO HCPCS: Performed by: INTERNAL MEDICINE

## 2018-10-25 PROCEDURE — 6370000000 HC RX 637 (ALT 250 FOR IP): Performed by: INTERNAL MEDICINE

## 2018-10-25 PROCEDURE — 6360000002 HC RX W HCPCS: Performed by: INTERNAL MEDICINE

## 2018-10-25 PROCEDURE — 2580000003 HC RX 258: Performed by: SURGERY

## 2018-10-25 PROCEDURE — 6360000002 HC RX W HCPCS: Performed by: SURGERY

## 2018-10-25 PROCEDURE — 97530 THERAPEUTIC ACTIVITIES: CPT

## 2018-10-25 PROCEDURE — 36592 COLLECT BLOOD FROM PICC: CPT

## 2018-10-25 PROCEDURE — 2700000000 HC OXYGEN THERAPY PER DAY

## 2018-10-25 PROCEDURE — 36415 COLL VENOUS BLD VENIPUNCTURE: CPT

## 2018-10-25 PROCEDURE — 80048 BASIC METABOLIC PNL TOTAL CA: CPT

## 2018-10-25 PROCEDURE — 2000000000 HC ICU R&B

## 2018-10-25 RX ORDER — DEXTROSE, SODIUM CHLORIDE, AND POTASSIUM CHLORIDE 5; .9; .15 G/100ML; G/100ML; G/100ML
INJECTION INTRAVENOUS CONTINUOUS
Status: DISCONTINUED | OUTPATIENT
Start: 2018-10-25 | End: 2018-10-27

## 2018-10-25 RX ADMIN — HEPARIN SODIUM 5000 UNITS: 5000 INJECTION INTRAVENOUS; SUBCUTANEOUS at 14:57

## 2018-10-25 RX ADMIN — SERTRALINE HYDROCHLORIDE 50 MG: 50 TABLET ORAL at 09:38

## 2018-10-25 RX ADMIN — POTASSIUM CHLORIDE, DEXTROSE MONOHYDRATE AND SODIUM CHLORIDE: 150; 5; 900 INJECTION, SOLUTION INTRAVENOUS at 11:42

## 2018-10-25 RX ADMIN — POTASSIUM CHLORIDE, DEXTROSE MONOHYDRATE AND SODIUM CHLORIDE: 150; 5; 900 INJECTION, SOLUTION INTRAVENOUS at 21:57

## 2018-10-25 RX ADMIN — HEPARIN SODIUM 5000 UNITS: 5000 INJECTION INTRAVENOUS; SUBCUTANEOUS at 07:38

## 2018-10-25 RX ADMIN — ONDANSETRON 4 MG: 2 INJECTION INTRAMUSCULAR; INTRAVENOUS at 18:48

## 2018-10-25 RX ADMIN — Medication 10 ML: at 21:53

## 2018-10-25 RX ADMIN — Medication 10 ML: at 09:45

## 2018-10-25 RX ADMIN — HEPARIN SODIUM 5000 UNITS: 5000 INJECTION INTRAVENOUS; SUBCUTANEOUS at 21:53

## 2018-10-25 RX ADMIN — Medication 10 ML: at 22:01

## 2018-10-25 RX ADMIN — SENNA 8.6 MG: 8.6 TABLET, COATED ORAL at 21:53

## 2018-10-25 RX ADMIN — MEROPENEM 1 G: 1 INJECTION, POWDER, FOR SOLUTION INTRAVENOUS at 10:50

## 2018-10-25 RX ADMIN — POTASSIUM CHLORIDE, DEXTROSE MONOHYDRATE AND SODIUM CHLORIDE: 150; 5; 900 INJECTION, SOLUTION INTRAVENOUS at 00:00

## 2018-10-25 RX ADMIN — PANTOPRAZOLE SODIUM 40 MG: 40 INJECTION, POWDER, FOR SOLUTION INTRAVENOUS at 09:38

## 2018-10-25 RX ADMIN — MEROPENEM 1 G: 1 INJECTION, POWDER, FOR SOLUTION INTRAVENOUS at 03:14

## 2018-10-25 RX ADMIN — POTASSIUM CHLORIDE: 2 INJECTION, SOLUTION, CONCENTRATE INTRAVENOUS at 18:42

## 2018-10-25 RX ADMIN — MEROPENEM 1 G: 1 INJECTION, POWDER, FOR SOLUTION INTRAVENOUS at 18:51

## 2018-10-25 RX ADMIN — Medication 10 ML: at 09:39

## 2018-10-25 RX ADMIN — Medication 300 UNITS: at 21:53

## 2018-10-25 RX ADMIN — FLUCONAZOLE 200 MG: 2 INJECTION, SOLUTION INTRAVENOUS at 21:53

## 2018-10-25 RX ADMIN — Medication 100 UNITS: at 09:46

## 2018-10-25 RX ADMIN — DOCUSATE SODIUM 100 MG: 100 CAPSULE, LIQUID FILLED ORAL at 09:38

## 2018-10-25 ASSESSMENT — PAIN SCALES - GENERAL
PAINLEVEL_OUTOF10: 0

## 2018-10-25 NOTE — PROGRESS NOTES
[address] [place] [address] [facility]  [place]  Progress Note    Seeing pt for atbx peritonitis/ abd abscess  Chief complain:  pain    SUBJECTIVE:  Patient is awake, alert , tolerating antibiotics well   Taking full diet   No c/o feels better some abd pain   Has loses tools    ROS:  Constitutional:  denies fever , chills   Cardiovascular: denies chest pain or palpitation   Genitourinary:      Denies  dysuria or burning micturition  Musculoskeletal: no aches or pain   Allergic/Immunologic:  No rash or itching     OBJECTIVE:    Vitals:    10/25/18 0300 10/25/18 0400 10/25/18 0500 10/25/18 0600   BP: 132/71 127/68 (!) 146/63 128/65   Pulse: 88 92 88 85   Resp: 20 15 19 19   Temp:       TempSrc:       SpO2: 93% 91% 91% 92%   Weight:       Height:           HEENT :        At/nc Thin pale   Heart:             RRR,  No murmurs, no gallops  Lungs:           Dec  to auscultation, no wheezing , no rales  Abdomen:       soft, non tender, bowel sounds present,  osotomy DISTENDED liquid stools  Extremites:     No edema, no ulcers,  Skin:                Normal turgor,normal texture  Lines :             Rue picc  10/23  Wong catheter :   Present    Drain serosang   right                 docusate sodium  100 mg Oral Daily    senna  1 tablet Oral Nightly    insulin lispro  0-6 Units Subcutaneous 4 times per day    sodium chloride flush  10 mL Intravenous 2 times per day    heparin flush  3 mL Intravenous 2 times per day    meropenem  1 g Intravenous Q8H    fluconazole  200 mg Intravenous Q24H    sodium chloride flush  10 mL Intravenous 2 times per day    pantoprazole  40 mg Intravenous Daily    And    sodium chloride (PF)  10 mL Intravenous Daily    heparin (porcine)  5,000 Units Subcutaneous 3 times per day    sertraline  50 mg Oral Daily    butamben-tetracaine-benzocaine  1 spray Topical Once     LABS  CBC:   Recent Labs      10/23/18   0515  10/24/18   0520  10/25/18   0620   WBC  15.4*  15.3*  13.7* HGB  8.1*  7.3*  9.6*   HCT  26.5*  23.0*  29.7*   PLT  413  390  366     BMP:    Recent Labs      10/23/18   0515  10/24/18   0520  10/25/18   0620   NA  139  134  132   K  3.8  4.5  3.5   CL  105  101  98   CO2  25  24  25   BUN  5*  5*  7*   CREATININE  0.5  0.4*  0.4*   GLUCOSE  102  306*  120*     Hepatic Function Panel:    Lab Results   Component Value Date    ALKPHOS 164 10/24/2018    ALT 21 10/24/2018    AST 32 10/24/2018    PROT 5.4 10/24/2018    BILITOT 0.3 10/24/2018    BILIDIR 0.4 11/12/2017    IBILI 0.3 11/12/2017    LABALBU 2.0 10/24/2018       Lab Results   Component Value Date    SEDRATE 82 (H) 08/06/2018       . No results found for: CRP      Microbiology :  Narrative     Source: ABD       Site: pelvic acid ischemic bowel             Culture & Susceptibility     ESCHERICHIA COLI     Antibiotic Interpretation BIANCA Unit   Confirmatory Extended Spectrum Beta-Lactamase  Pos mcg/mL   amoxicillin-clavulanate Intermediate =^16 mcg/mL   ampicillin Resistant >=^32 mcg/mL   aztreonam Resistant >=^64 mcg/mL   ceFAZolin Resistant >=^64 mcg/mL   cefTRIAXone Resistant >=^64 mcg/mL   cefepime Resistant >=^64 mcg/mL   doripenem Sensitive <=^0.12 mcg/mL   gentamicin Resistant >=^16 mcg/mL   imipenem Sensitive <=^0.25 mcg/mL   levofloxacin Resistant >=^8 mcg/mL   meropenem Sensitive <=^0.25 mcg/mL   piperacillin-tazobactam Sensitive <=^4 mcg/mL   tigecycline Sensitive <=^0.5 mcg/mL   tobramycin Intermediate =^8 mcg/mL   trimethoprim-sulfamethoxazole Sensitive <=^20 mcg/mL         PROTEUS HAUSERI     Antibiotic Interpretation BIANCA Unit   amoxicillin-clavulanate Sensitive =^4 mcg/mL   ampicillin Resistant >=^32 mcg/mL   ceFAZolin Resistant >=^64 mcg/mL   cefTRIAXone Sensitive <=^1 mcg/mL   cefepime Sensitive <=^1 mcg/mL   gentamicin Sensitive <=^1 mcg/mL   imipenem Sensitive =^2 mcg/mL   levofloxacin Sensitive <=^0.12 mcg/mL   piperacillin-tazobactam Sensitive <=^4 mcg/mL   trimethoprim-sulfamethoxazole Sensitive <=^20 mcg/mL         Lab and Collection       Radiology :  IR ULTRASOUND GUIDANCE VASCULAR ACCESS   Final Result   Ultrasound guidance was provided during placement of a   PICC line. IR FLUORO GUIDED CVA DEVICE PLACEMENT   Final Result   Successful placement of a 5 [place] double-lumen Power   PICC line using ultrasound guidance via the right brachial  vein. FL SMALL BOWEL FOLLOW THROUGH ONLY   Final Result   1. There are no findings to suggest bowel obstruction. Contrast is   seen throughout the entire small bowel and contrast was emptied from   the ostomy bag several times. 2. Dilated loop of small bowel within the left hemiabdomen which was   noted on the patient's previous CT scan of 10/17/2018 and is   unchanged. XR Chest Abdomen Ng Placement   Final Result   Nasogastric tube tip in appropriate position. XR CHEST PORTABLE   Final Result   Tip of the NG tube is above the gastroesophageal junction. Gastric distention. XR ABDOMEN (KUB) (SINGLE AP VIEW)   Final Result   1. Distended loops of small bowel which are similar in the degree of   distention when compared to 10/17/2018 CT scan. 2. Residual contrast within the ascending colon from the CT scan   performed on the same day. The colon is not distended. RADIOLOGY REPORT   Final Result      CT ABDOMEN PELVIS W IV CONTRAST Additional Contrast? Oral   Final Result   1. Postsurgical changes from a left midabdominal colostomy with   contrast and stool within the colon distal to the ostomy. 2. Redemonstration of diffuse infiltration of the perirectal and   presacral soft tissues with interval increase in number and size of   air-filled collections, with a collection extending into the left   piriformis muscle. 3. Mildly distended loops of small bowel, possibly related to ileus. 4. Diffuse increased sclerosis of the sacrum with periosteal reaction   along the presacral collection, suggestive of chronic osteomyelitis.    There

## 2018-10-25 NOTE — PLAN OF CARE
Problem: Aspiration:  Goal: Absence of aspiration  Absence of aspiration  Outcome: Met This Shift      Problem:  Bowel Function - Altered:  Goal: Bowel elimination is within specified parameters  Bowel elimination is within specified parameters  Outcome: Met This Shift      Problem: Cardiac Output - Decreased:  Goal: Hemodynamic stability will improve  Hemodynamic stability will improve  Outcome: Met This Shift      Problem: Fluid Volume - Imbalance:  Goal: Absence of imbalanced fluid volume signs and symptoms  Absence of imbalanced fluid volume signs and symptoms  Outcome: Met This Shift      Problem: Gas Exchange - Impaired:  Goal: Levels of oxygenation will improve  Levels of oxygenation will improve  Outcome: Met This Shift      Problem: Nutrition Deficit:  Goal: Ability to achieve adequate nutritional intake will improve  Ability to achieve adequate nutritional intake will improve  Outcome: Ongoing      Problem: Serum Glucose Level - Abnormal:  Goal: Ability to maintain appropriate glucose levels will improve to within specified parameters  Ability to maintain appropriate glucose levels will improve to within specified parameters  Outcome: Met This Shift      Problem: Skin Integrity - Impaired:  Goal: Will show no infection signs and symptoms  Will show no infection signs and symptoms  Outcome: Met This Shift    Goal: Absence of new skin breakdown  Absence of new skin breakdown  Outcome: Ongoing

## 2018-10-26 LAB
ANION GAP SERPL CALCULATED.3IONS-SCNC: 8 MMOL/L (ref 7–16)
BASOPHILS ABSOLUTE: 0.03 E9/L (ref 0–0.2)
BASOPHILS RELATIVE PERCENT: 0.2 % (ref 0–2)
BUN BLDV-MCNC: 8 MG/DL (ref 8–23)
C-REACTIVE PROTEIN: 6.2 MG/DL (ref 0–0.4)
CALCIUM SERPL-MCNC: 8 MG/DL (ref 8.6–10.2)
CHLORIDE BLD-SCNC: 103 MMOL/L (ref 98–107)
CO2: 27 MMOL/L (ref 22–29)
CREAT SERPL-MCNC: 0.5 MG/DL (ref 0.5–1)
EOSINOPHILS ABSOLUTE: 0.02 E9/L (ref 0.05–0.5)
EOSINOPHILS RELATIVE PERCENT: 0.2 % (ref 0–6)
GFR AFRICAN AMERICAN: >60
GFR NON-AFRICAN AMERICAN: >60 ML/MIN/1.73
GLUCOSE BLD-MCNC: 125 MG/DL (ref 74–109)
HCT VFR BLD CALC: 30.9 % (ref 34–48)
HEMOGLOBIN: 9.8 G/DL (ref 11.5–15.5)
IMMATURE GRANULOCYTES #: 0.21 E9/L
IMMATURE GRANULOCYTES %: 1.7 % (ref 0–5)
LYMPHOCYTES ABSOLUTE: 1.07 E9/L (ref 1.5–4)
LYMPHOCYTES RELATIVE PERCENT: 8.8 % (ref 20–42)
MAGNESIUM: 1.9 MG/DL (ref 1.6–2.6)
MCH RBC QN AUTO: 26.6 PG (ref 26–35)
MCHC RBC AUTO-ENTMCNC: 31.7 % (ref 32–34.5)
MCV RBC AUTO: 84 FL (ref 80–99.9)
METER GLUCOSE: 109 MG/DL (ref 70–110)
METER GLUCOSE: 114 MG/DL (ref 70–110)
METER GLUCOSE: 116 MG/DL (ref 70–110)
METER GLUCOSE: 120 MG/DL (ref 70–110)
MONOCYTES ABSOLUTE: 0.73 E9/L (ref 0.1–0.95)
MONOCYTES RELATIVE PERCENT: 6 % (ref 2–12)
NEUTROPHILS ABSOLUTE: 10.05 E9/L (ref 1.8–7.3)
NEUTROPHILS RELATIVE PERCENT: 83.1 % (ref 43–80)
PDW BLD-RTO: 17.2 FL (ref 11.5–15)
PHOSPHORUS: 1.8 MG/DL (ref 2.5–4.5)
PLATELET # BLD: 383 E9/L (ref 130–450)
PMV BLD AUTO: 9.3 FL (ref 7–12)
POTASSIUM SERPL-SCNC: 3.7 MMOL/L (ref 3.5–5)
RBC # BLD: 3.68 E12/L (ref 3.5–5.5)
SEDIMENTATION RATE, ERYTHROCYTE: 72 MM/HR (ref 0–20)
SODIUM BLD-SCNC: 138 MMOL/L (ref 132–146)
WBC # BLD: 12.1 E9/L (ref 4.5–11.5)

## 2018-10-26 PROCEDURE — 2580000003 HC RX 258: Performed by: INTERNAL MEDICINE

## 2018-10-26 PROCEDURE — 86140 C-REACTIVE PROTEIN: CPT

## 2018-10-26 PROCEDURE — 82962 GLUCOSE BLOOD TEST: CPT

## 2018-10-26 PROCEDURE — 6370000000 HC RX 637 (ALT 250 FOR IP): Performed by: SURGERY

## 2018-10-26 PROCEDURE — 85651 RBC SED RATE NONAUTOMATED: CPT

## 2018-10-26 PROCEDURE — 84100 ASSAY OF PHOSPHORUS: CPT

## 2018-10-26 PROCEDURE — 2580000003 HC RX 258: Performed by: SURGERY

## 2018-10-26 PROCEDURE — 6360000002 HC RX W HCPCS: Performed by: INTERNAL MEDICINE

## 2018-10-26 PROCEDURE — 2500000003 HC RX 250 WO HCPCS: Performed by: INTERNAL MEDICINE

## 2018-10-26 PROCEDURE — 2700000000 HC OXYGEN THERAPY PER DAY

## 2018-10-26 PROCEDURE — 6370000000 HC RX 637 (ALT 250 FOR IP): Performed by: INTERNAL MEDICINE

## 2018-10-26 PROCEDURE — 83735 ASSAY OF MAGNESIUM: CPT

## 2018-10-26 PROCEDURE — 97110 THERAPEUTIC EXERCISES: CPT

## 2018-10-26 PROCEDURE — 2000000000 HC ICU R&B

## 2018-10-26 PROCEDURE — C9113 INJ PANTOPRAZOLE SODIUM, VIA: HCPCS | Performed by: SURGERY

## 2018-10-26 PROCEDURE — 97530 THERAPEUTIC ACTIVITIES: CPT

## 2018-10-26 PROCEDURE — 85025 COMPLETE CBC W/AUTO DIFF WBC: CPT

## 2018-10-26 PROCEDURE — 6360000002 HC RX W HCPCS: Performed by: SURGERY

## 2018-10-26 PROCEDURE — 80048 BASIC METABOLIC PNL TOTAL CA: CPT

## 2018-10-26 PROCEDURE — 36592 COLLECT BLOOD FROM PICC: CPT

## 2018-10-26 PROCEDURE — 36415 COLL VENOUS BLD VENIPUNCTURE: CPT

## 2018-10-26 RX ADMIN — Medication 10 ML: at 20:24

## 2018-10-26 RX ADMIN — Medication 10 ML: at 08:48

## 2018-10-26 RX ADMIN — Medication 10 ML: at 09:25

## 2018-10-26 RX ADMIN — FLUCONAZOLE 200 MG: 2 INJECTION, SOLUTION INTRAVENOUS at 20:23

## 2018-10-26 RX ADMIN — HEPARIN SODIUM 5000 UNITS: 5000 INJECTION INTRAVENOUS; SUBCUTANEOUS at 21:01

## 2018-10-26 RX ADMIN — HEPARIN SODIUM 5000 UNITS: 5000 INJECTION INTRAVENOUS; SUBCUTANEOUS at 13:57

## 2018-10-26 RX ADMIN — Medication 100 UNITS: at 08:49

## 2018-10-26 RX ADMIN — MEROPENEM 1 G: 1 INJECTION, POWDER, FOR SOLUTION INTRAVENOUS at 05:22

## 2018-10-26 RX ADMIN — Medication 10 ML: at 20:27

## 2018-10-26 RX ADMIN — SERTRALINE HYDROCHLORIDE 50 MG: 50 TABLET ORAL at 08:48

## 2018-10-26 RX ADMIN — ONDANSETRON 4 MG: 2 INJECTION INTRAMUSCULAR; INTRAVENOUS at 08:51

## 2018-10-26 RX ADMIN — MEROPENEM 1 G: 1 INJECTION, POWDER, FOR SOLUTION INTRAVENOUS at 10:00

## 2018-10-26 RX ADMIN — SENNA 8.6 MG: 8.6 TABLET, COATED ORAL at 21:02

## 2018-10-26 RX ADMIN — POTASSIUM CHLORIDE, DEXTROSE MONOHYDRATE AND SODIUM CHLORIDE: 150; 5; 900 INJECTION, SOLUTION INTRAVENOUS at 19:52

## 2018-10-26 RX ADMIN — DOCUSATE SODIUM 100 MG: 100 CAPSULE, LIQUID FILLED ORAL at 08:48

## 2018-10-26 RX ADMIN — POTASSIUM CHLORIDE, DEXTROSE MONOHYDRATE AND SODIUM CHLORIDE: 150; 5; 900 INJECTION, SOLUTION INTRAVENOUS at 09:24

## 2018-10-26 RX ADMIN — Medication 300 UNITS: at 21:02

## 2018-10-26 RX ADMIN — POTASSIUM CHLORIDE: 2 INJECTION, SOLUTION, CONCENTRATE INTRAVENOUS at 18:40

## 2018-10-26 RX ADMIN — HEPARIN SODIUM 5000 UNITS: 5000 INJECTION INTRAVENOUS; SUBCUTANEOUS at 05:24

## 2018-10-26 RX ADMIN — PANTOPRAZOLE SODIUM 40 MG: 40 INJECTION, POWDER, FOR SOLUTION INTRAVENOUS at 08:47

## 2018-10-26 RX ADMIN — MEROPENEM 1 G: 1 INJECTION, POWDER, FOR SOLUTION INTRAVENOUS at 18:00

## 2018-10-26 ASSESSMENT — PAIN SCALES - GENERAL
PAINLEVEL_OUTOF10: 0

## 2018-10-26 NOTE — PROGRESS NOTES
0600   WBC  15.3*  13.7*  12.1*   HGB  7.3*  9.6*  9.8*   HCT  23.0*  29.7*  30.9*   PLT  390  366  383     BMP:    Recent Labs      10/24/18   0520  10/25/18   0620  10/26/18   0600   NA  134  132  138   K  4.5  3.5  3.7   CL  101  98  103   CO2  24  25  27   BUN  5*  7*  8   CREATININE  0.4*  0.4*  0.5   GLUCOSE  306*  120*  125*     Hepatic Function Panel:    Lab Results   Component Value Date    ALKPHOS 164 10/24/2018    ALT 21 10/24/2018    AST 32 10/24/2018    PROT 5.4 10/24/2018    BILITOT 0.3 10/24/2018    BILIDIR 0.4 11/12/2017    IBILI 0.3 11/12/2017    LABALBU 2.0 10/24/2018       Lab Results   Component Value Date    SEDRATE 72 (H) 10/26/2018       . No results found for: CRP      Microbiology :  Narrative     Source: ABD       Site: pelvic acid ischemic bowel             Culture & Susceptibility     ESCHERICHIA COLI     Antibiotic Interpretation BIANCA Unit   Confirmatory Extended Spectrum Beta-Lactamase  Pos mcg/mL   amoxicillin-clavulanate Intermediate =^16 mcg/mL   ampicillin Resistant >=^32 mcg/mL   aztreonam Resistant >=^64 mcg/mL   ceFAZolin Resistant >=^64 mcg/mL   cefTRIAXone Resistant >=^64 mcg/mL   cefepime Resistant >=^64 mcg/mL   doripenem Sensitive <=^0.12 mcg/mL   gentamicin Resistant >=^16 mcg/mL   imipenem Sensitive <=^0.25 mcg/mL   levofloxacin Resistant >=^8 mcg/mL   meropenem Sensitive <=^0.25 mcg/mL   piperacillin-tazobactam Sensitive <=^4 mcg/mL   tigecycline Sensitive <=^0.5 mcg/mL   tobramycin Intermediate =^8 mcg/mL   trimethoprim-sulfamethoxazole Sensitive <=^20 mcg/mL         PROTEUS HAUSERI     Antibiotic Interpretation BIANCA Unit   amoxicillin-clavulanate Sensitive =^4 mcg/mL   ampicillin Resistant >=^32 mcg/mL   ceFAZolin Resistant >=^64 mcg/mL   cefTRIAXone Sensitive <=^1 mcg/mL   cefepime Sensitive <=^1 mcg/mL   gentamicin Sensitive <=^1 mcg/mL   imipenem Sensitive =^2 mcg/mL   levofloxacin Sensitive <=^0.12 mcg/mL   piperacillin-tazobactam Sensitive <=^4 mcg/mL

## 2018-10-26 NOTE — PROGRESS NOTES
CMP:    Lab Results   Component Value Date     10/26/2018    K 3.7 10/26/2018    K 3.1 10/17/2018     10/26/2018    CO2 27 10/26/2018    BUN 8 10/26/2018    CREATININE 0.5 10/26/2018    GFRAA >60 10/26/2018    LABGLOM >60 10/26/2018    GLUCOSE 125 10/26/2018    PROT 5.4 10/24/2018    LABALBU 2.0 10/24/2018    CALCIUM 8.0 10/26/2018    BILITOT 0.3 10/24/2018    ALKPHOS 164 10/24/2018    AST 32 10/24/2018    ALT 21 10/24/2018     BMP:    Lab Results   Component Value Date     10/26/2018    K 3.7 10/26/2018    K 3.1 10/17/2018     10/26/2018    CO2 27 10/26/2018    BUN 8 10/26/2018    LABALBU 2.0 10/24/2018    CREATININE 0.5 10/26/2018    CALCIUM 8.0 10/26/2018    GFRAA >60 10/26/2018    LABGLOM >60 10/26/2018    GLUCOSE 125 10/26/2018     Hepatic Function Panel:    Lab Results   Component Value Date    ALKPHOS 164 10/24/2018    ALT 21 10/24/2018    AST 32 10/24/2018    PROT 5.4 10/24/2018    BILITOT 0.3 10/24/2018    BILIDIR 0.4 11/12/2017    IBILI 0.3 11/12/2017    LABALBU 2.0 10/24/2018     Albumin:    Lab Results   Component Value Date    LABALBU 2.0 10/24/2018     Calcium:    Lab Results   Component Value Date    CALCIUM 8.0 10/26/2018     Magnesium:    Lab Results   Component Value Date    MG 1.9 10/26/2018     Phosphorus:    Lab Results   Component Value Date    PHOS 1.8 10/26/2018     Last 3 Troponin:    Lab Results   Component Value Date    TROPONINI <0.01 10/16/2018    TROPONINI 0.01 12/21/2017    TROPONINI 0.02 12/19/2017     FLP:    Lab Results   Component Value Date    TRIG 89 10/23/2018    HDL 58.0 07/18/2013    LDLCALC 157 07/18/2013     TSH:    Lab Results   Component Value Date    TSH 4.620 04/07/2018        Patient Active Problem List   Diagnosis    Rectal cancer ([address]    Pneumatosis intestinalis    Macular degeneration    Enteritis    Severe protein-calorie malnutrition (HCC)    Macular degeneration    PAF (paroxysmal atrial fibrillation) (HCC)    Ischemic bowel

## 2018-10-26 NOTE — PROGRESS NOTES
10/25/2018    K 3.5 10/25/2018    K 3.1 10/17/2018    CL 98 10/25/2018    CO2 25 10/25/2018    BUN 7 10/25/2018    CREATININE 0.4 10/25/2018    GFRAA >60 10/25/2018    LABGLOM >60 10/25/2018    GLUCOSE 120 10/25/2018    PROT 5.4 10/24/2018    LABALBU 2.0 10/24/2018    CALCIUM 7.8 10/25/2018    BILITOT 0.3 10/24/2018    ALKPHOS 164 10/24/2018    AST 32 10/24/2018    ALT 21 10/24/2018     BMP:    Lab Results   Component Value Date     10/25/2018    K 3.5 10/25/2018    K 3.1 10/17/2018    CL 98 10/25/2018    CO2 25 10/25/2018    BUN 7 10/25/2018    LABALBU 2.0 10/24/2018    CREATININE 0.4 10/25/2018    CALCIUM 7.8 10/25/2018    GFRAA >60 10/25/2018    LABGLOM >60 10/25/2018    GLUCOSE 120 10/25/2018     Hepatic Function Panel:    Lab Results   Component Value Date    ALKPHOS 164 10/24/2018    ALT 21 10/24/2018    AST 32 10/24/2018    PROT 5.4 10/24/2018    BILITOT 0.3 10/24/2018    BILIDIR 0.4 11/12/2017    IBILI 0.3 11/12/2017    LABALBU 2.0 10/24/2018     Albumin:    Lab Results   Component Value Date    LABALBU 2.0 10/24/2018     Calcium:    Lab Results   Component Value Date    CALCIUM 7.8 10/25/2018     Magnesium:    Lab Results   Component Value Date    MG 1.8 10/25/2018     Phosphorus:    Lab Results   Component Value Date    PHOS 1.6 10/25/2018     Last 3 Troponin:    Lab Results   Component Value Date    TROPONINI <0.01 10/16/2018    TROPONINI 0.01 12/21/2017    TROPONINI 0.02 12/19/2017     FLP:    Lab Results   Component Value Date    TRIG 89 10/23/2018    HDL 58.0 07/18/2013    LDLCALC 157 07/18/2013     TSH:    Lab Results   Component Value Date    TSH 4.620 04/07/2018        Patient Active Problem List   Diagnosis    Rectal cancer ([address]    Pneumatosis intestinalis    Macular degeneration    Enteritis    Severe protein-calorie malnutrition (HCC)    Macular degeneration    PAF (paroxysmal atrial fibrillation) (HCC)    Ischemic bowel disease (HCC)    Altered mental status    Fatigue due to

## 2018-10-27 LAB
BASOPHILS ABSOLUTE: 0.03 E9/L (ref 0–0.2)
BASOPHILS RELATIVE PERCENT: 0.3 % (ref 0–2)
EOSINOPHILS ABSOLUTE: 0.03 E9/L (ref 0.05–0.5)
EOSINOPHILS RELATIVE PERCENT: 0.3 % (ref 0–6)
HCT VFR BLD CALC: 30.5 % (ref 34–48)
HEMOGLOBIN: 9.7 G/DL (ref 11.5–15.5)
IMMATURE GRANULOCYTES #: 0.2 E9/L
IMMATURE GRANULOCYTES %: 1.8 % (ref 0–5)
LYMPHOCYTES ABSOLUTE: 1.45 E9/L (ref 1.5–4)
LYMPHOCYTES RELATIVE PERCENT: 12.9 % (ref 20–42)
MCH RBC QN AUTO: 26.6 PG (ref 26–35)
MCHC RBC AUTO-ENTMCNC: 31.8 % (ref 32–34.5)
MCV RBC AUTO: 83.8 FL (ref 80–99.9)
METER GLUCOSE: 105 MG/DL (ref 70–110)
METER GLUCOSE: 107 MG/DL (ref 70–110)
METER GLUCOSE: 109 MG/DL (ref 70–110)
METER GLUCOSE: 114 MG/DL (ref 70–110)
METER GLUCOSE: 118 MG/DL (ref 70–110)
MONOCYTES ABSOLUTE: 0.96 E9/L (ref 0.1–0.95)
MONOCYTES RELATIVE PERCENT: 8.5 % (ref 2–12)
NEUTROPHILS ABSOLUTE: 8.57 E9/L (ref 1.8–7.3)
NEUTROPHILS RELATIVE PERCENT: 76.2 % (ref 43–80)
PDW BLD-RTO: 17.7 FL (ref 11.5–15)
PLATELET # BLD: 386 E9/L (ref 130–450)
PMV BLD AUTO: 9.2 FL (ref 7–12)
RBC # BLD: 3.64 E12/L (ref 3.5–5.5)
WBC # BLD: 11.2 E9/L (ref 4.5–11.5)

## 2018-10-27 PROCEDURE — 36415 COLL VENOUS BLD VENIPUNCTURE: CPT

## 2018-10-27 PROCEDURE — 2500000003 HC RX 250 WO HCPCS: Performed by: INTERNAL MEDICINE

## 2018-10-27 PROCEDURE — 2060000000 HC ICU INTERMEDIATE R&B

## 2018-10-27 PROCEDURE — 6360000002 HC RX W HCPCS: Performed by: INTERNAL MEDICINE

## 2018-10-27 PROCEDURE — 6360000002 HC RX W HCPCS: Performed by: SURGERY

## 2018-10-27 PROCEDURE — 2580000003 HC RX 258: Performed by: SURGERY

## 2018-10-27 PROCEDURE — 85025 COMPLETE CBC W/AUTO DIFF WBC: CPT

## 2018-10-27 PROCEDURE — 6370000000 HC RX 637 (ALT 250 FOR IP): Performed by: INTERNAL MEDICINE

## 2018-10-27 PROCEDURE — 36592 COLLECT BLOOD FROM PICC: CPT

## 2018-10-27 PROCEDURE — C9113 INJ PANTOPRAZOLE SODIUM, VIA: HCPCS | Performed by: SURGERY

## 2018-10-27 PROCEDURE — 82962 GLUCOSE BLOOD TEST: CPT

## 2018-10-27 PROCEDURE — 2580000003 HC RX 258: Performed by: INTERNAL MEDICINE

## 2018-10-27 PROCEDURE — 6370000000 HC RX 637 (ALT 250 FOR IP): Performed by: SURGERY

## 2018-10-27 RX ORDER — DEXTROSE MONOHYDRATE 25 G/50ML
12.5 INJECTION, SOLUTION INTRAVENOUS PRN
Status: DISCONTINUED | OUTPATIENT
Start: 2018-10-27 | End: 2018-10-29 | Stop reason: HOSPADM

## 2018-10-27 RX ORDER — NICOTINE POLACRILEX 4 MG
15 LOZENGE BUCCAL PRN
Status: DISCONTINUED | OUTPATIENT
Start: 2018-10-27 | End: 2018-10-29 | Stop reason: HOSPADM

## 2018-10-27 RX ORDER — DEXTROSE MONOHYDRATE 50 MG/ML
100 INJECTION, SOLUTION INTRAVENOUS PRN
Status: DISCONTINUED | OUTPATIENT
Start: 2018-10-27 | End: 2018-10-29 | Stop reason: HOSPADM

## 2018-10-27 RX ADMIN — SENNA 8.6 MG: 8.6 TABLET, COATED ORAL at 21:37

## 2018-10-27 RX ADMIN — CALCIUM GLUCONATE: 98 INJECTION, SOLUTION INTRAVENOUS at 17:54

## 2018-10-27 RX ADMIN — Medication 10 ML: at 09:00

## 2018-10-27 RX ADMIN — HEPARIN SODIUM 5000 UNITS: 5000 INJECTION INTRAVENOUS; SUBCUTANEOUS at 13:50

## 2018-10-27 RX ADMIN — Medication 10 ML: at 10:12

## 2018-10-27 RX ADMIN — Medication 10 ML: at 08:44

## 2018-10-27 RX ADMIN — HEPARIN SODIUM 5000 UNITS: 5000 INJECTION INTRAVENOUS; SUBCUTANEOUS at 22:21

## 2018-10-27 RX ADMIN — POTASSIUM CHLORIDE, DEXTROSE MONOHYDRATE AND SODIUM CHLORIDE: 150; 5; 900 INJECTION, SOLUTION INTRAVENOUS at 05:16

## 2018-10-27 RX ADMIN — SERTRALINE HYDROCHLORIDE 50 MG: 50 TABLET ORAL at 08:45

## 2018-10-27 RX ADMIN — MEROPENEM 1 G: 1 INJECTION, POWDER, FOR SOLUTION INTRAVENOUS at 18:00

## 2018-10-27 RX ADMIN — Medication 300 UNITS: at 08:43

## 2018-10-27 RX ADMIN — Medication 10 ML: at 21:37

## 2018-10-27 RX ADMIN — DOCUSATE SODIUM 100 MG: 100 CAPSULE, LIQUID FILLED ORAL at 08:45

## 2018-10-27 RX ADMIN — Medication 300 UNITS: at 21:37

## 2018-10-27 RX ADMIN — MEROPENEM 1 G: 1 INJECTION, POWDER, FOR SOLUTION INTRAVENOUS at 10:11

## 2018-10-27 RX ADMIN — HEPARIN SODIUM 5000 UNITS: 5000 INJECTION INTRAVENOUS; SUBCUTANEOUS at 05:06

## 2018-10-27 RX ADMIN — MEROPENEM 1 G: 1 INJECTION, POWDER, FOR SOLUTION INTRAVENOUS at 02:16

## 2018-10-27 RX ADMIN — FLUCONAZOLE 200 MG: 2 INJECTION, SOLUTION INTRAVENOUS at 18:02

## 2018-10-27 RX ADMIN — PANTOPRAZOLE SODIUM 40 MG: 40 INJECTION, POWDER, FOR SOLUTION INTRAVENOUS at 08:45

## 2018-10-27 ASSESSMENT — PAIN SCALES - GENERAL
PAINLEVEL_OUTOF10: 0

## 2018-10-27 NOTE — PROGRESS NOTES
Patient seen and examined. The patient continues to complain of some mild mid right sided abdominal discomfort. NGT removed 10/23. .  The patient has good UO. Patient denies any hiccups again this a.m. Patient on RA has O2 saturation 95% at rest.  Patient WBC 13.7 with hemoglobin 9.6 after 2 units packed cells yesterday. Patient's oral intake is still poor except for drinking 3 cans of ensure a day. No complaints of chest pain, dizziness.     BP (!) 165/69   Pulse 88   Temp 98.1 °F (36.7 °C) (Axillary)   Resp 20   Ht 5' 6\" (1.676 m)   Wt 125 lb (56.7 kg)   SpO2 92%   BMI 20.18 kg/m²     HEENT: nornal  Heart: regular rate and rhythm with PAC's  Lungs: Coarse breath sounds  Abdomen: Post op abdomen with mild distention and hypoactive bowel sounds  Ext: no clubbing, cyanosis, erythema, edema  Neuro: alert and oriented X 3, + [place]    CBC with Differential:    Lab Results   Component Value Date    WBC 11.2 10/27/2018    RBC 3.64 10/27/2018    HGB 9.7 10/27/2018    HCT 30.5 10/27/2018     10/27/2018    MCV 83.8 10/27/2018    MCH 26.6 10/27/2018    MCHC 31.8 10/27/2018    RDW 17.7 10/27/2018    NRBC 0.5 11/30/2017    BLASTSPCT 1.0 11/06/2017    METASPCT 0.9 10/18/2018    LYMPHOPCT 12.9 10/27/2018    PROMYELOPCT 0.5 11/26/2017    MONOPCT 8.5 10/27/2018    MYELOPCT 1.7 10/18/2018    BASOPCT 0.3 10/27/2018    MONOSABS 0.96 10/27/2018    LYMPHSABS 1.45 10/27/2018    EOSABS 0.03 10/27/2018    BASOSABS 0.03 10/27/2018     CMP:    Lab Results   Component Value Date     10/26/2018    K 3.7 10/26/2018    K 3.1 10/17/2018     10/26/2018    CO2 27 10/26/2018    BUN 8 10/26/2018    CREATININE 0.5 10/26/2018    GFRAA >60 10/26/2018    LABGLOM >60 10/26/2018    GLUCOSE 125 10/26/2018    PROT 5.4 10/24/2018    LABALBU 2.0 10/24/2018    CALCIUM 8.0 10/26/2018    BILITOT 0.3 10/24/2018    ALKPHOS 164 10/24/2018    AST 32 10/24/2018    ALT 21 10/24/2018     Magnesium:    Lab Results   Component Value Date    MG Final Result   Nasogastric tube tip in appropriate position. XR CHEST PORTABLE   Final Result   Tip of the NG tube is above the gastroesophageal junction. Gastric distention. XR ABDOMEN (KUB) (SINGLE AP VIEW)   Final Result   1. Distended loops of small bowel which are similar in the degree of   distention when compared to 10/17/2018 CT scan. 2. Residual contrast within the ascending colon from the CT scan   performed on the same day. The colon is not distended. RADIOLOGY REPORT   Final Result      CT ABDOMEN PELVIS W IV CONTRAST Additional Contrast? Oral   Final Result   1. Postsurgical changes from a left midabdominal colostomy with   contrast and stool within the colon distal to the ostomy. 2. Redemonstration of diffuse infiltration of the perirectal and   presacral soft tissues with interval increase in number and size of   air-filled collections, with a collection extending into the left   piriformis muscle. 3. Mildly distended loops of small bowel, possibly related to ileus. 4. Diffuse increased sclerosis of the sacrum with periosteal reaction   along the presacral collection, suggestive of chronic osteomyelitis. There are insufficiency fractures through the superior endplate of S1   and left sacral ala. 5. Nondisplaced fracture with increased sclerosis within the left   pubis. 6. Moderate bilateral hydroureteronephrosis without a definite   obstructing calculus. The distal ureters are not well visualized. Findings were discussed with Dr. Jen Connolly on 10/17/2018 at   9:34 AM.      XR Chest Abdomen Ng Placement   Final Result   Nasogastric tube tip in appropriate position.            docusate sodium  100 mg Oral Daily    senna  1 tablet Oral Nightly    insulin lispro  0-6 Units Subcutaneous 4 times per day    sodium chloride flush  10 mL Intravenous 2 times per day    heparin flush  3 mL Intravenous 2 times per day    meropenem  1 g Intravenous Q8H   

## 2018-10-27 NOTE — PROGRESS NOTES
General Surgery Progress Note  Clemencia Tuttle MD, MS    Patient's Name/Date of Birth: [place] / 1940    Date: October 27, 2018     Surgeon: Amada Abbott MD    Chief Complaint: abd pain, pelvic pain    Patient Active Problem List   Diagnosis    Rectal cancer ([address]    Pneumatosis intestinalis    Macular degeneration    Enteritis    Severe protein-calorie malnutrition ([address]    Macular degeneration    PAF (paroxysmal atrial fibrillation) ([address]    Ischemic bowel disease ([address]    Altered mental status    Fatigue due to excessive exertion    PAF (paroxysmal atrial fibrillation) ([address]    Macular degeneration    Ischemic bowel disease ([address]    Hx of blood clots    Cancer ([address]    Perforation of sigmoid colon ([address]    PAF (paroxysmal atrial fibrillation) ([address]    Ischemic bowel disease ([address]    Hx of blood clots    Cancer ([address]       Subjective: Doing well, pain in pelvis has resolved, no vomiting, admits nausea, appetite still improving, stoma working well, drain minimal output    Objective:  BP (!) 165/69   Pulse 88   Temp 98.1 °F (36.7 °C) (Axillary)   Resp 20   Ht 5' 6\" (1.676 m)   Wt 125 lb (56.7 kg)   SpO2 92%   BMI 20.18 kg/m²   Labs:  Recent Labs      10/25/18   0620  10/26/18   0600  10/27/18   0520   WBC  13.7*  12.1*  11.2   HGB  9.6*  9.8*  9.7*   HCT  29.7*  30.9*  30.5*     Lab Results   Component Value Date    CREATININE 0.5 10/26/2018    BUN 8 10/26/2018     10/26/2018    K 3.7 10/26/2018     10/26/2018    CO2 27 10/26/2018     No results for input(s): LIPASE, AMYLASE in the last 72 hours.   Labs reviewed as above- WBC normal today    General appearance:  NAD  Head: NCAT, PERRLA, EOMI, red conjunctiva  Neck: supple, no masses  Lungs: Equal chest rise bilateral  Heart: Reg rate  Abdomen: soft, nondistended, tender appropriately, normotympanic, no guarding, no peritoneal signs, incision C/D/I, drain output serosanguinous, dressing

## 2018-10-28 LAB
ALBUMIN SERPL-MCNC: 2.5 G/DL (ref 3.5–5.2)
ALP BLD-CCNC: 167 U/L (ref 35–104)
ALT SERPL-CCNC: 28 U/L (ref 0–32)
ANION GAP SERPL CALCULATED.3IONS-SCNC: 10 MMOL/L (ref 7–16)
AST SERPL-CCNC: 24 U/L (ref 0–31)
BASOPHILS ABSOLUTE: 0.02 E9/L (ref 0–0.2)
BASOPHILS RELATIVE PERCENT: 0.2 % (ref 0–2)
BILIRUB SERPL-MCNC: 0.3 MG/DL (ref 0–1.2)
BUN BLDV-MCNC: 16 MG/DL (ref 8–23)
CALCIUM SERPL-MCNC: 9 MG/DL (ref 8.6–10.2)
CHLORIDE BLD-SCNC: 100 MMOL/L (ref 98–107)
CO2: 27 MMOL/L (ref 22–29)
CREAT SERPL-MCNC: 0.5 MG/DL (ref 0.5–1)
EOSINOPHILS ABSOLUTE: 0.03 E9/L (ref 0.05–0.5)
EOSINOPHILS RELATIVE PERCENT: 0.3 % (ref 0–6)
GFR AFRICAN AMERICAN: >60
GFR NON-AFRICAN AMERICAN: >60 ML/MIN/1.73
GLUCOSE BLD-MCNC: 97 MG/DL (ref 74–109)
HCT VFR BLD CALC: 30.2 % (ref 34–48)
HEMOGLOBIN: 9.7 G/DL (ref 11.5–15.5)
IMMATURE GRANULOCYTES #: 0.17 E9/L
IMMATURE GRANULOCYTES %: 1.7 % (ref 0–5)
LYMPHOCYTES ABSOLUTE: 1.41 E9/L (ref 1.5–4)
LYMPHOCYTES RELATIVE PERCENT: 13.8 % (ref 20–42)
MCH RBC QN AUTO: 27.2 PG (ref 26–35)
MCHC RBC AUTO-ENTMCNC: 32.1 % (ref 32–34.5)
MCV RBC AUTO: 84.6 FL (ref 80–99.9)
METER GLUCOSE: 113 MG/DL (ref 70–110)
METER GLUCOSE: 114 MG/DL (ref 70–110)
METER GLUCOSE: 117 MG/DL (ref 70–110)
METER GLUCOSE: 98 MG/DL (ref 70–110)
MONOCYTES ABSOLUTE: 0.9 E9/L (ref 0.1–0.95)
MONOCYTES RELATIVE PERCENT: 8.8 % (ref 2–12)
NEUTROPHILS ABSOLUTE: 7.69 E9/L (ref 1.8–7.3)
NEUTROPHILS RELATIVE PERCENT: 75.2 % (ref 43–80)
PDW BLD-RTO: 18.6 FL (ref 11.5–15)
PLATELET # BLD: 426 E9/L (ref 130–450)
PMV BLD AUTO: 9.3 FL (ref 7–12)
POTASSIUM SERPL-SCNC: 4.7 MMOL/L (ref 3.5–5)
RBC # BLD: 3.57 E12/L (ref 3.5–5.5)
SODIUM BLD-SCNC: 137 MMOL/L (ref 132–146)
TOTAL PROTEIN: 6.6 G/DL (ref 6.4–8.3)
WBC # BLD: 10.2 E9/L (ref 4.5–11.5)

## 2018-10-28 PROCEDURE — 82962 GLUCOSE BLOOD TEST: CPT

## 2018-10-28 PROCEDURE — 6360000002 HC RX W HCPCS: Performed by: SURGERY

## 2018-10-28 PROCEDURE — 6370000000 HC RX 637 (ALT 250 FOR IP): Performed by: SURGERY

## 2018-10-28 PROCEDURE — 85025 COMPLETE CBC W/AUTO DIFF WBC: CPT

## 2018-10-28 PROCEDURE — 80053 COMPREHEN METABOLIC PANEL: CPT

## 2018-10-28 PROCEDURE — 2580000003 HC RX 258: Performed by: INTERNAL MEDICINE

## 2018-10-28 PROCEDURE — 36592 COLLECT BLOOD FROM PICC: CPT

## 2018-10-28 PROCEDURE — 6370000000 HC RX 637 (ALT 250 FOR IP): Performed by: INTERNAL MEDICINE

## 2018-10-28 PROCEDURE — 2580000003 HC RX 258: Performed by: SURGERY

## 2018-10-28 PROCEDURE — 2500000003 HC RX 250 WO HCPCS: Performed by: INTERNAL MEDICINE

## 2018-10-28 PROCEDURE — 6360000002 HC RX W HCPCS: Performed by: INTERNAL MEDICINE

## 2018-10-28 PROCEDURE — 2060000000 HC ICU INTERMEDIATE R&B

## 2018-10-28 PROCEDURE — C9113 INJ PANTOPRAZOLE SODIUM, VIA: HCPCS | Performed by: SURGERY

## 2018-10-28 PROCEDURE — 36415 COLL VENOUS BLD VENIPUNCTURE: CPT

## 2018-10-28 RX ADMIN — MEROPENEM 1 G: 1 INJECTION, POWDER, FOR SOLUTION INTRAVENOUS at 03:12

## 2018-10-28 RX ADMIN — FLUCONAZOLE 200 MG: 2 INJECTION, SOLUTION INTRAVENOUS at 18:22

## 2018-10-28 RX ADMIN — Medication 300 UNITS: at 20:33

## 2018-10-28 RX ADMIN — SENNA 8.6 MG: 8.6 TABLET, COATED ORAL at 20:33

## 2018-10-28 RX ADMIN — Medication 10 ML: at 20:33

## 2018-10-28 RX ADMIN — Medication 300 UNITS: at 08:03

## 2018-10-28 RX ADMIN — MEROPENEM 1 G: 1 INJECTION, POWDER, FOR SOLUTION INTRAVENOUS at 11:49

## 2018-10-28 RX ADMIN — MEROPENEM 1 G: 1 INJECTION, POWDER, FOR SOLUTION INTRAVENOUS at 17:38

## 2018-10-28 RX ADMIN — PANTOPRAZOLE SODIUM 40 MG: 40 INJECTION, POWDER, FOR SOLUTION INTRAVENOUS at 08:02

## 2018-10-28 RX ADMIN — SERTRALINE HYDROCHLORIDE 50 MG: 50 TABLET ORAL at 08:02

## 2018-10-28 RX ADMIN — HEPARIN SODIUM 5000 UNITS: 5000 INJECTION INTRAVENOUS; SUBCUTANEOUS at 05:57

## 2018-10-28 RX ADMIN — Medication 10 ML: at 08:03

## 2018-10-28 RX ADMIN — CALCIUM GLUCONATE: 98 INJECTION, SOLUTION INTRAVENOUS at 17:57

## 2018-10-28 RX ADMIN — HEPARIN SODIUM 5000 UNITS: 5000 INJECTION INTRAVENOUS; SUBCUTANEOUS at 15:17

## 2018-10-28 RX ADMIN — ONDANSETRON 4 MG: 2 INJECTION INTRAMUSCULAR; INTRAVENOUS at 10:56

## 2018-10-28 RX ADMIN — DOCUSATE SODIUM 100 MG: 100 CAPSULE, LIQUID FILLED ORAL at 08:02

## 2018-10-28 ASSESSMENT — PAIN SCALES - GENERAL
PAINLEVEL_OUTOF10: 0

## 2018-10-28 NOTE — PLAN OF CARE
Problem: Falls - Risk of:  Goal: Will remain free from falls  Will remain free from falls   Outcome: Ongoing      Problem: Risk for Impaired Skin Integrity  Goal: Tissue integrity - skin and mucous membranes  Structural intactness and normal physiological function of skin and  mucous membranes. Outcome: Ongoing      Problem: Pain:  Goal: Pain level will decrease  Pain level will decrease   Outcome: Ongoing      Problem: Nutrition  Goal: Optimal nutrition therapy  Outcome: Ongoing      Problem:  Bowel Function - Altered:  Goal: Bowel elimination is within specified parameters  Bowel elimination is within specified parameters   Outcome: Ongoing      Problem: Cardiac Output - Decreased:  Goal: Hemodynamic stability will improve  Hemodynamic stability will improve   Outcome: Ongoing      Problem: Fluid Volume - Imbalance:  Goal: Absence of imbalanced fluid volume signs and symptoms  Absence of imbalanced fluid volume signs and symptoms   Outcome: Ongoing      Problem: Nutrition Deficit:  Goal: Ability to achieve adequate nutritional intake will improve  Ability to achieve adequate nutritional intake will improve   Outcome: Ongoing      Problem: Serum Glucose Level - Abnormal:  Goal: Ability to maintain appropriate glucose levels will improve to within specified parameters  Ability to maintain appropriate glucose levels will improve to within specified parameters   Outcome: Ongoing

## 2018-10-28 NOTE — PROGRESS NOTES
PRN  acetaminophen (TYLENOL) tablet 650 mg, 650 mg, Oral, Q4H PRN  ondansetron (ZOFRAN) injection 4 mg, 4 mg, Intravenous, Q6H PRN  pantoprazole (PROTONIX) injection 40 mg, 40 mg, Intravenous, Daily **AND** sodium chloride (PF) 0.9 % injection 10 mL, 10 mL, Intravenous, Daily  heparin (porcine) injection 5,000 Units, 5,000 Units, Subcutaneous, 3 times per day  sertraline (ZOLOFT) tablet 50 mg, 50 mg, Oral, Daily  ipratropium-albuterol (DUONEB) nebulizer solution 1 ampule, 1 ampule, Inhalation, Q4H PRN  butamben-tetracaine-benzocaine (CETACAINE) spray 1 spray, 1 spray, Topical, Once  benzocaine-menthol (CEPACOL SORE THROAT) lozenge 1 lozenge, 1 lozenge, Oral, Q2H PRN    Objective:    /62   Pulse 80   Temp 98.4 °F (36.9 °C) (Oral)   Resp 18   Ht 5' 6\" (1.676 m)   Wt 125 lb (56.7 kg)   SpO2 93%   BMI 20.18 kg/m²   In: 938 [P.O.:100]  Out: 3850    In: 938   Out: 1454 [Urine:3500]   RRR, 1/6 MR murmurs, no gallops, or rubs.   CTA bilaterally, no wheeze, rales or rhonchi  bowel sounds present, nontender, distended, no masses  No clubbing, cyanosis, or edema  No neuro changes     CBC:   Lab Results   Component Value Date    WBC 10.2 10/28/2018    RBC 3.57 10/28/2018    HGB 9.7 10/28/2018    HCT 30.2 10/28/2018    MCV 84.6 10/28/2018    MCH 27.2 10/28/2018    MCHC 32.1 10/28/2018    RDW 18.6 10/28/2018     10/28/2018    MPV 9.3 10/28/2018     CBC with Differential:    Lab Results   Component Value Date    WBC 10.2 10/28/2018    RBC 3.57 10/28/2018    HGB 9.7 10/28/2018    HCT 30.2 10/28/2018     10/28/2018    MCV 84.6 10/28/2018    MCH 27.2 10/28/2018    MCHC 32.1 10/28/2018    RDW 18.6 10/28/2018    NRBC 0.5 11/30/2017    BLASTSPCT 1.0 11/06/2017    METASPCT 0.9 10/18/2018    LYMPHOPCT 13.8 10/28/2018    PROMYELOPCT 0.5 11/26/2017    MONOPCT 8.8 10/28/2018    MYELOPCT 1.7 10/18/2018    BASOPCT 0.2 10/28/2018    MONOSABS 0.90 10/28/2018    LYMPHSABS 1.41 10/28/2018    EOSABS 0.03 10/28/2018

## 2018-10-28 NOTE — PROGRESS NOTES
on the patient's previous CT scan of 10/17/2018 and is   unchanged. XR Chest Abdomen Ng Placement   Final Result   Nasogastric tube tip in appropriate position. XR CHEST PORTABLE   Final Result   Tip of the NG tube is above the gastroesophageal junction. Gastric distention. XR ABDOMEN (KUB) (SINGLE AP VIEW)   Final Result   1. Distended loops of small bowel which are similar in the degree of   distention when compared to 10/17/2018 CT scan. 2. Residual contrast within the ascending colon from the CT scan   performed on the same day. The colon is not distended. RADIOLOGY REPORT   Final Result      CT ABDOMEN PELVIS W IV CONTRAST Additional Contrast? Oral   Final Result   1. Postsurgical changes from a left midabdominal colostomy with   contrast and stool within the colon distal to the ostomy. 2. Redemonstration of diffuse infiltration of the perirectal and   presacral soft tissues with interval increase in number and size of   air-filled collections, with a collection extending into the left   piriformis muscle. 3. Mildly distended loops of small bowel, possibly related to ileus. 4. Diffuse increased sclerosis of the sacrum with periosteal reaction   along the presacral collection, suggestive of chronic osteomyelitis. There are insufficiency fractures through the superior endplate of S1   and left sacral ala. 5. Nondisplaced fracture with increased sclerosis within the left   pubis. 6. Moderate bilateral hydroureteronephrosis without a definite   obstructing calculus. The distal ureters are not well visualized. Findings were discussed with Dr. Shady Mcnair on 10/17/2018 at   9:34 AM.      XR Chest Abdomen Ng Placement   Final Result   Nasogastric tube tip in appropriate position.            insulin lispro  0-6 Units Subcutaneous 4x Daily AC & HS    docusate sodium  100 mg Oral Daily    senna  1 tablet Oral Nightly    sodium chloride flush  10 mL Intravenous 2 times per

## 2018-10-29 ENCOUNTER — HOSPITAL ENCOUNTER (OUTPATIENT)
Age: 78
Discharge: HOME | End: 2018-12-07
Attending: INTERNAL MEDICINE | Admitting: INTERNAL MEDICINE
Payer: MEDICARE

## 2018-10-29 VITALS
RESPIRATION RATE: 18 BRPM | BODY MASS INDEX: 20.09 KG/M2 | WEIGHT: 125 LBS | TEMPERATURE: 98.3 F | SYSTOLIC BLOOD PRESSURE: 108 MMHG | HEART RATE: 86 BPM | OXYGEN SATURATION: 94 % | HEIGHT: 66 IN | DIASTOLIC BLOOD PRESSURE: 58 MMHG

## 2018-10-29 DIAGNOSIS — R60.9 SWELLING: ICD-10-CM

## 2018-10-29 DIAGNOSIS — N32.89 BLADDER MASS: ICD-10-CM

## 2018-10-29 DIAGNOSIS — M79.89 LEG SWELLING: ICD-10-CM

## 2018-10-29 DIAGNOSIS — N89.8 VAGINAL DISCHARGE: ICD-10-CM

## 2018-10-29 DIAGNOSIS — R68.84 JAW PAIN: ICD-10-CM

## 2018-10-29 LAB
ANION GAP SERPL CALCULATED.3IONS-SCNC: 11 MMOL/L (ref 7–16)
BASOPHILS ABSOLUTE: 0.03 E9/L (ref 0–0.2)
BASOPHILS RELATIVE PERCENT: 0.3 % (ref 0–2)
BUN BLDV-MCNC: 21 MG/DL (ref 8–23)
CALCIUM SERPL-MCNC: 9.2 MG/DL (ref 8.6–10.2)
CHLORIDE BLD-SCNC: 96 MMOL/L (ref 98–107)
CO2: 28 MMOL/L (ref 22–29)
CREAT SERPL-MCNC: 0.6 MG/DL (ref 0.5–1)
EOSINOPHILS ABSOLUTE: 0.03 E9/L (ref 0.05–0.5)
EOSINOPHILS RELATIVE PERCENT: 0.3 % (ref 0–6)
GFR AFRICAN AMERICAN: >60
GFR NON-AFRICAN AMERICAN: >60 ML/MIN/1.73
GLUCOSE BLD-MCNC: 115 MG/DL (ref 74–109)
HCT VFR BLD CALC: 29.9 % (ref 34–48)
HEMOGLOBIN: 9.3 G/DL (ref 11.5–15.5)
IMMATURE GRANULOCYTES #: 0.13 E9/L
IMMATURE GRANULOCYTES %: 1.3 % (ref 0–5)
LYMPHOCYTES ABSOLUTE: 1.15 E9/L (ref 1.5–4)
LYMPHOCYTES RELATIVE PERCENT: 11.2 % (ref 20–42)
MAGNESIUM: 2.4 MG/DL (ref 1.6–2.6)
MCH RBC QN AUTO: 26.5 PG (ref 26–35)
MCHC RBC AUTO-ENTMCNC: 31.1 % (ref 32–34.5)
MCV RBC AUTO: 85.2 FL (ref 80–99.9)
METER GLUCOSE: 111 MG/DL (ref 70–110)
METER GLUCOSE: 122 MG/DL (ref 70–110)
METER GLUCOSE: 129 MG/DL (ref 70–110)
MONOCYTES ABSOLUTE: 0.62 E9/L (ref 0.1–0.95)
MONOCYTES RELATIVE PERCENT: 6 % (ref 2–12)
NEUTROPHILS ABSOLUTE: 8.31 E9/L (ref 1.8–7.3)
NEUTROPHILS RELATIVE PERCENT: 80.9 % (ref 43–80)
PDW BLD-RTO: 18.9 FL (ref 11.5–15)
PHOSPHORUS: 3.6 MG/DL (ref 2.5–4.5)
PLATELET # BLD: 437 E9/L (ref 130–450)
PMV BLD AUTO: 9.3 FL (ref 7–12)
POTASSIUM SERPL-SCNC: 4.4 MMOL/L (ref 3.5–5)
RBC # BLD: 3.51 E12/L (ref 3.5–5.5)
SODIUM BLD-SCNC: 135 MMOL/L (ref 132–146)
WBC # BLD: 10.3 E9/L (ref 4.5–11.5)

## 2018-10-29 PROCEDURE — 84100 ASSAY OF PHOSPHORUS: CPT

## 2018-10-29 PROCEDURE — 80048 BASIC METABOLIC PNL TOTAL CA: CPT

## 2018-10-29 PROCEDURE — 2580000003 HC RX 258: Performed by: INTERNAL MEDICINE

## 2018-10-29 PROCEDURE — 36415 COLL VENOUS BLD VENIPUNCTURE: CPT

## 2018-10-29 PROCEDURE — 6360000002 HC RX W HCPCS: Performed by: INTERNAL MEDICINE

## 2018-10-29 PROCEDURE — 6360000002 HC RX W HCPCS: Performed by: SURGERY

## 2018-10-29 PROCEDURE — 2580000003 HC RX 258: Performed by: SURGERY

## 2018-10-29 PROCEDURE — 97530 THERAPEUTIC ACTIVITIES: CPT

## 2018-10-29 PROCEDURE — 6370000000 HC RX 637 (ALT 250 FOR IP): Performed by: SURGERY

## 2018-10-29 PROCEDURE — 6370000000 HC RX 637 (ALT 250 FOR IP): Performed by: INTERNAL MEDICINE

## 2018-10-29 PROCEDURE — 85025 COMPLETE CBC W/AUTO DIFF WBC: CPT

## 2018-10-29 PROCEDURE — 97110 THERAPEUTIC EXERCISES: CPT

## 2018-10-29 PROCEDURE — 82962 GLUCOSE BLOOD TEST: CPT

## 2018-10-29 PROCEDURE — 83735 ASSAY OF MAGNESIUM: CPT

## 2018-10-29 PROCEDURE — C9113 INJ PANTOPRAZOLE SODIUM, VIA: HCPCS | Performed by: SURGERY

## 2018-10-29 RX ORDER — PROMETHAZINE HYDROCHLORIDE 25 MG/ML
12.5 INJECTION, SOLUTION INTRAMUSCULAR; INTRAVENOUS EVERY 6 HOURS PRN
DISCHARGE
Start: 2018-10-29 | End: 2021-07-16 | Stop reason: ALTCHOICE

## 2018-10-29 RX ORDER — FLUCONAZOLE 2 MG/ML
200 INJECTION, SOLUTION INTRAVENOUS EVERY 24 HOURS
DISCHARGE
Start: 2018-10-29 | End: 2021-07-16 | Stop reason: ALTCHOICE

## 2018-10-29 RX ORDER — HEPARIN SODIUM 5000 [USP'U]/ML
5000 INJECTION, SOLUTION INTRAVENOUS; SUBCUTANEOUS EVERY 8 HOURS SCHEDULED
DISCHARGE
Start: 2018-10-29 | End: 2021-07-16 | Stop reason: ALTCHOICE

## 2018-10-29 RX ORDER — PANTOPRAZOLE SODIUM 40 MG/10ML
40 INJECTION, POWDER, LYOPHILIZED, FOR SOLUTION INTRAVENOUS DAILY
DISCHARGE
Start: 2018-10-30 | End: 2021-07-16 | Stop reason: ALTCHOICE

## 2018-10-29 RX ORDER — SENNA PLUS 8.6 MG/1
1 TABLET ORAL NIGHTLY
Qty: 30 TABLET | Refills: 0 | Status: ON HOLD | OUTPATIENT
Start: 2018-10-29 | End: 2018-12-04 | Stop reason: HOSPADM

## 2018-10-29 RX ORDER — PSEUDOEPHEDRINE HCL 30 MG
100 TABLET ORAL DAILY
COMMUNITY
Start: 2018-10-30 | End: 2021-07-16 | Stop reason: ALTCHOICE

## 2018-10-29 RX ADMIN — DOCUSATE SODIUM 100 MG: 100 CAPSULE, LIQUID FILLED ORAL at 08:25

## 2018-10-29 RX ADMIN — SERTRALINE HYDROCHLORIDE 50 MG: 50 TABLET ORAL at 08:25

## 2018-10-29 RX ADMIN — Medication 10 ML: at 08:26

## 2018-10-29 RX ADMIN — MEROPENEM 1 G: 1 INJECTION, POWDER, FOR SOLUTION INTRAVENOUS at 11:07

## 2018-10-29 RX ADMIN — ONDANSETRON 4 MG: 2 INJECTION INTRAMUSCULAR; INTRAVENOUS at 14:47

## 2018-10-29 RX ADMIN — PANTOPRAZOLE SODIUM 40 MG: 40 INJECTION, POWDER, FOR SOLUTION INTRAVENOUS at 08:25

## 2018-10-29 RX ADMIN — HEPARIN SODIUM 5000 UNITS: 5000 INJECTION INTRAVENOUS; SUBCUTANEOUS at 05:14

## 2018-10-29 RX ADMIN — Medication 300 UNITS: at 08:26

## 2018-10-29 RX ADMIN — MEROPENEM 1 G: 1 INJECTION, POWDER, FOR SOLUTION INTRAVENOUS at 02:23

## 2018-10-29 ASSESSMENT — PAIN SCALES - GENERAL: PAINLEVEL_OUTOF10: 0

## 2018-10-29 NOTE — PROGRESS NOTES
PHYSICAL THERAPY TREATMENT NOTE      [id number]   Patient Name: Rhys Ge         [id number]                              1940    10/29/2018                       Recommendation for discharge: subacute    Equipment prescriptions needed: to be determined     AM-PAC Basic Mobility     AM-PAC Mobility Inpatient   How much difficulty turning over in bed?: A Lot  How much difficulty sitting down on / standing up from a chair with arms?: A Little  How much difficulty moving from lying on back to sitting on side of bed?: A Lot  How much help from another person moving to and from a bed to a chair?: A Lot  How much help from another person needed to walk in hospital room?: A Lot  How much help from another person for climbing 3-5 steps with a railing?: Total  AM-PAC Inpatient Mobility Raw Score : 12  AM-PAC Inpatient T-Scale Score : 35.33  Mobility Inpatient CMS 0-100% Score: 68.66  Mobility Inpatient CMS G-Code Modifier : CL      Patient Active Problem List   Diagnosis    Rectal cancer ([address]    Pneumatosis intestinalis    Macular degeneration    Enteritis    Severe protein-calorie malnutrition ([address]    Macular degeneration    PAF (paroxysmal atrial fibrillation) ([address]    Ischemic bowel disease ([address]    Altered mental status    Fatigue due to excessive exertion    PAF (paroxysmal atrial fibrillation) ([address]    Macular degeneration    Ischemic bowel disease ([address]    Hx of blood clots    Cancer ([address]    Perforation of sigmoid colon ([address]    PAF (paroxysmal atrial fibrillation) ([address]    Ischemic bowel disease ([address]    Hx of blood clots    Cancer ([address]       Precautions: falls, c/p colostomy, drain      S: Patient cleared by nursing for treatment. Patient is agreeable to treatment. Pt c/o nausea. Nursing was notified and provided nausea meds. Pain status: (measured on a visual analog scale with 0=no pain and 10=excruciating pain) No number assigned to pain/10.    O: Pt was instructed in and

## 2018-10-29 NOTE — PROGRESS NOTES
GENERAL SURGERY  DAILY PROGRESS NOTE  10/29/2018    Subjective:  Laying in bed. States her abdominal pain is minimal. Nausea is improving.      Objective:  BP (!) 108/58   Pulse 86   Temp 98.3 °F (36.8 °C) (Oral)   Resp 18   Ht 5' 6\" (1.676 m)   Wt 125 lb (56.7 kg)   SpO2 94%   BMI 20.18 kg/m²     GENERAL:  Laying in bed, awake, alert, cooperative, no apparent distress  LUNGS:  No increased work of breathing  CARDIOVASCULAR:  Regular rate   ABDOMEN:  Soft, non-tender,  Less distended, ostomy pink w/ stool in bag      Assessment/Plan:  [de-identified] y.o. 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

## 2018-10-29 NOTE — DISCHARGE SUMMARY
scan of 10/17/2018 and is   unchanged. XR Chest Abdomen Ng Placement   Final Result   Nasogastric tube tip in appropriate position. XR CHEST PORTABLE   Final Result   Tip of the NG tube is above the gastroesophageal junction. Gastric distention. XR ABDOMEN (KUB) (SINGLE AP VIEW)   Final Result   1. Distended loops of small bowel which are similar in the degree of   distention when compared to 10/17/2018 CT scan. 2. Residual contrast within the ascending colon from the CT scan   performed on the same day. The colon is not distended. RADIOLOGY REPORT   Final Result      CT ABDOMEN PELVIS W IV CONTRAST Additional Contrast? Oral   Final Result   1. Postsurgical changes from a left midabdominal colostomy with   contrast and stool within the colon distal to the ostomy. 2. Redemonstration of diffuse infiltration of the perirectal and   presacral soft tissues with interval increase in number and size of   air-filled collections, with a collection extending into the left   piriformis muscle. 3. Mildly distended loops of small bowel, possibly related to ileus. 4. Diffuse increased sclerosis of the sacrum with periosteal reaction   along the presacral collection, suggestive of chronic osteomyelitis. There are insufficiency fractures through the superior endplate of S1   and left sacral ala. 5. Nondisplaced fracture with increased sclerosis within the left   pubis. 6. Moderate bilateral hydroureteronephrosis without a definite   obstructing calculus. The distal ureters are not well visualized. Findings were discussed with Dr. Alma Bear on 10/17/2018 at   9:34 AM.      XR Chest Abdomen Ng Placement   Final Result   Nasogastric tube tip in appropriate position. Disposition  The patient's condition is fair. At this time the patient is without objective evidence of an acute process requiring continuing hospitalization or inpatient management.   They are stable for discharge

## 2018-10-30 LAB
ALBUMIN SERPL-MCNC: 2.5 G/DL (ref 3.5–5.2)
ALP BLD-CCNC: 159 U/L (ref 35–104)
ALT SERPL-CCNC: 23 U/L (ref 0–32)
ANION GAP SERPL CALCULATED.3IONS-SCNC: 8 MMOL/L (ref 7–16)
AST SERPL-CCNC: 21 U/L (ref 0–31)
BILIRUB SERPL-MCNC: 0.3 MG/DL (ref 0–1.2)
BUN BLDV-MCNC: 24 MG/DL (ref 8–23)
CALCIUM SERPL-MCNC: 9.3 MG/DL (ref 8.6–10.2)
CHLORIDE BLD-SCNC: 98 MMOL/L (ref 98–107)
CO2: 30 MMOL/L (ref 22–29)
CREAT SERPL-MCNC: 0.6 MG/DL (ref 0.5–1)
GFR AFRICAN AMERICAN: >60
GFR NON-AFRICAN AMERICAN: >60 ML/MIN/1.73
GLUCOSE BLD-MCNC: 112 MG/DL (ref 74–109)
HCT VFR BLD CALC: 29.6 % (ref 34–48)
HEMOGLOBIN: 9.2 G/DL (ref 11.5–15.5)
MAGNESIUM: 2.5 MG/DL (ref 1.6–2.6)
MCH RBC QN AUTO: 26.4 PG (ref 26–35)
MCHC RBC AUTO-ENTMCNC: 31.1 % (ref 32–34.5)
MCV RBC AUTO: 84.8 FL (ref 80–99.9)
PDW BLD-RTO: 19 FL (ref 11.5–15)
PHOSPHORUS: 3.8 MG/DL (ref 2.5–4.5)
PLATELET # BLD: 446 E9/L (ref 130–450)
PMV BLD AUTO: 9.1 FL (ref 7–12)
POTASSIUM SERPL-SCNC: 4.7 MMOL/L (ref 3.5–5)
PREALBUMIN: 14 MG/DL (ref 20–40)
RBC # BLD: 3.49 E12/L (ref 3.5–5.5)
SODIUM BLD-SCNC: 136 MMOL/L (ref 132–146)
TOTAL PROTEIN: 6.8 G/DL (ref 6.4–8.3)
WBC # BLD: 8.8 E9/L (ref 4.5–11.5)

## 2018-10-30 PROCEDURE — 83735 ASSAY OF MAGNESIUM: CPT

## 2018-10-30 PROCEDURE — 84134 ASSAY OF PREALBUMIN: CPT

## 2018-10-30 PROCEDURE — 85027 COMPLETE CBC AUTOMATED: CPT

## 2018-10-30 PROCEDURE — 80053 COMPREHEN METABOLIC PANEL: CPT

## 2018-10-30 PROCEDURE — 84100 ASSAY OF PHOSPHORUS: CPT

## 2018-11-01 ENCOUNTER — APPOINTMENT (OUTPATIENT)
Dept: GENERAL RADIOLOGY | Age: 78
End: 2018-11-01
Attending: INTERNAL MEDICINE
Payer: MEDICARE

## 2018-11-01 LAB
ALBUMIN SERPL-MCNC: 2.9 G/DL (ref 3.5–5.2)
ALP BLD-CCNC: 171 U/L (ref 35–104)
ALT SERPL-CCNC: 21 U/L (ref 0–32)
ANION GAP SERPL CALCULATED.3IONS-SCNC: 13 MMOL/L (ref 7–16)
AST SERPL-CCNC: 19 U/L (ref 0–31)
BILIRUB SERPL-MCNC: 0.3 MG/DL (ref 0–1.2)
BUN BLDV-MCNC: 27 MG/DL (ref 8–23)
CALCIUM SERPL-MCNC: 10.1 MG/DL (ref 8.6–10.2)
CHLORIDE BLD-SCNC: 96 MMOL/L (ref 98–107)
CO2: 26 MMOL/L (ref 22–29)
CREAT SERPL-MCNC: 0.6 MG/DL (ref 0.5–1)
GFR AFRICAN AMERICAN: >60
GFR NON-AFRICAN AMERICAN: >60 ML/MIN/1.73
GLUCOSE BLD-MCNC: 121 MG/DL (ref 74–109)
HCT VFR BLD CALC: 32.7 % (ref 34–48)
HEMOGLOBIN: 10.2 G/DL (ref 11.5–15.5)
MCH RBC QN AUTO: 26.6 PG (ref 26–35)
MCHC RBC AUTO-ENTMCNC: 31.2 % (ref 32–34.5)
MCV RBC AUTO: 85.2 FL (ref 80–99.9)
PDW BLD-RTO: 19.1 FL (ref 11.5–15)
PLATELET # BLD: 506 E9/L (ref 130–450)
PMV BLD AUTO: 9.2 FL (ref 7–12)
POTASSIUM SERPL-SCNC: 5.1 MMOL/L (ref 3.5–5)
PREALBUMIN: 18 MG/DL (ref 20–40)
RBC # BLD: 3.84 E12/L (ref 3.5–5.5)
SODIUM BLD-SCNC: 135 MMOL/L (ref 132–146)
TOTAL PROTEIN: 7.7 G/DL (ref 6.4–8.3)
WBC # BLD: 9.2 E9/L (ref 4.5–11.5)

## 2018-11-01 PROCEDURE — 80053 COMPREHEN METABOLIC PANEL: CPT

## 2018-11-01 PROCEDURE — 85027 COMPLETE CBC AUTOMATED: CPT

## 2018-11-01 PROCEDURE — 70110 X-RAY EXAM OF JAW 4/> VIEWS: CPT

## 2018-11-01 PROCEDURE — 84134 ASSAY OF PREALBUMIN: CPT

## 2018-11-02 LAB
ALBUMIN SERPL-MCNC: 2.7 G/DL (ref 3.5–5.2)
ALP BLD-CCNC: 167 U/L (ref 35–104)
ALT SERPL-CCNC: 22 U/L (ref 0–32)
ANION GAP SERPL CALCULATED.3IONS-SCNC: 8 MMOL/L (ref 7–16)
AST SERPL-CCNC: 19 U/L (ref 0–31)
BILIRUB SERPL-MCNC: 0.3 MG/DL (ref 0–1.2)
BUN BLDV-MCNC: 30 MG/DL (ref 8–23)
CALCIUM SERPL-MCNC: 9.8 MG/DL (ref 8.6–10.2)
CHLORIDE BLD-SCNC: 96 MMOL/L (ref 98–107)
CO2: 30 MMOL/L (ref 22–29)
CREAT SERPL-MCNC: 0.6 MG/DL (ref 0.5–1)
GFR AFRICAN AMERICAN: >60
GFR NON-AFRICAN AMERICAN: >60 ML/MIN/1.73
GLUCOSE BLD-MCNC: 113 MG/DL (ref 74–109)
MAGNESIUM: 2.4 MG/DL (ref 1.6–2.6)
PHOSPHORUS: 4.1 MG/DL (ref 2.5–4.5)
POTASSIUM SERPL-SCNC: 4.7 MMOL/L (ref 3.5–5)
SODIUM BLD-SCNC: 134 MMOL/L (ref 132–146)
TOTAL PROTEIN: 7.5 G/DL (ref 6.4–8.3)

## 2018-11-02 PROCEDURE — 80053 COMPREHEN METABOLIC PANEL: CPT

## 2018-11-02 PROCEDURE — 83735 ASSAY OF MAGNESIUM: CPT

## 2018-11-02 PROCEDURE — 84100 ASSAY OF PHOSPHORUS: CPT

## 2018-11-04 LAB
ANION GAP SERPL CALCULATED.3IONS-SCNC: 10 MMOL/L (ref 7–16)
BUN BLDV-MCNC: 30 MG/DL (ref 8–23)
CALCIUM SERPL-MCNC: 10.3 MG/DL (ref 8.6–10.2)
CHLORIDE BLD-SCNC: 96 MMOL/L (ref 98–107)
CO2: 29 MMOL/L (ref 22–29)
CREAT SERPL-MCNC: 0.6 MG/DL (ref 0.5–1)
GFR AFRICAN AMERICAN: >60
GFR NON-AFRICAN AMERICAN: >60 ML/MIN/1.73
GLUCOSE BLD-MCNC: 94 MG/DL (ref 74–109)
HCT VFR BLD CALC: 32.1 % (ref 34–48)
HEMOGLOBIN: 9.9 G/DL (ref 11.5–15.5)
POTASSIUM SERPL-SCNC: 4.8 MMOL/L (ref 3.5–5)
SODIUM BLD-SCNC: 135 MMOL/L (ref 132–146)

## 2018-11-04 PROCEDURE — 85014 HEMATOCRIT: CPT

## 2018-11-04 PROCEDURE — 85018 HEMOGLOBIN: CPT

## 2018-11-04 PROCEDURE — 80048 BASIC METABOLIC PNL TOTAL CA: CPT

## 2018-11-06 LAB
ALBUMIN SERPL-MCNC: 3.2 G/DL (ref 3.5–5.2)
ALP BLD-CCNC: 161 U/L (ref 35–104)
ALT SERPL-CCNC: 20 U/L (ref 0–32)
ANION GAP SERPL CALCULATED.3IONS-SCNC: 10 MMOL/L (ref 7–16)
AST SERPL-CCNC: 20 U/L (ref 0–31)
BILIRUB SERPL-MCNC: 0.2 MG/DL (ref 0–1.2)
BUN BLDV-MCNC: 32 MG/DL (ref 8–23)
CALCIUM SERPL-MCNC: 10.1 MG/DL (ref 8.6–10.2)
CHLORIDE BLD-SCNC: 97 MMOL/L (ref 98–107)
CO2: 30 MMOL/L (ref 22–29)
CREAT SERPL-MCNC: 0.7 MG/DL (ref 0.5–1)
GFR AFRICAN AMERICAN: >60
GFR NON-AFRICAN AMERICAN: >60 ML/MIN/1.73
GLUCOSE BLD-MCNC: 114 MG/DL (ref 74–99)
HCT VFR BLD CALC: 31.2 % (ref 34–48)
HEMOGLOBIN: 9.8 G/DL (ref 11.5–15.5)
MAGNESIUM: 2.3 MG/DL (ref 1.6–2.6)
MCH RBC QN AUTO: 26.9 PG (ref 26–35)
MCHC RBC AUTO-ENTMCNC: 31.4 % (ref 32–34.5)
MCV RBC AUTO: 85.7 FL (ref 80–99.9)
PDW BLD-RTO: 19 FL (ref 11.5–15)
PHOSPHORUS: 4.5 MG/DL (ref 2.5–4.5)
PLATELET # BLD: 484 E9/L (ref 130–450)
PMV BLD AUTO: 9.1 FL (ref 7–12)
POTASSIUM SERPL-SCNC: 4.7 MMOL/L (ref 3.5–5)
RBC # BLD: 3.64 E12/L (ref 3.5–5.5)
SODIUM BLD-SCNC: 137 MMOL/L (ref 132–146)
TOTAL PROTEIN: 8 G/DL (ref 6.4–8.3)
WBC # BLD: 8 E9/L (ref 4.5–11.5)

## 2018-11-06 PROCEDURE — 80053 COMPREHEN METABOLIC PANEL: CPT

## 2018-11-06 PROCEDURE — 83735 ASSAY OF MAGNESIUM: CPT

## 2018-11-06 PROCEDURE — 85027 COMPLETE CBC AUTOMATED: CPT

## 2018-11-06 PROCEDURE — 84100 ASSAY OF PHOSPHORUS: CPT

## 2018-11-09 LAB
ALBUMIN SERPL-MCNC: 3.1 G/DL (ref 3.5–5.2)
ALP BLD-CCNC: 154 U/L (ref 35–104)
ALT SERPL-CCNC: 20 U/L (ref 0–32)
ANION GAP SERPL CALCULATED.3IONS-SCNC: 12 MMOL/L (ref 7–16)
AST SERPL-CCNC: 22 U/L (ref 0–31)
BILIRUB SERPL-MCNC: 0.2 MG/DL (ref 0–1.2)
BUN BLDV-MCNC: 33 MG/DL (ref 8–23)
CALCIUM SERPL-MCNC: 10.3 MG/DL (ref 8.6–10.2)
CHLORIDE BLD-SCNC: 96 MMOL/L (ref 98–107)
CO2: 26 MMOL/L (ref 22–29)
CREAT SERPL-MCNC: 0.6 MG/DL (ref 0.5–1)
GFR AFRICAN AMERICAN: >60
GFR NON-AFRICAN AMERICAN: >60 ML/MIN/1.73
GLUCOSE BLD-MCNC: 120 MG/DL (ref 74–99)
HCT VFR BLD CALC: 33 % (ref 34–48)
HEMOGLOBIN: 10.3 G/DL (ref 11.5–15.5)
MCH RBC QN AUTO: 26.5 PG (ref 26–35)
MCHC RBC AUTO-ENTMCNC: 31.2 % (ref 32–34.5)
MCV RBC AUTO: 85.1 FL (ref 80–99.9)
PDW BLD-RTO: 19.2 FL (ref 11.5–15)
PLATELET # BLD: 472 E9/L (ref 130–450)
PMV BLD AUTO: 8.7 FL (ref 7–12)
POTASSIUM SERPL-SCNC: 5 MMOL/L (ref 3.5–5)
RBC # BLD: 3.88 E12/L (ref 3.5–5.5)
SODIUM BLD-SCNC: 134 MMOL/L (ref 132–146)
TOTAL PROTEIN: 7.8 G/DL (ref 6.4–8.3)
WBC # BLD: 6.6 E9/L (ref 4.5–11.5)

## 2018-11-09 PROCEDURE — 85027 COMPLETE CBC AUTOMATED: CPT

## 2018-11-09 PROCEDURE — 80053 COMPREHEN METABOLIC PANEL: CPT

## 2018-11-10 LAB
ALBUMIN SERPL-MCNC: 3.3 G/DL (ref 3.5–5.2)
ALP BLD-CCNC: 155 U/L (ref 35–104)
ALT SERPL-CCNC: 21 U/L (ref 0–32)
ANION GAP SERPL CALCULATED.3IONS-SCNC: 11 MMOL/L (ref 7–16)
AST SERPL-CCNC: 21 U/L (ref 0–31)
BACTERIA: ABNORMAL /HPF
BILIRUB SERPL-MCNC: 0.2 MG/DL (ref 0–1.2)
BILIRUBIN URINE: NEGATIVE
BLOOD, URINE: NEGATIVE
BUN BLDV-MCNC: 31 MG/DL (ref 8–23)
CALCIUM SERPL-MCNC: 10.2 MG/DL (ref 8.6–10.2)
CHLORIDE BLD-SCNC: 98 MMOL/L (ref 98–107)
CLARITY: CLEAR
CO2: 28 MMOL/L (ref 22–29)
COLOR: YELLOW
CREAT SERPL-MCNC: 0.6 MG/DL (ref 0.5–1)
EPITHELIAL CELLS, UA: ABNORMAL /HPF
GFR AFRICAN AMERICAN: >60
GFR NON-AFRICAN AMERICAN: >60 ML/MIN/1.73
GLUCOSE BLD-MCNC: 120 MG/DL (ref 74–99)
GLUCOSE URINE: NEGATIVE MG/DL
HCT VFR BLD CALC: 30.9 % (ref 34–48)
HEMOGLOBIN: 10 G/DL (ref 11.5–15.5)
KETONES, URINE: NEGATIVE MG/DL
LEUKOCYTE ESTERASE, URINE: ABNORMAL
MCH RBC QN AUTO: 27.5 PG (ref 26–35)
MCHC RBC AUTO-ENTMCNC: 32.4 % (ref 32–34.5)
MCV RBC AUTO: 85.1 FL (ref 80–99.9)
NITRITE, URINE: NEGATIVE
PDW BLD-RTO: 19.5 FL (ref 11.5–15)
PH UA: 7 (ref 5–9)
PLATELET # BLD: 459 E9/L (ref 130–450)
PMV BLD AUTO: 9.1 FL (ref 7–12)
POTASSIUM SERPL-SCNC: 4.5 MMOL/L (ref 3.5–5)
PROTEIN UA: NEGATIVE MG/DL
RBC # BLD: 3.63 E12/L (ref 3.5–5.5)
RBC UA: ABNORMAL /HPF (ref 0–2)
SODIUM BLD-SCNC: 137 MMOL/L (ref 132–146)
SPECIFIC GRAVITY UA: 1.01 (ref 1–1.03)
TOTAL PROTEIN: 7.9 G/DL (ref 6.4–8.3)
UROBILINOGEN, URINE: 0.2 E.U./DL
WBC # BLD: 7.7 E9/L (ref 4.5–11.5)
WBC UA: ABNORMAL /HPF (ref 0–5)

## 2018-11-10 PROCEDURE — 85027 COMPLETE CBC AUTOMATED: CPT

## 2018-11-10 PROCEDURE — 81001 URINALYSIS AUTO W/SCOPE: CPT

## 2018-11-10 PROCEDURE — 80053 COMPREHEN METABOLIC PANEL: CPT

## 2018-11-11 LAB
BACTERIA: ABNORMAL /HPF
BILIRUBIN URINE: NEGATIVE
BLOOD, URINE: ABNORMAL
CLARITY: ABNORMAL
COLOR: YELLOW
EPITHELIAL CELLS, UA: ABNORMAL /HPF
GLUCOSE URINE: NEGATIVE MG/DL
KETONES, URINE: NEGATIVE MG/DL
LEUKOCYTE ESTERASE, URINE: ABNORMAL
NITRITE, URINE: POSITIVE
PH UA: 7.5 (ref 5–9)
PROTEIN UA: NEGATIVE MG/DL
RBC UA: ABNORMAL /HPF (ref 0–2)
SPECIFIC GRAVITY UA: 1.01 (ref 1–1.03)
UROBILINOGEN, URINE: 0.2 E.U./DL
WBC UA: >20 /HPF (ref 0–5)

## 2018-11-11 PROCEDURE — 81001 URINALYSIS AUTO W/SCOPE: CPT

## 2018-11-11 PROCEDURE — 87186 SC STD MICRODIL/AGAR DIL: CPT

## 2018-11-11 PROCEDURE — 87088 URINE BACTERIA CULTURE: CPT

## 2018-11-12 ENCOUNTER — APPOINTMENT (OUTPATIENT)
Dept: ULTRASOUND IMAGING | Age: 78
End: 2018-11-12
Attending: INTERNAL MEDICINE
Payer: MEDICARE

## 2018-11-12 PROCEDURE — 76857 US EXAM PELVIC LIMITED: CPT

## 2018-11-13 LAB
ALBUMIN SERPL-MCNC: 3.3 G/DL (ref 3.5–5.2)
ALP BLD-CCNC: 185 U/L (ref 35–104)
ALT SERPL-CCNC: 31 U/L (ref 0–32)
ANION GAP SERPL CALCULATED.3IONS-SCNC: 11 MMOL/L (ref 7–16)
AST SERPL-CCNC: 24 U/L (ref 0–31)
BILIRUB SERPL-MCNC: 0.4 MG/DL (ref 0–1.2)
BUN BLDV-MCNC: 29 MG/DL (ref 8–23)
CALCIUM SERPL-MCNC: 10 MG/DL (ref 8.6–10.2)
CHLORIDE BLD-SCNC: 94 MMOL/L (ref 98–107)
CO2: 29 MMOL/L (ref 22–29)
CREAT SERPL-MCNC: 0.6 MG/DL (ref 0.5–1)
GFR AFRICAN AMERICAN: >60
GFR NON-AFRICAN AMERICAN: >60 ML/MIN/1.73
GLUCOSE BLD-MCNC: 120 MG/DL (ref 74–99)
HCT VFR BLD CALC: 29.1 % (ref 34–48)
HEMOGLOBIN: 9.4 G/DL (ref 11.5–15.5)
MCH RBC QN AUTO: 27.7 PG (ref 26–35)
MCHC RBC AUTO-ENTMCNC: 32.3 % (ref 32–34.5)
MCV RBC AUTO: 85.8 FL (ref 80–99.9)
PDW BLD-RTO: 19.8 FL (ref 11.5–15)
PLATELET # BLD: 364 E9/L (ref 130–450)
PMV BLD AUTO: 9.3 FL (ref 7–12)
POTASSIUM SERPL-SCNC: 4.1 MMOL/L (ref 3.5–5)
PREALBUMIN: 20 MG/DL (ref 20–40)
RBC # BLD: 3.39 E12/L (ref 3.5–5.5)
SODIUM BLD-SCNC: 134 MMOL/L (ref 132–146)
TOTAL PROTEIN: 7.6 G/DL (ref 6.4–8.3)
WBC # BLD: 13.2 E9/L (ref 4.5–11.5)

## 2018-11-13 PROCEDURE — 84134 ASSAY OF PREALBUMIN: CPT

## 2018-11-13 PROCEDURE — 80053 COMPREHEN METABOLIC PANEL: CPT

## 2018-11-13 PROCEDURE — 87040 BLOOD CULTURE FOR BACTERIA: CPT

## 2018-11-13 PROCEDURE — 85027 COMPLETE CBC AUTOMATED: CPT

## 2018-11-14 PROCEDURE — 87040 BLOOD CULTURE FOR BACTERIA: CPT

## 2018-11-15 LAB
ANION GAP SERPL CALCULATED.3IONS-SCNC: 11 MMOL/L (ref 7–16)
BUN BLDV-MCNC: 27 MG/DL (ref 8–23)
CALCIUM SERPL-MCNC: 10 MG/DL (ref 8.6–10.2)
CHLORIDE BLD-SCNC: 98 MMOL/L (ref 98–107)
CO2: 29 MMOL/L (ref 22–29)
CREAT SERPL-MCNC: 0.6 MG/DL (ref 0.5–1)
GFR AFRICAN AMERICAN: >60
GFR NON-AFRICAN AMERICAN: >60 ML/MIN/1.73
GLUCOSE BLD-MCNC: 105 MG/DL (ref 74–99)
HCT VFR BLD CALC: 29.9 % (ref 34–48)
HEMOGLOBIN: 9.2 G/DL (ref 11.5–15.5)
MCH RBC QN AUTO: 26.5 PG (ref 26–35)
MCHC RBC AUTO-ENTMCNC: 30.8 % (ref 32–34.5)
MCV RBC AUTO: 86.2 FL (ref 80–99.9)
ORGANISM: ABNORMAL
PDW BLD-RTO: 19.7 FL (ref 11.5–15)
PLATELET # BLD: 319 E9/L (ref 130–450)
PMV BLD AUTO: 9.6 FL (ref 7–12)
POTASSIUM SERPL-SCNC: 4.4 MMOL/L (ref 3.5–5)
RBC # BLD: 3.47 E12/L (ref 3.5–5.5)
SODIUM BLD-SCNC: 138 MMOL/L (ref 132–146)
URINE CULTURE, ROUTINE: ABNORMAL
URINE CULTURE, ROUTINE: ABNORMAL
WBC # BLD: 8.7 E9/L (ref 4.5–11.5)

## 2018-11-15 PROCEDURE — 85027 COMPLETE CBC AUTOMATED: CPT

## 2018-11-15 PROCEDURE — 80048 BASIC METABOLIC PNL TOTAL CA: CPT

## 2018-11-18 LAB — BLOOD CULTURE, ROUTINE: NORMAL

## 2018-11-19 LAB
ALBUMIN SERPL-MCNC: 3.4 G/DL (ref 3.5–5.2)
ALP BLD-CCNC: 161 U/L (ref 35–104)
ALT SERPL-CCNC: 12 U/L (ref 0–32)
ANION GAP SERPL CALCULATED.3IONS-SCNC: 13 MMOL/L (ref 7–16)
AST SERPL-CCNC: 13 U/L (ref 0–31)
BASOPHILS ABSOLUTE: 0.02 E9/L (ref 0–0.2)
BASOPHILS RELATIVE PERCENT: 0.2 % (ref 0–2)
BILIRUB SERPL-MCNC: 0.3 MG/DL (ref 0–1.2)
BUN BLDV-MCNC: 23 MG/DL (ref 8–23)
CALCIUM SERPL-MCNC: 10.7 MG/DL (ref 8.6–10.2)
CHLORIDE BLD-SCNC: 96 MMOL/L (ref 98–107)
CO2: 27 MMOL/L (ref 22–29)
CREAT SERPL-MCNC: 0.6 MG/DL (ref 0.5–1)
CULTURE, BLOOD 2: NORMAL
EOSINOPHILS ABSOLUTE: 0.03 E9/L (ref 0.05–0.5)
EOSINOPHILS RELATIVE PERCENT: 0.3 % (ref 0–6)
GFR AFRICAN AMERICAN: >60
GFR NON-AFRICAN AMERICAN: >60 ML/MIN/1.73
GLUCOSE BLD-MCNC: 112 MG/DL (ref 74–99)
HCT VFR BLD CALC: 33.8 % (ref 34–48)
HEMOGLOBIN: 10.5 G/DL (ref 11.5–15.5)
IMMATURE GRANULOCYTES #: 0.15 E9/L
IMMATURE GRANULOCYTES %: 1.6 % (ref 0–5)
LYMPHOCYTES ABSOLUTE: 1.81 E9/L (ref 1.5–4)
LYMPHOCYTES RELATIVE PERCENT: 19.5 % (ref 20–42)
MAGNESIUM: 1.9 MG/DL (ref 1.6–2.6)
MCH RBC QN AUTO: 26.8 PG (ref 26–35)
MCHC RBC AUTO-ENTMCNC: 31.1 % (ref 32–34.5)
MCV RBC AUTO: 86.2 FL (ref 80–99.9)
MONOCYTES ABSOLUTE: 1.08 E9/L (ref 0.1–0.95)
MONOCYTES RELATIVE PERCENT: 11.7 % (ref 2–12)
NEUTROPHILS ABSOLUTE: 6.18 E9/L (ref 1.8–7.3)
NEUTROPHILS RELATIVE PERCENT: 66.7 % (ref 43–80)
PDW BLD-RTO: 19.2 FL (ref 11.5–15)
PHOSPHORUS: 4.8 MG/DL (ref 2.5–4.5)
PLATELET # BLD: 383 E9/L (ref 130–450)
PMV BLD AUTO: 9.6 FL (ref 7–12)
POTASSIUM SERPL-SCNC: 4.6 MMOL/L (ref 3.5–5)
RBC # BLD: 3.92 E12/L (ref 3.5–5.5)
SODIUM BLD-SCNC: 136 MMOL/L (ref 132–146)
TOTAL PROTEIN: 8.4 G/DL (ref 6.4–8.3)
WBC # BLD: 9.3 E9/L (ref 4.5–11.5)

## 2018-11-19 PROCEDURE — 85025 COMPLETE CBC W/AUTO DIFF WBC: CPT

## 2018-11-19 PROCEDURE — 84100 ASSAY OF PHOSPHORUS: CPT

## 2018-11-19 PROCEDURE — 83735 ASSAY OF MAGNESIUM: CPT

## 2018-11-19 PROCEDURE — 80053 COMPREHEN METABOLIC PANEL: CPT

## 2018-11-23 ENCOUNTER — APPOINTMENT (OUTPATIENT)
Dept: ULTRASOUND IMAGING | Age: 78
End: 2018-11-23
Attending: INTERNAL MEDICINE
Payer: MEDICARE

## 2018-11-23 LAB
ALBUMIN SERPL-MCNC: 3.2 G/DL (ref 3.5–5.2)
ALP BLD-CCNC: 119 U/L (ref 35–104)
ALT SERPL-CCNC: 11 U/L (ref 0–32)
AMORPHOUS: ABNORMAL
ANION GAP SERPL CALCULATED.3IONS-SCNC: 11 MMOL/L (ref 7–16)
AST SERPL-CCNC: 16 U/L (ref 0–31)
BACTERIA: ABNORMAL /HPF
BILIRUB SERPL-MCNC: 0.3 MG/DL (ref 0–1.2)
BILIRUBIN URINE: NEGATIVE
BLOOD, URINE: ABNORMAL
BUN BLDV-MCNC: 21 MG/DL (ref 8–23)
CALCIUM SERPL-MCNC: 10.2 MG/DL (ref 8.6–10.2)
CHLORIDE BLD-SCNC: 97 MMOL/L (ref 98–107)
CLARITY: ABNORMAL
CO2: 28 MMOL/L (ref 22–29)
COLOR: YELLOW
CREAT SERPL-MCNC: 0.6 MG/DL (ref 0.5–1)
EPITHELIAL CELLS, UA: ABNORMAL /HPF
GFR AFRICAN AMERICAN: >60
GFR NON-AFRICAN AMERICAN: >60 ML/MIN/1.73
GLUCOSE BLD-MCNC: 104 MG/DL (ref 74–99)
GLUCOSE URINE: NEGATIVE MG/DL
HCT VFR BLD CALC: 31 % (ref 34–48)
HEMOGLOBIN: 9.7 G/DL (ref 11.5–15.5)
KETONES, URINE: NEGATIVE MG/DL
LEUKOCYTE ESTERASE, URINE: ABNORMAL
MCH RBC QN AUTO: 27.5 PG (ref 26–35)
MCHC RBC AUTO-ENTMCNC: 31.3 % (ref 32–34.5)
MCV RBC AUTO: 87.8 FL (ref 80–99.9)
NITRITE, URINE: NEGATIVE
PDW BLD-RTO: 19 FL (ref 11.5–15)
PH UA: 6.5 (ref 5–9)
PLATELET # BLD: 365 E9/L (ref 130–450)
PMV BLD AUTO: 9.3 FL (ref 7–12)
POTASSIUM SERPL-SCNC: 4 MMOL/L (ref 3.5–5)
PROTEIN UA: ABNORMAL MG/DL
RBC # BLD: 3.53 E12/L (ref 3.5–5.5)
RBC UA: ABNORMAL /HPF (ref 0–2)
SODIUM BLD-SCNC: 136 MMOL/L (ref 132–146)
SPECIFIC GRAVITY UA: <=1.005 (ref 1–1.03)
TOTAL PROTEIN: 7.5 G/DL (ref 6.4–8.3)
UROBILINOGEN, URINE: 0.2 E.U./DL
WBC # BLD: 6.9 E9/L (ref 4.5–11.5)
WBC UA: >20 /HPF (ref 0–5)

## 2018-11-23 PROCEDURE — 76857 US EXAM PELVIC LIMITED: CPT

## 2018-11-23 PROCEDURE — 85027 COMPLETE CBC AUTOMATED: CPT

## 2018-11-23 PROCEDURE — 81001 URINALYSIS AUTO W/SCOPE: CPT

## 2018-11-23 PROCEDURE — 93971 EXTREMITY STUDY: CPT

## 2018-11-23 PROCEDURE — 80053 COMPREHEN METABOLIC PANEL: CPT

## 2018-11-27 ENCOUNTER — APPOINTMENT (OUTPATIENT)
Dept: ULTRASOUND IMAGING | Age: 78
End: 2018-11-27
Attending: INTERNAL MEDICINE
Payer: MEDICARE

## 2018-11-27 LAB
BACTERIA: ABNORMAL /HPF
BILIRUBIN URINE: NEGATIVE
BLOOD, URINE: ABNORMAL
CLARITY: ABNORMAL
COLOR: YELLOW
EPITHELIAL CELLS, UA: ABNORMAL /HPF
GLUCOSE URINE: NEGATIVE MG/DL
KETONES, URINE: NEGATIVE MG/DL
LEUKOCYTE ESTERASE, URINE: ABNORMAL
NITRITE, URINE: NEGATIVE
PH UA: 6.5 (ref 5–9)
PROTEIN UA: ABNORMAL MG/DL
RBC UA: ABNORMAL /HPF (ref 0–2)
SPECIFIC GRAVITY UA: <=1.005 (ref 1–1.03)
UROBILINOGEN, URINE: 0.2 E.U./DL
WBC UA: >20 /HPF (ref 0–5)

## 2018-11-27 PROCEDURE — 87088 URINE BACTERIA CULTURE: CPT

## 2018-11-27 PROCEDURE — 93971 EXTREMITY STUDY: CPT

## 2018-11-27 PROCEDURE — 87186 SC STD MICRODIL/AGAR DIL: CPT

## 2018-11-27 PROCEDURE — 81001 URINALYSIS AUTO W/SCOPE: CPT

## 2018-11-28 LAB
ALBUMIN SERPL-MCNC: 3.6 G/DL (ref 3.5–5.2)
ALP BLD-CCNC: 103 U/L (ref 35–104)
ALT SERPL-CCNC: 11 U/L (ref 0–32)
ANION GAP SERPL CALCULATED.3IONS-SCNC: 10 MMOL/L (ref 7–16)
AST SERPL-CCNC: 12 U/L (ref 0–31)
BILIRUB SERPL-MCNC: 0.3 MG/DL (ref 0–1.2)
BUN BLDV-MCNC: 17 MG/DL (ref 8–23)
CALCIUM SERPL-MCNC: 10.6 MG/DL (ref 8.6–10.2)
CHLORIDE BLD-SCNC: 98 MMOL/L (ref 98–107)
CO2: 28 MMOL/L (ref 22–29)
CREAT SERPL-MCNC: 0.7 MG/DL (ref 0.5–1)
GFR AFRICAN AMERICAN: >60
GFR NON-AFRICAN AMERICAN: >60 ML/MIN/1.73
GLUCOSE BLD-MCNC: 103 MG/DL (ref 74–99)
HCT VFR BLD CALC: 32.5 % (ref 34–48)
HEMOGLOBIN: 10.1 G/DL (ref 11.5–15.5)
MCH RBC QN AUTO: 27.5 PG (ref 26–35)
MCHC RBC AUTO-ENTMCNC: 31.1 % (ref 32–34.5)
MCV RBC AUTO: 88.6 FL (ref 80–99.9)
PDW BLD-RTO: 19 FL (ref 11.5–15)
PLATELET # BLD: 340 E9/L (ref 130–450)
PMV BLD AUTO: 9.2 FL (ref 7–12)
POTASSIUM SERPL-SCNC: 4 MMOL/L (ref 3.5–5)
PREALBUMIN: 22 MG/DL (ref 20–40)
RBC # BLD: 3.67 E12/L (ref 3.5–5.5)
SODIUM BLD-SCNC: 136 MMOL/L (ref 132–146)
TOTAL PROTEIN: 7.6 G/DL (ref 6.4–8.3)
WBC # BLD: 7.3 E9/L (ref 4.5–11.5)

## 2018-11-28 PROCEDURE — 85027 COMPLETE CBC AUTOMATED: CPT

## 2018-11-28 PROCEDURE — 80053 COMPREHEN METABOLIC PANEL: CPT

## 2018-11-28 PROCEDURE — 84134 ASSAY OF PREALBUMIN: CPT

## 2018-11-30 LAB
ORGANISM: ABNORMAL
URINE CULTURE, ROUTINE: ABNORMAL
URINE CULTURE, ROUTINE: ABNORMAL

## 2018-12-01 LAB
ALBUMIN SERPL-MCNC: 3.8 G/DL (ref 3.5–5.2)
ALP BLD-CCNC: 106 U/L (ref 35–104)
ALT SERPL-CCNC: 14 U/L (ref 0–32)
ANION GAP SERPL CALCULATED.3IONS-SCNC: 13 MMOL/L (ref 7–16)
AST SERPL-CCNC: 16 U/L (ref 0–31)
BILIRUB SERPL-MCNC: 0.3 MG/DL (ref 0–1.2)
BUN BLDV-MCNC: 19 MG/DL (ref 8–23)
CALCIUM SERPL-MCNC: 10.6 MG/DL (ref 8.6–10.2)
CHLORIDE BLD-SCNC: 95 MMOL/L (ref 98–107)
CO2: 28 MMOL/L (ref 22–29)
CREAT SERPL-MCNC: 0.7 MG/DL (ref 0.5–1)
GFR AFRICAN AMERICAN: >60
GFR NON-AFRICAN AMERICAN: >60 ML/MIN/1.73
GLUCOSE BLD-MCNC: 108 MG/DL (ref 74–99)
HCT VFR BLD CALC: 34.4 % (ref 34–48)
HEMOGLOBIN: 10.8 G/DL (ref 11.5–15.5)
MAGNESIUM: 1.7 MG/DL (ref 1.6–2.6)
MCH RBC QN AUTO: 28.1 PG (ref 26–35)
MCHC RBC AUTO-ENTMCNC: 31.4 % (ref 32–34.5)
MCV RBC AUTO: 89.4 FL (ref 80–99.9)
PDW BLD-RTO: 18.7 FL (ref 11.5–15)
PHOSPHORUS: 4.2 MG/DL (ref 2.5–4.5)
PLATELET # BLD: 360 E9/L (ref 130–450)
PMV BLD AUTO: 9.1 FL (ref 7–12)
POTASSIUM SERPL-SCNC: 4 MMOL/L (ref 3.5–5)
RBC # BLD: 3.85 E12/L (ref 3.5–5.5)
SODIUM BLD-SCNC: 136 MMOL/L (ref 132–146)
TOTAL PROTEIN: 8.2 G/DL (ref 6.4–8.3)
WBC # BLD: 10 E9/L (ref 4.5–11.5)

## 2018-12-01 PROCEDURE — 85027 COMPLETE CBC AUTOMATED: CPT

## 2018-12-01 PROCEDURE — 83735 ASSAY OF MAGNESIUM: CPT

## 2018-12-01 PROCEDURE — 84100 ASSAY OF PHOSPHORUS: CPT

## 2018-12-01 PROCEDURE — 80053 COMPREHEN METABOLIC PANEL: CPT

## 2018-12-03 ENCOUNTER — ANESTHESIA EVENT (OUTPATIENT)
Dept: OPERATING ROOM | Age: 78
End: 2018-12-03

## 2018-12-03 LAB
INR BLD: 1
PROTHROMBIN TIME: 11.6 SEC (ref 9.3–12.4)

## 2018-12-03 PROCEDURE — 36415 COLL VENOUS BLD VENIPUNCTURE: CPT

## 2018-12-03 PROCEDURE — 85610 PROTHROMBIN TIME: CPT

## 2018-12-03 NOTE — ANESTHESIA PRE PROCEDURE
Department of Anesthesiology  Preprocedure Note       Name:  Lety Manuel   Age:  [de-identified] y.o.  :  1940                                          MRN:  [id number]         Date:  12/3/2018      Surgeon: Ugo Negrete):  Darryle Ba, MD    Procedure: CYSTOSCOPY RETROGRADE PYELOGRAM (N/A )    Medications prior to admission:   Prior to Admission medications    Medication Sig Start Date End Date Taking? Authorizing Provider   Heparin Sodium, Porcine, (HEPARIN, PORCINE,) 5000 UNIT/ML injection Inject 1 mL into the skin every 8 hours 10/29/18   [place] Bail, DO   trimethobenzamide Fiona Motto) 100 MG/ML injection Inject 2 mLs into the muscle every 6 hours as needed for Nausea 10/29/18   [place] Bail, DO   fluconazole (DIFLUCAN) 200MG/100ML IVPB Infuse 100 mLs intravenously every 24 hours 10/29/18   [place] Bail, DO   promethazine (PHENERGAN) 25 MG/ML injection Inject 0.5 mLs into the muscle every 6 hours as needed (nausea) 10/29/18   [place] Bail, DO   docusate sodium (COLACE, DULCOLAX) 100 MG CAPS Take 100 mg by mouth daily 10/30/18   [place] Bail, DO   benzocaine-menthol (CEPACOL SORE THROAT) 15-3.6 MG lozenge Take 1 lozenge by mouth every 2 hours as needed for Sore Throat 10/29/18   [place] Bail, DO   pantoprazole (PROTONIX) 40 MG injection Infuse 40 mg intravenously daily 10/30/18   [place] Bail, DO   acetaminophen (TYLENOL) 325 MG tablet Take 2 tablets by mouth every 4 hours as needed for Pain or Fever 17   [place] Bail, DO   sertraline (ZOLOFT) 50 MG tablet Take 50 mg by mouth daily    Historical Provider, MD       Current medications:    No current facility-administered medications for this encounter. Allergies:     Allergies   Allergen Reactions    Aspirin Other (See Comments)     tinnitus    Tape Renato Elizabeth Tape] Rash       Problem List:    Patient Active Problem List   Diagnosis Code    Rectal cancer ([facility] [address] C20    Pneumatosis intestinalis K63.89    Macular summary reviewed no history of anesthetic complications:   Airway: Mallampati: II  TM distance: >3 FB   Neck ROM: full  Mouth opening: > = 3 FB Dental:      Comment: Grossly intact    Pulmonary: breath sounds clear to auscultation      (-) COPD and asthma                          ROS comment: Former Smoker. Tinnitis   Cardiovascular:  Exercise tolerance: good (>4 METS),   (+) dysrhythmias (Paroxysmal A Fib.): atrial fibrillation,       ECG reviewed  Rhythm: regular  Rate: normal  Echocardiogram reviewed               ROS comment: EKG: Normal Sinus Rhythm 87, NS St changes. Neuro/Psych:   Negative Neuro/Psych ROS              GI/Hepatic/Renal:        (-) liver disease and no renal disease      ROS comment: Colorectal Cancer. Ischemic Bowel disease. .   Endo/Other:        (-) hyperthyroidism                ROS comment: Macular Degeneration. Multiple Drug Resistance Organisms. Abdominal:         (-) obese     Vascular: negative vascular ROS. ROS comment: H/O Blood Clots. .                             Anesthesia Plan      MAC     ASA 3       Induction: intravenous. MIPS: Postoperative opioids intended. Anesthetic plan and risks discussed with patient. Plan discussed with CRNA. Nick Franklin MD   12/3/2018    DOS STAFF ADDENDUM:    Pt seen and examined, chart reviewed (including anesthesia, drug and allergy history). Anesthetic plan, risks, benefits, alternatives, and personnel involved discussed with patient. Patient verbalized an understanding and agrees to proceed. Plan discussed with care team members and agreed upon.     Jack Garcia MD  Staff Anesthesiologist  10:24 AM

## 2018-12-04 ENCOUNTER — APPOINTMENT (OUTPATIENT)
Dept: GENERAL RADIOLOGY | Age: 78
End: 2018-12-04
Attending: INTERNAL MEDICINE
Payer: MEDICARE

## 2018-12-04 ENCOUNTER — ANESTHESIA (OUTPATIENT)
Dept: OPERATING ROOM | Age: 78
End: 2018-12-04

## 2018-12-04 VITALS
DIASTOLIC BLOOD PRESSURE: 52 MMHG | SYSTOLIC BLOOD PRESSURE: 86 MMHG | OXYGEN SATURATION: 99 % | RESPIRATION RATE: 22 BRPM

## 2018-12-04 VITALS
DIASTOLIC BLOOD PRESSURE: 62 MMHG | SYSTOLIC BLOOD PRESSURE: 137 MMHG | OXYGEN SATURATION: 98 % | HEART RATE: 79 BPM | TEMPERATURE: 97.8 F | RESPIRATION RATE: 14 BRPM

## 2018-12-04 LAB
ALBUMIN SERPL-MCNC: 3.8 G/DL (ref 3.5–5.2)
ALP BLD-CCNC: 100 U/L (ref 35–104)
ALT SERPL-CCNC: 11 U/L (ref 0–32)
ANION GAP SERPL CALCULATED.3IONS-SCNC: 13 MMOL/L (ref 7–16)
AST SERPL-CCNC: 16 U/L (ref 0–31)
BILIRUB SERPL-MCNC: 0.3 MG/DL (ref 0–1.2)
BUN BLDV-MCNC: 18 MG/DL (ref 8–23)
CALCIUM SERPL-MCNC: 10.4 MG/DL (ref 8.6–10.2)
CHLORIDE BLD-SCNC: 98 MMOL/L (ref 98–107)
CO2: 27 MMOL/L (ref 22–29)
CREAT SERPL-MCNC: 0.7 MG/DL (ref 0.5–1)
GFR AFRICAN AMERICAN: >60
GFR NON-AFRICAN AMERICAN: >60 ML/MIN/1.73
GLUCOSE BLD-MCNC: 109 MG/DL (ref 74–99)
HCT VFR BLD CALC: 33.2 % (ref 34–48)
HEMOGLOBIN: 10.5 G/DL (ref 11.5–15.5)
MCH RBC QN AUTO: 28.1 PG (ref 26–35)
MCHC RBC AUTO-ENTMCNC: 31.6 % (ref 32–34.5)
MCV RBC AUTO: 88.8 FL (ref 80–99.9)
PDW BLD-RTO: 18.3 FL (ref 11.5–15)
PLATELET # BLD: 306 E9/L (ref 130–450)
PMV BLD AUTO: 9.3 FL (ref 7–12)
POTASSIUM SERPL-SCNC: 3.8 MMOL/L (ref 3.5–5)
RBC # BLD: 3.74 E12/L (ref 3.5–5.5)
SODIUM BLD-SCNC: 138 MMOL/L (ref 132–146)
TOTAL PROTEIN: 7.8 G/DL (ref 6.4–8.3)
WBC # BLD: 6.6 E9/L (ref 4.5–11.5)

## 2018-12-04 PROCEDURE — 3700000001 HC ADD 15 MINUTES (ANESTHESIA): Performed by: UROLOGY

## 2018-12-04 PROCEDURE — 36415 COLL VENOUS BLD VENIPUNCTURE: CPT

## 2018-12-04 PROCEDURE — 7100000000 HC PACU RECOVERY - FIRST 15 MIN: Performed by: UROLOGY

## 2018-12-04 PROCEDURE — 7100000011 HC PHASE II RECOVERY - ADDTL 15 MIN: Performed by: UROLOGY

## 2018-12-04 PROCEDURE — 87186 SC STD MICRODIL/AGAR DIL: CPT

## 2018-12-04 PROCEDURE — 87205 SMEAR GRAM STAIN: CPT

## 2018-12-04 PROCEDURE — 2709999900 HC NON-CHARGEABLE SUPPLY: Performed by: UROLOGY

## 2018-12-04 PROCEDURE — 74400 UROGRAPHY IV +-KUB TOMOG: CPT

## 2018-12-04 PROCEDURE — 6360000002 HC RX W HCPCS: Performed by: NURSE ANESTHETIST, CERTIFIED REGISTERED

## 2018-12-04 PROCEDURE — 3700000000 HC ANESTHESIA ATTENDED CARE: Performed by: UROLOGY

## 2018-12-04 PROCEDURE — 3600000003 HC SURGERY LEVEL 3 BASE: Performed by: UROLOGY

## 2018-12-04 PROCEDURE — 3600000013 HC SURGERY LEVEL 3 ADDTL 15MIN: Performed by: UROLOGY

## 2018-12-04 PROCEDURE — C1758 CATHETER, URETERAL: HCPCS | Performed by: UROLOGY

## 2018-12-04 PROCEDURE — 6360000004 HC RX CONTRAST MEDICATION: Performed by: UROLOGY

## 2018-12-04 PROCEDURE — 7100000010 HC PHASE II RECOVERY - FIRST 15 MIN: Performed by: UROLOGY

## 2018-12-04 PROCEDURE — 88112 CYTOPATH CELL ENHANCE TECH: CPT

## 2018-12-04 PROCEDURE — 87070 CULTURE OTHR SPECIMN AEROBIC: CPT

## 2018-12-04 PROCEDURE — 85027 COMPLETE CBC AUTOMATED: CPT

## 2018-12-04 PROCEDURE — 80053 COMPREHEN METABOLIC PANEL: CPT

## 2018-12-04 PROCEDURE — 7100000001 HC PACU RECOVERY - ADDTL 15 MIN: Performed by: UROLOGY

## 2018-12-04 PROCEDURE — 2580000003 HC RX 258: Performed by: NURSE ANESTHETIST, CERTIFIED REGISTERED

## 2018-12-04 PROCEDURE — 87077 CULTURE AEROBIC IDENTIFY: CPT

## 2018-12-04 RX ORDER — CEFAZOLIN SODIUM 1 G/3ML
INJECTION, POWDER, FOR SOLUTION INTRAMUSCULAR; INTRAVENOUS PRN
Status: DISCONTINUED | OUTPATIENT
Start: 2018-12-04 | End: 2018-12-04 | Stop reason: SDUPTHER

## 2018-12-04 RX ORDER — PROPOFOL 10 MG/ML
INJECTION, EMULSION INTRAVENOUS CONTINUOUS PRN
Status: DISCONTINUED | OUTPATIENT
Start: 2018-12-04 | End: 2018-12-04 | Stop reason: SDUPTHER

## 2018-12-04 RX ORDER — FENTANYL CITRATE 50 UG/ML
INJECTION, SOLUTION INTRAMUSCULAR; INTRAVENOUS PRN
Status: DISCONTINUED | OUTPATIENT
Start: 2018-12-04 | End: 2018-12-04 | Stop reason: SDUPTHER

## 2018-12-04 RX ORDER — PROPOFOL 10 MG/ML
INJECTION, EMULSION INTRAVENOUS PRN
Status: DISCONTINUED | OUTPATIENT
Start: 2018-12-04 | End: 2018-12-04 | Stop reason: SDUPTHER

## 2018-12-04 RX ORDER — SODIUM CHLORIDE 9 MG/ML
INJECTION, SOLUTION INTRAVENOUS CONTINUOUS PRN
Status: DISCONTINUED | OUTPATIENT
Start: 2018-12-04 | End: 2018-12-04 | Stop reason: SDUPTHER

## 2018-12-04 RX ADMIN — FENTANYL CITRATE 25 MCG: 50 INJECTION, SOLUTION INTRAMUSCULAR; INTRAVENOUS at 13:14

## 2018-12-04 RX ADMIN — CEFAZOLIN 1000 MG: 1 INJECTION, POWDER, FOR SOLUTION INTRAMUSCULAR; INTRAVENOUS at 13:08

## 2018-12-04 RX ADMIN — PROPOFOL 100 MCG/KG/MIN: 10 INJECTION, EMULSION INTRAVENOUS at 13:14

## 2018-12-04 RX ADMIN — SODIUM CHLORIDE: 9 INJECTION, SOLUTION INTRAVENOUS at 13:05

## 2018-12-04 RX ADMIN — PROPOFOL 50 MG: 10 INJECTION, EMULSION INTRAVENOUS at 13:14

## 2018-12-04 ASSESSMENT — PULMONARY FUNCTION TESTS
PIF_VALUE: 1
PIF_VALUE: 0
PIF_VALUE: 0
PIF_VALUE: 1
PIF_VALUE: 0
PIF_VALUE: 1
PIF_VALUE: 0
PIF_VALUE: 1
PIF_VALUE: 0
PIF_VALUE: 1
PIF_VALUE: 0
PIF_VALUE: 0
PIF_VALUE: 1
PIF_VALUE: 0
PIF_VALUE: 1
PIF_VALUE: 0

## 2018-12-04 ASSESSMENT — PAIN SCALES - GENERAL
PAINLEVEL_OUTOF10: 0
PAINLEVEL_OUTOF10: 0

## 2018-12-04 NOTE — OP NOTE
[address]                                OPERATIVE REPORT    PATIENT NAME: Tobias Cherry                      :        1940  MED REC NO:   [id number]                            ROOM:  ACCOUNT NO:   [de-identified]                           ADMIT DATE: 10/29/2018  PROVIDER:     Duane Ulrich MD      CHIEF COMPLAINT:  Bladder mass. HISTORY OF PRESENT ILLNESS:  This 78-year-old female who was found on  ultrasonography to have possible right-sided bladder mass. The patient  has not had gross hematuria. She has been a cigarette smoker in the  past but not recently. She is being admitted at this time for a  cystoscopy and possible bladder biopsy. ALLERGIES:  She is allergic to ASPIRIN as well as ADHESIVE TAPE. MEDICAL PROBLEMS:  Include a rectal cancer which was operated on  approximately two years ago. She also has had atrial fibrillation and  macular degeneration, hearing loss, history of blood clots, altered  mental status in the past.    SURGICAL HISTORY:  Includes cholecystectomy, hand surgery, hysterectomy,  colostomy, dilatation of the esophagus. MEDICATIONS:  Currently are heparin drip, Tigan, Diflucan, Phenergan,  Colace, Cepacol, Protonix, Tylenol, and Zoloft. PAST MEDICAL HISTORY:  The patient is currently [facility].  She is  a former smoker. PHYSICAL EXAMINATION:  GENERAL:  The patient is very alert but very hard-of-hearing female, who  does not appear to be in acute distress. HEAD, EYES, EARS, NOSE AND THROAT:  Unremarkable. LUNGS:  Clear. HEART:  Slow rhythm. ABDOMEN:  Soft. There are no palpable organs or mass present. There is  a colostomy present in the left lower quadrant. PELVIC:  Unremarkable. IMPRESSION:  History of possible bladder mass on ultrasonography.     PLAN:  Plan is to do cystoscopy, retrograde pyelogram.        Kamaljit Skaggs MD    D:

## 2018-12-06 LAB
BODY FLUID CULTURE, STERILE: ABNORMAL
BODY FLUID CULTURE, STERILE: ABNORMAL
GRAM STAIN RESULT: ABNORMAL
ORGANISM: ABNORMAL

## 2018-12-06 NOTE — PROGRESS NOTES
[facility]  [facility]  [facility] Progress Note      Name:Yen Dueñas  MRN: [id number]   : 1940  Pt had been seen for atbx peritonitis/ abd abscess  ID ASKED to RE-EVALUATE ++ Urine Culture for ESBL+ E.coli   Chief complain:  Has no c/o this am  Resting in bed. SUBJECTIVE:  Patient is awake, alert , no new issues   She is [place]   No family here  No fevers.   dysuria has incontinence better  She is resting comfortable today    ROS:  Constitutional:  denies fever , chills   Cardiovascular: denies chest pain or palpitation   Genitourinary:      Denies  dysuria or burning micturition  Musculoskeletal: no aches or pain   Allergic/Immunologic:  No rash or itching     MEDS: meropenem/diflucan ended -   Meropenem was restarted on 18-had 6 days    OBJECTIVE:  T-97.6,  Bp-110/68,  p-80  r-17    HEENT :        At/nc Thin pale   Heart:             RRR,  No murmurs, no gallops  Lungs:           Dec  to auscultation, no wheezing , no rales  Abdomen:       soft, non tender, bowel sounds present,  ostomy with stools,  No flank pain  Extremites:     No edema, no ulcers,  Skin:                Normal turgor,normal texture    LABS  CBC:   Recent Labs      18   0405   WBC  6.6   HGB  10.5*   HCT  33.2*   PLT  306     BMP:    Recent Labs      18   0405   NA  138   K  3.8   CL  98   CO2  27   BUN  18   CREATININE  0.7   GLUCOSE  109*     Hepatic Function Panel:    Lab Results   Component Value Date    ALKPHOS 100 2018    ALT 11 2018    AST 16 2018    PROT 7.8 2018    BILITOT 0.3 2018    BILIDIR 0.4 2017    IBILI 0.3 2017    LABALBU 3.8 2018     Lab Results   Component Value Date    SEDRATE 72 (H) 10/26/2018     Lab Results   Component Value Date    CRP 6.2 (H) 10/26/2018     Microbiology :   urine cx ESCHERICHIA COLI     Antibiotic Interpretation BIANCA Unit   Confirmatory Extended Spectrum Beta-Lactamase  Pos mcg/mL

## 2019-04-29 ENCOUNTER — OFFICE VISIT (OUTPATIENT)
Dept: SURGERY | Age: 79
End: 2019-04-29
Payer: MEDICARE

## 2019-04-29 VITALS
HEART RATE: 88 BPM | WEIGHT: 120 LBS | HEIGHT: 66 IN | DIASTOLIC BLOOD PRESSURE: 76 MMHG | BODY MASS INDEX: 19.29 KG/M2 | TEMPERATURE: 97.9 F | OXYGEN SATURATION: 97 % | RESPIRATION RATE: 18 BRPM | SYSTOLIC BLOOD PRESSURE: 140 MMHG

## 2019-04-29 DIAGNOSIS — K94.19 INTESTINAL STOMA PROLAPSE (HCC): ICD-10-CM

## 2019-04-29 DIAGNOSIS — K92.9 DISORDER OF STOMA: Primary | ICD-10-CM

## 2019-04-29 PROCEDURE — 1090F PRES/ABSN URINE INCON ASSESS: CPT | Performed by: SURGERY

## 2019-04-29 PROCEDURE — G8420 CALC BMI NORM PARAMETERS: HCPCS | Performed by: SURGERY

## 2019-04-29 PROCEDURE — G8427 DOCREV CUR MEDS BY ELIG CLIN: HCPCS | Performed by: SURGERY

## 2019-04-29 PROCEDURE — 4040F PNEUMOC VAC/ADMIN/RCVD: CPT | Performed by: SURGERY

## 2019-04-29 PROCEDURE — 1036F TOBACCO NON-USER: CPT | Performed by: SURGERY

## 2019-04-29 PROCEDURE — 1123F ACP DISCUSS/DSCN MKR DOCD: CPT | Performed by: SURGERY

## 2019-04-29 PROCEDURE — 99214 OFFICE O/P EST MOD 30 MIN: CPT | Performed by: SURGERY

## 2019-04-29 PROCEDURE — G8400 PT W/DXA NO RESULTS DOC: HCPCS | Performed by: SURGERY

## 2019-04-29 RX ORDER — OMEPRAZOLE 20 MG/1
20 CAPSULE, DELAYED RELEASE ORAL DAILY
COMMUNITY

## 2019-04-29 NOTE — PROGRESS NOTES
General Surgery History and Physical  [facility]    Patient's Name/Date of Birth: Devika Renner / 1940    Date: April 29, 2019     Surgeon: Bedford Nyhan, M.D.    PCP: Krish Singh MD     Chief Complaint: stomal prolapse    HPI:   Devika Renner is a [de-identified] y.o. 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 Timing is intermittent, radiation to LLQ, alleviated by none and started several weeks ago and severity is 7/10. 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 Hx of LAR at [facility] with loop ileostomy prior to that with resulting small bowel ischemia and enterocutaneous fistula. She has now been recovering well. Her stoma is not causing pain but has intermittent prolapse. She is accompanied by her daughter and . Denies fever, chills. Some intermittent vaginal discharge. She also has some intermittent urinary incontinence but has been doing kegals with some improvement.     Patient Active Problem List   Diagnosis    Rectal cancer ([address]    Pneumatosis intestinalis    Macular degeneration    Enteritis    Severe protein-calorie malnutrition (HCC)    Macular degeneration    PAF (paroxysmal atrial fibrillation) (HCC)    Ischemic bowel disease (HCC)    Altered mental status    Fatigue due to excessive exertion    PAF (paroxysmal atrial fibrillation) (HCC)    Macular degeneration    Ischemic bowel disease ([address]    Hx of blood clots    Cancer (HCC)    Perforation of sigmoid colon (HCC)    PAF (paroxysmal atrial fibrillation) ([address]    Ischemic bowel disease ([address]    Hx of blood clots    Cancer ([address]       Past Medical History:   Diagnosis Date    Cancer ([address]     colorectal     History of blood transfusion     Hx of blood clots     dvt, on eliquis    Ischemic bowel disease ([address]     Macular degeneration     MDRO (multiple drug resistant organisms) resistance     PAF (paroxysmal atrial fibrillation) (HCC)     Tinnitus Past Surgical History:   Procedure Laterality Date    ABDOMEN SURGERY      colostomy & reversal    CHOLECYSTECTOMY  07    COLONOSCOPY      CYSTOSCOPY N/A 12/4/2018    CYSTOSCOPY RETROGRADE PYELOGRAM performed by Manoj Lane MD at [address] [facility] , ESOPHAGUS      HAND SURGERY      carpel tunnel rigth hand    HYSTERECTOMY      ILEOSTOMY OR JEJUNOSTOMY      OTHER SURGICAL HISTORY  11/06/2017    diagnostic laparoscopy with exploratory laparotomy and reduction of internal hernia and closure of mesenteric defect, oversewing of multiple serosal tears    FL LAP,SURG,COLECT,TOT,W/PROCTECT,W/ILEOST N/A 6/28/2018    LAPAROSCOPIC DIVERTING COLOSTOMY performed by Zenaida Ross MD at [address] [facility] LAP,SURG,COLECT,TOT,W/PROCTECT,W/ILEOST N/A 10/18/2018    LAPAROSCOPIC REVISION LOOP COLOSTOMY, EXTENSIVE LYSIS OF ADHESIONS, DRAINAGE PELVIC ABSCESS, END COLOSTOMY performed by Rhonda Calvert MD at [facility] ENDOSCOPY N/A 7/18/2018    EGD BIOPSY performed by Nuria Mearz DO at [address] 1940   Allergen Reactions    Aspirin Other (See Comments)     tinnitus    Tape [place] Bills Tape] Rash       The patient has a family history that is negative for severe cardiovascular or respiratory issues, negative for reaction to anesthesia. Time spent reviewing past medical, surgical, social and family history, vitals, nursing assessment and images. No changes from above documented history.     Social History     Socioeconomic History    Marital status:      Spouse name: Not on file    Number of children: Not on file    Years of education: Not on file    Highest education level: Not on file   Occupational History    Not on file   Social Needs    Financial resource strain: Not on file    Food insecurity:     Worry: Not on file     Inability: Not on file    Transportation needs:     Medical: Not on file     Non-medical: Not on file hematuria  Musculoskeletal ROS: negative for - focal weakness, gangrene  Psych/Neuro ROS: negative for - visual or auditory hallucinations, suicidal ideation    Physical exam:   BP (!) 140/76 (Site: Left Upper Arm, Position: Sitting, Cuff Size: Small Adult)   Pulse 88   Temp 97.9 °F (36.6 °C) (Oral)   Resp 18   Ht 5' 6\" (1.676 m)   Wt 120 lb (54.4 kg)   SpO2 97%   BMI 19.37 kg/m²   General appearance:  NAD, appears stated age  Head: NCAT, PERRLA, EOMI, red conjunctiva  Neck: supple, no masses, trachea midline  Lungs: Equal chest rise bilateral, no retractions, no wheezing  Heart: Reg rate  Abdomen: soft, nontender, nondistended, stoma pink viable, mild prolapse with reducible and no surrounding ischemia or skin changes  Skin; warm and dry, no cyanosis  Gu: no cva tenderness  Extremities: atraumatic, no focal motor deficits, no open wounds  Psych: No tremor, visual hallucinations      Radiology: I reviewed relevant abdominal imaging from this admission and that available in the EMR    Assessment:  Wiliam Nassar is a [de-identified] y.o. 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  No signs of ischemia, or compromise or pain  Patient Active Problem List   Diagnosis    Rectal cancer ([address]    Pneumatosis intestinalis    Macular degeneration    Enteritis    Severe protein-calorie malnutrition ([address]    Macular degeneration    PAF (paroxysmal atrial fibrillation) (HCC)    Ischemic bowel disease (HCC)    Altered mental status    Fatigue due to excessive exertion    PAF (paroxysmal atrial fibrillation) ([address]    Macular degeneration    Ischemic bowel disease ([address]    Hx of blood clots    Cancer (HCC)    Perforation of sigmoid colon ([address]    PAF (paroxysmal atrial fibrillation) ([address]    Ischemic bowel disease ([address]    Hx of blood clots    Cancer ([address]         Plan:  No need for intervention for prolapse  Stomal care  Referral to Angi Brush for appliance education  Follow up as needed  Would refer to [facility] is she wanted reversed as that is where her LAR was done        Ryder Chavarria MD  11:42 AM  4/29/2019

## 2019-05-14 ENCOUNTER — HOSPITAL ENCOUNTER (OUTPATIENT)
Dept: WOUND CARE | Age: 79
Discharge: HOME | End: 2019-05-14
Payer: MEDICARE

## 2019-05-14 PROCEDURE — 99212 OFFICE O/P EST SF 10 MIN: CPT

## 2019-05-14 PROCEDURE — 99211 OFF/OP EST MAY X REQ PHY/QHP: CPT

## 2019-05-14 NOTE — PROGRESS NOTES
ET Nurse  OUTPATIENT VISIT  Admit Date: 5/14/2019 11:00 AM    Reason for consult:  Evaluation of colostomy    Significant history:    Past Medical History:   Diagnosis Date    Cancer ([facility] [address]     colorectal     History of blood transfusion     Hx of blood clots     dvt, on eliquis    Ischemic bowel disease ([facility] [address]     Macular degeneration     MDRO (multiple drug resistant organisms) resistance     PAF (paroxysmal atrial fibrillation) (HCC)     Tinnitus        Stoma assessment:    Stoma is a good red color functioning for soft stool  Peristomal skin is clear  Stoma does prolapse at times  Pt states it does go back in  [facility] of stoma measures 1 3/4 inch  Pt is using coloplast #[id number] convex wafer  Uses ostomy powder and no sting barrier  Also uses adhesive remover    Plan:    inst to use same equipment  Will order sample of larger size    Clinical Level of Care Assessment    Outpatient Ostomy Care      NAME:  Prema Garcia  YOB: 1940  MEDICAL RECORD NUMBER:  [id number]   DATE:  5/14/2019      Patient Na Chambers Assessment- Document in Flowsheet I&O   Points   Review of chart []   0   Assess Complete Ostomy tab in Navigator for assessment of; stoma status, peristomal skin, presence of hernia/stool consistency/diet/related medications   Simple adjustments to pouch size/pouch system, new stoma pattern, accessory addition/deletion. []   1   Assess Complete Ostomy tab in Navigator for assessment of; stoma status, peristomal skin, presence of hernia/stool consistency/diet/related medications   Moderate adjustments to pouch size/pouch system, new stoma pattern, accessory addition/deletion. Observe patient/caregiver with hands-on care. 1-2 adjustments to pouch size/system/skin care/accessory addition or deletion.     [x]   2   Assess Complete Ostomy tab in Navigator for assessment of; stoma status, peristomal skin, presence of hernia/stool consistency/diet/related medications   Complex adjustments to pouch additional topics   Pre-operative ostomy education with review of written resources for patient/family/caregiver as needed. Demonstration/return demonstration of ostomy irrigation  Documentation in [facility] completed. []   3     Patient Assessment and Planning in [facility] Clinical Note   Planning Definition Points   Simple Simple pouch change procedure completed and reviewed with patient/family/caregiver   Documentation in [facility] completed. []   1   Intermediate Moderate level of follow-up needs:   Pouch change/discharge procedure revised and reviewed with patient/caregiver. Communications with outside resources; i.e. Telephone calls to Surgeon/ PCP, family/caregiver, home health, ECF. Documentation in [facility] completed. [x]   2   Complex Complex level of instructions/changes:   Family/Caregiver learning/demonstration/return demonstration visit. Pouching/discharge procedure revised/reviewed with patient/family/caregiver. Contact with outside resources; i.e. communication with Surgeon/ PCP, home health, ECF. Contact/referral to ostomy appliance supplier for new or additional products. Review when to call [facility] or schedule follow-up visit. Referral to Emergency Department   Documentation in [facility] completed. []   3       Is this the Patient's First Visit with [facility]? No    Is this Patient Established to this [facility] within the last 3 years?    Yes             Clinical Level of Care      Points  0-3  Level 1 []     Points  4-6  Level 2 [x]     Points  7-8  Level 3 []     Points  9-10  Level 4 []     Points  11-12  Level 5 []       Electronically signed by Yamil Oliver RN on 5/14/2019 at 11:54 AM      Yamil Oliver 5/14/2019 11:50 AM

## 2020-03-25 PROBLEM — H35.30 MACULAR DEGENERATION: Status: RESOLVED | Noted: 2020-03-25 | Resolved: 2020-03-24

## 2020-03-25 PROBLEM — K55.9 ISCHEMIC BOWEL DISEASE (HCC): Status: RESOLVED | Noted: 2020-03-25 | Resolved: 2020-03-24

## 2020-03-25 PROBLEM — I48.0 PAF (PAROXYSMAL ATRIAL FIBRILLATION) (HCC): Status: RESOLVED | Noted: 2020-03-25 | Resolved: 2020-03-24

## 2021-07-16 ENCOUNTER — APPOINTMENT (OUTPATIENT)
Dept: CT IMAGING | Age: 81
DRG: 641 | End: 2021-07-16
Payer: MEDICARE

## 2021-07-16 ENCOUNTER — APPOINTMENT (OUTPATIENT)
Dept: GENERAL RADIOLOGY | Age: 81
DRG: 641 | End: 2021-07-16
Payer: MEDICARE

## 2021-07-16 ENCOUNTER — HOSPITAL ENCOUNTER (INPATIENT)
Age: 81
LOS: 1 days | Discharge: HOME | DRG: 641 | End: 2021-07-17
Attending: EMERGENCY MEDICINE | Admitting: INTERNAL MEDICINE
Payer: MEDICARE

## 2021-07-16 DIAGNOSIS — K56.609 SMALL BOWEL OBSTRUCTION (HCC): Primary | ICD-10-CM

## 2021-07-16 LAB
ALBUMIN SERPL-MCNC: 3.7 G/DL (ref 3.5–5.2)
ALP BLD-CCNC: 77 U/L (ref 35–104)
ALT SERPL-CCNC: 12 U/L (ref 0–32)
ANION GAP SERPL CALCULATED.3IONS-SCNC: 11 MMOL/L (ref 7–16)
AST SERPL-CCNC: 40 U/L (ref 0–31)
BACTERIA: ABNORMAL /HPF
BASOPHILS ABSOLUTE: 0.02 E9/L (ref 0–0.2)
BASOPHILS RELATIVE PERCENT: 0.2 % (ref 0–2)
BILIRUB SERPL-MCNC: 0.4 MG/DL (ref 0–1.2)
BILIRUBIN URINE: NEGATIVE
BLOOD, URINE: ABNORMAL
BUN BLDV-MCNC: 11 MG/DL (ref 6–23)
CALCIUM SERPL-MCNC: 9.4 MG/DL (ref 8.6–10.2)
CHLORIDE BLD-SCNC: 107 MMOL/L (ref 98–107)
CLARITY: ABNORMAL
CO2: 21 MMOL/L (ref 22–29)
COLOR: YELLOW
CREAT SERPL-MCNC: 1 MG/DL (ref 0.5–1)
EOSINOPHILS ABSOLUTE: 0.03 E9/L (ref 0.05–0.5)
EOSINOPHILS RELATIVE PERCENT: 0.3 % (ref 0–6)
EPITHELIAL CELLS, UA: ABNORMAL /HPF
GFR AFRICAN AMERICAN: >60
GFR NON-AFRICAN AMERICAN: 53 ML/MIN/1.73
GLUCOSE BLD-MCNC: 111 MG/DL (ref 74–99)
GLUCOSE URINE: NEGATIVE MG/DL
HCT VFR BLD CALC: 41.3 % (ref 34–48)
HEMOGLOBIN: 13.4 G/DL (ref 11.5–15.5)
IMMATURE GRANULOCYTES #: 0.05 E9/L
IMMATURE GRANULOCYTES %: 0.5 % (ref 0–5)
KETONES, URINE: NEGATIVE MG/DL
LACTIC ACID: 1.5 MMOL/L (ref 0.5–2.2)
LEUKOCYTE ESTERASE, URINE: ABNORMAL
LIPASE: 48 U/L (ref 13–60)
LYMPHOCYTES ABSOLUTE: 0.29 E9/L (ref 1.5–4)
LYMPHOCYTES RELATIVE PERCENT: 2.8 % (ref 20–42)
MCH RBC QN AUTO: 30 PG (ref 26–35)
MCHC RBC AUTO-ENTMCNC: 32.4 % (ref 32–34.5)
MCV RBC AUTO: 92.6 FL (ref 80–99.9)
MONOCYTES ABSOLUTE: 0.8 E9/L (ref 0.1–0.95)
MONOCYTES RELATIVE PERCENT: 7.8 % (ref 2–12)
NEUTROPHILS ABSOLUTE: 9.02 E9/L (ref 1.8–7.3)
NEUTROPHILS RELATIVE PERCENT: 88.4 % (ref 43–80)
NITRITE, URINE: NEGATIVE
PDW BLD-RTO: 14.6 FL (ref 11.5–15)
PH UA: 6 (ref 5–9)
PLATELET # BLD: 252 E9/L (ref 130–450)
PMV BLD AUTO: 10 FL (ref 7–12)
POTASSIUM REFLEX MAGNESIUM: 4.9 MMOL/L (ref 3.5–5)
PROTEIN UA: NEGATIVE MG/DL
RBC # BLD: 4.46 E12/L (ref 3.5–5.5)
RBC UA: ABNORMAL /HPF (ref 0–2)
REASON FOR REJECTION: NORMAL
REASON FOR REJECTION: NORMAL
REJECTED TEST: NORMAL
REJECTED TEST: NORMAL
SODIUM BLD-SCNC: 139 MMOL/L (ref 132–146)
SPECIFIC GRAVITY UA: 1.01 (ref 1–1.03)
TOTAL PROTEIN: 7.4 G/DL (ref 6.4–8.3)
TROPONIN, HIGH SENSITIVITY: 11 NG/L (ref 0–9)
UROBILINOGEN, URINE: 0.2 E.U./DL
WBC # BLD: 10.2 E9/L (ref 4.5–11.5)
WBC UA: >20 /HPF (ref 0–5)

## 2021-07-16 PROCEDURE — 83690 ASSAY OF LIPASE: CPT

## 2021-07-16 PROCEDURE — 2500000003 HC RX 250 WO HCPCS: Performed by: INTERNAL MEDICINE

## 2021-07-16 PROCEDURE — 81001 URINALYSIS AUTO W/SCOPE: CPT

## 2021-07-16 PROCEDURE — 6370000000 HC RX 637 (ALT 250 FOR IP): Performed by: INTERNAL MEDICINE

## 2021-07-16 PROCEDURE — 2580000003 HC RX 258: Performed by: EMERGENCY MEDICINE

## 2021-07-16 PROCEDURE — 2060000000 HC ICU INTERMEDIATE R&B

## 2021-07-16 PROCEDURE — 71045 X-RAY EXAM CHEST 1 VIEW: CPT

## 2021-07-16 PROCEDURE — 6360000004 HC RX CONTRAST MEDICATION: Performed by: RADIOLOGY

## 2021-07-16 PROCEDURE — 70450 CT HEAD/BRAIN W/O DYE: CPT

## 2021-07-16 PROCEDURE — 6360000002 HC RX W HCPCS: Performed by: INTERNAL MEDICINE

## 2021-07-16 PROCEDURE — C9113 INJ PANTOPRAZOLE SODIUM, VIA: HCPCS | Performed by: INTERNAL MEDICINE

## 2021-07-16 PROCEDURE — 74177 CT ABD & PELVIS W/CONTRAST: CPT

## 2021-07-16 PROCEDURE — 87088 URINE BACTERIA CULTURE: CPT

## 2021-07-16 PROCEDURE — 97161 PT EVAL LOW COMPLEX 20 MIN: CPT | Performed by: PHYSICAL THERAPIST

## 2021-07-16 PROCEDURE — 84484 ASSAY OF TROPONIN QUANT: CPT

## 2021-07-16 PROCEDURE — 72125 CT NECK SPINE W/O DYE: CPT

## 2021-07-16 PROCEDURE — 85025 COMPLETE CBC W/AUTO DIFF WBC: CPT

## 2021-07-16 PROCEDURE — 80053 COMPREHEN METABOLIC PANEL: CPT

## 2021-07-16 PROCEDURE — 36415 COLL VENOUS BLD VENIPUNCTURE: CPT

## 2021-07-16 PROCEDURE — 99222 1ST HOSP IP/OBS MODERATE 55: CPT | Performed by: SURGERY

## 2021-07-16 PROCEDURE — 97165 OT EVAL LOW COMPLEX 30 MIN: CPT | Performed by: OCCUPATIONAL THERAPIST

## 2021-07-16 PROCEDURE — 83605 ASSAY OF LACTIC ACID: CPT

## 2021-07-16 PROCEDURE — 99285 EMERGENCY DEPT VISIT HI MDM: CPT

## 2021-07-16 RX ORDER — M-VIT,TX,IRON,MINS/CALC/FOLIC 27MG-0.4MG
1 TABLET ORAL DAILY
COMMUNITY

## 2021-07-16 RX ORDER — ACETAMINOPHEN 325 MG/1
650 TABLET ORAL EVERY 6 HOURS PRN
Status: DISCONTINUED | OUTPATIENT
Start: 2021-07-16 | End: 2021-07-17 | Stop reason: HOSPADM

## 2021-07-16 RX ORDER — DEXTROSE, SODIUM CHLORIDE, AND POTASSIUM CHLORIDE 5; .45; .075 G/100ML; G/100ML; G/100ML
INJECTION INTRAVENOUS CONTINUOUS
Status: DISCONTINUED | OUTPATIENT
Start: 2021-07-16 | End: 2021-07-17 | Stop reason: HOSPADM

## 2021-07-16 RX ORDER — HEPARIN SODIUM 5000 [USP'U]/ML
5000 INJECTION, SOLUTION INTRAVENOUS; SUBCUTANEOUS 2 TIMES DAILY
Status: DISCONTINUED | OUTPATIENT
Start: 2021-07-16 | End: 2021-07-16

## 2021-07-16 RX ORDER — HEPARIN SODIUM 5000 [USP'U]/ML
5000 INJECTION, SOLUTION INTRAVENOUS; SUBCUTANEOUS EVERY 8 HOURS SCHEDULED
Status: DISCONTINUED | OUTPATIENT
Start: 2021-07-16 | End: 2021-07-17 | Stop reason: HOSPADM

## 2021-07-16 RX ORDER — PANTOPRAZOLE SODIUM 40 MG/10ML
40 INJECTION, POWDER, LYOPHILIZED, FOR SOLUTION INTRAVENOUS DAILY
Status: DISCONTINUED | OUTPATIENT
Start: 2021-07-16 | End: 2021-07-17 | Stop reason: HOSPADM

## 2021-07-16 RX ORDER — 0.9 % SODIUM CHLORIDE 0.9 %
1000 INTRAVENOUS SOLUTION INTRAVENOUS ONCE
Status: COMPLETED | OUTPATIENT
Start: 2021-07-16 | End: 2021-07-16

## 2021-07-16 RX ADMIN — PANTOPRAZOLE SODIUM 40 MG: 40 INJECTION, POWDER, FOR SOLUTION INTRAVENOUS at 15:12

## 2021-07-16 RX ADMIN — HEPARIN SODIUM 5000 UNITS: 5000 INJECTION INTRAVENOUS; SUBCUTANEOUS at 15:13

## 2021-07-16 RX ADMIN — SERTRALINE HYDROCHLORIDE 50 MG: 50 TABLET ORAL at 15:12

## 2021-07-16 RX ADMIN — IOPAMIDOL 75 ML: 755 INJECTION, SOLUTION INTRAVENOUS at 07:09

## 2021-07-16 RX ADMIN — POTASSIUM CHLORIDE, DEXTROSE MONOHYDRATE AND SODIUM CHLORIDE: 75; 5; 450 INJECTION, SOLUTION INTRAVENOUS at 15:57

## 2021-07-16 RX ADMIN — HEPARIN SODIUM 5000 UNITS: 5000 INJECTION INTRAVENOUS; SUBCUTANEOUS at 20:39

## 2021-07-16 RX ADMIN — SODIUM CHLORIDE 1000 ML: 9 INJECTION, SOLUTION INTRAVENOUS at 05:12

## 2021-07-16 RX ADMIN — ACETAMINOPHEN 650 MG: 325 TABLET ORAL at 16:42

## 2021-07-16 ASSESSMENT — ENCOUNTER SYMPTOMS
VOMITING: 1
SHORTNESS OF BREATH: 0
NAUSEA: 1
COUGH: 0
ABDOMINAL PAIN: 0
BACK PAIN: 0
RHINORRHEA: 0
BLOOD IN STOOL: 0

## 2021-07-16 ASSESSMENT — PAIN DESCRIPTION - DESCRIPTORS: DESCRIPTORS: ACHING

## 2021-07-16 ASSESSMENT — PAIN DESCRIPTION - PAIN TYPE: TYPE: ACUTE PAIN

## 2021-07-16 ASSESSMENT — PAIN SCALES - GENERAL
PAINLEVEL_OUTOF10: 1
PAINLEVEL_OUTOF10: 3
PAINLEVEL_OUTOF10: 0
PAINLEVEL_OUTOF10: 3

## 2021-07-16 ASSESSMENT — PAIN DESCRIPTION - FREQUENCY: FREQUENCY: CONTINUOUS

## 2021-07-16 ASSESSMENT — PAIN DESCRIPTION - ONSET: ONSET: GRADUAL

## 2021-07-16 ASSESSMENT — PAIN DESCRIPTION - LOCATION: LOCATION: HEAD

## 2021-07-16 NOTE — H&P
Department of Internal Medicine        CHIEF COMPLAINT: Syncope    Reason for Admission: Small bowel obstruction    HISTORY OF PRESENT ILLNESS:      The patient is a 78 y.o. female who presents with having a syncopal episode at home. Patient states she went to restroom to empty her colostomy when she stood up she passed out. There is no associated chest pain, palpitation paresthesias or unusual shortness of breath. Patient thought she had been emptying her ostomy more frequently in the last 24 hours and she thought she may have been dehydrated. She had associated nausea vomiting with a syncopal episode this morning. Patient states that she did hit her head when she passed out has a hematoma. She denies any current neck or back pain. Patient denies history of similar problem. In the ED patient had a CT of the abdomen showed high-grade distal small bowel obstruction with the point of transition in the anterior upper left pelvis. Patient has a history of prior sigmoidectomy. Patient has a chronic left hydroureter nephrosis. Temperature 98.2 with a heart rate of 64 and patient had a blood pressure supine 146/86 and standing was 97/67. O2 sat 90% on room air. BUN/creatinine 11/1.0 with normal electrolytes and essentially normal liver enzymes with a WBC 10.2 and hemoglobin 13.4. General surgery was notified in the ED. Urinalysis did show large amount of blood along with greater than 20 WBCs and a few bacteria. Case discussed with patient daughter at the bedside.     Past Medical History:    Past Medical History:   Diagnosis Date    Cancer ([facility] [address]     colorectal     History of blood transfusion     Hx of blood clots     dvt, on eliquis    Ischemic bowel disease ([facility] [address] [facility]     Macular degeneration     MDRO (multiple drug resistant organisms) resistance     PAF (paroxysmal atrial fibrillation) (HCC)     Tinnitus      Past Surgical History:    Past Surgical History:   Procedure Laterality Date    ABDOMEN SURGERY      colostomy & reversal    CHOLECYSTECTOMY  07    COLONOSCOPY      CYSTOSCOPY N/A 12/4/2018    CYSTOSCOPY RETROGRADE PYELOGRAM performed by Ken Espinoza MD at [address], ESOPHAGUS      HAND SURGERY      carpel tunnel rigth hand    HYSTERECTOMY      ILEOSTOMY OR JEJUNOSTOMY      OTHER SURGICAL HISTORY  11/06/2017    diagnostic laparoscopy with exploratory laparotomy and reduction of internal hernia and closure of mesenteric defect, oversewing of multiple serosal tears    [place] LAP,SURG,COLECT,TOT,W/PROCTECT,W/ILEOST N/A 6/28/2018    LAPAROSCOPIC DIVERTING COLOSTOMY performed by Carolina Vazquez MD at [address] [facility] LAP,SURG,COLECT,TOT,W/PROCTECT,W/ILEOST N/A 10/18/2018    LAPAROSCOPIC REVISION LOOP COLOSTOMY, EXTENSIVE LYSIS OF ADHESIONS, DRAINAGE PELVIC ABSCESS, END COLOSTOMY performed by Mikki Yarbrough MD at [address] [facility] N/A 7/18/2018    EGD BIOPSY performed by Casandra Razo DO at [place] [address]       Medications Prior to Admission:    @  Prior to Admission medications    Medication Sig Start Date End Date Taking?  Authorizing Provider   omeprazole (PRILOSEC) 20 MG delayed release capsule Take 20 mg by mouth daily    Historical Provider, MD   Heparin Sodium, Porcine, (HEPARIN, PORCINE,) 5000 UNIT/ML injection Inject 1 mL into the skin every 8 hours 10/29/18   Casandra Razo DO   trimethobenzamide Dorrene Baltazar) 100 MG/ML injection Inject 2 mLs into the muscle every 6 hours as needed for Nausea 10/29/18   Casandra Razo DO   fluconazole (DIFLUCAN) 200MG/100ML IVPB Infuse 100 mLs intravenously every 24 hours 10/29/18   Casandra Razo DO   promethazine (PHENERGAN) 25 MG/ML injection Inject 0.5 mLs into the muscle every 6 hours as needed (nausea) 10/29/18   Casandra Razo DO   docusate sodium (COLACE, DULCOLAX) 100 MG CAPS Take 100 mg by mouth daily 10/30/18   Casandra Razo DO   benzocaine-menthol (CEPACOL SORE THROAT) 15-3.6 MG lozenge Take 1 lozenge by mouth every 2 hours as needed for Sore Throat 10/29/18   Marlene Angel, DO   pantoprazole (PROTONIX) 40 MG injection Infuse 40 mg intravenously daily 10/30/18   Marlene Angel DO   acetaminophen (TYLENOL) 325 MG tablet Take 2 tablets by mouth every 4 hours as needed for Pain or Fever 12/26/17   Marlene Angel DO   sertraline (ZOLOFT) 50 MG tablet Take 50 mg by mouth daily    Historical Provider, MD       Allergies:  Aspirin and Tape Ricarda Porteous tape]    Social History:   Social History     Socioeconomic History    Marital status:      Spouse name: Not on file    Number of children: Not on file    Years of education: Not on file    Highest education level: Not on file   Occupational History    Not on file   Tobacco Use    Smoking status: Former Smoker     Years: 20.00     Types: Cigarettes    Smokeless tobacco: Never Used    Tobacco comment: quit at age 44    Substance and Sexual Activity    Alcohol use: No     Comment: socially    Drug use: No    Sexual activity: Not on file   Other Topics Concern    Not on file   Social History Narrative    Lives in [facility] with  (Mack Marquez / Bed). Worked in an office. Social Determinants of Health     Financial Resource Strain:     Difficulty of Paying Living Expenses:    Food Insecurity:     Worried About Running Out of Food in the Last Year:     [address] in the Last Year:    Transportation Needs:     Lack of Transportation (Medical):      Lack of Transportation (Non-Medical):    Physical Activity:     Days of Exercise per Week:     Minutes of Exercise per Session:    Stress:     Feeling of Stress :    Social Connections:     Frequency of Communication with Friends and Family:     Frequency of Social Gatherings with Friends and Family:     Attends [employer] Services:     Active Member of Clubs or Organizations:     Attends Club or Organization Meetings:     Marital Status:    Intimate Partner Violence:     Fear of Current or Ex-Partner:     Emotionally Abused:     Physically Abused:     Sexually Abused:        Family History:   Family History   Problem Relation Age of Onset    Arthritis Mother     Kidney Disease Father     Cancer Father        REVIEW OF SYSTEMS:    Gen: Patient denies any lightheadedness or dizziness. No LOC or syncope. No fevers or chills. HEENT: No earache, sore throat or nasal congestion. Resp: Denies cough, hemoptysis or sputum production. Cardiac: Denies chest pain, SOB, diaphoresis or palpitations. GI: + nausea, vomiting, no diarrhea or constipation. No melena or hematochezia. : No urinary complaints, dysuria, hematuria or frequency. MSK: No extremity weakness, paralysis or paresthesias. PHYSICAL EXAM:    Vitals:  BP (!) 146/86   Pulse 64   Temp 98.2 °F (36.8 °C) (Oral)   Resp 20   Ht 5' 6\" (1.676 m)   Wt 150 lb (68 kg)   SpO2 99%   BMI 24.21 kg/m²     General:  This is a 78 y.o. yo female who is alert and oriented in NAD  HEENT:  Head is normocephalic and atraumatic, PERRLA, EOMI, mucus membranes moist with no pharyngeal erythema or exudate. Neck:  Supple with no carotid bruits, JVD or thyromegaly.   No cervical adenopathy  CV:  Regular rate and rhythm, no murmurs  Lungs: Coarse breath sounds to auscultation bilaterally with no wheezes, rales or rhonchi  Abdomen: + Mildly tender mid left abdomen, + mildly distended, + ostomy left mid lower quadrant, hypoactive bowel sounds present  Extremities:  No edema, peripheral pulses intact bilaterally  Neuro:  Cranial nerves II-XII grossly intact; motor and sensory function intact with no focal deficits  Skin:  No rashes, lesions or wounds    DATA:  CBC with Differential:    Lab Results   Component Value Date    WBC 10.2 07/16/2021    RBC 4.46 07/16/2021    HGB 13.4 07/16/2021    HCT 41.3 07/16/2021     07/16/2021    MCV 92.6 07/16/2021    MCH 30.0 07/16/2021    MCHC 32.4 07/16/2021 RDW 14.6 07/16/2021    NRBC 0.5 11/30/2017    BLASTSPCT 1.0 11/06/2017    METASPCT 0.9 10/18/2018    LYMPHOPCT 2.8 07/16/2021    PROMYELOPCT 0.5 11/26/2017    MONOPCT 7.8 07/16/2021    MYELOPCT 1.7 10/18/2018    BASOPCT 0.2 07/16/2021    MONOSABS 0.80 07/16/2021    LYMPHSABS 0.29 07/16/2021    EOSABS 0.03 07/16/2021    BASOSABS 0.02 07/16/2021     CMP:    Lab Results   Component Value Date     07/16/2021    K 4.9 07/16/2021     07/16/2021    CO2 21 07/16/2021    BUN 11 07/16/2021    CREATININE 1.0 07/16/2021    GFRAA >60 07/16/2021    LABGLOM 53 07/16/2021    GLUCOSE 111 07/16/2021    PROT 7.4 07/16/2021    LABALBU 3.7 07/16/2021    CALCIUM 9.4 07/16/2021    BILITOT 0.4 07/16/2021    ALKPHOS 77 07/16/2021    AST 40 07/16/2021    ALT 12 07/16/2021     Magnesium:    Lab Results   Component Value Date    MG 1.7 12/01/2018     Phosphorus:    Lab Results   Component Value Date    PHOS 4.2 12/01/2018     PT/INR:    Lab Results   Component Value Date    PROTIME 11.6 12/03/2018    INR 1.0 12/03/2018     Troponin:    Lab Results   Component Value Date    TROPONINI <0.01 10/16/2018     U/A:    Lab Results   Component Value Date    COLORU Yellow 07/16/2021    PROTEINU Negative 07/16/2021    PHUR 6.0 07/16/2021    WBCUA >20 07/16/2021    RBCUA 2-5 07/16/2021    BACTERIA FEW 07/16/2021    CLARITYU CLOUDY 07/16/2021    SPECGRAV 1.015 07/16/2021    LEUKOCYTESUR LARGE 07/16/2021    UROBILINOGEN 0.2 07/16/2021    BILIRUBINUR Negative 07/16/2021    BLOODU LARGE 07/16/2021    GLUCOSEU Negative 07/16/2021    AMORPHOUS FEW 11/23/2018     ABG:    Lab Results   Component Value Date    PH 7.514 10/20/2018    PCO2 28.5 10/20/2018    PO2 67.6 10/20/2018    HCO3 22.4 10/20/2018    BE 0.2 10/20/2018    O2SAT 94.4 10/20/2018     HgBA1c:  No results found for: LABA1C  FLP:    Lab Results   Component Value Date    TRIG 89 10/23/2018    HDL 58.0 07/18/2013    LDLCALC 157 07/18/2013     TSH:    Lab Results   Component Value Date    TSH

## 2021-07-16 NOTE — CONSULTS
GENERAL SURGERY  CONSULT NOTE  7/16/2021    HPI  Camila De La O is a 78 y.o. female for whom general surgery was consulted for findings on CT scan suggestive high-grade small bowel obstruction. The patient presented after having about 5 days of high output from her ostomy. The ostomy output has been liquid. She says that this is not abnormal for her as her ostomy produces liquid for some time then will thicken and go back and forth for years. She is not identified any precipitating factors. She presented after a syncopal episode while she was emptying her ostomy output in the bathroom. She hit her head but had no neurological symptoms. She presented to the emergency room for further evaluation possible treatment. On arrival to the emergency room she underwent CT scan of the head which was normal.  She had labs drawn and she underwent CT of the abdomen pelvis for her high output. CT scan was read as high-grade small bowel obstruction with point of transition in the anterior upper left pelvis. She says that she has had intermittent nausea but one episode of vomiting after she fell and hit her head. Never had abdominal pain.  She has been having regular output from her ostomy although it has been more than normal.      Past Medical History:   Diagnosis Date    Cancer ([address]     colorectal     History of blood transfusion     Hx of blood clots     dvt, on eliquis    Ischemic bowel disease ([address]     Macular degeneration     MDRO (multiple drug resistant organisms) resistance     PAF (paroxysmal atrial fibrillation) (HCC)     Tinnitus        Past Surgical History:   Procedure Laterality Date    ABDOMEN SURGERY      colostomy & reversal    CHOLECYSTECTOMY  07    COLONOSCOPY      CYSTOSCOPY N/A 12/4/2018    CYSTOSCOPY RETROGRADE PYELOGRAM performed by Sharyn Mitchell MD at [address], ESOPHAGUS      HAND SURGERY      carpel tunnel rigth hand    HYSTERECTOMY      ILEOSTOMY OR JEJUNOSTOMY      OTHER SURGICAL HISTORY  11/06/2017    diagnostic laparoscopy with exploratory laparotomy and reduction of internal hernia and closure of mesenteric defect, oversewing of multiple serosal tears    [place] LAP,SURG,COLECT,TOT,W/PROCTECT,W/ILEOST N/A 6/28/2018    LAPAROSCOPIC DIVERTING COLOSTOMY performed by Gely Torres MD at [address] LAP,SURG,COLECT,TOT,W/PROCTECT,W/ILEOST N/A 10/18/2018    LAPAROSCOPIC REVISION LOOP COLOSTOMY, EXTENSIVE LYSIS OF ADHESIONS, DRAINAGE PELVIC ABSCESS, END COLOSTOMY performed by Santosh Raymond MD at [address] [facility] ENDOSCOPY N/A 7/18/2018    EGD BIOPSY performed by Marlnee Angel DO at [place] [address]       Prior to Admission medications    Medication Sig Start Date End Date Taking?  Authorizing Provider   Multiple Vitamins-Minerals (THERAPEUTIC MULTIVITAMIN-MINERALS) tablet Take 1 tablet by mouth daily   Yes Historical Provider, MD   COVID-19 Ad26 Vaccine (J&J, JAN) 0.5 ML SUSP injection Inject 0.5 mLs into the muscle once 3/2021 - COMPLETED   Yes Historical Provider, MD   omeprazole (PRILOSEC) 20 MG delayed release capsule Take 20 mg by mouth daily   Yes Historical Provider, MD   acetaminophen (TYLENOL) 325 MG tablet Take 2 tablets by mouth every 4 hours as needed for Pain or Fever 12/26/17  Yes Marlene Angel DO   sertraline (ZOLOFT) 50 MG tablet Take 50 mg by mouth daily   Yes Historical Provider, MD       Allergies   Allergen Reactions    Aspirin Other (See Comments)     tinnitus    Tape Marella [place] Tape] Rash       Family History   Problem Relation Age of Onset    Arthritis Mother     Kidney Disease Father     Cancer Father        Social History     Tobacco Use    Smoking status: Former Smoker     Years: 20.00     Types: Cigarettes    Smokeless tobacco: Never Used    Tobacco comment: quit at age 44    Substance Use Topics    Alcohol use: No     Comment: socially    Drug use: No         Review of Systems Negative except what is listed in the HPI      PHYSICAL EXAM:  Vitals:    07/16/21 1100   BP: (!) 140/65   Pulse: 68   Resp: 16   Temp: 98.8 °F (37.1 °C)   SpO2: 97%       General Appearance:  lying in bed, in NAD  Skin:  no obvious rashes or lesions  Head/face:  normocephalic, right forehead ecchymosis  Eyes:  No icterus, conjugate gaze  Lungs:  symmetric chest rise, nonlabored on room air  Heart:  warm throughout, pink, normal rate  Abdomen:  Soft, NTTP throughout, prior ileostomy site at right hemiabdomen is well healed, prior ex lap scar is well healed, easily reducible 4cm prolapsed ostomy at the L hemiabdomen is productive of gas and liquid stool  Extremities: MAEx4, no UE edema      LABS:  CBC  Recent Labs     07/16/21  0455   WBC 10.2   HGB 13.4   HCT 41.3          BMP  Recent Labs     07/16/21  0615      K 4.9      CO2 21*   BUN 11   CREATININE 1.0   CALCIUM 9.4       Liver Function  Recent Labs     07/16/21  0455 07/16/21  0615   LIPASE 48  --    BILITOT  --  0.4   AST  --  40*   ALT  --  12   ALKPHOS  --  77   PROT  --  7.4   LABALBU  --  3.7     No results for input(s): LACTATE in the last 72 hours. No results for input(s): INR, PTT in the last 72 hours. Invalid input(s): PT    RADIOLOGY  CT HEAD WO CONTRAST    Result Date: 7/16/2021  EXAMINATION: CT OF THE HEAD WITHOUT CONTRAST  7/16/2021 4:42 am TECHNIQUE: CT of the head was performed without the administration of intravenous contrast. Dose modulation, iterative reconstruction, and/or weight based adjustment of the mA/kV was utilized to reduce the radiation dose to as low as reasonably achievable. COMPARISON: None. HISTORY: ORDERING SYSTEM PROVIDED HISTORY: Evaluate intracranial abnormality TECHNOLOGIST PROVIDED HISTORY: Has a \"code stroke\" or \"stroke alert\" been called? ->No Reason for exam:->Evaluate intracranial abnormality Decision Support Exception - unselect if not a suspected or confirmed emergency medical Additional Contrast? None    Result Date: 7/16/2021  EXAMINATION: CT OF THE ABDOMEN AND PELVIS WITH CONTRAST 7/16/2021 7:03 am TECHNIQUE: CT of the abdomen and pelvis was performed with the administration of intravenous contrast. Multiplanar reformatted images are provided for review. Dose modulation, iterative reconstruction, and/or weight based adjustment of the mA/kV was utilized to reduce the radiation dose to as low as reasonably achievable. COMPARISON: 10/17/2018 HISTORY: ORDERING SYSTEM PROVIDED HISTORY: Increased output from colostomy, nausea and vomiting TECHNOLOGIST PROVIDED HISTORY: Reason for exam:->Increased only from colostomy, nausea and vomiting Additional Contrast?->None Decision Support Exception - unselect if not a suspected or confirmed emergency medical condition->Emergency Medical Condition (MA) History of colorectal cancer. FINDINGS: Lower Chest: Slight bibasilar atelectasis. Normal heart size. No pleural effusion or suspicious pulmonary nodule. Organs: Mild diffuse low-density of the liver is present without worrisome focal lesion. Slight intrahepatic biliary dilatation present. Gallbladder surgically absent. Stable small bilateral renal cysts. Marked chronic left hydroureteronephrosis. Left ureter is obstructed at the level of iliac vessels. Slight distension of the right renal pelvis is present significantly improved from the prior study. Adrenal glands and other abdominal organs appear normal. GI/Bowel: Distal transverse colon left-sided colostomy present. Short Joy's pouch present in the lower pelvis. Apparent left hemicolectomy. Moderate fluid-filled distension of multiple distal small bowel loops. Terminal ileum is normal in size. Point of obstruction is in the anterior upper left pelvis at a site of small bowel anastomosis. Pelvis: Moderate fibrotic stranding present in the posterior lower pelvis from prior sigmoid resection and presumed radiation treatment.   No mass 1938    Date: July 16, 2021     Surgeon: Rhina Brownlee MD    Requesting Physician:     Chief Complaint: abd pain, naausea, vomiting    Patient Active Problem List   Diagnosis    [facility] ([facility]    Pneumatosis intestinalis    Enteritis    Severe protein-calorie malnutrition ([address]    Altered mental status    Fatigue due to excessive exertion    Macular degeneration    Hx of blood clots    Cancer ([facility] [address]    Perforation of sigmoid colon ([address]    PAF (paroxysmal atrial fibrillation) ([facility] [address] [facility]    Ischemic bowel disease ([address]    Hx of blood clots    Cancer ([facility] [address]    SBO (small bowel obstruction) ([facility] [address]       Subjective: As above. I saw and examined the patient and discussed the above HPI with the resident and agree with documented positive and negative findings. Objective:  BP (!) 140/65   Pulse 68   Temp 98.8 °F (37.1 °C) (Oral)   Resp 16   Ht 5' 6\" (1.676 m)   Wt 150 lb (68 kg)   SpO2 97%   BMI 24.21 kg/m²   Labs:  Reviewed by me and relevant abnormalities noted       A complete 10 system review was performed and are otherwise negative unless mentioned in the above HPI. Specific negatives are listed below but may not include all those reviewed.     General ROS: negative obtundation, AMS  ENT ROS: negative rhinorrhea, epistaxis  Allergy and Immunology ROS: negative itchy/watery eyes or nasal congestion  Hematological and Lymphatic ROS: negative spontaneous bleeding or bruising  Endocrine ROS: negative  lethargy, mood swings, palpitations or polydipsia/polyuria  Respiratory ROS: negative sputum changes, stridor, tachypnea or wheezing  Cardiovascular ROS: negative for - loss of consciousness, murmur or orthopnea  Gastrointestinal ROS: negative for - hematochezia or hematemesis  Genito-Urinary ROS: negative for -  genital discharge or hematuria  Musculoskeletal ROS: negative for - focal weakness, gangrene  Psych/Neuro ROS: negative for - visual or auditory hallucinations, suicidal ideation    Physical exam:   BP (!) 140/65   Pulse 68   Temp 98.8 °F (37.1 °C) (Oral)   Resp 16   Ht 5' 6\" (1.676 m)   Wt 150 lb (68 kg)   SpO2 97%   BMI 24.21 kg/m²   General appearance:  NAD, appears stated age  Head: NCAT, PERRLA, EOMI, red conjunctiva  Neck: supple, no masses, trachea midline  Lungs: Equal chest rise bilateral, no retractions, no wheezing  Heart: Reg rate  Abdomen: soft, mild distended, nontender, stoma prolapse LLQ, pink, viable, clear green fluid in bag  Skin; warm and dry, no cyanosis  Gu: no cva tenderness  Extremities: atraumatic, no focal motor deficits, no open wounds  Psych: No tremor, visual hallucinations        Radiology: I reviewed relevant abdominal imaging from this admission and that available in the EMR including CT abd/pel from admission. My assessment is partial small bowel obstruction    Assessment/Plan:  Claudia Sinha is a 78 y.o. female with small bowel obstruction  Patient Active Problem List   Diagnosis    [facility] ([facility] [address]    Pneumatosis intestinalis    Enteritis    Severe protein-calorie malnutrition (HCC)    Altered mental status    Fatigue due to excessive exertion    Macular degeneration    Hx of blood clots    Cancer (HCC)    Perforation of sigmoid colon (HCC)    PAF (paroxysmal atrial fibrillation) (HCC)    Ischemic bowel disease (HCC)    Hx of blood clots    Cancer (HCC)    SBO (small bowel obstruction) (HCC)       Her ostomy seems to be functioning which contradicts the CT scan findings of a bowel obstruction. Clear liquid diet  monitor stoma output if she has decreased output tomorrow would consider repeating her CT with oral contrast to definitively diagnose a bowel obstruction    Hemoconcentration on labs is evidence of severe dehydration  aggressive IV fluid hydration tonight and will reevaluate in the morning regarding CT scan  I have personally participated in a face-to-face history and physical exam on the date of service.  Reviewed chart, vitals, labs and radiologic studies. I also participated in medical decision making with the resident/NP on the date of service and I agree with all of the pertinent clinical information, assessment and treatment plan. I have reviewed and edited the note above based on my findings during my history, exam, and decision making.        Physician Signature: Electronically signed by Dr. Vera Salazar  [phone] (p)  7/16/2021  3:14 PM

## 2021-07-16 NOTE — PROGRESS NOTES
Physical Therapy Initial Evaluation/Plan of Care    Room #:  9260/3594-74  Patient Name: Yohannes Henson  YOB: 1940  MRN: [id number]    Date of Service: 7/16/2021      Tentative placement recommendation: Home Health Physical Therapy     Equipment recommendation: Patient has needed equipment       Evaluating Physical Therapist: Angelica Pavon, PT #[id number]      Specific Provider Orders/Date/Referring Provider :  07/16/21 1300   PT eval and treat Start: 07/16/21 1300, End: 07/16/21 1300, ONE TIME, Standing Count: 1 Occurrences, R    Neal Farmers, DO      Admitting Diagnosis:   SBO (small bowel obstruction) ([facility] [address] [K56.609]  syncopal episode while she was emptying her ostomy output in the bathroom      Visit Diagnoses       Codes    Small bowel obstruction ([facility] [address]    -  Primary K56.609        Surgery: -    Patient Active Problem List   Diagnosis    Rectal cancer ([address]    Pneumatosis intestinalis    Enteritis    Severe protein-calorie malnutrition ([address]    Altered mental status    Fatigue due to excessive exertion    Macular degeneration    Hx of blood clots    Cancer ([address]    Perforation of sigmoid colon ([address]    PAF (paroxysmal atrial fibrillation) ([address]    Ischemic bowel disease ([address]    Hx of blood clots    Cancer ([address]    SBO (small bowel obstruction) ([address]        ASSESSMENT of Current Deficits Patient exhibits decreased strength, balance and endurance impairing functional mobility, transfers, gait , gait distance and tolerance to activity unsteady gait no Loss of balance. Patient requires skilled physical therapy to address concerns listed above to increase safety and independence at discharge.          PHYSICAL THERAPY  PLAN OF CARE       Physical therapy plan of care is established based on physician order,  patient diagnosis and clinical assessment    Current Treatment Recommendations:    -Standing Balance: Perform strengthening exercises in standing to promote motor control with or without upper extremity support   -Transfers: Cues for hand placement, technique and safety  -Gait: Gait training and Standing activities to improve: base of support, weight shift, weight bearing    -Endurance: Utilize Supervised activities to increase level of endurance to allow for safe functional mobility including transfers and gait   -Stairs: Stair training with instruction on proper technique and hand placement on rail    PT long term treatment goals are located in below grid    Patient and or family understand(s) diagnosis, prognosis, and plan of care. Frequency of treatments: Patient will be seen  daily.          Prior Level of Function: Patient ambulated with cane    Rehab Potential: good    for baseline    Past medical history:   Past Medical History:   Diagnosis Date    Cancer ([facility] [address]     colorectal     History of blood transfusion     Hx of blood clots     dvt, on eliquis    Ischemic bowel disease ([facility] [address]     Macular degeneration     MDRO (multiple drug resistant organisms) resistance     PAF (paroxysmal atrial fibrillation) (HCC)     Tinnitus      Past Surgical History:   Procedure Laterality Date    ABDOMEN SURGERY      colostomy & reversal    CHOLECYSTECTOMY  07    COLONOSCOPY      CYSTOSCOPY N/A 12/4/2018    CYSTOSCOPY RETROGRADE PYELOGRAM performed by Ken Espinoza MD at [address] [facility] [address], ESOPHAGUS      HAND SURGERY      carpel tunnel rigth hand    HYSTERECTOMY      ILEOSTOMY OR JEJUNOSTOMY      OTHER SURGICAL HISTORY  11/06/2017    diagnostic laparoscopy with exploratory laparotomy and reduction of internal hernia and closure of mesenteric defect, oversewing of multiple serosal tears    VT LAP,SURG,COLECT,TOT,W/PROCTECT,W/ILEOST N/A 6/28/2018    LAPAROSCOPIC DIVERTING COLOSTOMY performed by Carolina Vazquez MD at [address] [facility] LAP,SURG,COLECT,TOT,W/PROCTECT,W/ILEOST N/A 10/18/2018    LAPAROSCOPIC REVISION LOOP COLOSTOMY, EXTENSIVE LYSIS OF ADHESIONS, DRAINAGE PELVIC ABSCESS, END COLOSTOMY performed by Corey Chu MD at [address] [facility] N/A 7/18/2018    EGD BIOPSY performed by Suzi Monzon DO at [address] [facility]:    Precautions: Activity as tolerated, falls, hard of hearing and ostomy ,    Social history: Patient lives with spouse   in a two story home bedroom and bathroom 2nd floor 12+ steps with bilateral rail  with No steps  to enter  Walk in shower grab bars    Equipment owned: Cane and Shower chair,       [facility]   How much difficulty turning over in bed?: None  How much difficulty sitting down on / standing up from a chair with arms?: A Little  How much difficulty moving from lying on back to sitting on side of bed?: None  How much help from another person moving to and from a bed to a chair?: A Little  How much help from another person needed to walk in hospital room?: A Little  How much help from another person for climbing 3-5 steps with a railing?: A Little  AM-PAC Inpatient Mobility Raw Score : 20  AM-PAC Inpatient T-Scale Score : 47.67  Mobility Inpatient CMS 0-100% Score: 35.83  Mobility Inpatient CMS G-Code Modifier : [address] cleared patient for PT evaluation. The admitting diagnosis and active problem list as listed above have been reviewed prior to the initiation of this evaluation. OBJECTIVE;   Initial Evaluation  Date: 7/16/2021 Treatment Date:     Short Term/ Long Term   Goals   Was pt agreeable to Eval/treatment? Yes    To be met in 2 days   Pain level   No c/o        Bed Mobility    Rolling: Independent    Supine to sit: Independent    Sit to supine: Independent    Scooting: Independent    Rolling: Independent    Supine to sit:  Independent    Sit to supine: Independent    Scooting: Independent     Transfers Sit to stand: Supervision     Sit to stand: Independent     Ambulation    1 x 20 feet, 1 x 60 feet using  no device with Supervision    cues for safety slightly unsteady, generally uses cane, no Loss of balance    100 feet using  cane with Modified Independent    Stair negotiation: ascended and descended   Not assessed       10 steps 1 rail with supervision    ROM Within functional limits        Strength BUE:  refer to OT maty  RLE:  4-/5  LLE:  4-/5   Increase strength in affected mm groups by 1/3 grade   Balance Sitting EOB:  good    Dynamic Standing:  fair    Sitting EOB:  good    Dynamic Standing: good       Patient is Alert & Oriented x person, place, time and situation and follows directions    Sensation:  Patient  reports numbness/tingling bilateral lower extremities and bilateral upper extremities    Edema:  no    Endurance: fair  +    Vitals: room air    Blood Pressure at rest   Blood Pressure during session     Heart Rate at rest  86 Heart Rate during session     SPO2 at rest 91%  SPO2 during session  %     Patient education  Patient educated on role of Physical Therapy, risks of immobility, safety and plan of care and  importance of mobility while in hospital       Patient response to education:   Pt verbalized understanding Pt demonstrated skill Pt requires further education in this area   Yes Partial Yes      Treatment:  Patient practiced and was instructed/facilitated in the following treatment: Patient assisted to edge of bed,   Sat edge of bed 5 minutes with Supervision  to increase dynamic sitting balance and activity tolerance. ambulated in room, assisted to commode, emptied ostomy bag without assist.      Therapeutic Exercises:  not performed      At end of session, patient in bed with   call light and phone within reach,   all lines and tubes intact, nursing notified. Patient would benefit from continued skilled Physical Therapy to improve functional independence and quality of life.           Patient's/ family goals   home        Time in  1434  Time out  1456    Total Treatment Time  0 minutes    Evaluation time includes thorough review of current medical information, gathering information on past medical history/social history and prior level of function, completion of standardized testing/informal observation of tasks, assessment of data, and development of Plan of care and goals.      CPT codes:  Low Complexity PT evaluation (51811)  No treatment    Brandon Comer PT

## 2021-07-16 NOTE — ED NOTES
daughter at bedside. IV fluid infusing as ordered. Pt. Aware urine specimen still needed.      Natasha Grover RN  07/16/21 [id number]

## 2021-07-16 NOTE — ED NOTES
Ambulated to BR. Urine spec. Obtained and sent to lab. Pt. Ambulated with assist.  Complains of being weak.      Joe Durán RN  07/16/21 2984

## 2021-07-16 NOTE — ED PROVIDER NOTES
Presents to the ED for evaluation. Patient was getting up to use the restroom to empty her colostomy bag and when she stood up she had a syncopal episode. There was no chest pain or shortness of breath preceding this or afterwards. She states she has been emptying her colostomy bag frequently and thinks she may be dehydrated. Symptoms all started last night. After the syncopal episode occurred today she had an episode of nausea and vomiting. She states that the nausea has much improved. No associated chest pain or shortness of breath. No associated abdominal pain. She has not noted any blood in the stool or in the emesis when she had the episode. Patient right now do states she feels tired. She did hit her head and has a hematoma present. Denies any neck or back pain. She did ambulate from the EMS cot to the bed without difficulty. The history is provided by the patient. No  was used. Review of Systems   Constitutional: Positive for fatigue. Negative for chills, diaphoresis and fever. HENT: Negative for congestion and rhinorrhea. Eyes: Negative for visual disturbance. Respiratory: Negative for cough and shortness of breath. Cardiovascular: Negative for chest pain, palpitations and leg swelling. Gastrointestinal: Positive for nausea ( Since the syncopal episode) and vomiting. Negative for abdominal pain and blood in stool. Increase stooling into her colostomy   Genitourinary: Negative for difficulty urinating, dysuria, flank pain, frequency, hematuria and urgency. Musculoskeletal: Negative for back pain and neck pain. Skin: Positive for wound ( Bruise on head     ). Negative for pallor. Neurological: Positive for dizziness, syncope and light-headedness. Hematological: Does not bruise/bleed easily. Psychiatric/Behavioral: Negative for confusion. All other systems reviewed and are negative.        Physical Exam  Vitals and nursing note reviewed. Constitutional:       General: She is not in acute distress. Appearance: She is well-developed and normal weight. She is not diaphoretic. HENT:      Head: Normocephalic. Comments: Patient does have a small cephalhematoma on the right forehead. No surrounding bony crepitance. No lacerations. Eyes:      Extraocular Movements: Extraocular movements intact. Conjunctiva/sclera: Conjunctivae normal.   Cardiovascular:      Rate and Rhythm: Normal rate and regular rhythm. Heart sounds: Normal heart sounds. No murmur heard. Pulmonary:      Effort: Pulmonary effort is normal. No respiratory distress. Breath sounds: Normal breath sounds. No wheezing or rales. Abdominal:      General: Bowel sounds are normal. There is no distension. Palpations: Abdomen is soft. Tenderness: There is no abdominal tenderness. There is no guarding or rebound. Comments: Colostomy in the left lower abdomen. No blood present. Musculoskeletal:         General: No tenderness ( No tenderness to palpation of the thoracic or lumbar spine. ). Cervical back: Normal range of motion and neck supple. No tenderness ( No midline cervical spine tenderness. ). Skin:     General: Skin is warm and dry. Coloration: Skin is not jaundiced or pale. Neurological:      Mental Status: She is alert and oriented to person, place, and time. Comments: Sensation grossly intact. No focal neurological deficits. No ataxia with finger-to-nose. Procedures     MDM  Number of Diagnoses or Management Options  Small bowel obstruction ([address]  Diagnosis management comments: 7:44 AM EDT  I received this patient at sign out from Dr. Mirna Akhtar  I have discussed the patient's initial exam, treatment and plan of care with the out going physician. I have introduced my self to the patient / family and have answered their questions to this point.   I have examined the patient myself and reviewed ordered tests / medications and  reviewed any available results to this point. If a resident is involved in the Emergency Department care, I have discussed my findings and plan with them as well. This was signed out to me patient had an apparent syncopal episode earlier today while having significant output from her ostomy bag. Patient's troponin was reassuring no ischemic change on EKG patient is alert in order for space time situation. CT was concerning for high-grade bowel obstruction. Patient was discussed with Dr. Claudette Gu who agreed to consult and the case was admitted to Dr. Bianca Hernandez. Fabiola Farrell DO      ED Course as of Jul 16 0842 Fri Jul 16, 2021   1268 Patient's orthostatics were positive. Will reassess after IV fluids. [MS]   6366 Patient resting in bed no acute distress. Discussed results of orthostatics with patient and family. Right now she is feeling better. Currently receiving IV fluids. Discussed results of CT head, neck, and chest x-ray. [MS]   6669 Patient is resting in bed no distress. Discussed results of labs thus far. Patient has no complaints at this time. Discussed with her that the oncoming physician will be watching for the CAT scan results. [MS]   8313 Signing out patient to . Patient's case discussed. Treatment plan as well as current test results reviewed together.      [MS]      ED Course User Index  [MS] Bonnie Peacock DO       --------------------------------------------- PAST HISTORY ---------------------------------------------  Past Medical History:  has a past medical history of Cancer ([facility] [address] History of blood transfusion, Hx of blood clots, Ischemic bowel disease ([facility] [address] Macular degeneration, MDRO (multiple drug resistant organisms) resistance, PAF (paroxysmal atrial fibrillation) ([facility] [address] and Tinnitus. Past Surgical History:  has a past surgical history that includes Cholecystectomy (07); Hand surgery;  Hysterectomy; parvez and bso (cervix removed); ileostomy or jejunostomy; other surgical history (11/06/2017); Abdomen surgery; Colonoscopy; Dilatation, esophagus; pr lap,surg,colect,tot,w/proctect,w/ileost (N/A, 6/28/2018); Upper gastrointestinal endoscopy (N/A, 7/18/2018); pr lap,surg,colect,tot,w/proctect,w/ileost (N/A, 10/18/2018); and Cystoscopy (N/A, 12/4/2018). Social History:  reports that she has quit smoking. Her smoking use included cigarettes. She quit after 20.00 years of use. She has never used smokeless tobacco. She reports that she does not drink alcohol and does not use drugs. Family History: family history includes Arthritis in her mother; Cancer in her father; Kidney Disease in her father. The patients home medications have been reviewed.     Allergies: Aspirin and Tape [adhesive tape]    -------------------------------------------------- RESULTS -------------------------------------------------    LABS:  Results for orders placed or performed during the hospital encounter of 07/16/21   CBC Auto Differential   Result Value Ref Range    WBC 10.2 4.5 - 11.5 E9/L    RBC 4.46 3.50 - 5.50 E12/L    Hemoglobin 13.4 11.5 - 15.5 g/dL    Hematocrit 41.3 34.0 - 48.0 %    MCV 92.6 80.0 - 99.9 fL    MCH 30.0 26.0 - 35.0 pg    MCHC 32.4 32.0 - 34.5 %    RDW 14.6 11.5 - 15.0 fL    Platelets 176 193 - 016 E9/L    MPV 10.0 7.0 - 12.0 fL    Neutrophils % 88.4 (H) 43.0 - 80.0 %    Immature Granulocytes % 0.5 0.0 - 5.0 %    Lymphocytes % 2.8 (L) 20.0 - 42.0 %    Monocytes % 7.8 2.0 - 12.0 %    Eosinophils % 0.3 0.0 - 6.0 %    Basophils % 0.2 0.0 - 2.0 %    Neutrophils Absolute 9.02 (H) 1.80 - 7.30 E9/L    Immature Granulocytes # 0.05 E9/L    Lymphocytes Absolute 0.29 (L) 1.50 - 4.00 E9/L    Monocytes Absolute 0.80 0.10 - 0.95 E9/L    Eosinophils Absolute 0.03 (L) 0.05 - 0.50 E9/L    Basophils Absolute 0.02 0.00 - 0.20 E9/L   Lipase   Result Value Ref Range    Lipase 48 13 - 60 U/L   Urinalysis, reflex to microscopic   Result Value Ref Range colostomy. No finding worrisome for intra-abdominal metastatic disease. Chronic left   hydroureteronephrosis. XR CHEST PORTABLE   Final Result   Scarring and/or atelectasis at the bases, right greater than left. Obstructive airways disease. CT Cervical Spine WO Contrast   Final Result   No acute abnormality of the cervical spine. Facet arthropathy as indicated above. CT HEAD WO CONTRAST   Final Result   No acute intracranial abnormality.               ------------------------- NURSING NOTES AND VITALS REVIEWED ---------------------------  Date / Time Roomed:  7/16/2021  4:23 AM  ED Bed Assignment:  10/10    The nursing notes within the ED encounter and vital signs as below have been reviewed. Patient Vitals for the past 24 hrs:   BP Temp Temp src Pulse Resp SpO2 Height Weight   07/16/21 0547 (!) 146/86 -- -- -- 20 99 % -- --   07/16/21 0439 -- -- -- -- 20 97 % -- --   07/16/21 0428 (!) 153/66 98.2 °F (36.8 °C) Oral 64 20 98 % 5' 6\" (1.676 m) 150 lb (68 kg)       Oxygen Saturation Interpretation: Normal    ------------------------------------------ PROGRESS NOTES ------------------------------------------  Re-evaluation(s):  Time: 1  Patients symptoms are improving  Repeat physical examination is not changed    Counseling:  I have spoken with the patient and discussed todays results, in addition to providing specific details for the plan of care and counseling regarding the diagnosis and prognosis. Their questions are answered at this time and they are agreeable with the plan of admission.    --------------------------------- ADDITIONAL PROVIDER NOTES ---------------------------------  Consultations:   Spoke with Dr. Kirk Cooley Discussed case. They will admit the patient.   This patient's ED course included: a personal history and physicial examination, re-evaluation prior to disposition, multiple bedside re-evaluations, IV medications, cardiac monitoring, continuous pulse oximetry and complex medical decision making and emergency management    This patient has remained hemodynamically stable during their ED course. Diagnosis:  1. Small bowel obstruction (HCC)        Disposition:  Patient's disposition: Admit to med/surg floor  Patient's condition is stable.        Madelyn Krabbe, DO  07/16/21 5263

## 2021-07-16 NOTE — CONSULTS
Cardiology Consult    The patient is a 78 y.o. female who presents with having a syncopal episode at home. Patient states she went to restroom to empty her colostomy when she stood up she passed out. There is no associated chest pain, palpitation paresthesias or unusual shortness of breath. Patient thought she had been emptying her ostomy more frequently in the last 24 hours and she thought she may have been dehydrated. She had associated nausea vomiting with a syncopal episode this morning. Patient states that she did hit her head when she passed out has a hematoma. She denies any current neck or back pain. Patient denies history of similar problem. In the ED patient had a CT of the abdomen showed high-grade distal small bowel obstruction with the point of transition in the anterior upper left pelvis. Patient has a history of prior sigmoidectomy. Patient has a chronic left hydroureter nephrosis. Temperature 98.2 with a heart rate of 64 and patient had a blood pressure supine 146/86 and standing was 97/67. O2 sat 90% on room air. BUN/creatinine 11/1.0 with normal electrolytes and essentially normal liver enzymes with a WBC 10.2 and hemoglobin 13.4. General surgery was notified in the ED. Urinalysis did show large amount of blood along with greater than 20 WBCs and a few bacteria.   Case discussed with patient daughter at the bedside.     Past Medical History:    Past Medical History[]Expand by Default        Past Medical History:   Diagnosis Date    Cancer ([facility]       colorectal     History of blood transfusion      Hx of blood clots       dvt, on eliquis    Ischemic bowel disease ([facility]      Macular degeneration      MDRO (multiple drug resistant organisms) resistance      PAF (paroxysmal atrial fibrillation) (HCC)      Tinnitus           Past Surgical History:    Past Surgical History[]Expand by Default         Past Surgical History:   Procedure Laterality Date    ABDOMEN SURGERY         colostomy & reversal    CHOLECYSTECTOMY   07    COLONOSCOPY        CYSTOSCOPY N/A 12/4/2018     CYSTOSCOPY RETROGRADE PYELOGRAM performed by Jarocho Nails MD at [address], ESOPHAGUS        HAND SURGERY         carpel tunnel rigth hand    HYSTERECTOMY        ILEOSTOMY OR JEJUNOSTOMY        OTHER SURGICAL HISTORY   11/06/2017     diagnostic laparoscopy with exploratory laparotomy and reduction of internal hernia and closure of mesenteric defect, oversewing of multiple serosal tears    SC LAP,SURG,COLECT,TOT,W/PROCTECT,W/ILEOST N/A 6/28/2018     LAPAROSCOPIC DIVERTING COLOSTOMY performed by Arnulfo Morocho MD at [facility] LAP,SURG,COLECT,TOT,W/PROCTECT,W/ILEOST N/A 10/18/2018     LAPAROSCOPIC REVISION LOOP COLOSTOMY, EXTENSIVE LYSIS OF ADHESIONS, DRAINAGE PELVIC ABSCESS, END COLOSTOMY performed by Maday Amador MD at [address] ENDOSCOPY N/A 7/18/2018     EGD BIOPSY performed by Yunior Cea, DO at [facility]     Medications Prior to Admission:    @  Home Medications[]Expand by Default           Prior to Admission medications    Medication Sig Start Date End Date Taking?  Authorizing Provider   omeprazole (PRILOSEC) 20 MG delayed release capsule Take 20 mg by mouth daily       Historical Provider, MD   Heparin Sodium, Porcine, (HEPARIN, PORCINE,) 5000 UNIT/ML injection Inject 1 mL into the skin every 8 hours 10/29/18     Yunior Cease, DO   trimethobenzamide Hezzie Alina) 100 MG/ML injection Inject 2 mLs into the muscle every 6 hours as needed for Nausea 10/29/18     Yunior Cease, DO   fluconazole (DIFLUCAN) 200MG/100ML IVPB Infuse 100 mLs intravenously every 24 hours 10/29/18     Yunior Cease, DO   promethazine (PHENERGAN) 25 MG/ML injection Inject 0.5 mLs into the muscle every 6 hours as needed (nausea) 10/29/18     Yunior Cease, DO   docusate sodium (COLACE, DULCOLAX) 100 MG CAPS Take 100 mg by mouth daily 10/30/18     Jacqueline Lieu, DO   benzocaine-menthol (CEPACOL SORE THROAT) 15-3.6 MG lozenge Take 1 lozenge by mouth every 2 hours as needed for Sore Throat 10/29/18     Jacqueline Lieu, DO   pantoprazole (PROTONIX) 40 MG injection Infuse 40 mg intravenously daily 10/30/18     Jacqueline Lieu, DO   acetaminophen (TYLENOL) 325 MG tablet Take 2 tablets by mouth every 4 hours as needed for Pain or Fever 12/26/17     Jacqueline Lieu, DO   sertraline (ZOLOFT) 50 MG tablet Take 50 mg by mouth daily       Historical Provider, MD            Allergies:  Aspirin and Tape Simpson Junes tape]     Social History:   Social History   []Expand by Default            Socioeconomic History    Marital status:        Spouse name: Not on file    Number of children: Not on file    Years of education: Not on file    Highest education level: Not on file   Occupational History    Not on file   Tobacco Use    Smoking status: Former Smoker       Years: 20.00       Types: Cigarettes    Smokeless tobacco: Never Used    Tobacco comment: quit at age 44    Substance and Sexual Activity    Alcohol use: No       Comment: socially    Drug use: No    Sexual activity: Not on file   Other Topics Concern    Not on file   Social History Narrative     Lives in [facility] with  (Angel Morales / Bed).     Worked in an office.      Social Determinants of Health          Financial Resource Strain:     Difficulty of Paying Living Expenses:    Food Insecurity:     Worried About Running Out of Food in the Last Year:     [address] in the Last Year:    Transportation Needs:     Lack of Transportation (Medical):      Lack of Transportation (Non-Medical):    Physical Activity:     Days of Exercise per Week:     Minutes of Exercise per Session:    Stress:     Feeling of Stress :    Social Connections:     Frequency of Communication with Friends and Family:     Frequency of Social Gatherings with Friends and Family:     Attends Yazdan[employer] Services:     Active Member of Clubs or Organizations:     Attends Club or Organization Meetings:     Marital Status:    Intimate Partner Violence:     Fear of Current or Ex-Partner:     Emotionally Abused:     Physically Abused:     Sexually Abused:             Family History:   Family History[]Expand by Default         Family History   Problem Relation Age of Onset    Arthritis Mother      Kidney Disease Father      Cancer Father              REVIEW OF SYSTEMS:     Gen: Patient denies any lightheadedness or dizziness. No LOC or syncope. No fevers or chills. HEENT: No earache, sore throat or nasal congestion. Resp: Denies cough, hemoptysis or sputum production. Cardiac: Denies chest pain, SOB, diaphoresis or palpitations. GI: + nausea, vomiting, no diarrhea or constipation. No melena or hematochezia. : No urinary complaints, dysuria, hematuria or frequency. MSK: No extremity weakness, paralysis or paresthesias.      PHYSICAL EXAM:     Vitals:  BP (!) 146/86   Pulse 64   Temp 98.2 °F (36.8 °C) (Oral)   Resp 20   Ht 5' 6\" (1.676 m)   Wt 150 lb (68 kg)   SpO2 99%   BMI 24.21 kg/m²      General:  This is a 78 y.o. yo female who is alert and oriented in NAD  HEENT:  Head is normocephalic and atraumatic, PERRLA, EOMI, mucus membranes moist with no pharyngeal erythema or exudate. Neck:  Supple with no carotid bruits, JVD or thyromegaly.   No cervical adenopathy  CV:  Regular rate and rhythm, no murmurs  Lungs: Coarse breath sounds to auscultation bilaterally with no wheezes, rales or rhonchi  Abdomen: + Mildly tender mid left abdomen, + mildly distended, + ostomy left mid lower quadrant, hypoactive bowel sounds present  Extremities:  No edema, peripheral pulses intact bilaterally  Neuro:  Cranial nerves II-XII grossly intact; motor and sensory function intact with no focal deficits  Skin:  No rashes, lesions or wounds     DATA:  CBC with Differential:          Lab Results Component Value Date     WBC 10.2 07/16/2021     RBC 4.46 07/16/2021     HGB 13.4 07/16/2021     HCT 41.3 07/16/2021      07/16/2021     MCV 92.6 07/16/2021     MCH 30.0 07/16/2021     MCHC 32.4 07/16/2021     RDW 14.6 07/16/2021     NRBC 0.5 11/30/2017     BLASTSPCT 1.0 11/06/2017     METASPCT 0.9 10/18/2018     LYMPHOPCT 2.8 07/16/2021     PROMYELOPCT 0.5 11/26/2017     MONOPCT 7.8 07/16/2021     MYELOPCT 1.7 10/18/2018     BASOPCT 0.2 07/16/2021     MONOSABS 0.80 07/16/2021     LYMPHSABS 0.29 07/16/2021     EOSABS 0.03 07/16/2021     BASOSABS 0.02 07/16/2021      CMP:          Lab Results   Component Value Date      07/16/2021     K 4.9 07/16/2021      07/16/2021     CO2 21 07/16/2021     BUN 11 07/16/2021     CREATININE 1.0 07/16/2021     GFRAA >60 07/16/2021     LABGLOM 53 07/16/2021     GLUCOSE 111 07/16/2021     PROT 7.4 07/16/2021     LABALBU 3.7 07/16/2021     CALCIUM 9.4 07/16/2021     BILITOT 0.4 07/16/2021     ALKPHOS 77 07/16/2021     AST 40 07/16/2021     ALT 12 07/16/2021      Magnesium:          Lab Results   Component Value Date     MG 1.7 12/01/2018      Phosphorus:          Lab Results   Component Value Date     PHOS 4.2 12/01/2018      PT/INR:          Lab Results   Component Value Date     PROTIME 11.6 12/03/2018     INR 1.0 12/03/2018      Troponin:          Lab Results   Component Value Date     TROPONINI <0.01 10/16/2018      U/A:          Lab Results   Component Value Date     COLORU Yellow 07/16/2021     PROTEINU Negative 07/16/2021     PHUR 6.0 07/16/2021     WBCUA >20 07/16/2021     RBCUA 2-5 07/16/2021     BACTERIA FEW 07/16/2021     CLARITYU CLOUDY 07/16/2021     SPECGRAV 1.015 07/16/2021     LEUKOCYTESUR LARGE 07/16/2021     UROBILINOGEN 0.2 07/16/2021     BILIRUBINUR Negative 07/16/2021     BLOODU LARGE 07/16/2021     GLUCOSEU Negative 07/16/2021     AMORPHOUS FEW 11/23/2018      ABG:          Lab Results   Component Value Date     PH 7.514 10/20/2018     PCO2 28.5 10/20/2018     PO2 67.6 10/20/2018     HCO3 22.4 10/20/2018     BE 0.2 10/20/2018     O2SAT 94.4 10/20/2018      HgBA1c:  No results found for: LABA1C  FLP:          Lab Results   Component Value Date     TRIG 89 10/23/2018     HDL 58.0 07/18/2013     LDLCALC 157 07/18/2013      TSH:          Lab Results   Component Value Date     TSH 4.620 04/07/2018      IRON:          Lab Results   Component Value Date     IRON 44 06/28/2018      LIPASE:          Lab Results   Component Value Date     LIPASE 48 07/16/2021         ASSESSMENT AND PLAN:            Patient Active Problem List     Diagnosis Date Noted    SBO (small bowel obstruction) ([address] 07/16/2021    PAF (paroxysmal atrial fibrillation) (HCC)      Ischemic bowel disease ([address]      Hx of blood clots      Cancer ([address]      Perforation of sigmoid colon ([address] 10/17/2018    Altered mental status 06/25/2018    Fatigue due to excessive exertion 06/25/2018    Macular degeneration      Hx of blood clots      Cancer ([address]      Enteritis 12/21/2017    Severe protein-calorie malnutrition ([address] 12/21/2017    Pneumatosis intestinalis      Rectal cancer ([address] 11/17/2016      Imp/Plan:    Syncopal Episode   [place]  No evidence AMI/ACS  Acute small bowel obstruction?  Still with stomal output  Hx Paroxysmal A Fib  Hx colorectal cancer  Hx strangulated ischemic bowel  Ileostomy  Hx DVT  Hearing loss bilateral    Monitor closely  Echocardiogram  IV fluids  Surgical consult

## 2021-07-16 NOTE — PROGRESS NOTES
[address]  [facility]         Date:2021                                                   Patient Name: Chan Arnold     MRN: [id number]     : 1940     Room: [address] [place] [address]       Evaluating OT: Will Hodgkins, OTR/L; [id number]       Referring Provider and Orders/Date:   OT eval and treat Start: 21 1300, End: 21 1300, ONE TIME, Standing Count: 1 Occurrences, R    Oral Salvia, DO       Diagnosis:   1.  Small bowel obstruction [facility])         Pertinent Medical History:        Past Medical History:   Diagnosis Date    Cancer [facility]     colorectal     History of blood transfusion     Hx of blood clots     dvt, on eliquis    [facility]     Macular degeneration     MDRO (multiple drug resistant organisms) resistance     PAF (paroxysmal atrial fibrillation) (HCC)     Tinnitus           Past Surgical History:   Procedure Laterality Date    ABDOMEN SURGERY      colostomy & reversal    CHOLECYSTECTOMY  07    COLONOSCOPY      CYSTOSCOPY N/A 2018    CYSTOSCOPY RETROGRADE PYELOGRAM performed by Aida Carmen MD at [address]      HAND SURGERY      carpel tunnel rig hand    HYSTERECTOMY      ILEOSTOMY OR JEJUNOSTOMY      OTHER SURGICAL HISTORY  2017    diagnostic laparoscopy with exploratory laparotomy and reduction of internal hernia and closure of mesenteric defect, oversewing of multiple serosal tears    [facility] LAP,SURG,COLECT,TOT,W/PROCTECT,W/ILEOST N/A 2018    LAPAROSCOPIC DIVERTING COLOSTOMY performed by Ariana Arrington MD at [address] [facility] LAP,SURG,COLECT,TOT,W/PROCTECT,W/ILEOST N/A 10/18/2018    LAPAROSCOPIC REVISION LOOP COLOSTOMY, EXTENSIVE LYSIS OF ADHESIONS, DRAINAGE PELVIC ABSCESS, END COLOSTOMY performed by Iram Bucio MD at [address] [facility] ENDOSCOPY N/A 7/18/2018    EGD BIOPSY performed by Malu Jaime DO at [facility] ENDOSCOPY       Precautions:  Fall Risk, ESBL iso-contact, ostomy    Recommended placement: home with [facility] services    Assessment of current deficits     [x] Functional mobility  [x]ADLs  [] Strength               []Cognition     [x] Functional transfers   [x] IADLs         [x] Safety Awareness   [x]Endurance     [] Fine Coordination              [x] Balance      [] Vision/perception   []Sensation      [x]Gross Motor Coordination  [] ROM  [] Delirium                   [] Motor Control     OT PLAN OF CARE   OT POC based on physician orders, patient diagnosis and results of clinical assessment    Frequency/Duration 1-3 days/wk for 2 weeks PRN   Specific OT Treatment Interventions to include:   * Instruction/training on adapted ADL techniques and AE recommendations to increase functional independence within precautions       * Training on energy conservation strategies, correct breathing pattern and techniques to improve independence/tolerance for self-care routine  * Functional transfer/mobility training/DME recommendations for increased independence, safety, and fall prevention  * Patient/Family education to increase follow through with safety techniques and functional independence  * Recommendation of environmental modifications for increased safety with functional transfers/mobility and ADLs  * Therapeutic exercise to improve motor endurance, ROM, and functional strength for ADLs/functional transfers  * Therapeutic activities to facilitate/challenge dynamic balance, stand tolerance for increased safety and independence with ADLs  * Therapeutic activities to facilitate gross/fine motor skills for increased independence with ADLs     Recommended Adaptive Equipment/DME: none     Home Living: Pt lives at home with spouse who is also presenting with medical complications that have been very stressful to pt.  They live in a 2 story home with no steps to enter and 12+ steps to the second floor with mat rails to bed and bath. Bathroom setup: walk in shower with bars and shower seat; raised toilet    DME owned: cane     Prior Level of Function: indep with ADLs , indep with IADLs; ambulated indep with cane   Driving: no   Occupation: no   Enjoys: cooking and cleaning    Pain Level: non;  Cognition: A&O: 4/4; Follows 4 step directions   Memory:  Intact   Sequencing:  Intact   Problem solving:  Intact   Judgement/safety:  Intact     Functional Assessment:  AM-PAC Daily Activity Raw Score: 19/24   Initial Eval Status  Date: 7/16/21 Treatment Status  Date: STGs = LTGs  Time frame: 10-14 days   Feeding Independent   NA-PLOF   Grooming Stand by Assist standing at sink for hand wash. Declining oral care. Able to wash face. Independent    UB Dressing Minimal Assist with gown from sitting EOB. Independent    LB Dressing Stand by Assist for donning brief, pants and socks in sitting/standing EOB. Pt able to doff pants following only cues for safety and energy conservation. Independent    Bathing Stand by Assist for balance and safety assist from sitting/standing EOB. Independent    Toileting Stand by Assist for urination with cues for AD management and grab bars. Management of ostomy was not tested. Independent    Bed Mobility  Supine to sit: Independent   Sit to supine: Independent     Supine to sit: Independent   Sit to supine: Independent    Functional Transfers Stand by Assist from bed and toilet with cues for management of AD. Suspected that safety would have improved with cane per PLOF. Independent    Functional Mobility Stand by Assist with one loss of balance noted with turning. Pt is aware of deficits and reports that she has neuropathy. She does demonstrate very deliberate steps with functional mobility to compensate.    Independent    Balance Sitting:     Static:  good    Dynamic:good  Standing: fair    Standing: fair+   Activity Tolerance Room air with good tolerance to OT session. Only completed standing for 3min period without complaints of being light headed or dizzy. Increase standing tolerance for >7min for carry over into toileting, functional tranfers and indep in ADLs   Visual/  Perceptual Glasses: Present; WFL    Reports change in vision since admission: No     NA            Hand Dominance right   AROM (PROM) Strength Additional Info:    RUE  WFL 5/5 good  and  FMC/dexterity noted during ADL tasks       LUE WFL 5/5 good  and FMC/dexterity noted during ADL tasks       Hearing: [place]  Sensation: c/o numbness or tingling in hands and feet. Tone: WFL   Edema: none    Comments: Upon arrival patient supine in bed finishing her lunch. Pt required min A for most UB ADLs and SBA LB ADLs tasks. Limited with SBA for standing during LB ADLs and functional transfers. The biggest barriers reflect that of functional transfers, functional mobility, UB/LB ADLs, activity tolerance, balance, safety and strengthening. At end of session, patient supine with call light and phone within reach, all lines and tubes intact. Overall patient demonstrated min decreased independence and safety during completion of ADL/functional transfer/mobility tasks. Nursing updated on pt position and status following OT eval. Pt would benefit from continued skilled OT to increase safety and independence with completion of ADL/IADL tasks for functional independence and quality of life. Rehab Potential: Good  for established goals     Patient / Family Goal: Return home with spouse during this \"stressful\" time. Patient and/or family were instructed on functional diagnosis, prognosis/goals and OT plan of care. Demonstrated good understanding.      Eval Complexity: Low  · History: Brief review of medical records and additional review of physical, cognitive, or psychosocial history related to current functional performance  · Exam: 1-3 performance deficits  · Assistance/Modification: Min assistance or modifications required to perform tasks. May have comorbidities that affect occupational performance. Time In: 1346  Time Out: 1408  Total Treatment Time: 0    Min Units   OT Eval Low 97165  x  1   OT Eval Medium 89093      OT Eval High 26510      OT Re-Eval [id number]       Therapeutic Ex [id number]       Therapeutic Activities 39586       ADL/Self Care [id number]       Orthotic Management [id number]       Manual [id number]     Neuro Re-Ed [id number]       Non-Billable Time          Evaluation Time additionally includes thorough review of current medical information, gathering information on past medical history/social history and prior level of function, interpretation of standardized testing/informal observation of tasks, assessment of data and development of plan of care and goals.             Zohra Ochoa OTR/L; [id number]

## 2021-07-17 ENCOUNTER — APPOINTMENT (OUTPATIENT)
Dept: CT IMAGING | Age: 81
DRG: 641 | End: 2021-07-17
Payer: MEDICARE

## 2021-07-17 VITALS
RESPIRATION RATE: 18 BRPM | BODY MASS INDEX: 27.16 KG/M2 | HEIGHT: 66 IN | OXYGEN SATURATION: 96 % | TEMPERATURE: 97.8 F | DIASTOLIC BLOOD PRESSURE: 68 MMHG | WEIGHT: 169 LBS | HEART RATE: 67 BPM | SYSTOLIC BLOOD PRESSURE: 155 MMHG

## 2021-07-17 LAB
CHOLESTEROL, TOTAL: 145 MG/DL (ref 0–199)
HDLC SERPL-MCNC: 46 MG/DL
LDL CHOLESTEROL CALCULATED: 73 MG/DL (ref 0–99)
MAGNESIUM: 1.9 MG/DL (ref 1.6–2.6)
TRIGL SERPL-MCNC: 130 MG/DL (ref 0–149)
TSH SERPL DL<=0.05 MIU/L-ACNC: 3.43 UIU/ML (ref 0.27–4.2)
VLDLC SERPL CALC-MCNC: 26 MG/DL

## 2021-07-17 PROCEDURE — 84443 ASSAY THYROID STIM HORMONE: CPT

## 2021-07-17 PROCEDURE — 74177 CT ABD & PELVIS W/CONTRAST: CPT

## 2021-07-17 PROCEDURE — C9113 INJ PANTOPRAZOLE SODIUM, VIA: HCPCS | Performed by: INTERNAL MEDICINE

## 2021-07-17 PROCEDURE — 6370000000 HC RX 637 (ALT 250 FOR IP): Performed by: INTERNAL MEDICINE

## 2021-07-17 PROCEDURE — 2500000003 HC RX 250 WO HCPCS: Performed by: INTERNAL MEDICINE

## 2021-07-17 PROCEDURE — 99232 SBSQ HOSP IP/OBS MODERATE 35: CPT | Performed by: SURGERY

## 2021-07-17 PROCEDURE — 80061 LIPID PANEL: CPT

## 2021-07-17 PROCEDURE — 6360000002 HC RX W HCPCS: Performed by: INTERNAL MEDICINE

## 2021-07-17 PROCEDURE — 36415 COLL VENOUS BLD VENIPUNCTURE: CPT

## 2021-07-17 PROCEDURE — 83735 ASSAY OF MAGNESIUM: CPT

## 2021-07-17 RX ADMIN — POTASSIUM CHLORIDE, DEXTROSE MONOHYDRATE AND SODIUM CHLORIDE: 75; 5; 450 INJECTION, SOLUTION INTRAVENOUS at 09:03

## 2021-07-17 RX ADMIN — HEPARIN SODIUM 5000 UNITS: 5000 INJECTION INTRAVENOUS; SUBCUTANEOUS at 14:23

## 2021-07-17 RX ADMIN — SERTRALINE HYDROCHLORIDE 50 MG: 50 TABLET ORAL at 08:51

## 2021-07-17 RX ADMIN — PANTOPRAZOLE SODIUM 40 MG: 40 INJECTION, POWDER, FOR SOLUTION INTRAVENOUS at 08:51

## 2021-07-17 RX ADMIN — HEPARIN SODIUM 5000 UNITS: 5000 INJECTION INTRAVENOUS; SUBCUTANEOUS at 05:07

## 2021-07-17 ASSESSMENT — PAIN SCALES - GENERAL
PAINLEVEL_OUTOF10: 0
PAINLEVEL_OUTOF10: 0

## 2021-07-17 NOTE — PROGRESS NOTES
Department of Internal Medicine        CHIEF COMPLAINT: Syncope    Reason for Admission: Small bowel obstruction    HISTORY OF PRESENT ILLNESS:      The patient is a 78 y.o. female who presents with having a syncopal episode at home. Patient states she went to restroom to empty her colostomy when she stood up she passed out. There is no associated chest pain, palpitation paresthesias or unusual shortness of breath. Patient thought she had been emptying her ostomy more frequently in the last 24 hours and she thought she may have been dehydrated. She had associated nausea vomiting with a syncopal episode this morning. Patient states that she did hit her head when she passed out has a hematoma. She denies any current neck or back pain. Patient denies history of similar problem. In the ED patient had a CT of the abdomen showed high-grade distal small bowel obstruction with the point of transition in the anterior upper left pelvis. Patient has a history of prior sigmoidectomy. Patient has a chronic left hydroureter nephrosis. Temperature 98.2 with a heart rate of 64 and patient had a blood pressure supine 146/86 and standing was 97/67. O2 sat 90% on room air. BUN/creatinine 11/1.0 with normal electrolytes and essentially normal liver enzymes with a WBC 10.2 and hemoglobin 13.4. General surgery was notified in the ED. Urinalysis did show large amount of blood along with greater than 20 WBCs and a few bacteria. Case discussed with patient daughter at the bedside. 7/17/2021  Patient seen examined on PICU. Patient is having output out of her ostomy-dark liquid. Patient states her abdominal pain is moderately improved. Patient has nausea but no emesis. Patient denies any chest pain, palpitations or unusual shortness of breath. Cardiology and general surgery note reviewed. Temperature is 97.8 with heart rate of 67. Blood pressure ranges 122//68.  O2 sats 96% room air at rest. Urinary output is fairly good. Patient is set up for CT of the abdomen pelvis with oral contrast today. Past Medical History:    Past Medical History:   Diagnosis Date    Cancer ([facility] [address]     colorectal     History of blood transfusion     Hx of blood clots     dvt, on eliquis    Ischemic bowel disease ([facility] [address]     Macular degeneration     MDRO (multiple drug resistant organisms) resistance     PAF (paroxysmal atrial fibrillation) (HCC)     Tinnitus      Past Surgical History:    Past Surgical History:   Procedure Laterality Date    ABDOMEN SURGERY      colostomy & reversal    CHOLECYSTECTOMY  07    COLONOSCOPY      CYSTOSCOPY N/A 12/4/2018    CYSTOSCOPY RETROGRADE PYELOGRAM performed by Cortez Navarro MD at [facility] , ESOPHAGUS      HAND SURGERY      carpel tunnel rigth hand    HYSTERECTOMY      ILEOSTOMY OR JEJUNOSTOMY      OTHER SURGICAL HISTORY  11/06/2017    diagnostic laparoscopy with exploratory laparotomy and reduction of internal hernia and closure of mesenteric defect, oversewing of multiple serosal tears    [place] LAP,SURG,COLECT,TOT,W/PROCTECT,W/ILEOST N/A 6/28/2018    LAPAROSCOPIC DIVERTING COLOSTOMY performed by Julissa Carrion MD at [facility] LAP,SURG,COLECT,TOT,W/PROCTECT,W/ILEOST N/A 10/18/2018    LAPAROSCOPIC REVISION LOOP COLOSTOMY, EXTENSIVE LYSIS OF ADHESIONS, DRAINAGE PELVIC ABSCESS, END COLOSTOMY performed by Sue Whittaker MD at [address] [facility] ENDOSCOPY N/A 7/18/2018    EGD BIOPSY performed by Brianna Stephenson DO at [place] [address]       Medications Prior to Admission:    @  Prior to Admission medications    Medication Sig Start Date End Date Taking?  Authorizing Provider   Multiple Vitamins-Minerals (THERAPEUTIC MULTIVITAMIN-MINERALS) tablet Take 1 tablet by mouth daily   Yes Historical Provider, MD   COVID-19 Ad26 Vaccine (J&J, JAN) 0.5 ML SUSP injection Inject 0.5 mLs into the muscle once 3/2021 - COMPLETED   Yes Historical Abused:     Sexually Abused:        Family History:   Family History   Problem Relation Age of Onset    Arthritis Mother     Kidney Disease Father     Cancer Father        REVIEW OF SYSTEMS:    Gen: Patient denies any lightheadedness or dizziness. No LOC or syncope. No fevers or chills. HEENT: No earache, sore throat or nasal congestion. Resp: Denies cough, hemoptysis or sputum production. Cardiac: Denies chest pain, SOB, diaphoresis or palpitations. GI: + nausea, vomiting, no diarrhea or constipation. No melena or hematochezia. : No urinary complaints, dysuria, hematuria or frequency. MSK: No extremity weakness, paralysis or paresthesias. PHYSICAL EXAM:    Vitals:  BP (!) 155/68   Pulse 67   Temp 97.8 °F (36.6 °C) (Oral)   Resp 18   Ht 5' 6\" (1.676 m)   Wt 169 lb (76.7 kg)   SpO2 96%   BMI 27.28 kg/m²     General:  This is a 78 y.o. yo female who is alert and oriented in NAD  HEENT:  Head is normocephalic and atraumatic, PERRLA, EOMI, mucus membranes moist with no pharyngeal erythema or exudate. Neck:  Supple with no carotid bruits, JVD or thyromegaly.   No cervical adenopathy  CV:  Regular rate and rhythm, no murmurs  Lungs: Coarse breath sounds to auscultation bilaterally with no wheezes, rales or rhonchi  Abdomen: + Mildly tender mid left abdomen, + mildly distended, + ostomy left mid lower quadrant, hypoactive bowel sounds present  Extremities:  No edema, peripheral pulses intact bilaterally  Neuro:  Cranial nerves II-XII grossly intact; motor and sensory function intact with no focal deficits  Skin:  No rashes, lesions or wounds    DATA:  CBC with Differential:    Lab Results   Component Value Date    WBC 10.2 07/16/2021    RBC 4.46 07/16/2021    HGB 13.4 07/16/2021    HCT 41.3 07/16/2021     07/16/2021    MCV 92.6 07/16/2021    MCH 30.0 07/16/2021    MCHC 32.4 07/16/2021    RDW 14.6 07/16/2021    NRBC 0.5 11/30/2017    BLASTSPCT 1.0 11/06/2017    METASPCT 0.9 10/18/2018    LYMPHOPCT 2.8 07/16/2021    PROMYELOPCT 0.5 11/26/2017    MONOPCT 7.8 07/16/2021    MYELOPCT 1.7 10/18/2018    BASOPCT 0.2 07/16/2021    MONOSABS 0.80 07/16/2021    LYMPHSABS 0.29 07/16/2021    EOSABS 0.03 07/16/2021    BASOSABS 0.02 07/16/2021     CMP:    Lab Results   Component Value Date     07/16/2021    K 4.9 07/16/2021     07/16/2021    CO2 21 07/16/2021    BUN 11 07/16/2021    CREATININE 1.0 07/16/2021    GFRAA >60 07/16/2021    LABGLOM 53 07/16/2021    GLUCOSE 111 07/16/2021    PROT 7.4 07/16/2021    LABALBU 3.7 07/16/2021    CALCIUM 9.4 07/16/2021    BILITOT 0.4 07/16/2021    ALKPHOS 77 07/16/2021    AST 40 07/16/2021    ALT 12 07/16/2021     Magnesium:    Lab Results   Component Value Date    MG 1.9 07/17/2021     Phosphorus:    Lab Results   Component Value Date    PHOS 4.2 12/01/2018     PT/INR:    Lab Results   Component Value Date    PROTIME 11.6 12/03/2018    INR 1.0 12/03/2018     Troponin:    Lab Results   Component Value Date    TROPONINI <0.01 10/16/2018     U/A:    Lab Results   Component Value Date    COLORU Yellow 07/16/2021    PROTEINU Negative 07/16/2021    PHUR 6.0 07/16/2021    WBCUA >20 07/16/2021    RBCUA 2-5 07/16/2021    BACTERIA FEW 07/16/2021    CLARITYU CLOUDY 07/16/2021    SPECGRAV 1.015 07/16/2021    LEUKOCYTESUR LARGE 07/16/2021    UROBILINOGEN 0.2 07/16/2021    BILIRUBINUR Negative 07/16/2021    BLOODU LARGE 07/16/2021    GLUCOSEU Negative 07/16/2021    AMORPHOUS FEW 11/23/2018     ABG:    Lab Results   Component Value Date    PH 7.514 10/20/2018    PCO2 28.5 10/20/2018    PO2 67.6 10/20/2018    HCO3 22.4 10/20/2018    BE 0.2 10/20/2018    O2SAT 94.4 10/20/2018     HgBA1c:  No results found for: LABA1C  FLP:    Lab Results   Component Value Date    TRIG 130 07/17/2021    HDL 46 07/17/2021    LDLCALC 73 07/17/2021    LABVLDL 26 07/17/2021     TSH:    Lab Results   Component Value Date    TSH 3.430 07/17/2021     IRON:    Lab Results   Component Value Date IRON 44 06/28/2018     LIPASE:    Lab Results   Component Value Date    LIPASE 48 07/16/2021       ASSESSMENT AND PLAN:      Patient Active Problem List    Diagnosis Date Noted    SBO (small bowel obstruction) ([address] 07/16/2021    PAF (paroxysmal atrial fibrillation) (HCC)     Ischemic bowel disease ([address]     Hx of blood clots     Cancer ([address]     Perforation of sigmoid colon ([address] 10/17/2018    Altered mental status 06/25/2018    Fatigue due to excessive exertion 06/25/2018    Macular degeneration     Hx of blood clots     Cancer ([address]     Enteritis 12/21/2017    Severe protein-calorie malnutrition ([address] 12/21/2017    Pneumatosis intestinalis     Rectal cancer ([facility] [address] 11/17/2016     Impression:  1. Distal small bowel obstruction  2. Bacteriuria  3. Syncope  4. Orthostatic hypotension  5. History of paroxysmal atrial fib  6. History of prior perforated sigmoid colon with colostomy  7. History of macular degeneration  8. History of DVT of leg  9. History of chronic left hydroureteronephrosis  10. Hard of hearing    Plan:  Admit to telemetry floor  IV fluids D5 with half-normal saline 20 KCl at 75 cc an hour  Home medications reviewed  Activity as tolerated and with assistance if needed  Blood pressure supine and standing daily  Urine culture  Tigan 200 mg IM every 8 hours. Consult general surgery  Consult cardiology  IV Rocephin, Flagyl    CT the abdomen pelvis with oral and IV contrast today. Routine labs in a.m.         Suzi Monzon DO, D.O.  7/17/2021  11:44 AM

## 2021-07-17 NOTE — PROGRESS NOTES
diet as tolerated  Suspect dehydration and possible high-output ostomy was the source of her partial obstruction  Syncopal episode per cardiology    Physician Signature: Electronically signed by Dr. Vera Salazar  [phone] (p)  7/17/2021  10:11 AM

## 2021-07-17 NOTE — PLAN OF CARE
Problem: Falls - Risk of:  Goal: Will remain free from falls  Description: Will remain free from falls  Outcome: Completed     Problem: Falls - Risk of:  Goal: Absence of physical injury  Description: Absence of physical injury  Outcome: Completed     Problem: Pain:  Goal: Pain level will decrease  Description: Pain level will decrease  Outcome: Completed     Problem: Pain:  Goal: Control of acute pain  Description: Control of acute pain  Outcome: Completed     Problem: Pain:  Goal: Control of chronic pain  Description: Control of chronic pain  Outcome: Completed

## 2021-07-17 NOTE — DISCHARGE SUMMARY
Department of Internal Medicine        CHIEF COMPLAINT: Syncope    Reason for Admission: Small bowel obstruction    HISTORY OF PRESENT ILLNESS:      The patient is a 78 y.o. female who presents with having a syncopal episode at home. Patient states she went to restroom to empty her colostomy when she stood up she passed out. There is no associated chest pain, palpitation paresthesias or unusual shortness of breath. Patient thought she had been emptying her ostomy more frequently in the last 24 hours and she thought she may have been dehydrated. She had associated nausea vomiting with a syncopal episode this morning. Patient states that she did hit her head when she passed out has a hematoma. She denies any current neck or back pain. Patient denies history of similar problem. In the ED patient had a CT of the abdomen showed high-grade distal small bowel obstruction with the point of transition in the anterior upper left pelvis. Patient has a history of prior sigmoidectomy. Patient has a chronic left hydroureter nephrosis. Temperature 98.2 with a heart rate of 64 and patient had a blood pressure supine 146/86 and standing was 97/67. O2 sat 90% on room air. BUN/creatinine 11/1.0 with normal electrolytes and essentially normal liver enzymes with a WBC 10.2 and hemoglobin 13.4. General surgery was notified in the ED. Urinalysis did show large amount of blood along with greater than 20 WBCs and a few bacteria. Case discussed with patient daughter at the bedside. 7/17/2021  Patient seen examined on PICU. Patient is having output out of her ostomy-dark liquid. Patient states her abdominal pain is moderately improved. Patient has nausea but no emesis. Patient denies any chest pain, palpitations or unusual shortness of breath. Cardiology and general surgery note reviewed. Temperature is 97.8 with heart rate of 67. Blood pressure ranges 122//68.  O2 sats 96% room air at rest. Urinary output is fairly good. Patient is set up for CT of the abdomen pelvis with oral contrast today. Repeat CT abd with oral contrast today was unremarkable for SBO. Pt tolerated solid diet w/o problems. Pt is stable for discharge. Past Medical History:    Past Medical History:   Diagnosis Date    Cancer ([facility] [address]     colorectal     History of blood transfusion     Hx of blood clots     dvt, on eliquis    Ischemic bowel disease ([address]     Macular degeneration     MDRO (multiple drug resistant organisms) resistance     PAF (paroxysmal atrial fibrillation) (HCC)     Tinnitus      Past Surgical History:    Past Surgical History:   Procedure Laterality Date    ABDOMEN SURGERY      colostomy & reversal    CHOLECYSTECTOMY  07    COLONOSCOPY      CYSTOSCOPY N/A 12/4/2018    CYSTOSCOPY RETROGRADE PYELOGRAM performed by Alyssa Salvador MD at [address] [facility], ESOPHAGUS      HAND SURGERY      carpel tunnel rigth hand    HYSTERECTOMY      ILEOSTOMY OR JEJUNOSTOMY      OTHER SURGICAL HISTORY  11/06/2017    diagnostic laparoscopy with exploratory laparotomy and reduction of internal hernia and closure of mesenteric defect, oversewing of multiple serosal tears    [place] LAP,SURG,COLECT,TOT,W/PROCTECT,W/ILEOST N/A 6/28/2018    LAPAROSCOPIC DIVERTING COLOSTOMY performed by Glenroy Wren MD at [address] [facility] LAP,SURG,COLECT,TOT,W/PROCTECT,W/ILEOST N/A 10/18/2018    LAPAROSCOPIC REVISION LOOP COLOSTOMY, EXTENSIVE LYSIS OF ADHESIONS, DRAINAGE PELVIC ABSCESS, END COLOSTOMY performed by Ori Carter MD at [address] [facility] ENDOSCOPY N/A 7/18/2018    EGD BIOPSY performed by Shyla García DO at [place] [address]       Medications Prior to Admission:    @  Prior to Admission medications    Medication Sig Start Date End Date Taking?  Authorizing Provider   Multiple Vitamins-Minerals (THERAPEUTIC MULTIVITAMIN-MINERALS) tablet Take 1 tablet by mouth daily   Yes Historical Provider, MD COVID-19 Ad26 Vaccine ([employer], [employer]) 0.5 ML SUSP injection Inject 0.5 mLs into the muscle once 3/2021 - COMPLETED   Yes Historical Provider, MD   omeprazole (PRILOSEC) 20 MG delayed release capsule Take 20 mg by mouth daily   Yes Historical Provider, MD   acetaminophen (TYLENOL) 325 MG tablet Take 2 tablets by mouth every 4 hours as needed for Pain or Fever 12/26/17  Yes Betty Gatica,    sertraline (ZOLOFT) 50 MG tablet Take 50 mg by mouth daily   Yes Historical Provider, MD       Allergies:  Aspirin and Tape Rere Ripper tape]    Social History:   Social History     Socioeconomic History    Marital status:      Spouse name: Not on file    Number of children: Not on file    Years of education: Not on file    Highest education level: Not on file   Occupational History    Not on file   Tobacco Use    Smoking status: Former Smoker     Years: 20.00     Types: Cigarettes    Smokeless tobacco: Never Used    Tobacco comment: quit at age 44    Substance and Sexual Activity    Alcohol use: No     Comment: socially    Drug use: No    Sexual activity: Not on file   Other Topics Concern    Not on file   Social History Narrative    Lives in [facility] with  ([employer] / Bed). Worked in an office. Social Determinants of Health     Financial Resource Strain:     Difficulty of Paying Living Expenses:    Food Insecurity:     Worried About Running Out of Food in the Last Year:     [address] in the Last Year:    Transportation Needs:     Lack of Transportation (Medical):      Lack of Transportation (Non-Medical):    Physical Activity:     Days of Exercise per Week:     Minutes of Exercise per Session:    Stress:     Feeling of Stress :    Social Connections:     Frequency of Communication with Friends and Family:     Frequency of Social Gatherings with Friends and Family:     Attends Religion Services:     Active Member of Clubs or Organizations:     Attends Club or Organization Meetings:     Marital Status:    Intimate Partner Violence:     Fear of Current or Ex-Partner:     Emotionally Abused:     Physically Abused:     Sexually Abused:        Family History:   Family History   Problem Relation Age of Onset    Arthritis Mother     Kidney Disease Father     Cancer Father        REVIEW OF SYSTEMS:    Gen: Patient denies any lightheadedness or dizziness. No LOC or syncope. No fevers or chills. HEENT: No earache, sore throat or nasal congestion. Resp: Denies cough, hemoptysis or sputum production. Cardiac: Denies chest pain, SOB, diaphoresis or palpitations. GI: + nausea, vomiting, no diarrhea or constipation. No melena or hematochezia. : No urinary complaints, dysuria, hematuria or frequency. MSK: No extremity weakness, paralysis or paresthesias. PHYSICAL EXAM:    Vitals:  BP (!) 155/68   Pulse 67   Temp 97.8 °F (36.6 °C) (Oral)   Resp 18   Ht 5' 6\" (1.676 m)   Wt 169 lb (76.7 kg)   SpO2 96%   BMI 27.28 kg/m²     General:  This is a 78 y.o. yo female who is alert and oriented in NAD  HEENT:  Head is normocephalic and atraumatic, PERRLA, EOMI, mucus membranes moist with no pharyngeal erythema or exudate. Neck:  Supple with no carotid bruits, JVD or thyromegaly.   No cervical adenopathy  CV:  Regular rate and rhythm, no murmurs  Lungs: Coarse breath sounds to auscultation bilaterally with no wheezes, rales or rhonchi  Abdomen: + Mildly tender mid left abdomen, + mildly distended, + ostomy left mid lower quadrant, hypoactive bowel sounds present  Extremities:  No edema, peripheral pulses intact bilaterally  Neuro:  Cranial nerves II-XII grossly intact; motor and sensory function intact with no focal deficits  Skin:  No rashes, lesions or wounds    DATA:  CBC with Differential:    Lab Results   Component Value Date    WBC 10.2 07/16/2021    RBC 4.46 07/16/2021    HGB 13.4 07/16/2021    HCT 41.3 07/16/2021     07/16/2021    MCV 92.6 07/16/2021 MCH 30.0 07/16/2021    MCHC 32.4 07/16/2021    RDW 14.6 07/16/2021    NRBC 0.5 11/30/2017    BLASTSPCT 1.0 11/06/2017    METASPCT 0.9 10/18/2018    LYMPHOPCT 2.8 07/16/2021    PROMYELOPCT 0.5 11/26/2017    MONOPCT 7.8 07/16/2021    MYELOPCT 1.7 10/18/2018    BASOPCT 0.2 07/16/2021    MONOSABS 0.80 07/16/2021    LYMPHSABS 0.29 07/16/2021    EOSABS 0.03 07/16/2021    BASOSABS 0.02 07/16/2021     CMP:    Lab Results   Component Value Date     07/16/2021    K 4.9 07/16/2021     07/16/2021    CO2 21 07/16/2021    BUN 11 07/16/2021    CREATININE 1.0 07/16/2021    GFRAA >60 07/16/2021    LABGLOM 53 07/16/2021    GLUCOSE 111 07/16/2021    PROT 7.4 07/16/2021    LABALBU 3.7 07/16/2021    CALCIUM 9.4 07/16/2021    BILITOT 0.4 07/16/2021    ALKPHOS 77 07/16/2021    AST 40 07/16/2021    ALT 12 07/16/2021     Magnesium:    Lab Results   Component Value Date    MG 1.9 07/17/2021     Phosphorus:    Lab Results   Component Value Date    PHOS 4.2 12/01/2018     PT/INR:    Lab Results   Component Value Date    PROTIME 11.6 12/03/2018    INR 1.0 12/03/2018     Troponin:    Lab Results   Component Value Date    TROPONINI <0.01 10/16/2018     U/A:    Lab Results   Component Value Date    COLORU Yellow 07/16/2021    PROTEINU Negative 07/16/2021    PHUR 6.0 07/16/2021    WBCUA >20 07/16/2021    RBCUA 2-5 07/16/2021    BACTERIA FEW 07/16/2021    CLARITYU CLOUDY 07/16/2021    SPECGRAV 1.015 07/16/2021    LEUKOCYTESUR LARGE 07/16/2021    UROBILINOGEN 0.2 07/16/2021    BILIRUBINUR Negative 07/16/2021    BLOODU LARGE 07/16/2021    GLUCOSEU Negative 07/16/2021    AMORPHOUS FEW 11/23/2018     ABG:    Lab Results   Component Value Date    PH 7.514 10/20/2018    PCO2 28.5 10/20/2018    PO2 67.6 10/20/2018    HCO3 22.4 10/20/2018    BE 0.2 10/20/2018    O2SAT 94.4 10/20/2018     HgBA1c:  No results found for: LABA1C  FLP:    Lab Results   Component Value Date    TRIG 130 07/17/2021    HDL 46 07/17/2021    LDLCALC 73 07/17/2021 LABVLDL 26 07/17/2021     TSH:    Lab Results   Component Value Date    TSH 3.430 07/17/2021     IRON:    Lab Results   Component Value Date    IRON 44 06/28/2018     LIPASE:    Lab Results   Component Value Date    LIPASE 48 07/16/2021       ASSESSMENT AND PLAN:      Patient Active Problem List    Diagnosis Date Noted    SBO (small bowel obstruction) ([address] 07/16/2021    PAF (paroxysmal atrial fibrillation) (HCC)     Ischemic bowel disease ([address]     Hx of blood clots     Cancer ([address]     Perforation of sigmoid colon ([address] 10/17/2018    Altered mental status 06/25/2018    Fatigue due to excessive exertion 06/25/2018    Macular degeneration     Hx of blood clots     Cancer ([address]     Enteritis 12/21/2017    Severe protein-calorie malnutrition ([address] 12/21/2017    Pneumatosis intestinalis     Rectal cancer ([address] 11/17/2016     Impression:  1. Distal small bowel obstruction  2. Bacteriuria  3. Syncope  4. Orthostatic hypotension  5. History of paroxysmal atrial fib  6. History of prior perforated sigmoid colon with colostomy  7. History of macular degeneration  8. History of DVT of leg  9. History of chronic left hydroureteronephrosis  10.   Hard of hearing    Plan:  Discharge home today  Cont same home meds    Pt to F/U with PCP in 1 week or as directed      Malu Jaime DO, D.O.  7/17/2021  6:30 PM

## 2021-07-18 LAB — URINE CULTURE, ROUTINE: NORMAL

## 2021-12-20 ENCOUNTER — APPOINTMENT (OUTPATIENT)
Dept: GENERAL RADIOLOGY | Age: 81
End: 2021-12-20
Payer: MEDICARE

## 2021-12-20 ENCOUNTER — APPOINTMENT (OUTPATIENT)
Dept: CT IMAGING | Age: 81
End: 2021-12-20
Payer: MEDICARE

## 2021-12-20 ENCOUNTER — HOSPITAL ENCOUNTER (EMERGENCY)
Age: 81
Discharge: HOME | End: 2021-12-20
Attending: EMERGENCY MEDICINE
Payer: MEDICARE

## 2021-12-20 VITALS
RESPIRATION RATE: 18 BRPM | SYSTOLIC BLOOD PRESSURE: 130 MMHG | TEMPERATURE: 97.8 F | BODY MASS INDEX: 24.48 KG/M2 | DIASTOLIC BLOOD PRESSURE: 60 MMHG | WEIGHT: 152.3 LBS | OXYGEN SATURATION: 98 % | HEART RATE: 73 BPM | HEIGHT: 66 IN

## 2021-12-20 DIAGNOSIS — R11.2 NON-INTRACTABLE VOMITING WITH NAUSEA, UNSPECIFIED VOMITING TYPE: Primary | ICD-10-CM

## 2021-12-20 LAB
ALBUMIN SERPL-MCNC: 4.5 G/DL (ref 3.5–5.2)
ALP BLD-CCNC: 81 U/L (ref 35–104)
ALT SERPL-CCNC: 7 U/L (ref 0–32)
ANION GAP SERPL CALCULATED.3IONS-SCNC: 12 MMOL/L (ref 7–16)
AST SERPL-CCNC: 15 U/L (ref 0–31)
BACTERIA: NORMAL /HPF
BASOPHILS ABSOLUTE: 0.01 E9/L (ref 0–0.2)
BASOPHILS RELATIVE PERCENT: 0.2 % (ref 0–2)
BILIRUB SERPL-MCNC: 0.5 MG/DL (ref 0–1.2)
BILIRUBIN URINE: NEGATIVE
BLOOD, URINE: ABNORMAL
BUN BLDV-MCNC: 15 MG/DL (ref 6–23)
CALCIUM SERPL-MCNC: 9.8 MG/DL (ref 8.6–10.2)
CHLORIDE BLD-SCNC: 104 MMOL/L (ref 98–107)
CLARITY: CLEAR
CO2: 24 MMOL/L (ref 22–29)
COLOR: ABNORMAL
CREAT SERPL-MCNC: 1.1 MG/DL (ref 0.5–1)
EOSINOPHILS ABSOLUTE: 0.02 E9/L (ref 0.05–0.5)
EOSINOPHILS RELATIVE PERCENT: 0.4 % (ref 0–6)
EPITHELIAL CELLS, UA: NORMAL /HPF
GFR AFRICAN AMERICAN: 57
GFR NON-AFRICAN AMERICAN: 47 ML/MIN/1.73
GLUCOSE BLD-MCNC: 99 MG/DL (ref 74–99)
GLUCOSE URINE: NEGATIVE MG/DL
HCT VFR BLD CALC: 40.1 % (ref 34–48)
HEMOGLOBIN: 12.9 G/DL (ref 11.5–15.5)
IMMATURE GRANULOCYTES #: 0.02 E9/L
IMMATURE GRANULOCYTES %: 0.4 % (ref 0–5)
KETONES, URINE: NEGATIVE MG/DL
LACTIC ACID, SEPSIS: 1.5 MMOL/L (ref 0.5–1.9)
LEUKOCYTE ESTERASE, URINE: ABNORMAL
LIPASE: 34 U/L (ref 13–60)
LYMPHOCYTES ABSOLUTE: 0.71 E9/L (ref 1.5–4)
LYMPHOCYTES RELATIVE PERCENT: 13.8 % (ref 20–42)
MAGNESIUM: 2 MG/DL (ref 1.6–2.6)
MCH RBC QN AUTO: 30.7 PG (ref 26–35)
MCHC RBC AUTO-ENTMCNC: 32.2 % (ref 32–34.5)
MCV RBC AUTO: 95.5 FL (ref 80–99.9)
MONOCYTES ABSOLUTE: 0.47 E9/L (ref 0.1–0.95)
MONOCYTES RELATIVE PERCENT: 9.1 % (ref 2–12)
NEUTROPHILS ABSOLUTE: 3.92 E9/L (ref 1.8–7.3)
NEUTROPHILS RELATIVE PERCENT: 76.1 % (ref 43–80)
NITRITE, URINE: NEGATIVE
PDW BLD-RTO: 13.6 FL (ref 11.5–15)
PH UA: 6.5 (ref 5–9)
PLATELET # BLD: 270 E9/L (ref 130–450)
PMV BLD AUTO: 9.8 FL (ref 7–12)
POTASSIUM SERPL-SCNC: 4.6 MMOL/L (ref 3.5–5)
PROTEIN UA: NEGATIVE MG/DL
RBC # BLD: 4.2 E12/L (ref 3.5–5.5)
RBC UA: NORMAL /HPF (ref 0–2)
SARS-COV-2, NAAT: NOT DETECTED
SODIUM BLD-SCNC: 140 MMOL/L (ref 132–146)
SPECIFIC GRAVITY UA: 1.01 (ref 1–1.03)
TOTAL PROTEIN: 7.7 G/DL (ref 6.4–8.3)
TROPONIN, HIGH SENSITIVITY: 12 NG/L (ref 0–9)
UROBILINOGEN, URINE: 0.2 E.U./DL
WBC # BLD: 5.2 E9/L (ref 4.5–11.5)
WBC UA: NORMAL /HPF (ref 0–5)

## 2021-12-20 PROCEDURE — 83605 ASSAY OF LACTIC ACID: CPT

## 2021-12-20 PROCEDURE — 84484 ASSAY OF TROPONIN QUANT: CPT

## 2021-12-20 PROCEDURE — 83690 ASSAY OF LIPASE: CPT

## 2021-12-20 PROCEDURE — 87088 URINE BACTERIA CULTURE: CPT

## 2021-12-20 PROCEDURE — 6360000004 HC RX CONTRAST MEDICATION: Performed by: RADIOLOGY

## 2021-12-20 PROCEDURE — 2580000003 HC RX 258: Performed by: STUDENT IN AN ORGANIZED HEALTH CARE EDUCATION/TRAINING PROGRAM

## 2021-12-20 PROCEDURE — 96374 THER/PROPH/DIAG INJ IV PUSH: CPT

## 2021-12-20 PROCEDURE — 99284 EMERGENCY DEPT VISIT MOD MDM: CPT

## 2021-12-20 PROCEDURE — 93005 ELECTROCARDIOGRAM TRACING: CPT | Performed by: STUDENT IN AN ORGANIZED HEALTH CARE EDUCATION/TRAINING PROGRAM

## 2021-12-20 PROCEDURE — 74177 CT ABD & PELVIS W/CONTRAST: CPT

## 2021-12-20 PROCEDURE — 87635 SARS-COV-2 COVID-19 AMP PRB: CPT

## 2021-12-20 PROCEDURE — 80053 COMPREHEN METABOLIC PANEL: CPT

## 2021-12-20 PROCEDURE — 81001 URINALYSIS AUTO W/SCOPE: CPT

## 2021-12-20 PROCEDURE — 6360000002 HC RX W HCPCS: Performed by: STUDENT IN AN ORGANIZED HEALTH CARE EDUCATION/TRAINING PROGRAM

## 2021-12-20 PROCEDURE — 71045 X-RAY EXAM CHEST 1 VIEW: CPT

## 2021-12-20 PROCEDURE — 85025 COMPLETE CBC W/AUTO DIFF WBC: CPT

## 2021-12-20 PROCEDURE — 83735 ASSAY OF MAGNESIUM: CPT

## 2021-12-20 RX ORDER — ONDANSETRON 2 MG/ML
4 INJECTION INTRAMUSCULAR; INTRAVENOUS ONCE
Status: COMPLETED | OUTPATIENT
Start: 2021-12-20 | End: 2021-12-20

## 2021-12-20 RX ORDER — ONDANSETRON 4 MG/1
4 TABLET, ORALLY DISINTEGRATING ORAL 3 TIMES DAILY PRN
Qty: 21 TABLET | Refills: 0 | Status: SHIPPED | OUTPATIENT
Start: 2021-12-20 | End: 2022-06-13

## 2021-12-20 RX ORDER — 0.9 % SODIUM CHLORIDE 0.9 %
1000 INTRAVENOUS SOLUTION INTRAVENOUS ONCE
Status: COMPLETED | OUTPATIENT
Start: 2021-12-20 | End: 2021-12-20

## 2021-12-20 RX ADMIN — ONDANSETRON 4 MG: 2 INJECTION INTRAMUSCULAR; INTRAVENOUS at 14:40

## 2021-12-20 RX ADMIN — SODIUM CHLORIDE 1000 ML: 9 INJECTION, SOLUTION INTRAVENOUS at 14:41

## 2021-12-20 RX ADMIN — IOPAMIDOL 75 ML: 755 INJECTION, SOLUTION INTRAVENOUS at 14:13

## 2021-12-20 NOTE — ED PROVIDER NOTES
[address] [facility]  Department of Emergency Medicine     Written by: Ashok Avilez DO  Patient Name: Talon Chambers  Attending Provider: Bruno Mejia DO  Admit Date: 2021 12:20 PM  MRN: [id number]                   : 1940        Chief Complaint   Patient presents with    Fatigue    - Chief complaint    HPI   Talon Chambers is a 78 y.o. female presenting to the ED for evaluation of Fatigue  She also presents for evaluation of nausea, vomiting, loose and different appearing colostomy output. She has mild generalized abdominal discomfort. Denies any black or bloody emesis or stools. She has not had any fevers, denies any chest pain or palpitations. Patient does have a significant abdominal history, she did have a high-grade distal small bowel obstruction in 2021, per chart review she did not require surgery for this. Before that she had very significant history in 2018 when she was treated for colorectal cancer, she has had a colostomy formation since then. She has been off of chemotherapy for several years now. Patient's complaints are moderate in severity, have started to improve since onset throughout the day yesterday, no specific aggravating or alleviating factors. Patient denies any cough, sore throat, shortness of breath, urinary symptoms, lower extremity edema or tenderness. Review of Systems   Constitutional: Negative for chills and fever. HENT: Negative for rhinorrhea and sore throat. Eyes: Negative for visual disturbance. Respiratory: Negative for cough and shortness of breath. Cardiovascular: Negative for chest pain and palpitations. Gastrointestinal: Positive for abdominal pain (\"discomfort\" from vomiting, non-focal), diarrhea, nausea and vomiting. Negative for blood in stool. Endocrine: Negative for polydipsia and polyuria. Genitourinary: Negative for dysuria and frequency. Musculoskeletal: Negative for back pain and myalgias.    Skin: Negative for rash and wound. Neurological: Negative for weakness and headaches. Psychiatric/Behavioral: Negative for confusion. All other systems reviewed and are negative. Physical Exam  Vitals and nursing note reviewed. Constitutional:       General: She is not in acute distress. Appearance: She is not toxic-appearing. HENT:      Head: Normocephalic and atraumatic. Right Ear: External ear normal.      Left Ear: External ear normal.      Nose: Nose normal. No rhinorrhea. Mouth/Throat:      Mouth: Mucous membranes are moist.      Pharynx: Oropharynx is clear. Eyes:      Extraocular Movements: Extraocular movements intact. Conjunctiva/sclera: Conjunctivae normal.      Pupils: Pupils are equal, round, and reactive to light. Cardiovascular:      Rate and Rhythm: Normal rate and regular rhythm. Pulses: Normal pulses. Heart sounds: Normal heart sounds. Pulmonary:      Effort: Pulmonary effort is normal. No respiratory distress. Breath sounds: Normal breath sounds. No wheezing or rales. Abdominal:      General: Bowel sounds are normal. There is no distension. Palpations: Abdomen is soft. Tenderness: There is no abdominal tenderness (No tenderness on abdominal examination). There is no right CVA tenderness, left CVA tenderness, guarding or rebound. Comments: Colostomy bag in place, brown-green appearing stool output which is nonbloody and is not melanotic in appearance   Musculoskeletal:         General: Normal range of motion. Cervical back: Normal range of motion and neck supple. No rigidity or tenderness. Right lower leg: No edema. Left lower leg: No edema. Skin:     General: Skin is warm and dry. Capillary Refill: Capillary refill takes less than 2 seconds. Coloration: Skin is not jaundiced or pale. Findings: No rash. Neurological:      General: No focal deficit present.       Mental Status: She is alert and oriented to person, place, and time. Psychiatric:         Mood and Affect: Mood normal.         Behavior: Behavior normal.          Procedures       MDM     ED Course as of 12/21/21 1043   Mon Dec 20, 2021   1251 EKG interpreted by the emergency department physician, 1239:Rate is 65; rhythm is sinus; interpretation is sinus rhythm, normal axis, , QRS 68, QTc 438, no ST elevations, no abnormal T wave inversions; [VG]   1253   ATTENDING PROVIDER ATTESTATION:     I have personally performed and/or participated in the history, exam, medical decision making, and procedures and agree with all pertinent clinical information unless otherwise noted. I have also reviewed and agree with the past medical, family and social history unless otherwise noted. I have discussed this patient in detail with the resident and provided the instruction and education regarding the evidence-based evaluation and treatment of N/V/D. History: Patient presents to the ED for evaluation of nausea, vomiting, and increased stooling from her colostomy bag. She states that symptoms started shortly after eating last night. Her  had the same thing but does not have the same symptoms. The emesis and stool are nonbloody. She reports some mild abdominal discomfort. She states she has had a lot of liquid come out of her colostomy and is now had some associated dizziness and lightheadedness. Symptoms have been improving since onset. Mild to moderate in severity. No associated chest pain or shortness of breath. My findings: Salena Cohn is a 78 y.o. female whom is in no distress. Physical exam reveals lying in bed comfortably. Abdomen is soft with no tenderness. Greenish-brownish liquid stool in colostomy bag. No blood. My plan: Symptomatic and supportive care. Appropriate labs and imaging.     Electronically signed by Trinidad Lyle DO on 12/20/21 at 12:53 PM EST       [MS]   1413 [id number] Patient reassessed, she states that she feels much better; she received IV fluids and Zofran. States that she will provide a urine sample at this time. [VG]   1953 Patient's repeat blood pressure is much improved. She is appropriate for discharge home. [MS]   2017 Suspect that the initially recorded increase in blood pressure was a false read as on repeat testing patient's blood pressure is normal and near where it was when she came in; she does not take any antihypertensive medications, she has no chest pain and has not developed any new or worsening symptoms and she has been here. She feels good and is stable and appropriate for discharge. [VG]      ED Course User Index  [MS] Jordan Peralta DO  [VG] Kay Garcia DO       [facility]    This is a 78 y.o. female presenting for evaluation of nausea, vomiting, and loose appearing colostomy output for 1 day. Patient is alert and oriented on arrival, she is nontoxic in appearance; she appears slightly fatigued but overall is alert and responds to examination and questions appropriately. Lungs CTA bilaterally. Abdomen is soft, nontender; patient does state that she had some mild generalized abdominal discomfort from vomiting but currently on examination there is no tenderness anywhere; colostomy appears normal urine output does not appear watery but does appear soft/loose and is brown/green in color, nonbloody and nonmelanotic appearing. Labs reviewed, creatinine is 1.1, 5 months ago was 1.0; troponin is 12; patient has no chest pain and no acute EKG changes. Lactic acid is 1.5. CBC shows normal hemoglobin and no leukocytosis. Patient is afebrile, normotensive, not tachycardic. Electrolytes and LFTs appear WNLs. Rapid Covid is negative. Patient was given IV fluids and Zofran with marked improvement of her symptoms. Patient ambulated to the bathroom easily without issue; she provided urine sample, did not appear to show UTI; patient has no urinary symptoms.  CT abdomen pelvis shows redemonstration of postsurgical changes, shows bilateral hydronephrosis which appears overall stable compared to prior, possible ambulatory dysfunction because of intra and extrahepatic bile duct dilatation however this does not correlate clinically as patient has no abdominal pain and her LFTs are normal; multiple prominent loops of small bowel that \"could indicate adynamic ileus or nonspecific enteritis. No evidence of bowel obstruction. \" Given these findings, patient's resolution of symptoms, stable vitals (please note her blood pressure was recorded with systolic 556 however feel this read was incorrect as repeat blood pressure was exactly near her baseline and she had no interventions in her clinical presentation did not change at all in that time), and benign abdominal exam, feel she is appropriate and stable for discharge. Discussed results and plan with patient, she voiced understanding and is amenable. She will be given a prescription for Zofran. Strict return precautions were discussed. She voiced understanding and understand signs/symptoms for which to return to the ER. EKG reviewed and interpreted by me:    Rate is 65; rhythm is sinus; interpretation is sinus rhythm with PACs, normal axis, , QRS 68, QTc 438, no ST elevations, no abnormal T wave inversions; when compared to previous EKG PACs are now present. I have discussed this patient with my attending, who has seen the patient and agrees with this disposition. Patient was seen and evaluated by myself and my attending Ramona Lim DO.  Assessment and Plan discussed with attending provider, please see attestation for final plan of care.       --------------------------------------------- PAST HISTORY ---------------------------------------------  Past Medical History:  has a past medical history of Cancer ([facility] [address] History of blood transfusion, Hx of blood clots, [facility] Macular degeneration, MDRO (multiple Immature Granulocytes # 0.02 E9/L    Lymphocytes Absolute 0.71 (L) 1.50 - 4.00 E9/L    Monocytes Absolute 0.47 0.10 - 0.95 E9/L    Eosinophils Absolute 0.02 (L) 0.05 - 0.50 E9/L    Basophils Absolute 0.01 0.00 - 0.20 E9/L   Lipase   Result Value Ref Range    Lipase 34 13 - 60 U/L   Troponin   Result Value Ref Range    Troponin, High Sensitivity 12 (H) 0 - 9 ng/L   Urinalysis, reflex to microscopic   Result Value Ref Range    Color, UA Straw Straw/Yellow    Clarity, UA Clear Clear    Glucose, Ur Negative Negative mg/dL    Bilirubin Urine Negative Negative    Ketones, Urine Negative Negative mg/dL    Specific Gravity, UA 1.010 1.005 - 1.030    Blood, Urine SMALL (A) Negative    pH, UA 6.5 5.0 - 9.0    Protein, UA Negative Negative mg/dL    Urobilinogen, Urine 0.2 <2.0 E.U./dL    Nitrite, Urine Negative Negative    Leukocyte Esterase, Urine SMALL (A) Negative   Comprehensive metabolic panel   Result Value Ref Range    Sodium 140 132 - 146 mmol/L    Potassium 4.6 3.5 - 5.0 mmol/L    Chloride 104 98 - 107 mmol/L    CO2 24 22 - 29 mmol/L    Anion Gap 12 7 - 16 mmol/L    Glucose 99 74 - 99 mg/dL    BUN 15 6 - 23 mg/dL    CREATININE 1.1 (H) 0.5 - 1.0 mg/dL    GFR Non-African American 47 >=60 mL/min/1.73    GFR African American 57     Calcium 9.8 8.6 - 10.2 mg/dL    Total Protein 7.7 6.4 - 8.3 g/dL    Albumin 4.5 3.5 - 5.2 g/dL    Total Bilirubin 0.5 0.0 - 1.2 mg/dL    Alkaline Phosphatase 81 35 - 104 U/L    ALT 7 0 - 32 U/L    AST 15 0 - 31 U/L   Magnesium   Result Value Ref Range    Magnesium 2.0 1.6 - 2.6 mg/dL   Lactate, Sepsis   Result Value Ref Range    Lactic Acid, Sepsis 1.5 0.5 - 1.9 mmol/L   Microscopic Urinalysis   Result Value Ref Range    WBC, UA 2-5 0 - 5 /HPF    RBC, UA 0-1 0 - 2 /HPF    Epithelial Cells, UA FEW /HPF    Bacteria, UA NONE SEEN None Seen /HPF   EKG 12 Lead   Result Value Ref Range    Ventricular Rate 65 BPM    Atrial Rate 65 BPM    P-R Interval 138 ms    QRS Duration 68 ms    Q-T Interval 422 ms QTc Calculation (Bazett) 438 ms    P Axis 69 degrees    R Axis 65 degrees    T Axis 69 degrees       Radiology:  CT ABDOMEN PELVIS W IV CONTRAST Additional Contrast? None   Final Result   1. Redemonstration of postsurgical changes related to bowel resection with   left-sided ostomy. 2. Multiple prominent loops of small bowel in pelvis and ventral abdomen   which could indicate adynamic ileus or nonspecific enteritis. No evidence of   bowel obstruction. 3. Stable severe left hydronephrosis with left hydroureter. Mild-to-moderate   right hydronephrosis increased compared to prior. 4. Intrahepatic and extrahepatic bile duct dilatation. Finding could suggest   dysfunction at level of the ampulla. 5. Redemonstration of thickened soft tissue in pelvis at level of presacral   space possibly related to radiation treatment. RECOMMENDATIONS:   Unavailable         XR CHEST PORTABLE   Final Result   1. Linear density within the right lower lobe and within the left lung base   favored to represent atelectasis. 2. There are no findings to suggest pneumonia                 ------------------------- NURSING NOTES AND VITALS REVIEWED ---------------------------  Date / Time Roomed:  12/20/2021 12:20 PM  ED Bed Assignment:  25/25    The nursing notes within the ED encounter and vital signs as below have been reviewed. /60   Pulse 73   Temp 97.8 °F (36.6 °C) (Oral)   Resp 18   Ht 5' 6\" (1.676 m)   Wt 152 lb 4.8 oz (69.1 kg)   SpO2 98%   BMI 24.58 kg/m²   Oxygen Saturation Interpretation: Normal      ------------------------------------------ PROGRESS NOTES ------------------------------------------  10:46 AM EST  I have spoken with the patient and discussed todays results, in addition to providing specific details for the plan of care and counseling regarding the diagnosis and prognosis. Their questions are answered at this time and they are agreeable with the plan.  I discussed at length with them reasons for immediate return here for re evaluation. They will followup with their primary care physician by calling their office tomorrow. --------------------------------- ADDITIONAL PROVIDER NOTES ---------------------------------  At this time the patient is without objective evidence of an acute process requiring hospitalization or inpatient management. They have remained hemodynamically stable throughout their entire ED visit and are stable for discharge with outpatient follow-up. The plan has been discussed in detail and they are aware of the specific conditions for emergent return, as well as the importance of follow-up. Discharge Medication List as of 12/20/2021  8:02 PM      START taking these medications    Details   ondansetron (ZOFRAN-ODT) 4 MG disintegrating tablet Take 1 tablet by mouth 3 times daily as needed for Nausea or Vomiting, Disp-21 tablet, R-0Print             Diagnosis:  1. Non-intractable vomiting with nausea, unspecified vomiting type        Disposition:  Patient's disposition: Discharge to home  Patient's condition is stable.        Marlo Becker,   Resident  12/21/21 7746

## 2021-12-20 NOTE — ED NOTES
Bed: 25  Expected date:   Expected time:   Means of arrival:   Comments:  chantale Rich RN  12/20/21 1776

## 2021-12-20 NOTE — ED NOTES
Pt presents to ED by ems from home with reports of weakness, fatigue, N/V, and diarrhea for the last few days. Pt has a colostomy bag. Pt is alert and oriented x3. Pt also states that she became light headed and dizzy yesterday and might of had a syncopal episode but is not sure.       Austyn Triana RN  12/20/21 9505

## 2021-12-21 LAB
EKG ATRIAL RATE: 65 BPM
EKG P AXIS: 69 DEGREES
EKG P-R INTERVAL: 138 MS
EKG Q-T INTERVAL: 422 MS
EKG QRS DURATION: 68 MS
EKG QTC CALCULATION (BAZETT): 438 MS
EKG R AXIS: 65 DEGREES
EKG T AXIS: 69 DEGREES
EKG VENTRICULAR RATE: 65 BPM

## 2021-12-21 ASSESSMENT — ENCOUNTER SYMPTOMS
NAUSEA: 1
ABDOMINAL PAIN: 1
RHINORRHEA: 0
COUGH: 0
SHORTNESS OF BREATH: 0
BLOOD IN STOOL: 0
BACK PAIN: 0
DIARRHEA: 1
SORE THROAT: 0
VOMITING: 1

## 2021-12-22 LAB — URINE CULTURE, ROUTINE: NORMAL

## 2022-06-02 ENCOUNTER — TELEPHONE (OUTPATIENT)
Dept: FAMILY MEDICINE CLINIC | Age: 82
End: 2022-06-02

## 2022-06-02 NOTE — TELEPHONE ENCOUNTER
----- Message from Macho Feltcher sent at 2022 11:29 AM EDT -----  Subject: Appointment Request    Reason for Call: New Patient Request Appointment    QUESTIONS  Type of Appointment? New Patient/New to Provider  Reason for appointment request? Requested Provider unavailable - Ruth Ann Hudson  Additional Information for Provider? Pt's daughter, Michael Maguire is a pt of   [facility] and would like to see if she would accept her as a new pt. Please call Annette to advise.  ---------------------------------------------------------------------------  --------------  CALL BACK INFO  What is the best way for the office to contact you? OK to leave message on   voicemail  Preferred Call Back Phone Number? [phone]  ---------------------------------------------------------------------------  --------------  SCRIPT ANSWERS  Relationship to Patient? Other  Representative Name? Annette  Additional information verified (besides Name and Date of Birth)? Phone   Number  Specialty Confirmation? Primary Care  Is this the first appointment to establish care for a ? No  Have you been diagnosed with, awaiting test results for, or told that you   are suspected of having COVID-19 (Coronavirus)? (If patient has tested   negative or was tested as a requirement for work, school, or travel and   not based on symptoms, answer no)? No  Within the past 10 days have you developed any of the following symptoms   (answer no if symptoms have been present longer than 10 days or began   more than 10 days ago)? Fever or Chills, Cough, Shortness of breath or   difficulty breathing, Loss of taste or smell, Sore throat, Nasal   congestion, Sneezing or runny nose, Fatigue or generalized body aches   (answer no if pain is specific to a body part e.g. back pain), Diarrhea,   Headache? No  Have you had close contact with someone with COVID-19 in the last 7 days?    No  (Service Expert  click yes below to proceed with [employer] As Usual Scheduling)? Yes  Have you been diagnosed with, awaiting test results for, or told that you   are suspected of having COVID-19 (Coronavirus)? (If patient has tested   negative or was tested as a requirement for work, school, or travel and   not based on symptoms, answer no)? No  Within the past 10 days have you developed any of the following symptoms   (answer no if symptoms have been present longer than 10 days or began   more than 10 days ago)? Fever or Chills, Cough, Shortness of breath or   difficulty breathing, Loss of taste or smell, Sore throat, Nasal   congestion, Sneezing or runny nose, Fatigue or generalized body aches   (answer no if pain is specific to a body part e.g. back pain), Diarrhea,   Headache? No  Have you had close contact with someone with COVID-19 in the last 7 days? No  (Service Expert  click yes below to proceed with Pain Doctor As Usual   Scheduling)?  Yes

## 2022-06-13 ENCOUNTER — OFFICE VISIT (OUTPATIENT)
Dept: FAMILY MEDICINE CLINIC | Age: 82
End: 2022-06-13
Payer: MEDICARE

## 2022-06-13 VITALS
SYSTOLIC BLOOD PRESSURE: 126 MMHG | HEART RATE: 82 BPM | OXYGEN SATURATION: 98 % | WEIGHT: 154 LBS | BODY MASS INDEX: 24.75 KG/M2 | RESPIRATION RATE: 16 BRPM | TEMPERATURE: 97.2 F | HEIGHT: 66 IN | DIASTOLIC BLOOD PRESSURE: 72 MMHG

## 2022-06-13 DIAGNOSIS — L82.1 SEBORRHEIC KERATOSIS: ICD-10-CM

## 2022-06-13 DIAGNOSIS — L91.8 SKIN TAG: ICD-10-CM

## 2022-06-13 DIAGNOSIS — Z00.00 ENCOUNTER FOR MEDICAL EXAMINATION TO ESTABLISH CARE: Primary | ICD-10-CM

## 2022-06-13 PROCEDURE — G8420 CALC BMI NORM PARAMETERS: HCPCS | Performed by: NURSE PRACTITIONER

## 2022-06-13 PROCEDURE — 1036F TOBACCO NON-USER: CPT | Performed by: NURSE PRACTITIONER

## 2022-06-13 PROCEDURE — G8400 PT W/DXA NO RESULTS DOC: HCPCS | Performed by: NURSE PRACTITIONER

## 2022-06-13 PROCEDURE — G8427 DOCREV CUR MEDS BY ELIG CLIN: HCPCS | Performed by: NURSE PRACTITIONER

## 2022-06-13 PROCEDURE — 99203 OFFICE O/P NEW LOW 30 MIN: CPT | Performed by: NURSE PRACTITIONER

## 2022-06-13 PROCEDURE — 1090F PRES/ABSN URINE INCON ASSESS: CPT | Performed by: NURSE PRACTITIONER

## 2022-06-13 PROCEDURE — 1123F ACP DISCUSS/DSCN MKR DOCD: CPT | Performed by: NURSE PRACTITIONER

## 2022-06-13 ASSESSMENT — ENCOUNTER SYMPTOMS
COUGH: 0
CONSTIPATION: 1
DIARRHEA: 1
SHORTNESS OF BREATH: 0
NAUSEA: 0
WHEEZING: 0
ROS SKIN COMMENTS: SEE HPI
VOMITING: 0

## 2022-06-13 ASSESSMENT — PATIENT HEALTH QUESTIONNAIRE - PHQ9
1. LITTLE INTEREST OR PLEASURE IN DOING THINGS: 0
SUM OF ALL RESPONSES TO PHQ9 QUESTIONS 1 & 2: 0
SUM OF ALL RESPONSES TO PHQ QUESTIONS 1-9: 0
2. FEELING DOWN, DEPRESSED OR HOPELESS: 0
SUM OF ALL RESPONSES TO PHQ QUESTIONS 1-9: 0

## 2022-06-13 NOTE — PROGRESS NOTES
Edwin Woodard (:  1940) is a 78 y.o. female,New patient, here for evaluation of the following chief complaint(s):  Establish Care (states previous pcp is [facility]. ), Health Maintenance (Needs AWV.), and Skin Tag (on temple, chest. )         ASSESSMENT/PLAN:  1. Encounter for medical examination to establish care  2. Skin tag  Will consider removal is bothersome    3. Seborrheic keratosis  Will schedule in office removal with Dr Lee Lim, gets irritated by hair    Advised need for AWV    No follow-ups on file. Subjective   SUBJECTIVE/OBJECTIVE:  Patient is here to establish care. Follows with Dr Carlos Mcgarry who has been her PCP as well  History of colorectal CA, surgery Dr Azra Kang at [facility]. Has had SBO, ischemic bowel as complications. Has colostomy   History of PAF but this was when in ICU, no further a fib per daughter  Complains of growth to right temple. Not painful but gets hair stuck on it. Also has a few skin tags on chest        Review of Systems   Constitutional: Positive for fatigue. Negative for activity change, appetite change and unexpected weight change. Respiratory: Negative for cough, shortness of breath and wheezing. Cardiovascular: Negative for chest pain and palpitations. Gastrointestinal: Positive for constipation and diarrhea. Negative for nausea and vomiting. Endocrine: Negative for cold intolerance, heat intolerance, polydipsia, polyphagia and polyuria. Skin:        See HPI   Neurological: Negative for weakness, light-headedness and headaches. Psychiatric/Behavioral: Positive for dysphoric mood. Negative for sleep disturbance. The patient is nervous/anxious. Objective   /72   Pulse 82   Temp 97.2 °F (36.2 °C) (Temporal)   Resp 16   Ht 5' 6\" (1.676 m)   Wt 154 lb (69.9 kg)   SpO2 98%   BMI 24.86 kg/m²    Physical Exam  Constitutional:       General: She is not in acute distress. Appearance: Normal appearance.  She is

## 2022-06-27 ENCOUNTER — OFFICE VISIT (OUTPATIENT)
Dept: FAMILY MEDICINE CLINIC | Age: 82
End: 2022-06-27
Payer: MEDICARE

## 2022-06-27 VITALS
OXYGEN SATURATION: 96 % | DIASTOLIC BLOOD PRESSURE: 62 MMHG | SYSTOLIC BLOOD PRESSURE: 118 MMHG | HEART RATE: 72 BPM | RESPIRATION RATE: 19 BRPM | TEMPERATURE: 97.1 F

## 2022-06-27 DIAGNOSIS — N18.31 STAGE 3A CHRONIC KIDNEY DISEASE (HCC): ICD-10-CM

## 2022-06-27 DIAGNOSIS — E43 SEVERE PROTEIN-CALORIE MALNUTRITION (HCC): ICD-10-CM

## 2022-06-27 DIAGNOSIS — I48.0 PAF (PAROXYSMAL ATRIAL FIBRILLATION) (HCC): ICD-10-CM

## 2022-06-27 DIAGNOSIS — C20 RECTAL CANCER (HCC): ICD-10-CM

## 2022-06-27 DIAGNOSIS — L91.8 SKIN TAG: Primary | ICD-10-CM

## 2022-06-27 DIAGNOSIS — K55.9 ISCHEMIC BOWEL DISEASE (HCC): ICD-10-CM

## 2022-06-27 PROBLEM — N18.30 CHRONIC RENAL DISEASE, STAGE III (HCC): Status: ACTIVE | Noted: 2022-06-27

## 2022-06-27 PROCEDURE — 11200 RMVL SKIN TAGS UP TO&INC 15: CPT | Performed by: FAMILY MEDICINE

## 2022-06-27 SDOH — ECONOMIC STABILITY: FOOD INSECURITY: WITHIN THE PAST 12 MONTHS, YOU WORRIED THAT YOUR FOOD WOULD RUN OUT BEFORE YOU GOT MONEY TO BUY MORE.: NEVER TRUE

## 2022-06-27 SDOH — ECONOMIC STABILITY: FOOD INSECURITY: WITHIN THE PAST 12 MONTHS, THE FOOD YOU BOUGHT JUST DIDN'T LAST AND YOU DIDN'T HAVE MONEY TO GET MORE.: NEVER TRUE

## 2022-06-27 ASSESSMENT — SOCIAL DETERMINANTS OF HEALTH (SDOH): HOW HARD IS IT FOR YOU TO PAY FOR THE VERY BASICS LIKE FOOD, HOUSING, MEDICAL CARE, AND HEATING?: NOT HARD AT ALL

## 2022-06-27 NOTE — PROGRESS NOTES
OPERATIVE NOTE    DATE OF PROCEDURE: 6/27/22     SURGEON: Chino Winslow     ASSISTANT: Ruth Ann Ramos    PREOPERATIVE DIAGNOSIS: seborrheic Keratosis face    POSTOPERATIVE DIAGNOSIS:  seborrheic Keratosis face    OPERATION: Paring  seborrheic Keratosis face    ANESTHESIA: obtained by infiltration using 1% Lidocaine with epinephrine    ESTIMATED BLOOD LOSS: Zero     COMPLICATIONS: None     SPECIMENS: Was Not Obtained    HISTORY: The patient is a 78y.o. year old female with history of above preop diagnosis. Itchy and scaling bleeding lesions Right temple and neck and face lesions (4). I explained the risk, benefits, expected outcome, and alternatives to the procedure. Patient understands and is in agreement. PROCEDURE:  Pt seated. Lesions marked, scrubbed betadine and blocked as above. Lesions pared off with number 15 knife and scissos as appropriate. [id number] for hemostasis applied. Pt. Tolerated procedure and was fully instructed in follow up and aftercare.      Nelson Case D.O.     6/27/22

## 2022-10-18 ENCOUNTER — HOSPITAL ENCOUNTER (INPATIENT)
Age: 82
LOS: 6 days | Discharge: HOME | DRG: 177 | End: 2022-10-24
Attending: EMERGENCY MEDICINE | Admitting: INTERNAL MEDICINE
Payer: MEDICARE

## 2022-10-18 ENCOUNTER — APPOINTMENT (OUTPATIENT)
Dept: CT IMAGING | Age: 82
DRG: 177 | End: 2022-10-18
Payer: MEDICARE

## 2022-10-18 ENCOUNTER — APPOINTMENT (OUTPATIENT)
Dept: GENERAL RADIOLOGY | Age: 82
DRG: 177 | End: 2022-10-18
Payer: MEDICARE

## 2022-10-18 DIAGNOSIS — J96.01 ACUTE RESPIRATORY FAILURE WITH HYPOXIA (HCC): Primary | ICD-10-CM

## 2022-10-18 DIAGNOSIS — U07.1 COVID-19 VIRUS INFECTION: ICD-10-CM

## 2022-10-18 LAB
ANION GAP SERPL CALCULATED.3IONS-SCNC: 13 MMOL/L (ref 7–16)
BACTERIA: ABNORMAL /HPF
BASOPHILS ABSOLUTE: 0 E9/L (ref 0–0.2)
BASOPHILS RELATIVE PERCENT: 0 % (ref 0–2)
BILIRUBIN URINE: ABNORMAL
BLOOD, URINE: ABNORMAL
BUN BLDV-MCNC: 25 MG/DL (ref 6–23)
C-REACTIVE PROTEIN: 9.1 MG/DL (ref 0–0.4)
CALCIUM SERPL-MCNC: 9.5 MG/DL (ref 8.6–10.2)
CHLORIDE BLD-SCNC: 98 MMOL/L (ref 98–107)
CLARITY: ABNORMAL
CO2: 22 MMOL/L (ref 22–29)
COLOR: YELLOW
CREAT SERPL-MCNC: 1.1 MG/DL (ref 0.5–1)
D DIMER: 596 NG/ML DDU
EKG ATRIAL RATE: 72 BPM
EKG P AXIS: 40 DEGREES
EKG P-R INTERVAL: 108 MS
EKG Q-T INTERVAL: 374 MS
EKG QRS DURATION: 74 MS
EKG QTC CALCULATION (BAZETT): 409 MS
EKG R AXIS: 55 DEGREES
EKG T AXIS: 68 DEGREES
EKG VENTRICULAR RATE: 72 BPM
EOSINOPHILS ABSOLUTE: 0 E9/L (ref 0.05–0.5)
EOSINOPHILS RELATIVE PERCENT: 0 % (ref 0–6)
EPITHELIAL CELLS, UA: ABNORMAL /HPF
FERRITIN: 569 NG/ML
FIBRINOGEN: 479 MG/DL (ref 200–400)
GFR SERPL CREATININE-BSD FRML MDRD: 49 ML/MIN/1.73
GLUCOSE BLD-MCNC: 113 MG/DL (ref 74–99)
GLUCOSE URINE: NEGATIVE MG/DL
HCT VFR BLD CALC: 35.4 % (ref 34–48)
HCT VFR BLD CALC: 37.6 % (ref 34–48)
HEMOGLOBIN: 11.7 G/DL (ref 11.5–15.5)
HEMOGLOBIN: 12.4 G/DL (ref 11.5–15.5)
INFLUENZA A BY PCR: NOT DETECTED
INFLUENZA B BY PCR: NOT DETECTED
KETONES, URINE: NEGATIVE MG/DL
LEUKOCYTE ESTERASE, URINE: ABNORMAL
LYMPHOCYTES ABSOLUTE: 0.16 E9/L (ref 1.5–4)
LYMPHOCYTES RELATIVE PERCENT: 3.5 % (ref 20–42)
MCH RBC QN AUTO: 30 PG (ref 26–35)
MCH RBC QN AUTO: 30 PG (ref 26–35)
MCHC RBC AUTO-ENTMCNC: 33 % (ref 32–34.5)
MCHC RBC AUTO-ENTMCNC: 33.1 % (ref 32–34.5)
MCV RBC AUTO: 90.8 FL (ref 80–99.9)
MCV RBC AUTO: 91 FL (ref 80–99.9)
MONOCYTES ABSOLUTE: 0.08 E9/L (ref 0.1–0.95)
MONOCYTES RELATIVE PERCENT: 1.7 % (ref 2–12)
NEUTROPHILS ABSOLUTE: 3.9 E9/L (ref 1.8–7.3)
NEUTROPHILS RELATIVE PERCENT: 94.8 % (ref 43–80)
NITRITE, URINE: NEGATIVE
PDW BLD-RTO: 13.5 FL (ref 11.5–15)
PDW BLD-RTO: 13.8 FL (ref 11.5–15)
PH UA: 6 (ref 5–9)
PLATELET # BLD: 163 E9/L (ref 130–450)
PLATELET # BLD: 166 E9/L (ref 130–450)
PMV BLD AUTO: 9.8 FL (ref 7–12)
PMV BLD AUTO: 9.8 FL (ref 7–12)
POTASSIUM SERPL-SCNC: 3.7 MMOL/L (ref 3.5–5)
PROTEIN UA: 30 MG/DL
RBC # BLD: 3.9 E12/L (ref 3.5–5.5)
RBC # BLD: 4.13 E12/L (ref 3.5–5.5)
RBC # BLD: NORMAL 10*6/UL
RBC UA: >20 /HPF (ref 0–2)
SARS-COV-2, NAAT: DETECTED
SODIUM BLD-SCNC: 133 MMOL/L (ref 132–146)
SPECIFIC GRAVITY UA: 1.02 (ref 1–1.03)
TROPONIN, HIGH SENSITIVITY: 14 NG/L (ref 0–9)
UROBILINOGEN, URINE: 0.2 E.U./DL
WBC # BLD: 4.1 E9/L (ref 4.5–11.5)
WBC # BLD: 5.5 E9/L (ref 4.5–11.5)
WBC UA: >20 /HPF (ref 0–5)

## 2022-10-18 PROCEDURE — 82728 ASSAY OF FERRITIN: CPT

## 2022-10-18 PROCEDURE — 71250 CT THORAX DX C-: CPT

## 2022-10-18 PROCEDURE — 2580000003 HC RX 258: Performed by: EMERGENCY MEDICINE

## 2022-10-18 PROCEDURE — 86140 C-REACTIVE PROTEIN: CPT

## 2022-10-18 PROCEDURE — 87635 SARS-COV-2 COVID-19 AMP PRB: CPT

## 2022-10-18 PROCEDURE — 87077 CULTURE AEROBIC IDENTIFY: CPT

## 2022-10-18 PROCEDURE — 85384 FIBRINOGEN ACTIVITY: CPT

## 2022-10-18 PROCEDURE — 96374 THER/PROPH/DIAG INJ IV PUSH: CPT

## 2022-10-18 PROCEDURE — 99285 EMERGENCY DEPT VISIT HI MDM: CPT

## 2022-10-18 PROCEDURE — 84484 ASSAY OF TROPONIN QUANT: CPT

## 2022-10-18 PROCEDURE — 6360000002 HC RX W HCPCS: Performed by: INTERNAL MEDICINE

## 2022-10-18 PROCEDURE — 96375 TX/PRO/DX INJ NEW DRUG ADDON: CPT

## 2022-10-18 PROCEDURE — 87088 URINE BACTERIA CULTURE: CPT

## 2022-10-18 PROCEDURE — 85027 COMPLETE CBC AUTOMATED: CPT

## 2022-10-18 PROCEDURE — 80048 BASIC METABOLIC PNL TOTAL CA: CPT

## 2022-10-18 PROCEDURE — XW0DXM6 INTRODUCTION OF BARICITINIB INTO MOUTH AND PHARYNX, EXTERNAL APPROACH, NEW TECHNOLOGY GROUP 6: ICD-10-PCS | Performed by: INTERNAL MEDICINE

## 2022-10-18 PROCEDURE — 85025 COMPLETE CBC W/AUTO DIFF WBC: CPT

## 2022-10-18 PROCEDURE — 36415 COLL VENOUS BLD VENIPUNCTURE: CPT

## 2022-10-18 PROCEDURE — 6370000000 HC RX 637 (ALT 250 FOR IP): Performed by: INTERNAL MEDICINE

## 2022-10-18 PROCEDURE — 2580000003 HC RX 258: Performed by: INTERNAL MEDICINE

## 2022-10-18 PROCEDURE — 6360000002 HC RX W HCPCS: Performed by: EMERGENCY MEDICINE

## 2022-10-18 PROCEDURE — 85378 FIBRIN DEGRADE SEMIQUANT: CPT

## 2022-10-18 PROCEDURE — 93005 ELECTROCARDIOGRAM TRACING: CPT | Performed by: EMERGENCY MEDICINE

## 2022-10-18 PROCEDURE — 1200000000 HC SEMI PRIVATE

## 2022-10-18 PROCEDURE — 71046 X-RAY EXAM CHEST 2 VIEWS: CPT

## 2022-10-18 PROCEDURE — 81001 URINALYSIS AUTO W/SCOPE: CPT

## 2022-10-18 PROCEDURE — 87186 SC STD MICRODIL/AGAR DIL: CPT

## 2022-10-18 PROCEDURE — 87502 INFLUENZA DNA AMP PROBE: CPT

## 2022-10-18 RX ORDER — ZINC GLUCONATE 50 MG
50 TABLET ORAL DAILY
Status: DISCONTINUED | OUTPATIENT
Start: 2022-10-18 | End: 2022-10-20

## 2022-10-18 RX ORDER — ENOXAPARIN SODIUM 100 MG/ML
30 INJECTION SUBCUTANEOUS 2 TIMES DAILY
Status: DISCONTINUED | OUTPATIENT
Start: 2022-10-18 | End: 2022-10-24 | Stop reason: HOSPADM

## 2022-10-18 RX ORDER — M-VIT,TX,IRON,MINS/CALC/FOLIC 27MG-0.4MG
1 TABLET ORAL DAILY
Status: DISCONTINUED | OUTPATIENT
Start: 2022-10-18 | End: 2022-10-20

## 2022-10-18 RX ORDER — DEXAMETHASONE SODIUM PHOSPHATE 10 MG/ML
6 INJECTION INTRAMUSCULAR; INTRAVENOUS EVERY 24 HOURS
Status: DISCONTINUED | OUTPATIENT
Start: 2022-10-19 | End: 2022-10-23

## 2022-10-18 RX ORDER — ENOXAPARIN SODIUM 100 MG/ML
40 INJECTION SUBCUTANEOUS DAILY
Status: DISCONTINUED | OUTPATIENT
Start: 2022-10-18 | End: 2022-10-18

## 2022-10-18 RX ORDER — SODIUM CHLORIDE 9 MG/ML
INJECTION, SOLUTION INTRAVENOUS CONTINUOUS
Status: DISCONTINUED | OUTPATIENT
Start: 2022-10-18 | End: 2022-10-23

## 2022-10-18 RX ORDER — PROCHLORPERAZINE EDISYLATE 5 MG/ML
5 INJECTION INTRAMUSCULAR; INTRAVENOUS EVERY 6 HOURS PRN
Status: DISCONTINUED | OUTPATIENT
Start: 2022-10-18 | End: 2022-10-24 | Stop reason: HOSPADM

## 2022-10-18 RX ORDER — PANTOPRAZOLE SODIUM 40 MG/1
40 TABLET, DELAYED RELEASE ORAL
Status: DISCONTINUED | OUTPATIENT
Start: 2022-10-19 | End: 2022-10-20

## 2022-10-18 RX ORDER — LANOLIN ALCOHOL/MO/W.PET/CERES
50 CREAM (GRAM) TOPICAL DAILY
Status: DISCONTINUED | OUTPATIENT
Start: 2022-10-18 | End: 2022-10-24 | Stop reason: HOSPADM

## 2022-10-18 RX ORDER — AZITHROMYCIN 250 MG/1
500 TABLET, FILM COATED ORAL DAILY
Status: DISCONTINUED | OUTPATIENT
Start: 2022-10-19 | End: 2022-10-21

## 2022-10-18 RX ORDER — POLYETHYLENE GLYCOL 3350 17 G/17G
17 POWDER, FOR SOLUTION ORAL DAILY PRN
Status: DISCONTINUED | OUTPATIENT
Start: 2022-10-18 | End: 2022-10-24 | Stop reason: HOSPADM

## 2022-10-18 RX ORDER — ACETAMINOPHEN 325 MG/1
650 TABLET ORAL EVERY 4 HOURS PRN
Status: DISCONTINUED | OUTPATIENT
Start: 2022-10-18 | End: 2022-10-24 | Stop reason: HOSPADM

## 2022-10-18 RX ORDER — BUDESONIDE AND FORMOTEROL FUMARATE DIHYDRATE 80; 4.5 UG/1; UG/1
2 AEROSOL RESPIRATORY (INHALATION) 2 TIMES DAILY
Status: DISCONTINUED | OUTPATIENT
Start: 2022-10-18 | End: 2022-10-24 | Stop reason: HOSPADM

## 2022-10-18 RX ORDER — GAUZE BANDAGE 2" X 2"
100 BANDAGE TOPICAL DAILY
Status: DISCONTINUED | OUTPATIENT
Start: 2022-10-18 | End: 2022-10-24 | Stop reason: HOSPADM

## 2022-10-18 RX ORDER — ONDANSETRON 2 MG/ML
4 INJECTION INTRAMUSCULAR; INTRAVENOUS ONCE
Status: COMPLETED | OUTPATIENT
Start: 2022-10-18 | End: 2022-10-18

## 2022-10-18 RX ORDER — ASCORBIC ACID 500 MG
1000 TABLET ORAL DAILY
Status: DISCONTINUED | OUTPATIENT
Start: 2022-10-18 | End: 2022-10-24 | Stop reason: HOSPADM

## 2022-10-18 RX ORDER — VITAMIN B COMPLEX
2000 TABLET ORAL DAILY
Status: DISCONTINUED | OUTPATIENT
Start: 2022-10-18 | End: 2022-10-24 | Stop reason: HOSPADM

## 2022-10-18 RX ORDER — DEXAMETHASONE SODIUM PHOSPHATE 10 MG/ML
8 INJECTION INTRAMUSCULAR; INTRAVENOUS ONCE
Status: COMPLETED | OUTPATIENT
Start: 2022-10-18 | End: 2022-10-18

## 2022-10-18 RX ADMIN — Medication 100 MG: at 16:45

## 2022-10-18 RX ADMIN — BARICITINIB 2 MG: 2 TABLET, FILM COATED ORAL at 21:43

## 2022-10-18 RX ADMIN — Medication 2000 UNITS: at 16:46

## 2022-10-18 RX ADMIN — PYRIDOXINE HCL TAB 50 MG 50 MG: 50 TAB at 16:45

## 2022-10-18 RX ADMIN — ENOXAPARIN SODIUM 30 MG: 100 INJECTION SUBCUTANEOUS at 21:43

## 2022-10-18 RX ADMIN — Medication 50 MG: at 16:45

## 2022-10-18 RX ADMIN — SODIUM CHLORIDE: 900 INJECTION, SOLUTION INTRAVENOUS at 16:52

## 2022-10-18 RX ADMIN — OXYCODONE HYDROCHLORIDE AND ACETAMINOPHEN 1000 MG: 500 TABLET ORAL at 16:45

## 2022-10-18 RX ADMIN — MULTIPLE VITAMINS W/ MINERALS TAB 1 TABLET: TAB at 16:45

## 2022-10-18 RX ADMIN — CEFTRIAXONE 2000 MG: 2 INJECTION, POWDER, FOR SOLUTION INTRAMUSCULAR; INTRAVENOUS at 14:28

## 2022-10-18 RX ADMIN — ONDANSETRON 4 MG: 2 INJECTION INTRAMUSCULAR; INTRAVENOUS at 12:49

## 2022-10-18 RX ADMIN — DEXAMETHASONE SODIUM PHOSPHATE 8 MG: 10 INJECTION INTRAMUSCULAR; INTRAVENOUS at 14:25

## 2022-10-18 RX ADMIN — SERTRALINE 50 MG: 50 TABLET, FILM COATED ORAL at 16:45

## 2022-10-18 RX ADMIN — AZITHROMYCIN MONOHYDRATE 500 MG: 500 INJECTION, POWDER, LYOPHILIZED, FOR SOLUTION INTRAVENOUS at 14:43

## 2022-10-18 ASSESSMENT — ENCOUNTER SYMPTOMS
ABDOMINAL PAIN: 0
SINUS PRESSURE: 0
EYE PAIN: 0
SHORTNESS OF BREATH: 0
EYE REDNESS: 0
BACK PAIN: 0
DIARRHEA: 0
NAUSEA: 1
EYE DISCHARGE: 0
COUGH: 0
WHEEZING: 0
ABDOMINAL DISTENTION: 0
VOMITING: 0
SORE THROAT: 0

## 2022-10-18 NOTE — PROGRESS NOTES
Pharmacy Consultation Note    Consult date: 10/18/2022  Physician/provider: Dr. Tierney Camacho has been consulted to evaluate criteria for COVID therapy. Based on the algorithm, the patient DOES currently meet [facility] P&T approved Covid-19 treatment criteria for Baricitinib.     Thank you for the consult,  Jer Roberts, PharmD, BCPS 10/18/2022 6:41 PM   [phone]

## 2022-10-18 NOTE — H&P
Department of Internal Medicine        CHIEF COMPLAINT: Generalized fatigue    Reason for Admission: Acute hypoxic respiratory failure, COVID-19 infection    HISTORY OF PRESENT ILLNESS:      The patient is a 78 y.o. female who presents with generalized fatigue over the last 2 weeks. Patient also complains of a mildly scant clear productive cough. She denies any problem with any chest or abdominal pain. Patient denies any history of leg swelling or pain in her calf or thighs. Patient also has some intermittent episodes of nausea but this is somewhat chronic. She denies any fever/chills, unusual shortness of breath at rest,vomiting. Patient has a history of prior tobacco use for about 20 years about a half a pack to-pack of cigarettes a day. Patient is very hard of hearing. Patient has history of colorectal CA along with chronic kidney disease stage IIIa, history of DVT, ischemic bowel, macular degeneration, paroxysmal atrial fib. Patient was seen in the ED and was hypoxic and was O2 saturation on room air at rest and with placed on O2. Patient tested positive for COVID and 2 view chest x-ray showed suspected early bibasilar infiltrates slightly worse on the right. BUN/creatinine is 25/1.1 with normal electrolytes WBC 5.5 and hemoglobin 12.4. Urinalysis showed large leukocyte esterase and greater than 20 WBCs.     Past Medical History:    Past Medical History:   Diagnosis Date    Cancer [facility])     colorectal     Chronic renal disease, stage III [facility]) [[id number]] 6/27/2022    History of blood transfusion     Hx of blood clots     dvt, on eliquis    Ischemic bowel disease ([facility]     Macular degeneration     MDRO (multiple drug resistant organisms) resistance     PAF (paroxysmal atrial fibrillation) (HCC)     Tinnitus      Past Surgical History:    Past Surgical History:   Procedure Laterality Date    ABDOMEN SURGERY      colostomy & reversal    CHOLECYSTECTOMY  07    COLONOSCOPY      CYSTOSCOPY N/A 12/4/2018 CYSTOSCOPY RETROGRADE PYELOGRAM performed by Khoi Jansen MD at [address] [facility], ESOPHAGUS      HAND SURGERY      carpel tunnel rigth hand    HYSTERECTOMY (CERVIX STATUS UNKNOWN)      ILEOSTOMY OR JEJUNOSTOMY      OTHER SURGICAL HISTORY  11/06/2017    diagnostic laparoscopy with exploratory laparotomy and reduction of internal hernia and closure of mesenteric defect, oversewing of multiple serosal tears    AL LAP,SURG,COLECT,TOT,W/PROCTECT,W/ILEOST N/A 6/28/2018    LAPAROSCOPIC DIVERTING COLOSTOMY performed by Charissa Miller MD at [address] [facility] LAP,SURG,COLECT,TOT,W/PROCTECT,W/ILEOST N/A 10/18/2018    LAPAROSCOPIC REVISION LOOP COLOSTOMY, EXTENSIVE LYSIS OF ADHESIONS, DRAINAGE PELVIC ABSCESS, END COLOSTOMY performed by Barrie ePdroza MD at [address] [place] [address] (CERVIX REMOVED)      UPPER GASTROINTESTINAL ENDOSCOPY N/A 7/18/2018    EGD BIOPSY performed by Unique Brush DO at [place] [address]       Medications Prior to Admission:    @  Prior to Admission medications    Medication Sig Start Date End Date Taking?  Authorizing Provider   Multiple Vitamins-Minerals (THERAPEUTIC MULTIVITAMIN-MINERALS) tablet Take 1 tablet by mouth daily    Historical Provider, MD   omeprazole (PRILOSEC) 20 MG delayed release capsule Take 20 mg by mouth daily    Historical Provider, MD   acetaminophen (TYLENOL) 325 MG tablet Take 2 tablets by mouth every 4 hours as needed for Pain or Fever 12/26/17   Unique Brush DO   sertraline (ZOLOFT) 50 MG tablet Take 50 mg by mouth daily    Historical Provider, MD       Allergies:  Aspirin and Tape Rhiannon Buttery tape]    Social History:   Social History     Socioeconomic History    Marital status:      Spouse name: Not on file    Number of children: Not on file    Years of education: Not on file    Highest education level: Not on file   Occupational History    Not on file   Tobacco Use    Smoking status: Former     Years: 20.00     Types: Cigarettes    Smokeless tobacco: Never    Tobacco comments:     quit at age 44    Substance and Sexual Activity    Alcohol use: No     Comment: socially    Drug use: No    Sexual activity: Not on file   Other Topics Concern    Not on file   Social History Narrative    Lives in [facility] with  (Ayush Stack / Bed). Worked in an office. Social Determinants of Health     Financial Resource Strain: Low Risk     Difficulty of Paying Living Expenses: Not hard at all   Food Insecurity: No Food Insecurity    Worried About Running Out of Food in the Last Year: Never true    Ran Out of Food in the Last Year: Never true   Transportation Needs: Not on file   Physical Activity: Not on file   Stress: Not on file   Social Connections: Not on file   Intimate Partner Violence: Not on file   Housing Stability: Not on file       Family History:   Family History   Problem Relation Age of Onset    Arthritis Mother     Kidney Disease Father     Cancer Father         kidney cancer       REVIEW OF SYSTEMS:    Gen: + Generalized fatigue. Patient denies any lightheadedness or dizziness. No LOC or syncope. No fevers or chills. HEENT: No earache, sore throat or nasal congestion. Resp: Denies cough, hemoptysis or sputum production. Cardiac: Denies chest pain, SOB, diaphoresis or palpitations. GI: No nausea, vomiting, diarrhea or constipation. No melena or hematochezia. : No urinary complaints, dysuria, hematuria or frequency. MSK: No extremity weakness, paralysis or paresthesias. PHYSICAL EXAM:    Vitals:  BP (!) 99/48   Pulse 62   Temp 99.1 °F (37.3 °C) (Oral)   Resp 16   Ht 5' 7\" (1.702 m)   Wt 145 lb (65.8 kg)   SpO2 100%   BMI 22.71 kg/m²     General:  This is a 78 y.o. yo female who is alert and oriented in mild distress secondary to above  HEENT:  Head is normocephalic and atraumatic, PERRLA, EOMI, mucus membranes moist with no pharyngeal erythema or exudate. Neck:  Supple with no carotid bruits, JVD or thyromegaly.   No cervical adenopathy  CV:  Regular rate and rhythm, no murmurs  Lungs: Coarse decreased breath sounds to auscultation bilaterally with with scattered rails right greater than left and mild rhonchi in the right, no wheezes  Abdomen:  Soft, nontender, nondistended, bowel sounds present  Extremities:  No edema, peripheral pulses intact bilaterally  Neuro:  Cranial nerves II-XII grossly intact; motor and sensory function intact with no focal deficits  Skin:  No rashes, lesions or wounds    DATA:  CBC with Differential:    Lab Results   Component Value Date/Time    WBC 5.5 10/18/2022 12:27 PM    RBC 4.13 10/18/2022 12:27 PM    HGB 12.4 10/18/2022 12:27 PM    HCT 37.6 10/18/2022 12:27 PM     10/18/2022 12:27 PM    MCV 91.0 10/18/2022 12:27 PM    MCH 30.0 10/18/2022 12:27 PM    MCHC 33.0 10/18/2022 12:27 PM    RDW 13.8 10/18/2022 12:27 PM    NRBC 0.5 11/30/2017 06:40 AM    BLASTSPCT 1.0 11/06/2017 11:35 PM    METASPCT 0.9 10/18/2018 05:38 AM    LYMPHOPCT 13.8 12/20/2021 12:50 PM    PROMYELOPCT 0.5 11/26/2017 05:30 AM    MONOPCT 9.1 12/20/2021 12:50 PM    MYELOPCT 1.7 10/18/2018 05:38 AM    BASOPCT 0.2 12/20/2021 12:50 PM    MONOSABS 0.47 12/20/2021 12:50 PM    LYMPHSABS 0.71 12/20/2021 12:50 PM    EOSABS 0.02 12/20/2021 12:50 PM    BASOSABS 0.01 12/20/2021 12:50 PM     CMP:    Lab Results   Component Value Date/Time     10/18/2022 12:27 PM    K 3.7 10/18/2022 12:27 PM    K 4.9 07/16/2021 06:15 AM    CL 98 10/18/2022 12:27 PM    CO2 22 10/18/2022 12:27 PM    BUN 25 10/18/2022 12:27 PM    CREATININE 1.1 10/18/2022 12:27 PM    GFRAA 57 12/20/2021 12:50 PM    LABGLOM 49 10/18/2022 12:27 PM    GLUCOSE 113 10/18/2022 12:27 PM    PROT 7.7 12/20/2021 12:50 PM    LABALBU 4.5 12/20/2021 12:50 PM    CALCIUM 9.5 10/18/2022 12:27 PM    BILITOT 0.5 12/20/2021 12:50 PM    ALKPHOS 81 12/20/2021 12:50 PM    AST 15 12/20/2021 12:50 PM    ALT 7 12/20/2021 12:50 PM     Magnesium:    Lab Results   Component Value Date/Time    MG 2.0 12/20/2021 12:50 PM     Phosphorus:    Lab Results   Component Value Date/Time    PHOS 4.2 12/01/2018 03:50 AM     PT/INR:    Lab Results   Component Value Date/Time    PROTIME 11.6 12/03/2018 02:50 PM    INR 1.0 12/03/2018 02:50 PM     Troponin:    Lab Results   Component Value Date/Time    TROPONINI <0.01 10/16/2018 11:12 PM     U/A:    Lab Results   Component Value Date/Time    COLORU Yellow 10/18/2022 02:22 PM    PROTEINU 30 10/18/2022 02:22 PM    PHUR 6.0 10/18/2022 02:22 PM    WBCUA >20 10/18/2022 02:22 PM    RBCUA >20 10/18/2022 02:22 PM    BACTERIA MODERATE 10/18/2022 02:22 PM    CLARITYU CLOUDY 10/18/2022 02:22 PM    SPECGRAV 1.020 10/18/2022 02:22 PM    LEUKOCYTESUR LARGE 10/18/2022 02:22 PM    UROBILINOGEN 0.2 10/18/2022 02:22 PM    BILIRUBINUR SMALL 10/18/2022 02:22 PM    BLOODU LARGE 10/18/2022 02:22 PM    GLUCOSEU Negative 10/18/2022 02:22 PM    AMORPHOUS FEW 11/23/2018 04:20 AM     ABG:    Lab Results   Component Value Date/Time    PH 7.514 10/20/2018 08:04 PM    PCO2 28.5 10/20/2018 08:04 PM    PO2 67.6 10/20/2018 08:04 PM    HCO3 22.4 10/20/2018 08:04 PM    BE 0.2 10/20/2018 08:04 PM    O2SAT 94.4 10/20/2018 08:04 PM     HgBA1c:  No results found for: LABA1C  FLP:    Lab Results   Component Value Date/Time    TRIG 130 07/17/2021 05:07 AM    HDL 46 07/17/2021 05:07 AM    LDLCALC 73 07/17/2021 05:07 AM    LABVLDL 26 07/17/2021 05:07 AM     TSH:    Lab Results   Component Value Date/Time    TSH 3.430 07/17/2021 05:07 AM     IRON:    Lab Results   Component Value Date/Time    IRON 44 06/28/2018 06:00 AM     LIPASE:    Lab Results   Component Value Date/Time    LIPASE 34 12/20/2021 12:50 PM       ASSESSMENT AND PLAN:      Patient Active Problem List    Diagnosis Date Noted    Acute respiratory failure with hypoxia ([facility] 10/18/2022    Chronic renal disease, stage III ([facility] [[id number]] 06/27/2022    SBO (small bowel obstruction) ([facility] 07/16/2021    PAF (paroxysmal atrial fibrillation) (HCC)     Ischemic bowel disease ([facility]     Hx of blood clots     Perforation of sigmoid colon ([facility] 10/17/2018    Altered mental status 06/25/2018    Fatigue due to excessive exertion 06/25/2018    Macular degeneration     Cancer ([facility]     Enteritis 12/21/2017    Severe protein-calorie malnutrition ([facility] 12/21/2017    Pneumatosis intestinalis     Rectal cancer ([facility] 11/17/2016     Impression:  1. Acute hypoxic respiratory failure  2. Positive COVID-19 infection with bibasilar infiltrate  3. Bacteriuria-rule out UTI  4. History of paroxysmal atrial fibs-currently sinus rhythm  5. History of macular degeneration  6. History of perforated bowel- ischemic bowel  7. Generalized weakness and fatigue  8. History of left femoral DVT  9. History of colorectal CA   10. [place]    Plan:  Admit to monitored bed  Home medications reviewed  Monitor heart rate, blood pressure, O2 saturation  General diet  O2 nasal cannula and titrate to keep O2 sat greater than 92-96%  Sputum culture and sensitivity  Combivent Respimat-1 puff 4 times daily  Symbicort 80/4.5 inhaler-2 puffs twice daily  Decadron 6 mg IV push daily  Vitamin C 1 g p.o. daily, zinc 50 mg p.o. daily, vitamin B1 100 mg p.o. daily, vitamin B6 50 mg daily, vitamin D 2000 units p.o. daily  Incentive spirometry every 2 hours while awake  Consult pharmacy for COVID protocol  Urine for streptococcal Legionella antigen  Urine culture  Lovenox 30 mg subcu twice daily  C-reactive protein, D-dimer every other day  Procalcitonin now  Rocephin 1 g IV piggyback daily  Azithromycin 500 mg p.o. daily x5 days  Consult cardiology    CMP, CBC in a.m.     Shari Mesa DO, D.O.  10/18/2022  3:27 PM

## 2022-10-18 NOTE — ED NOTES
Attempted to wean patient off O2 since she does not wear any at home. Patient SpO2 was at 82% while on room air. Patient placed on 4L O2. SpO2 came up to 92%. Dr. Mickey Mishra notified.       Ninfa Lazo RN  10/18/22 3422

## 2022-10-18 NOTE — ED PROVIDER NOTES
Patient with history of about 2 weeks of generalized fatigue. She does suffer from chronic bouts of nausea and has had gout as well recently. No fever, chills, shortness of breath, chest pain, vomiting or diarrhea. Fatigue  Severity:  Mild  Onset quality:  Gradual  Duration:  2 weeks  Timing:  Constant  Progression:  Waxing and waning  Chronicity:  New  Relieved by:  None tried  Worsened by:  Nothing  Ineffective treatments:  None tried  Associated symptoms: nausea    Associated symptoms: no abdominal pain, no anorexia, no arthralgias, no chest pain, no cough, no diarrhea, no dizziness, no dysuria, no numbness in extremities, no fever, no frequency, no headaches, no melena, no sensory-motor deficit, no shortness of breath and no vomiting       Review of Systems   Constitutional:  Positive for fatigue. Negative for chills and fever. HENT:  Negative for sinus pressure and sore throat. Eyes:  Negative for pain, discharge and redness. Respiratory:  Negative for cough, shortness of breath and wheezing. Cardiovascular:  Negative for chest pain. Gastrointestinal:  Positive for nausea. Negative for abdominal distention, abdominal pain, anorexia, diarrhea, melena and vomiting. Genitourinary:  Negative for dysuria and frequency. Musculoskeletal:  Negative for arthralgias and back pain. Skin:  Negative for rash and wound. Neurological:  Negative for dizziness, weakness and headaches. Hematological:  Negative for adenopathy. All other systems reviewed and are negative. Physical Exam  Vitals and nursing note reviewed. Constitutional:       Appearance: She is well-developed. HENT:      Head: Normocephalic and atraumatic. Comments: Patient is notably hard of hearing. Eyes:      Pupils: Pupils are equal, round, and reactive to light. Cardiovascular:      Rate and Rhythm: Normal rate and regular rhythm. Heart sounds: Normal heart sounds. No murmur heard.   Pulmonary:      Effort: Pulmonary effort is normal. No respiratory distress. Breath sounds: Normal breath sounds. No wheezing or rales. Abdominal:      General: Bowel sounds are normal.      Palpations: Abdomen is soft. Tenderness: There is no abdominal tenderness. There is no guarding or rebound. Musculoskeletal:      Cervical back: Normal range of motion and neck supple. Skin:     General: Skin is warm and dry. Neurological:      Mental Status: She is alert and oriented to person, place, and time. Cranial Nerves: No cranial nerve deficit. Coordination: Coordination normal.        Procedures     MDM     EKG: This EKG is signed and interpreted by me. Rate: 72  Rhythm: Sinus  Interpretation: no acute changes, non-specific EKG, and sinus arrhythmia  Comparison: stable as compared to patient's most recent EKG           --------------------------------------------- PAST HISTORY ---------------------------------------------  Past Medical History:  has a past medical history of Cancer ([facility] [address] Chronic renal disease, stage III ([facility] [address] [[id number]], History of blood transfusion, Hx of blood clots, Ischemic bowel disease ([address] Macular degeneration, MDRO (multiple drug resistant organisms) resistance, PAF (paroxysmal atrial fibrillation) ([address] and Tinnitus. Past Surgical History:  has a past surgical history that includes Cholecystectomy (07); Hand surgery; Hysterectomy; Total abdominal hysterectomy w/ bilateral salpingoophorectomy; Jejunostomy; other surgical history (11/06/2017); Abdomen surgery; Colonoscopy; Dilatation, esophagus; pr lap,surg,colect,tot,w/proctect,w/ileost (N/A, 6/28/2018); Upper gastrointestinal endoscopy (N/A, 7/18/2018); pr lap,surg,colect,tot,w/proctect,w/ileost (N/A, 10/18/2018); and Cystoscopy (N/A, 12/4/2018). Social History:  reports that she has quit smoking. Her smoking use included cigarettes.  She has never used smokeless tobacco. She reports that she does not drink alcohol and does not use drugs. Family History: family history includes Arthritis in her mother; Cancer in her father; Kidney Disease in her father. The patients home medications have been reviewed. Allergies: Aspirin and Tape [adhesive tape]    -------------------------------------------------- RESULTS -------------------------------------------------    LABS:  Results for orders placed or performed during the hospital encounter of 10/18/22   COVID-19, Rapid    Specimen: Nasopharyngeal Swab   Result Value Ref Range    SARS-CoV-2, NAAT DETECTED (A) Not Detected   Rapid influenza A/B antigens    Specimen: Nasopharyngeal   Result Value Ref Range    Influenza A by PCR Not Detected Not Detected    Influenza B by PCR Not Detected Not Detected   Basic metabolic panel   Result Value Ref Range    Sodium 133 132 - 146 mmol/L    Potassium 3.7 3.5 - 5.0 mmol/L    Chloride 98 98 - 107 mmol/L    CO2 22 22 - 29 mmol/L    Anion Gap 13 7 - 16 mmol/L    Glucose 113 (H) 74 - 99 mg/dL    BUN 25 (H) 6 - 23 mg/dL    Creatinine 1.1 (H) 0.5 - 1.0 mg/dL    Est, Glom Filt Rate 49 >=60 mL/min/1.73    Calcium 9.5 8.6 - 10.2 mg/dL   CBC   Result Value Ref Range    WBC 5.5 4.5 - 11.5 E9/L    RBC 4.13 3.50 - 5.50 E12/L    Hemoglobin 12.4 11.5 - 15.5 g/dL    Hematocrit 37.6 34.0 - 48.0 %    MCV 91.0 80.0 - 99.9 fL    MCH 30.0 26.0 - 35.0 pg    MCHC 33.0 32.0 - 34.5 %    RDW 13.8 11.5 - 15.0 fL    Platelets 437 156 - 709 E9/L    MPV 9.8 7.0 - 12.0 fL   Troponin   Result Value Ref Range    Troponin, High Sensitivity 14 (H) 0 - 9 ng/L   EKG 12 Lead   Result Value Ref Range    Ventricular Rate 72 BPM    Atrial Rate 72 BPM    P-R Interval 108 ms    QRS Duration 74 ms    Q-T Interval 374 ms    QTc Calculation (Bazett) 409 ms    P Axis 40 degrees    R Axis 55 degrees    T Axis 68 degrees       RADIOLOGY:  XR CHEST (2 VW)   Final Result   COPD.       Suspect early bibasilar infiltrates, slightly worse on the right.                   ------------------------- NURSING NOTES AND VITALS REVIEWED ---------------------------  Date / Time Roomed:  10/18/2022 12:13 PM  ED Bed Assignment:  15/15    The nursing notes within the ED encounter and vital signs as below have been reviewed. Patient Vitals for the past 24 hrs:   BP Temp Pulse Resp SpO2 Height Weight   10/18/22 1300 (!) 110/48 -- 66 18 94 % -- --   10/18/22 1245 -- -- -- -- (!) 81 % -- --   10/18/22 1219 (!) 125/59 97.5 °F (36.4 °C) 79 18 92 % 5' 7\" (1.702 m) 145 lb (65.8 kg)       Oxygen Saturation Interpretation: Abnormal    ------------------------------------------ PROGRESS NOTES ------------------------------------------  Re-evaluation(s):  Time: 4992  Patients symptoms show no change  Repeat physical examination is not changed    Counseling:  I have spoken with the patient and discussed todays results, in addition to providing specific details for the plan of care and counseling regarding the diagnosis and prognosis. Their questions are answered at this time and they are agreeable with the plan of admission.    --------------------------------- ADDITIONAL PROVIDER NOTES ---------------------------------  Consultations:  Time: 1430. Spoke with Dr. Giovani Lucero. Discussed case. They will admit the patient. This patient's ED course included: a personal history and physicial examination, multiple bedside re-evaluations, IV medications, cardiac monitoring, and continuous pulse oximetry    This patient has remained hemodynamically stable during their ED course. Diagnosis:  1. Acute respiratory failure with hypoxia ([address]    2. COVID-19 virus infection        Disposition:  Patient's disposition: Admit to telemetry  Patient's condition is stable.          Vilma Gomez DO  10/18/22 4903

## 2022-10-19 LAB
ALBUMIN SERPL-MCNC: 3.6 G/DL (ref 3.5–5.2)
ALP BLD-CCNC: 81 U/L (ref 35–104)
ALT SERPL-CCNC: 20 U/L (ref 0–32)
ANION GAP SERPL CALCULATED.3IONS-SCNC: 11 MMOL/L (ref 7–16)
AST SERPL-CCNC: 31 U/L (ref 0–31)
BASOPHILS ABSOLUTE: 0 E9/L (ref 0–0.2)
BASOPHILS RELATIVE PERCENT: 0 % (ref 0–2)
BILIRUB SERPL-MCNC: 0.2 MG/DL (ref 0–1.2)
BUN BLDV-MCNC: 20 MG/DL (ref 6–23)
CALCIUM SERPL-MCNC: 9.1 MG/DL (ref 8.6–10.2)
CHLORIDE BLD-SCNC: 106 MMOL/L (ref 98–107)
CHOLESTEROL, TOTAL: 157 MG/DL (ref 0–199)
CO2: 20 MMOL/L (ref 22–29)
CREAT SERPL-MCNC: 0.9 MG/DL (ref 0.5–1)
EOSINOPHILS ABSOLUTE: 0 E9/L (ref 0.05–0.5)
EOSINOPHILS RELATIVE PERCENT: 0 % (ref 0–6)
GFR SERPL CREATININE-BSD FRML MDRD: >60 ML/MIN/1.73
GLUCOSE BLD-MCNC: 140 MG/DL (ref 74–99)
HCT VFR BLD CALC: 35.5 % (ref 34–48)
HDLC SERPL-MCNC: 62 MG/DL
HEMOGLOBIN: 11.6 G/DL (ref 11.5–15.5)
LDL CHOLESTEROL CALCULATED: 77 MG/DL (ref 0–99)
LYMPHOCYTES ABSOLUTE: 0.08 E9/L (ref 1.5–4)
LYMPHOCYTES RELATIVE PERCENT: 4.3 % (ref 20–42)
MAGNESIUM: 1.8 MG/DL (ref 1.6–2.6)
MCH RBC QN AUTO: 30.1 PG (ref 26–35)
MCHC RBC AUTO-ENTMCNC: 32.7 % (ref 32–34.5)
MCV RBC AUTO: 92.2 FL (ref 80–99.9)
METAMYELOCYTES RELATIVE PERCENT: 1.7 % (ref 0–1)
MONOCYTES ABSOLUTE: 0.06 E9/L (ref 0.1–0.95)
MONOCYTES RELATIVE PERCENT: 2.6 % (ref 2–12)
NEUTROPHILS ABSOLUTE: 1.95 E9/L (ref 1.8–7.3)
NEUTROPHILS RELATIVE PERCENT: 91.3 % (ref 43–80)
PDW BLD-RTO: 13.7 FL (ref 11.5–15)
PHOSPHORUS: 2.9 MG/DL (ref 2.5–4.5)
PLATELET # BLD: 169 E9/L (ref 130–450)
PMV BLD AUTO: 10 FL (ref 7–12)
POTASSIUM SERPL-SCNC: 4.3 MMOL/L (ref 3.5–5)
PROCALCITONIN: 0.17 NG/ML (ref 0–0.08)
RBC # BLD: 3.85 E12/L (ref 3.5–5.5)
SODIUM BLD-SCNC: 137 MMOL/L (ref 132–146)
TOTAL PROTEIN: 6.9 G/DL (ref 6.4–8.3)
TRIGL SERPL-MCNC: 88 MG/DL (ref 0–149)
TSH SERPL DL<=0.05 MIU/L-ACNC: 1.42 UIU/ML (ref 0.27–4.2)
VLDLC SERPL CALC-MCNC: 18 MG/DL
WBC # BLD: 2.1 E9/L (ref 4.5–11.5)

## 2022-10-19 PROCEDURE — 2580000003 HC RX 258: Performed by: INTERNAL MEDICINE

## 2022-10-19 PROCEDURE — 2580000003 HC RX 258

## 2022-10-19 PROCEDURE — 6360000002 HC RX W HCPCS: Performed by: INTERNAL MEDICINE

## 2022-10-19 PROCEDURE — 87449 NOS EACH ORGANISM AG IA: CPT

## 2022-10-19 PROCEDURE — XW033H5 INTRODUCTION OF TOCILIZUMAB INTO PERIPHERAL VEIN, PERCUTANEOUS APPROACH, NEW TECHNOLOGY GROUP 5: ICD-10-PCS | Performed by: INTERNAL MEDICINE

## 2022-10-19 PROCEDURE — 97535 SELF CARE MNGMENT TRAINING: CPT

## 2022-10-19 PROCEDURE — 94640 AIRWAY INHALATION TREATMENT: CPT

## 2022-10-19 PROCEDURE — 6370000000 HC RX 637 (ALT 250 FOR IP): Performed by: INTERNAL MEDICINE

## 2022-10-19 PROCEDURE — 2700000000 HC OXYGEN THERAPY PER DAY

## 2022-10-19 PROCEDURE — 97165 OT EVAL LOW COMPLEX 30 MIN: CPT

## 2022-10-19 PROCEDURE — 85025 COMPLETE CBC W/AUTO DIFF WBC: CPT

## 2022-10-19 PROCEDURE — 93306 TTE W/DOPPLER COMPLETE: CPT

## 2022-10-19 PROCEDURE — 84100 ASSAY OF PHOSPHORUS: CPT

## 2022-10-19 PROCEDURE — 80053 COMPREHEN METABOLIC PANEL: CPT

## 2022-10-19 PROCEDURE — 80061 LIPID PANEL: CPT

## 2022-10-19 PROCEDURE — 36415 COLL VENOUS BLD VENIPUNCTURE: CPT

## 2022-10-19 PROCEDURE — 84443 ASSAY THYROID STIM HORMONE: CPT

## 2022-10-19 PROCEDURE — 1200000000 HC SEMI PRIVATE

## 2022-10-19 PROCEDURE — 97530 THERAPEUTIC ACTIVITIES: CPT

## 2022-10-19 PROCEDURE — 84145 PROCALCITONIN (PCT): CPT

## 2022-10-19 PROCEDURE — 83735 ASSAY OF MAGNESIUM: CPT

## 2022-10-19 RX ORDER — EPINEPHRINE 1 MG/ML
0.3 INJECTION, SOLUTION, CONCENTRATE INTRAVENOUS
Status: ACTIVE | OUTPATIENT
Start: 2022-10-19 | End: 2022-10-20

## 2022-10-19 RX ORDER — DIPHENHYDRAMINE HYDROCHLORIDE 50 MG/ML
25 INJECTION INTRAMUSCULAR; INTRAVENOUS ONCE
Status: COMPLETED | OUTPATIENT
Start: 2022-10-19 | End: 2022-10-19

## 2022-10-19 RX ORDER — ACETAMINOPHEN 325 MG/1
650 TABLET ORAL ONCE
Status: COMPLETED | OUTPATIENT
Start: 2022-10-19 | End: 2022-10-19

## 2022-10-19 RX ORDER — METHYLPREDNISOLONE SODIUM SUCCINATE 125 MG/2ML
125 INJECTION, POWDER, LYOPHILIZED, FOR SOLUTION INTRAMUSCULAR; INTRAVENOUS
Status: DISPENSED | OUTPATIENT
Start: 2022-10-19 | End: 2022-10-20

## 2022-10-19 RX ORDER — SODIUM CHLORIDE 0.9 % (FLUSH) 0.9 %
SYRINGE (ML) INJECTION
Status: COMPLETED
Start: 2022-10-19 | End: 2022-10-19

## 2022-10-19 RX ADMIN — SODIUM CHLORIDE, PRESERVATIVE FREE 10 ML: 5 INJECTION INTRAVENOUS at 00:41

## 2022-10-19 RX ADMIN — ENOXAPARIN SODIUM 30 MG: 100 INJECTION SUBCUTANEOUS at 21:01

## 2022-10-19 RX ADMIN — MULTIPLE VITAMINS W/ MINERALS TAB 1 TABLET: TAB at 08:43

## 2022-10-19 RX ADMIN — IPRATROPIUM BROMIDE AND ALBUTEROL 1 PUFF: 20; 100 SPRAY, METERED RESPIRATORY (INHALATION) at 21:00

## 2022-10-19 RX ADMIN — CEFTRIAXONE SODIUM 1000 MG: 1 INJECTION, POWDER, FOR SOLUTION INTRAMUSCULAR; INTRAVENOUS at 14:21

## 2022-10-19 RX ADMIN — ACETAMINOPHEN 650 MG: 325 TABLET ORAL at 18:28

## 2022-10-19 RX ADMIN — BUDESONIDE AND FORMOTEROL FUMARATE DIHYDRATE 2 PUFF: 80; 4.5 AEROSOL RESPIRATORY (INHALATION) at 21:00

## 2022-10-19 RX ADMIN — BUDESONIDE AND FORMOTEROL FUMARATE DIHYDRATE 2 PUFF: 80; 4.5 AEROSOL RESPIRATORY (INHALATION) at 06:40

## 2022-10-19 RX ADMIN — SODIUM CHLORIDE: 900 INJECTION, SOLUTION INTRAVENOUS at 08:55

## 2022-10-19 RX ADMIN — Medication 2000 UNITS: at 08:42

## 2022-10-19 RX ADMIN — IPRATROPIUM BROMIDE AND ALBUTEROL 1 PUFF: 20; 100 SPRAY, METERED RESPIRATORY (INHALATION) at 06:40

## 2022-10-19 RX ADMIN — PYRIDOXINE HCL TAB 50 MG 50 MG: 50 TAB at 08:43

## 2022-10-19 RX ADMIN — ENOXAPARIN SODIUM 30 MG: 100 INJECTION SUBCUTANEOUS at 08:43

## 2022-10-19 RX ADMIN — IPRATROPIUM BROMIDE AND ALBUTEROL 1 PUFF: 20; 100 SPRAY, METERED RESPIRATORY (INHALATION) at 01:58

## 2022-10-19 RX ADMIN — AZITHROMYCIN MONOHYDRATE 500 MG: 250 TABLET ORAL at 08:43

## 2022-10-19 RX ADMIN — IPRATROPIUM BROMIDE AND ALBUTEROL 1 PUFF: 20; 100 SPRAY, METERED RESPIRATORY (INHALATION) at 13:19

## 2022-10-19 RX ADMIN — TOCILIZUMAB 400 MG: 20 INJECTION, SOLUTION, CONCENTRATE INTRAVENOUS at 20:20

## 2022-10-19 RX ADMIN — PANTOPRAZOLE SODIUM 40 MG: 40 TABLET, DELAYED RELEASE ORAL at 06:04

## 2022-10-19 RX ADMIN — Medication 50 MG: at 08:42

## 2022-10-19 RX ADMIN — Medication 100 MG: at 08:43

## 2022-10-19 RX ADMIN — SERTRALINE 50 MG: 50 TABLET, FILM COATED ORAL at 08:43

## 2022-10-19 RX ADMIN — OXYCODONE HYDROCHLORIDE AND ACETAMINOPHEN 1000 MG: 500 TABLET ORAL at 08:43

## 2022-10-19 RX ADMIN — DIPHENHYDRAMINE HYDROCHLORIDE 25 MG: 50 INJECTION, SOLUTION INTRAMUSCULAR; INTRAVENOUS at 18:30

## 2022-10-19 RX ADMIN — DEXAMETHASONE SODIUM PHOSPHATE 6 MG: 10 INJECTION INTRAMUSCULAR; INTRAVENOUS at 00:40

## 2022-10-19 ASSESSMENT — PAIN SCALES - GENERAL: PAINLEVEL_OUTOF10: 0

## 2022-10-19 ASSESSMENT — PAIN DESCRIPTION - DESCRIPTORS: DESCRIPTORS: OTHER (COMMENT)

## 2022-10-19 ASSESSMENT — PAIN DESCRIPTION - LOCATION: LOCATION: OTHER (COMMENT)

## 2022-10-19 ASSESSMENT — PAIN DESCRIPTION - ORIENTATION: ORIENTATION: OTHER (COMMENT)

## 2022-10-19 NOTE — CONSULTS
Cardiology Consult    The patient is a 78 y.o. female who presents with generalized fatigue over the last 2 weeks. Sx began as cold/flulike 12 days ago. Pt was Rxed with po decadron and Azithromycin. Patient also complains of a mildly scant clear productive cough. She denies any problem with any chest or abdominal pain. Patient denies any history of leg swelling or pain in her calf or thighs. Patient also has some intermittent episodes of nausea but this is somewhat chronic. She denies any fever/chills, unusual shortness of breath at rest,vomiting. Patient has a history of prior tobacco use for about 20 years about a half a pack to-pack of cigarettes a day. Patient is very hard of hearing. Patient has history of colorectal CA along with chronic kidney disease stage IIIa, history of DVT, ischemic bowel, macular degeneration, paroxysmal atrial fib. Patient was seen in the ED and was hypoxic and was O2 saturation on room air at rest and with placed on O2. Patient tested positive for COVID and 2 view chest x-ray showed suspected early bibasilar infiltrates slightly worse on the right. BUN/creatinine is 25/1.1 with normal electrolytes WBC 5.5 and hemoglobin 12.4. Urinalysis showed large leukocyte esterase and greater than 20 WBCs.      Past Medical History:    Past Medical History[]Expand by Default        Past Medical History:   Diagnosis Date    Cancer [facility])       colorectal     Chronic renal disease, stage III [facility]) [[id number]] 6/27/2022    History of blood transfusion      Hx of blood clots       dvt, on eliquis    Ischemic bowel disease ([facility] [address]      Macular degeneration      MDRO (multiple drug resistant organisms) resistance      PAF (paroxysmal atrial fibrillation) (HCC)      Tinnitus           Past Surgical History:    Past Surgical History[]Expand by Default         Past Surgical History:   Procedure Laterality Date    ABDOMEN SURGERY         colostomy & reversal    CHOLECYSTECTOMY   07    COLONOSCOPY CYSTOSCOPY N/A 12/4/2018     CYSTOSCOPY RETROGRADE PYELOGRAM performed by Marvin Torres MD at [address] [facility], ESOPHAGUS        HAND SURGERY         carpel tunnel rigth hand    HYSTERECTOMY (CERVIX STATUS UNKNOWN)        ILEOSTOMY OR JEJUNOSTOMY        OTHER SURGICAL HISTORY   11/06/2017     diagnostic laparoscopy with exploratory laparotomy and reduction of internal hernia and closure of mesenteric defect, oversewing of multiple serosal tears    IA LAP,SURG,COLECT,TOT,W/PROCTECT,W/ILEOST N/A 6/28/2018     LAPAROSCOPIC DIVERTING COLOSTOMY performed by Tanesha Jensen MD at [address] [facility] LAP,SURG,COLECT,TOT,W/PROCTECT,W/ILEOST N/A 10/18/2018     LAPAROSCOPIC REVISION LOOP COLOSTOMY, EXTENSIVE LYSIS OF ADHESIONS, DRAINAGE PELVIC ABSCESS, END COLOSTOMY performed by Nanci Lora MD at [address] [place], [place] [address]way [facility] (CERVIX REMOVED)        UPPER GASTROINTESTINAL ENDOSCOPY N/A 7/18/2018     EGD BIOPSY performed by Sterling Aragon DO at [place] [address]            Medications Prior to Admission:    @  Home Medications[]Expand by Default           Prior to Admission medications    Medication Sig Start Date End Date Taking?  Authorizing Provider   Multiple Vitamins-Minerals (THERAPEUTIC MULTIVITAMIN-MINERALS) tablet Take 1 tablet by mouth daily       Historical Provider, MD   omeprazole (PRILOSEC) 20 MG delayed release capsule Take 20 mg by mouth daily       Historical Provider, MD   acetaminophen (TYLENOL) 325 MG tablet Take 2 tablets by mouth every 4 hours as needed for Pain or Fever 12/26/17     Sterling Aragon DO   sertraline (ZOLOFT) 50 MG tablet Take 50 mg by mouth daily       Historical Provider, MD            Allergies:  Aspirin and Tape Minus Kai tape]     Social History:   Social History   []Expand by [employer]            Socioeconomic History    Marital status:        Spouse name: Not on file    Number of children: Not on file    Years of education: Not on file    Highest education level: Not on file   Occupational History    Not on file   Tobacco Use    Smoking status: Former       Years: 20.00       Types: Cigarettes    Smokeless tobacco: Never    Tobacco comments:       quit at age 44    Substance and Sexual Activity    Alcohol use: No       Comment: socially    Drug use: No    Sexual activity: Not on file   Other Topics Concern    Not on file   Social History Narrative     Lives in [facility] with  (Keyur Nelson / Bed). Worked in an office. Social Determinants of Health          Financial Resource Strain: Low Risk     Difficulty of Paying Living Expenses: Not hard at all   Food Insecurity: No Food Insecurity    Worried About Running Out of Food in the Last Year: Never true    Ran Out of Food in the Last Year: Never true   Transportation Needs: Not on file   Physical Activity: Not on file   Stress: Not on file   Social Connections: Not on file   Intimate Partner Violence: Not on file   Housing Stability: Not on file            Family History:   Family History[]Expand by Default         Family History   Problem Relation Age of Onset    Arthritis Mother      Kidney Disease Father      Cancer Father           kidney cancer            REVIEW OF SYSTEMS:     Gen: + Generalized fatigue. Patient denies any lightheadedness or dizziness. No LOC or syncope. No fevers or chills. HEENT: No earache, sore throat or nasal congestion. Resp: Denies cough, hemoptysis or sputum production. Cardiac: Denies chest pain, SOB, diaphoresis or palpitations. GI: No nausea, vomiting, diarrhea or constipation. No melena or hematochezia. : No urinary complaints, dysuria, hematuria or frequency. MSK: No extremity weakness, paralysis or paresthesias.       PHYSICAL EXAM:     Vitals:  BP (!) 99/48   Pulse 62   Temp 99.1 °F (37.3 °C) (Oral)   Resp 16   Ht 5' 7\" (1.702 m)   Wt 145 lb (65.8 kg)   SpO2 100%   BMI 22.71 kg/m²      General:  This is a 78 y.o. yo female who is alert and oriented in mild distress secondary to above  HEENT:  Head is normocephalic and atraumatic, PERRLA, EOMI, mucus membranes moist with no pharyngeal erythema or exudate. Neck:  Supple with no carotid bruits, JVD or thyromegaly.   No cervical adenopathy  CV:  Regular rate and rhythm, no murmurs  Lungs: Coarse decreased breath sounds to auscultation bilaterally with with scattered rails right greater than left and mild rhonchi in the right, no wheezes  Abdomen:  Soft, nontender, nondistended, bowel sounds present  Extremities:  No edema, peripheral pulses intact bilaterally  Neuro:  Cranial nerves II-XII grossly intact; motor and sensory function intact with no focal deficits  Skin:  No rashes, lesions or wounds     DATA:  CBC with Differential:          Lab Results   Component Value Date/Time     WBC 5.5 10/18/2022 12:27 PM     RBC 4.13 10/18/2022 12:27 PM     HGB 12.4 10/18/2022 12:27 PM     HCT 37.6 10/18/2022 12:27 PM      10/18/2022 12:27 PM     MCV 91.0 10/18/2022 12:27 PM     MCH 30.0 10/18/2022 12:27 PM     MCHC 33.0 10/18/2022 12:27 PM     RDW 13.8 10/18/2022 12:27 PM     NRBC 0.5 11/30/2017 06:40 AM     BLASTSPCT 1.0 11/06/2017 11:35 PM     METASPCT 0.9 10/18/2018 05:38 AM     LYMPHOPCT 13.8 12/20/2021 12:50 PM     PROMYELOPCT 0.5 11/26/2017 05:30 AM     MONOPCT 9.1 12/20/2021 12:50 PM     MYELOPCT 1.7 10/18/2018 05:38 AM     BASOPCT 0.2 12/20/2021 12:50 PM     MONOSABS 0.47 12/20/2021 12:50 PM     LYMPHSABS 0.71 12/20/2021 12:50 PM     EOSABS 0.02 12/20/2021 12:50 PM     BASOSABS 0.01 12/20/2021 12:50 PM      CMP:          Lab Results   Component Value Date/Time      10/18/2022 12:27 PM     K 3.7 10/18/2022 12:27 PM     K 4.9 07/16/2021 06:15 AM     CL 98 10/18/2022 12:27 PM     CO2 22 10/18/2022 12:27 PM     BUN 25 10/18/2022 12:27 PM     CREATININE 1.1 10/18/2022 12:27 PM     GFRAA 57 12/20/2021 12:50 PM     LABGLOM 49 10/18/2022 12:27 PM     GLUCOSE 113 10/18/2022 12:27 PM     PROT 7.7 12/20/2021 12:50 PM LABALBU 4.5 12/20/2021 12:50 PM     CALCIUM 9.5 10/18/2022 12:27 PM     BILITOT 0.5 12/20/2021 12:50 PM     ALKPHOS 81 12/20/2021 12:50 PM     AST 15 12/20/2021 12:50 PM     ALT 7 12/20/2021 12:50 PM      Magnesium:          Lab Results   Component Value Date/Time     MG 2.0 12/20/2021 12:50 PM      Phosphorus:          Lab Results   Component Value Date/Time     PHOS 4.2 12/01/2018 03:50 AM      PT/INR:          Lab Results   Component Value Date/Time     PROTIME 11.6 12/03/2018 02:50 PM     INR 1.0 12/03/2018 02:50 PM      Troponin:          Lab Results   Component Value Date/Time     TROPONINI <0.01 10/16/2018 11:12 PM      U/A:          Lab Results   Component Value Date/Time     COLORU Yellow 10/18/2022 02:22 PM     PROTEINU 30 10/18/2022 02:22 PM     PHUR 6.0 10/18/2022 02:22 PM     WBCUA >20 10/18/2022 02:22 PM     RBCUA >20 10/18/2022 02:22 PM     BACTERIA MODERATE 10/18/2022 02:22 PM     CLARITYU CLOUDY 10/18/2022 02:22 PM     SPECGRAV 1.020 10/18/2022 02:22 PM     LEUKOCYTESUR LARGE 10/18/2022 02:22 PM     UROBILINOGEN 0.2 10/18/2022 02:22 PM     BILIRUBINUR SMALL 10/18/2022 02:22 PM     BLOODU LARGE 10/18/2022 02:22 PM     GLUCOSEU Negative 10/18/2022 02:22 PM     AMORPHOUS FEW 11/23/2018 04:20 AM      ABG:          Lab Results   Component Value Date/Time     PH 7.514 10/20/2018 08:04 PM     PCO2 28.5 10/20/2018 08:04 PM     PO2 67.6 10/20/2018 08:04 PM     HCO3 22.4 10/20/2018 08:04 PM     BE 0.2 10/20/2018 08:04 PM     O2SAT 94.4 10/20/2018 08:04 PM      HgBA1c:  No results found for: LABA1C  FLP:          Lab Results   Component Value Date/Time     TRIG 130 07/17/2021 05:07 AM     HDL 46 07/17/2021 05:07 AM     LDLCALC 73 07/17/2021 05:07 AM     LABVLDL 26 07/17/2021 05:07 AM      TSH:          Lab Results   Component Value Date/Time     TSH 3.430 07/17/2021 05:07 AM      IRON:          Lab Results   Component Value Date/Time     IRON 44 06/28/2018 06:00 AM      LIPASE:          Lab Results   Component Value Date/Time     LIPASE 34 12/20/2021 12:50 PM              Patient Active Problem List     Diagnosis Date Noted    Acute respiratory failure with hypoxia ([facility] [address] 10/18/2022    Chronic renal disease, stage III [facility]) [[id number]] 06/27/2022    SBO (small bowel obstruction) ([address] 07/16/2021    PAF (paroxysmal atrial fibrillation) (HCC)      Ischemic bowel disease ([address]      Hx of blood clots      Perforation of sigmoid colon ([address] 10/17/2018    Altered mental status 06/25/2018    Fatigue due to excessive exertion 06/25/2018    Macular degeneration      Cancer ([address]      Enteritis 12/21/2017    Severe protein-calorie malnutrition ([address] 12/21/2017    Pneumatosis intestinalis      Rectal cancer ([facility] [address] 11/17/2016      Imp/Plan:    Acute hypoxic respiratory failure  Acute/subacute COVID-19 pneumonia-began 10/6/22  History of paroxysmal atrial fib-currently sinus rhythm  No evidence AMI/ACS  History of macular degeneration  History of perforated bowel- ischemic bowel;   Pneumatosis intestinalis  Generalized weakness and fatigue  History of left femoral DVT  History of colorectal CA  [place]       Monitor closely  Oxygen  Tocilizumab IV preferred over baricitinib po immune modulator given nausea and ischemic bowel issues  Echocardiogram  IV dexamethasone  Vitamin C 1 g p.o. daily, zinc 50 mg p.o. daily, vitamin B1 100 mg p.o. daily, vitamin B6 50 mg daily, vitamin D 2000 units p.o. daily  Incentive spirometry every 2 hours while awake  Consult pharmacy for COVID protocol  Urine for streptococcal Legionella antigen  Urine culture  Lovenox 30 mg subcu twice daily  C-reactive protein, D-dimer every other day  Procalcitonin now  Rocephin 1 g IV piggyback daily  Azithromycin 500 mg p.o. daily x5 days

## 2022-10-19 NOTE — PLAN OF CARE
Problem: Safety - Adult  Goal: Free from fall injury  Outcome: Progressing  Flowsheets (Taken 10/19/2022 0855 by Shanti Frausto RN)  Free From Fall Injury: Instruct family/caregiver on patient safety     Problem: ABCDS Injury Assessment  Goal: Absence of physical injury  Outcome: Progressing  Flowsheets (Taken 10/19/2022 0855 by Shanti Frausto RN)  Absence of Physical Injury: Implement safety measures based on patient assessment     Problem: Skin/Tissue Integrity  Goal: Absence of new skin breakdown  Description: 1. Monitor for areas of redness and/or skin breakdown  2. Assess vascular access sites hourly  3. Every 4-6 hours minimum:  Change oxygen saturation probe site  4. Every 4-6 hours:  If on nasal continuous positive airway pressure, respiratory therapy assess nares and determine need for appliance change or resting period.   Outcome: Progressing

## 2022-10-19 NOTE — PROGRESS NOTES
Subjective: The patient is awake and alert. No problems overnight. Denies chest pain, angina, and dyspnea. Denies abdominal pain. Tolerating diet. No nausea or vomiting.     Current Facility-Administered Medications: tocilizumab (ACTEMRA) 486 mg in sodium chloride 0.9 % 100 mL IVPB, 486 mg, IntraVENous, Once  acetaminophen (TYLENOL) tablet 650 mg, 650 mg, Oral, Once **AND** diphenhydrAMINE (BENADRYL) injection 25 mg, 25 mg, IntraVENous, Once  methylPREDNISolone sodium (SOLU-MEDROL) injection 125 mg, 125 mg, IntraVENous, Once PRN  EPINEPHrine PF 1 MG/ML injection (Anaphylaxis) 0.3 mg, 0.3 mg, IntraMUSCular, Once PRN  sertraline (ZOLOFT) tablet 50 mg, 50 mg, Oral, Daily  acetaminophen (TYLENOL) tablet 650 mg, 650 mg, Oral, Q4H PRN  therapeutic multivitamin-minerals 1 tablet, 1 tablet, Oral, Daily  pantoprazole (PROTONIX) tablet 40 mg, 40 mg, Oral, QAM AC  ascorbic acid (VITAMIN C) tablet 1,000 mg, 1,000 mg, Oral, Daily  vitamin B-6 (PYRIDOXINE) tablet 50 mg, 50 mg, Oral, Daily  albuterol-ipratropium (COMBIVENT RESPIMAT)  MCG/ACT inhaler 1 puff, 1 puff, Inhalation, Q6H  budesonide-formoterol (SYMBICORT) 80-4.5 MCG/ACT inhaler 2 puff, 2 puff, Inhalation, BID  thiamine mononitrate tablet 100 mg, 100 mg, Oral, Daily  prochlorperazine (COMPAZINE) injection 5 mg, 5 mg, IntraVENous, Q6H PRN  polyethylene glycol (GLYCOLAX) packet 17 g, 17 g, Oral, Daily PRN  dexamethasone (DECADRON) injection 6 mg, 6 mg, IntraVENous, Q24H  zinc gluconate tablet 50 mg, 50 mg, Oral, Daily  cefTRIAXone (ROCEPHIN) 1,000 mg in sterile water 10 mL IV syringe, 1,000 mg, IntraVENous, Q24H  azithromycin (ZITHROMAX) tablet 500 mg, 500 mg, Oral, Daily  enoxaparin Sodium (LOVENOX) injection 30 mg, 30 mg, SubCUTAneous, BID  0.9 % sodium chloride infusion, , IntraVENous, Continuous  Vitamin D (CHOLECALCIFEROL) tablet 2,000 Units, 2,000 Units, Oral, Daily  perflutren lipid microspheres (DEFINITY) injection 1.65 mg, 1.5 mL, IntraVENous, ONCE PRN    Objective:    BP (!) 123/59   Pulse 51   Temp 97.7 °F (36.5 °C) (Oral)   Resp 16   Ht 5' 7\" (1.702 m)   Wt 145 lb (65.8 kg)   SpO2 97%   BMI 22.71 kg/m²   In: -   Out: 900    In: -   Out: 900 [Urine:900]   RRR, no murmurs, no gallops, or rubs.   CTA bilaterally, no wheeze, rales or rhonchi  bowel sounds present, nontender, nondistended, no masses  No clubbing, cyanosis, or edema  No neuro changes     CBC with Differential:    Lab Results   Component Value Date/Time    WBC 2.1 10/19/2022 08:11 AM    RBC 3.85 10/19/2022 08:11 AM    HGB 11.6 10/19/2022 08:11 AM    HCT 35.5 10/19/2022 08:11 AM     10/19/2022 08:11 AM    MCV 92.2 10/19/2022 08:11 AM    MCH 30.1 10/19/2022 08:11 AM    MCHC 32.7 10/19/2022 08:11 AM    RDW 13.7 10/19/2022 08:11 AM    NRBC 0.5 11/30/2017 06:40 AM    BLASTSPCT 1.0 11/06/2017 11:35 PM    METASPCT 1.7 10/19/2022 08:11 AM    LYMPHOPCT 4.3 10/19/2022 08:11 AM    PROMYELOPCT 0.5 11/26/2017 05:30 AM    MONOPCT 2.6 10/19/2022 08:11 AM    MYELOPCT 1.7 10/18/2018 05:38 AM    BASOPCT 0.0 10/19/2022 08:11 AM    MONOSABS 0.06 10/19/2022 08:11 AM    LYMPHSABS 0.08 10/19/2022 08:11 AM    EOSABS 0.00 10/19/2022 08:11 AM    BASOSABS 0.00 10/19/2022 08:11 AM     CMP:    Lab Results   Component Value Date/Time     10/19/2022 08:11 AM    K 4.3 10/19/2022 08:11 AM    K 4.9 07/16/2021 06:15 AM     10/19/2022 08:11 AM    CO2 20 10/19/2022 08:11 AM    BUN 20 10/19/2022 08:11 AM    CREATININE 0.9 10/19/2022 08:11 AM    GFRAA 57 12/20/2021 12:50 PM    LABGLOM >60 10/19/2022 08:11 AM    GLUCOSE 140 10/19/2022 08:11 AM    PROT 6.9 10/19/2022 08:11 AM    LABALBU 3.6 10/19/2022 08:11 AM    CALCIUM 9.1 10/19/2022 08:11 AM    BILITOT 0.2 10/19/2022 08:11 AM    ALKPHOS 81 10/19/2022 08:11 AM    AST 31 10/19/2022 08:11 AM    ALT 20 10/19/2022 08:11 AM     BMP:    Lab Results   Component Value Date/Time     10/19/2022 08:11 AM    K 4.3 10/19/2022 08:11 AM    K 4.9 07/16/2021 06:15 AM  10/19/2022 08:11 AM    CO2 20 10/19/2022 08:11 AM    BUN 20 10/19/2022 08:11 AM    LABALBU 3.6 10/19/2022 08:11 AM    CREATININE 0.9 10/19/2022 08:11 AM    CALCIUM 9.1 10/19/2022 08:11 AM    GFRAA 57 12/20/2021 12:50 PM    LABGLOM >60 10/19/2022 08:11 AM    GLUCOSE 140 10/19/2022 08:11 AM     Hepatic Function Panel:    Lab Results   Component Value Date/Time    ALKPHOS 81 10/19/2022 08:11 AM    ALT 20 10/19/2022 08:11 AM    AST 31 10/19/2022 08:11 AM    PROT 6.9 10/19/2022 08:11 AM    BILITOT 0.2 10/19/2022 08:11 AM    BILIDIR 0.4 11/12/2017 10:08 AM    IBILI 0.3 11/12/2017 10:08 AM    LABALBU 3.6 10/19/2022 08:11 AM     Albumin:    Lab Results   Component Value Date/Time    LABALBU 3.6 10/19/2022 08:11 AM     Last 3 Troponin:    Lab Results   Component Value Date/Time    TROPONINI <0.01 10/16/2018 11:12 PM    TROPONINI 0.01 12/21/2017 02:40 AM    TROPONINI 0.02 12/19/2017 10:09 AM     FLP:    Lab Results   Component Value Date/Time    TRIG 88 10/19/2022 08:11 AM    HDL 62 10/19/2022 08:11 AM    LDLCALC 77 10/19/2022 08:11 AM    LABVLDL 18 10/19/2022 08:11 AM     TSH:    Lab Results   Component Value Date/Time    TSH 1.420 10/19/2022 08:11 AM          Patient Active Problem List   Diagnosis    Rectal cancer ([facility] [address]    Pneumatosis intestinalis    Enteritis    Severe protein-calorie malnutrition (HCC)    Altered mental status    Fatigue due to excessive exertion    Macular degeneration    Cancer ([facility] [address]    Perforation of sigmoid colon (HCC)    PAF (paroxysmal atrial fibrillation) ([facility] [address]    Ischemic bowel disease (HCC)    Hx of blood clots    SBO (small bowel obstruction) (HCC)    Chronic renal disease, stage III ([facility] [address] [[id number]]    Acute respiratory failure with hypoxia (HCC)     Imp/Plan:     Acute hypoxic respiratory failure  Acute/subacute COVID-19 pneumonia-began 10/6/22  History of paroxysmal atrial fib-currently sinus rhythm  No evidence AMI/ACS  History of macular degeneration  History of perforated bowel- ischemic bowel;   Pneumatosis intestinalis  Generalized weakness and fatigue  History of left femoral DVT  History of colorectal CA  [place]        Monitor closely  Oxygen  Tocilizumab IV preferred over baricitinib po immune modulator given nausea and ischemic bowel issues  Echocardiogram  IV dexamethasone  Vitamin C 1 g p.o. daily, zinc 50 mg p.o. daily, vitamin B1 100 mg p.o. daily, vitamin B6 50 mg daily, vitamin D 2000 units p.o. daily  Incentive spirometry every 2 hours while awake  Consult pharmacy for COVID protocol  Urine for streptococcal Legionella antigen  Urine culture  Lovenox 30 mg subcu twice daily  C-reactive protein, D-dimer every other day  Procalcitonin now  Rocephin 1 g IV piggyback daily  Azithromycin 500 mg p.o. daily x5 days

## 2022-10-19 NOTE — ACP (ADVANCE CARE PLANNING)
Advance Care Planning     Advance Care Planning Activator (Inpatient)  Conversation Note      Date of ACP Conversation: 10/19/2022     Conversation Conducted with: Patient with Decision Making Capacity    ACP Activator: Rosario David, [address] [facility] Decision Maker:     Current Designated Health Care Decision Maker:     Primary Decision Maker: Yogesh Winchester Spouse - [phone]    Primary Decision Maker: Ksenia Rodrigues - Child - [phone]    Secondary Decision Maker: Bonnie Deng - Other - [phone]  Click here to complete Healthcare Decision Makers including section of the Healthcare Decision Maker Relationship (ie \"Primary\")      Care Preferences    Ventilation: \"If you were in your present state of health and suddenly became very ill and were unable to breathe on your own, what would your preference be about the use of a ventilator (breathing machine) if it were available to you? \"      Would the patient desire the use of ventilator (breathing machine)?:  \"I don't know\"    \"If your health worsens and it becomes clear that your chance of recovery is unlikely, what would your preference be about the use of a ventilator (breathing machine) if it were available to you? \"     Would the patient desire the use of ventilator (breathing machine)?: No      Resuscitation  \"CPR works best to restart the heart when there is a sudden event, like a heart attack, in someone who is otherwise healthy. Unfortunately, CPR does not typically restart the heart for people who have serious health conditions or who are very sick. \"    \"In the event your heart stopped as a result of an underlying serious health condition, would you want attempts to be made to restart your heart (answer \"yes\" for attempt to resuscitate) or would you prefer a natural death (answer \"no\" for do not attempt to resuscitate)? \"  \"I don't know\"       [] Yes   [x] No   Educated Patient / Rolla Apgar regarding differences between Advance Directives and portable DNR orders.     Length of ACP Conversation in minutes:      Conversation Outcomes:  [x] ACP discussion completed  [] Existing advance directive reviewed with patient; no changes to patient's previously recorded wishes  [] New Advance Directive completed  [] Portable Do Not Rescitate prepared for Provider review and signature  [] POLST/POST/MOLST/MOST prepared for Provider review and signature      Follow-up plan:    [] Schedule follow-up conversation to continue planning  [x] Referred individual to Provider for additional questions/concerns   [] Advised patient/agent/surrogate to review completed ACP document and update if needed with changes in condition, patient preferences or care setting    [x] This note routed to one or more involved healthcare providers

## 2022-10-19 NOTE — PROGRESS NOTES
Department of Internal Medicine        CHIEF COMPLAINT: Generalized fatigue    Reason for Admission: Acute hypoxic respiratory failure, COVID-19 infection    HISTORY OF PRESENT ILLNESS:      The patient is a 78 y.o. female who presents with generalized fatigue over the last 2 weeks. Patient also complains of a mildly scant clear productive cough. She denies any problem with any chest or abdominal pain. Patient denies any history of leg swelling or pain in her calf or thighs. Patient also has some intermittent episodes of nausea but this is somewhat chronic. She denies any fever/chills, unusual shortness of breath at rest,vomiting. Patient has a history of prior tobacco use for about 20 years about a half a pack to-pack of cigarettes a day. Patient is very hard of hearing. Patient has history of colorectal CA along with chronic kidney disease stage IIIa, history of DVT, ischemic bowel, macular degeneration, paroxysmal atrial fib. Patient was seen in the ED and was hypoxic and was O2 saturation on room air at rest and with placed on O2. Patient tested positive for COVID and 2 view chest x-ray showed suspected early bibasilar infiltrates slightly worse on the right. BUN/creatinine is 25/1.1 with normal electrolytes WBC 5.5 and hemoglobin 12.4. Urinalysis showed large leukocyte esterase and greater than 20 WBCs. 10/19/2022  Patient was seen examined on telemetry floor. Patient looks a little bit better today. Patient denies any problem chest pain, abdominal pain, nausea vomiting or fever and chills. Patient has an occasional nonproductive cough. CT of the chest showed patchy peripheral predominant groundglass opacities in both lungs with moderate pulmonary emphysema with evidence of prior granulomatous disease. Left hydronephrosis is incompletely imaged but has a history of chronic left hydronephrosis. BUN/creatinine was 29/0.9 with procalcitonin 0.17. CRP yesterday was 9.1.   Liver enzymes normal with a WBC of 2.1 hemoglobin 11.6. D-dimer 596 yesterday. Her temperature is 97.7 with heart rate sinus bradycardia with a rate of 51 with a blood pressure 123/59. O2 sat 97% on 4 L nasal cannula. Past Medical History:    Past Medical History:   Diagnosis Date    Cancer [facility])     colorectal     Chronic renal disease, stage III [facility]) [[id number]] 6/27/2022    History of blood transfusion     Hx of blood clots     dvt, on eliquis    Ischemic bowel disease ([address]     Macular degeneration     MDRO (multiple drug resistant organisms) resistance     PAF (paroxysmal atrial fibrillation) (HCC)     Tinnitus      Past Surgical History:    Past Surgical History:   Procedure Laterality Date    ABDOMEN SURGERY      colostomy & reversal    CHOLECYSTECTOMY  07    COLONOSCOPY      CYSTOSCOPY N/A 12/4/2018    CYSTOSCOPY RETROGRADE PYELOGRAM performed by Ollie Garcia MD at [address] [place] [address], ESOPHAGUS      HAND SURGERY      carpel tunnel rigth hand    HYSTERECTOMY (CERVIX STATUS UNKNOWN)      ILEOSTOMY OR JEJUNOSTOMY      OTHER SURGICAL HISTORY  11/06/2017    diagnostic laparoscopy with exploratory laparotomy and reduction of internal hernia and closure of mesenteric defect, oversewing of multiple serosal tears    CO LAP,SURG,COLECT,TOT,W/PROCTECT,W/ILEOST N/A 6/28/2018    LAPAROSCOPIC DIVERTING COLOSTOMY performed by Jamaal Spencer MD at [address] [place] [address] LAP,SURG,COLECT,TOT,W/PROCTECT,W/ILEOST N/A 10/18/2018    LAPAROSCOPIC REVISION LOOP COLOSTOMY, EXTENSIVE LYSIS OF ADHESIONS, DRAINAGE PELVIC ABSCESS, END COLOSTOMY performed by Lavera Baumgarten, MD at [address] [place] [address] 119 (CERVIX REMOVED)      UPPER GASTROINTESTINAL ENDOSCOPY N/A 7/18/2018    EGD BIOPSY performed by Jordi Rutherford DO at [facility]       Medications Prior to Admission:    @  Prior to Admission medications    Medication Sig Start Date End Date Taking?  Authorizing Provider   Multiple Vitamins-Minerals (THERAPEUTIC MULTIVITAMIN-MINERALS) tablet Take Resp: Denies cough, hemoptysis or sputum production. Cardiac: Denies chest pain, SOB, diaphoresis or palpitations. GI: No nausea, vomiting, diarrhea or constipation. No melena or hematochezia. : No urinary complaints, dysuria, hematuria or frequency. MSK: No extremity weakness, paralysis or paresthesias. PHYSICAL EXAM:    Vitals:  BP (!) 123/59   Pulse 51   Temp 97.7 °F (36.5 °C) (Oral)   Resp 16   Ht 5' 7\" (1.702 m)   Wt 145 lb (65.8 kg)   SpO2 97%   BMI 22.71 kg/m²     General:  This is a 78 y.o. yo female who is alert and oriented in mild distress secondary to above  HEENT:  Head is normocephalic and atraumatic, PERRLA, EOMI, mucus membranes moist with no pharyngeal erythema or exudate. Neck:  Supple with no carotid bruits, JVD or thyromegaly.   No cervical adenopathy  CV:  Regular rate and rhythm, no murmurs  Lungs: Coarse decreased breath sounds to auscultation bilaterally with with scattered rails right greater than left and mild rhonchi in the right, no wheezes  Abdomen:  Soft, nontender, nondistended, bowel sounds present  Extremities:  No edema, peripheral pulses intact bilaterally  Neuro:  Cranial nerves II-XII grossly intact; motor and sensory function intact with no focal deficits  Skin:  No rashes, lesions or wounds    DATA:  CBC with Differential:    Lab Results   Component Value Date/Time    WBC 2.1 10/19/2022 08:11 AM    RBC 3.85 10/19/2022 08:11 AM    HGB 11.6 10/19/2022 08:11 AM    HCT 35.5 10/19/2022 08:11 AM     10/19/2022 08:11 AM    MCV 92.2 10/19/2022 08:11 AM    MCH 30.1 10/19/2022 08:11 AM    MCHC 32.7 10/19/2022 08:11 AM    RDW 13.7 10/19/2022 08:11 AM    NRBC 0.5 11/30/2017 06:40 AM    BLASTSPCT 1.0 11/06/2017 11:35 PM    METASPCT 1.7 10/19/2022 08:11 AM    LYMPHOPCT 4.3 10/19/2022 08:11 AM    PROMYELOPCT 0.5 11/26/2017 05:30 AM    MONOPCT 2.6 10/19/2022 08:11 AM    MYELOPCT 1.7 10/18/2018 05:38 AM    BASOPCT 0.0 10/19/2022 08:11 AM    MONOSABS 0.06 10/19/2022 08:11 AM    LYMPHSABS 0.08 10/19/2022 08:11 AM    EOSABS 0.00 10/19/2022 08:11 AM    BASOSABS 0.00 10/19/2022 08:11 AM     CMP:    Lab Results   Component Value Date/Time     10/19/2022 08:11 AM    K 4.3 10/19/2022 08:11 AM    K 4.9 07/16/2021 06:15 AM     10/19/2022 08:11 AM    CO2 20 10/19/2022 08:11 AM    BUN 20 10/19/2022 08:11 AM    CREATININE 0.9 10/19/2022 08:11 AM    GFRAA 57 12/20/2021 12:50 PM    LABGLOM >60 10/19/2022 08:11 AM    GLUCOSE 140 10/19/2022 08:11 AM    PROT 6.9 10/19/2022 08:11 AM    LABALBU 3.6 10/19/2022 08:11 AM    CALCIUM 9.1 10/19/2022 08:11 AM    BILITOT 0.2 10/19/2022 08:11 AM    ALKPHOS 81 10/19/2022 08:11 AM    AST 31 10/19/2022 08:11 AM    ALT 20 10/19/2022 08:11 AM     Magnesium:    Lab Results   Component Value Date/Time    MG 1.8 10/19/2022 08:11 AM     Phosphorus:    Lab Results   Component Value Date/Time    PHOS 2.9 10/19/2022 08:11 AM     PT/INR:    Lab Results   Component Value Date/Time    PROTIME 11.6 12/03/2018 02:50 PM    INR 1.0 12/03/2018 02:50 PM     Troponin:    Lab Results   Component Value Date/Time    TROPONINI <0.01 10/16/2018 11:12 PM     U/A:    Lab Results   Component Value Date/Time    COLORU Yellow 10/18/2022 02:22 PM    PROTEINU 30 10/18/2022 02:22 PM    PHUR 6.0 10/18/2022 02:22 PM    WBCUA >20 10/18/2022 02:22 PM    RBCUA >20 10/18/2022 02:22 PM    BACTERIA MODERATE 10/18/2022 02:22 PM    CLARITYU CLOUDY 10/18/2022 02:22 PM    SPECGRAV 1.020 10/18/2022 02:22 PM    LEUKOCYTESUR LARGE 10/18/2022 02:22 PM    UROBILINOGEN 0.2 10/18/2022 02:22 PM    BILIRUBINUR SMALL 10/18/2022 02:22 PM    BLOODU LARGE 10/18/2022 02:22 PM    GLUCOSEU Negative 10/18/2022 02:22 PM    AMORPHOUS FEW 11/23/2018 04:20 AM     ABG:    Lab Results   Component Value Date/Time    PH 7.514 10/20/2018 08:04 PM    PCO2 28.5 10/20/2018 08:04 PM    PO2 67.6 10/20/2018 08:04 PM    HCO3 22.4 10/20/2018 08:04 PM    BE 0.2 10/20/2018 08:04 PM    O2SAT 94.4 10/20/2018 08:04 PM     HgBA1c:  No results found for: LABA1C  FLP:    Lab Results   Component Value Date/Time    TRIG 88 10/19/2022 08:11 AM    HDL 62 10/19/2022 08:11 AM    LDLCALC 77 10/19/2022 08:11 AM    LABVLDL 18 10/19/2022 08:11 AM     TSH:    Lab Results   Component Value Date/Time    TSH 1.420 10/19/2022 08:11 AM     IRON:    Lab Results   Component Value Date/Time    IRON 44 06/28/2018 06:00 AM     LIPASE:    Lab Results   Component Value Date/Time    LIPASE 34 12/20/2021 12:50 PM       ASSESSMENT AND PLAN:      Patient Active Problem List    Diagnosis Date Noted    Acute respiratory failure with hypoxia ([facility] 10/18/2022    Chronic renal disease, stage III ([facility] [368303] 06/27/2022    SBO (small bowel obstruction) ([facility] 07/16/2021    PAF (paroxysmal atrial fibrillation) ([facility]     Ischemic bowel disease ([address]     Hx of blood clots     Perforation of sigmoid colon ([address] 10/17/2018    Altered mental status 06/25/2018    Fatigue due to excessive exertion 06/25/2018    Macular degeneration     Cancer ([address]     Enteritis 12/21/2017    Severe protein-calorie malnutrition ([address] 12/21/2017    Pneumatosis intestinalis     Rectal cancer ([facility] 11/17/2016     Impression:  1. Acute hypoxic respiratory failure  2. Positive COVID-19 infection with bibasilar infiltrate  3. Bacteriuria-rule out UTI  4. History of paroxysmal atrial fibs-currently sinus rhythm  5. History of macular degeneration  6. History of perforated bowel- ischemic bowel  7. Generalized weakness and fatigue  8. History of left femoral DVT  9. History of colorectal CA   10. [place]  11.   Chronic left hydronephrosis    Plan:  Admit to monitored bed  Home medications reviewed  Monitor heart rate, blood pressure, O2 saturation  General diet  O2 nasal cannula and titrate to keep O2 sat greater than 92-96%  Sputum culture and sensitivity  Combivent Respimat-1 puff 4 times daily  Symbicort 80/4.5 inhaler-2 puffs twice daily  Decadron 6 mg IV push daily  Vitamin C 1 g p.o. daily, zinc 50 mg p.o. daily, vitamin B1 100 mg p.o. daily, vitamin B6 50 mg daily, vitamin D 2000 units p.o. daily  Incentive spirometry every 2 hours while awake  Consult pharmacy for COVID protocol  Urine for streptococcal Legionella antigen  Urine culture  Lovenox 30 mg subcu twice daily  C-reactive protein, D-dimer every other day  Procalcitonin now  Rocephin 1 g IV piggyback daily  Azithromycin 500 mg p.o. daily x5 days  Consult cardiology  CT of the chest-see progress note 10/19    CMP, CBC in a..     [facility] Hwang DO, D.OGiancarlo  10/19/2022  2:12 PM

## 2022-10-19 NOTE — CARE COORDINATION
10/19/2022 1342 CM note: POSITIVE COVID 10/18/22. ACP completed. Met with patient for transition of care needs. Pt is very pleasant and [place] She resides with her  in a 2 story home,3 step entry with rail. Bedroom on second level with full flight of steps with rail. Pt relays she can manage  personal care including ostomy care but her  and dtr assist with shopping, cooking and transportation. DME includes a cane and ww. She is wearing [email or web address] [place] and does not have home o2. [facility]x [facility], [facility]. PCP is Dr Jasen Peterson and uses [facility]. OT recommendations reviewed with patient. She plans to return home at d/c and states her dtr will provide transportation. Pt gave CM permission to speak with her dtr regarding transition of care needs. Pt's dtr Brandyn Burnett is hopeful for pt to return home at d/c. Will await PT eval to assist with transition of care needs and follow for possible home o2 needs.  Aisha GUERRERO

## 2022-10-19 NOTE — PROGRESS NOTES
[address]  [facility] [place]        Date:10/19/2022                                                  Patient Name: Kiara Dominguez    MRN: [id number]    : 1940    Room: [address] [place] [address]      Evaluating OT: Nimesh Osullivan OTR/L #[id number]     Referring Provider and Specific Provider Orders/Date:      10/18/22 1530  OT eval and treat  Start:  10/18/22 1530,   End:  10/18/22 1530,   ONE TIME,   Standing Count:  1 Occurrences,   R         Jazmin Sprague,       Placement Recommendation: Subacute vs Home Health Occupational Therapy if patient is able to meet goals        Diagnosis:   1.  Acute respiratory failure with hypoxia ([facility]    2. COVID-19 virus infection           Surgery: None      Pertinent Medical History:       Past Medical History:   Diagnosis Date    Cancer [facility])     colorectal     Chronic renal disease, stage III ([facility] [[id number]] 2022    History of blood transfusion     Hx of blood clots     dvt, on eliquis    Ischemic bowel disease ([facility]     Macular degeneration     MDRO (multiple drug resistant organisms) resistance     PAF (paroxysmal atrial fibrillation) (HCC)     Tinnitus          Past Surgical History:   Procedure Laterality Date    ABDOMEN SURGERY      colostomy & reversal    CHOLECYSTECTOMY  07    COLONOSCOPY      CYSTOSCOPY N/A 2018    CYSTOSCOPY RETROGRADE PYELOGRAM performed by Robb Fox MD at [id number] [place] [address], [facility]      HAND SURGERY      carpel tunnel rigth hand    HYSTERECTOMY (CERVIX STATUS UNKNOWN)      ILEOSTOMY OR JEJUNOSTOMY      OTHER SURGICAL HISTORY  2017    diagnostic laparoscopy with exploratory laparotomy and reduction of internal hernia and closure of mesenteric defect, oversewing of multiple serosal tears    MA LAP,SURG,COLECT,TOT,W/PROCTECT,W/ILEOST N/A 2018    LAPAROSCOPIC DIVERTING COLOSTOMY performed by Ramon Bynum MD at [facility] OR    UT LAP,SURG,COLECT,TOT,W/PROCTECT,W/ILEOST N/A 10/18/2018    LAPAROSCOPIC REVISION LOOP COLOSTOMY, EXTENSIVE LYSIS OF ADHESIONS, DRAINAGE PELVIC ABSCESS, END COLOSTOMY performed by Day Bazzi MD at [facility] [address] (CERVIX REMOVED)      UPPER GASTROINTESTINAL ENDOSCOPY N/A 7/18/2018    EGD BIOPSY performed by Mica Lentz DO at [facility] ENDOSCOPY       Precautions:  Fall Risk, alarm, chair BID, activity as tolerated, bathroom privileges with assistance, hard of hearing, ostomy, O2, incontinence.       Assessment of current deficits    [x] Functional mobility  [x]ADLs  [x] Strength               []Cognition    [x] Functional transfers   [x] IADLs         [x] Safety Awareness   [x]Endurance    [x] Fine Coordination              [x] Balance      [x] Vision/perception   []Sensation     []Gross Motor Coordination  [] ROM  [] Delirium                   [] Motor Control     OT PLAN OF CARE   OT POC based on physician orders, patient diagnosis and results of clinical assessment    Frequency/Duration 1-3 days/wk for 2 weeks PRN     Specific OT Treatment Interventions to include:   * Instruction/training on adapted ADL techniques and AE recommendations to increase functional independence within precautions       * Training on energy conservation strategies, correct breathing pattern and techniques to improve independence/tolerance for self-care routine  * Functional transfer/mobility training/DME recommendations for increased independence, safety, and fall prevention  * Patient/Family education to increase follow through with safety techniques and functional independence  * Recommendation of environmental modifications for increased safety with functional transfers/mobility and ADLs  * Therapeutic exercise to improve motor endurance, ROM, and functional strength for ADLs/functional transfers  * Therapeutic activities to facilitate/challenge dynamic balance, stand tolerance for increased safety and independence with ADLs    Recommended Adaptive Equipment: TBD      Home Living: Lives with spouse, single family home, 2 story, 3 steps to enter with rail. Flight of stairs with rail to 2nd floor bedroom and bathroom. Half bathroom on 1st floor. Bathroom set-up: walk-in shower , shower chair         Equipment owned: cane, wheeled walker     Prior Level of Function: Moderate [place] with ADLs , spouse and/or daughter assist with IADLs; ambulated independently with cane. Driving: no     Pain Level: pt denied pain; Nursing notified. Cognition: A&O: 4/4; Follows 2-3 step directions   Memory: fair    Sequencing: fair    Problem solving: fair    Judgement/safety: fair     [facility]   AM-PAC Daily Activity Inpatient   How much help for putting on and taking off regular lower body clothing?: A Lot  How much help for Bathing?: A Lot  How much help for Toileting?: A Lot  How much help for putting on and taking off regular upper body clothing?: A Lot  How much help for taking care of personal grooming?: A Little  How much help for eating meals?: A Little  AM-[facility] Inpatient Daily Activity Raw Score: 14  AM-PAC Inpatient ADL T-Scale Score : 33.39  ADL Inpatient CMS 0-100% Score: 59.67  ADL Inpatient CMS G-Code Modifier : CK     Functional Assessment:    Initial Eval Status  Date: 10/19/22   Treatment Status  Date: STGs = LTGs  Time frame: 10-14 days   Feeding Supervision    Independent    Grooming Minimal Assist     Moderate [place]    UB Dressing Moderate Assist   For gown mgmt to doff/don seated in chair     Moderate [place]    LB Dressing Maximal Assist   To thread LEs through briefs and don over hips while standing supported at walker     Moderate [place]    Bathing Moderate Assist     Moderate [place]    Toileting Maximal Assist   Extensive time for hygiene care d/t x 3 episodes on urinary incontinence upon standing at bedside. Dep for set-up/removal of bedpan at EOB.      Moderate [place] Bed Mobility  Supine to sit: Minimal Assist   Sit to supine: Not Assessed     Supine to sit: Independent   Sit to supine: Independent    Functional Transfers Sit to stand: Minimal Assist   Stand to sit: Minimal Assist     Transfer training with verbal cues for hand placement throughout session to improve safety. Independent    Functional Mobility Minimal Assist with standard walker to improve balance from EOB to chair, verbal cues for walker sequence and safety. Moderate [place] with use of wheeled walker    Balance Sitting:     Static: fair     Dynamic: fair   Standing: fair/fair minus with walker ; retropulsive upon standing     Sitting:     Static: good    Dynamic: good  Standing: fair plus with walker    Activity Tolerance fair    Increase standing tolerance >3 minutes for improved engagement with functional transfers and indep in ADLs     Visual/  Perceptual Glasses: yes     Reports changes in vision since admission: no ; hx of macular degeneration     NA      Hand Dominance: right      AROM (PROM) Strength Additional Info:  Goal:   RUE  WFL 4-/5 good  and wfl FMC/dexterity noted during ADL tasks   Improve overall RUE strength  for participation in functional tasks   LUE WFL 4-/5 good  and wfl FMC/dexterity noted during ADL tasks   Improve overall LUE strength  for participation in functional tasks     Hearing: Hard of hearing    Sensation:  B LE neuropathy   Tone:  WFL   Edema:      Vitals: 4L   HR at rest: 50s bpm HR with activity: 64 bpm HR at end of session: 67 bpm   SpO2 at rest: 88-94% SpO2 with activity: 86% , recovery time x 1 min seated to 90s  SpO2 at end of session: 94%   BP at rest:  BP with activity:  BP at end of session:      Comments: RN cleared patient for OT. Upon arrival patient in supine.  Therapist facilitated and instructed pt on adapted  techniques & compensatory strategies to improve safety and independence with basic ADLs, bed mobility, functional transfers and mobility to allow pt to achieve highest level of independence and safely. Pt demonstrated fair understanding of education & follow through. At end of session, patient was in chair with call light and phone within reach, all lines and tubes intact. Overall, patient demonstrated  decreased independence and safety during completion of ADL tasks. Pt would benefit from continued skilled OT to increase safety and independence with completion of ADL tasks and functional mobility for improved quality of life. Treatment: OT treatment provided this date includes:   Instructions/training on safety, sequencing, and adapted techniques for completion of ADLs. Facilitated bed mobility with cues for proper body mechanics and sequencing to prepare for ADL completion. Instruction/training on safe functional mobility/transfer techniques including hand and feet placement   Skilled monitoring of O2 sats - see section above     Rehab Potential: Good for established goals. Patient / Family Goal: not stated       Patient and/or family were instructed on functional diagnosis, prognosis/goals and OT plan of care. Demonstrated fair understanding. Eval Complexity: Low    Time In: 9:00 AM   Time Out: 9:45 AM    Total Treatment Time: 25       Min Units   OT Eval Low 97165  X  1    OT Eval Medium 65226      OT Eval High 86944      OT Re-Eval 24160            ADL/Self Care 08818 15 1   Therapeutic Activities 46625 10 1   Therapeutic Ex 61571       Orthotic Management 28046       Manual 02915     Neuro Re-Ed 58393       Non-Billable Time        Evaluation Time additionally includes thorough review of current medical information, gathering information on past medical history/social history and prior level of function, interpretation of standardized testing/informal observation of tasks, assessment of data and development of plan of care and goals.         Evaluating OT: Meng Xavier OTR/L #[id number]

## 2022-10-19 NOTE — PROGRESS NOTES
Pharmacy Consultation Note    Consult date: 10/19/2022  Physician/provider: Dr. Meliza Norwood has been consulted to evaluate criteria for COVID therapy. Patient was initiated on baricitinib, however ALC <200 yesterday & today. CRP elevated @ 9.1. Will switch from baricitinib to tocilizumab IV x 1.     Thank you for the consult,  Shirley Mir PharmD, BCPS 10/19/2022 12:47 PM   [phone]

## 2022-10-20 LAB
ALBUMIN SERPL-MCNC: 3.5 G/DL (ref 3.5–5.2)
ALP BLD-CCNC: 75 U/L (ref 35–104)
ALT SERPL-CCNC: 26 U/L (ref 0–32)
ANION GAP SERPL CALCULATED.3IONS-SCNC: 14 MMOL/L (ref 7–16)
AST SERPL-CCNC: 33 U/L (ref 0–31)
BILIRUB SERPL-MCNC: 0.2 MG/DL (ref 0–1.2)
BUN BLDV-MCNC: 18 MG/DL (ref 6–23)
CALCIUM SERPL-MCNC: 9.4 MG/DL (ref 8.6–10.2)
CHLORIDE BLD-SCNC: 104 MMOL/L (ref 98–107)
CO2: 19 MMOL/L (ref 22–29)
CREAT SERPL-MCNC: 0.8 MG/DL (ref 0.5–1)
D DIMER: 632 NG/ML DDU
GFR SERPL CREATININE-BSD FRML MDRD: >60 ML/MIN/1.73
GLUCOSE BLD-MCNC: 122 MG/DL (ref 74–99)
L. PNEUMOPHILA SEROGP 1 UR AG: NORMAL
POTASSIUM SERPL-SCNC: 4.2 MMOL/L (ref 3.5–5)
SODIUM BLD-SCNC: 137 MMOL/L (ref 132–146)
STREP PNEUMONIAE ANTIGEN, URINE: NORMAL
TOTAL PROTEIN: 6.9 G/DL (ref 6.4–8.3)

## 2022-10-20 PROCEDURE — 80053 COMPREHEN METABOLIC PANEL: CPT

## 2022-10-20 PROCEDURE — 2580000003 HC RX 258: Performed by: INTERNAL MEDICINE

## 2022-10-20 PROCEDURE — A4216 STERILE WATER/SALINE, 10 ML: HCPCS | Performed by: INTERNAL MEDICINE

## 2022-10-20 PROCEDURE — 6360000002 HC RX W HCPCS: Performed by: INTERNAL MEDICINE

## 2022-10-20 PROCEDURE — 6370000000 HC RX 637 (ALT 250 FOR IP): Performed by: INTERNAL MEDICINE

## 2022-10-20 PROCEDURE — 1200000000 HC SEMI PRIVATE

## 2022-10-20 PROCEDURE — 86140 C-REACTIVE PROTEIN: CPT

## 2022-10-20 PROCEDURE — C9113 INJ PANTOPRAZOLE SODIUM, VIA: HCPCS | Performed by: INTERNAL MEDICINE

## 2022-10-20 PROCEDURE — 85378 FIBRIN DEGRADE SEMIQUANT: CPT

## 2022-10-20 PROCEDURE — 36415 COLL VENOUS BLD VENIPUNCTURE: CPT

## 2022-10-20 PROCEDURE — 94640 AIRWAY INHALATION TREATMENT: CPT

## 2022-10-20 PROCEDURE — 2700000000 HC OXYGEN THERAPY PER DAY

## 2022-10-20 RX ORDER — DIPHENHYDRAMINE HYDROCHLORIDE 50 MG/ML
25 INJECTION INTRAMUSCULAR; INTRAVENOUS ONCE
Status: COMPLETED | OUTPATIENT
Start: 2022-10-20 | End: 2022-10-20

## 2022-10-20 RX ADMIN — IPRATROPIUM BROMIDE AND ALBUTEROL 1 PUFF: 20; 100 SPRAY, METERED RESPIRATORY (INHALATION) at 13:11

## 2022-10-20 RX ADMIN — PANTOPRAZOLE SODIUM 40 MG: 40 TABLET, DELAYED RELEASE ORAL at 06:08

## 2022-10-20 RX ADMIN — Medication 100 MG: at 08:32

## 2022-10-20 RX ADMIN — PYRIDOXINE HCL TAB 50 MG 50 MG: 50 TAB at 08:32

## 2022-10-20 RX ADMIN — BUDESONIDE AND FORMOTEROL FUMARATE DIHYDRATE 2 PUFF: 80; 4.5 AEROSOL RESPIRATORY (INHALATION) at 05:22

## 2022-10-20 RX ADMIN — AZITHROMYCIN MONOHYDRATE 500 MG: 250 TABLET ORAL at 08:31

## 2022-10-20 RX ADMIN — Medication 50 MG: at 08:32

## 2022-10-20 RX ADMIN — IPRATROPIUM BROMIDE AND ALBUTEROL 1 PUFF: 20; 100 SPRAY, METERED RESPIRATORY (INHALATION) at 05:22

## 2022-10-20 RX ADMIN — MULTIPLE VITAMINS W/ MINERALS TAB 1 TABLET: TAB at 08:31

## 2022-10-20 RX ADMIN — DIPHENHYDRAMINE HYDROCHLORIDE 25 MG: 50 INJECTION, SOLUTION INTRAMUSCULAR; INTRAVENOUS at 17:39

## 2022-10-20 RX ADMIN — DEXAMETHASONE SODIUM PHOSPHATE 6 MG: 10 INJECTION INTRAMUSCULAR; INTRAVENOUS at 00:41

## 2022-10-20 RX ADMIN — SERTRALINE 50 MG: 50 TABLET, FILM COATED ORAL at 08:31

## 2022-10-20 RX ADMIN — SODIUM CHLORIDE: 900 INJECTION, SOLUTION INTRAVENOUS at 01:17

## 2022-10-20 RX ADMIN — CEFTRIAXONE SODIUM 1000 MG: 1 INJECTION, POWDER, FOR SOLUTION INTRAMUSCULAR; INTRAVENOUS at 15:45

## 2022-10-20 RX ADMIN — Medication 2000 UNITS: at 08:32

## 2022-10-20 RX ADMIN — PANTOPRAZOLE SODIUM 40 MG: 40 INJECTION, POWDER, FOR SOLUTION INTRAVENOUS at 17:38

## 2022-10-20 RX ADMIN — ENOXAPARIN SODIUM 30 MG: 100 INJECTION SUBCUTANEOUS at 08:32

## 2022-10-20 RX ADMIN — PROCHLORPERAZINE EDISYLATE 5 MG: 5 INJECTION INTRAMUSCULAR; INTRAVENOUS at 15:44

## 2022-10-20 RX ADMIN — SODIUM CHLORIDE: 900 INJECTION, SOLUTION INTRAVENOUS at 15:46

## 2022-10-20 RX ADMIN — ENOXAPARIN SODIUM 30 MG: 100 INJECTION SUBCUTANEOUS at 20:10

## 2022-10-20 RX ADMIN — OXYCODONE HYDROCHLORIDE AND ACETAMINOPHEN 1000 MG: 500 TABLET ORAL at 08:32

## 2022-10-20 NOTE — PROCEDURES
[address] [facility]                                 [facility]    PATIENT NAME: Emy Orellana                      :        1940  MED REC NO:   [id number]                            ROOM:       1961  ACCOUNT NO:   [de-identified]                           ADMIT DATE: 10/18/2022  PROVIDER:     Braulio Worrell MD    ECHOCARDIOGRAPHER:  Prema Zarco    INDICATIONS:  Elevated cardiac enzymes, hypoxia, COVID-19, acute  pneumonia, LV function. 2-D MEASUREMENTS:  Left ventricular outflow tract 2.0 cm. Right  ventricle diastole 2.6. Left ventricle diastole 3.4, systole 2.5. Septal and posterior wall thickness of the left ventricle 1.1 cm. Left  atrial dimension 2.7. Left atrial area 9 cm2. Right atrial area 11  cm2. Aortic root diameter 3.2 cm. Aortic valve cusp separation 1.9 cm. IMPRESSION:  1. All cardiac chamber sizes including aortic root size were within  normal limits. 2.  The left ventricle shows normal wall thicknesses, ejection fraction  60%. No focal wall motion abnormality. There is diastolic filling  dysfunction stage I.  3.  The mitral valve appears structurally normal, shows a maximal  gradient of 7.4 mmHg. Mitral valve area 2.3 cm2 by pressure half time. There is no significant mitral stenosis. There is trace mitral  regurgitation only. 4.  The aortic valve is mildly sclerotic, leaflets open well. Maximal  gradient 5 mmHg. Aortic valve area 2.8 cm2. There is no aortic  stenosis nor insufficiency. 5.  There is no pulmonic stenosis nor insufficiency. There is trace to  1+ tricuspid regurgitation. Pulmonary pressures estimated at 22 mmHg. Notably right ventricular contractility is well preserved. 6.  There is no significant pericardial effusion nor any definite  intracavitary mass or thrombus or shunt identified on this study.         Alcides Ledesma MD    D: 10/19/2022

## 2022-10-20 NOTE — PROGRESS NOTES
Pharmacy Consultation Note    Consult date: 10/20/2022  Physician/provider: Dr. Rakan Hidalgo has been consulted to evaluate criteria for COVID therapy. Patient transitioned from baricitinib to tocilizumab - received x 1 dose last night. Pharmacy will sign off, please re-consult if needed.     Thank you,  Mimi Morillo, PharmD, BCPS 10/20/2022 11:38 AM   [phone]

## 2022-10-20 NOTE — PROGRESS NOTES
Physical Therapy  Physical Therapy    Room #: 7519/5130-75  Date: 10/20/2022       Patient Name: Mabel Lopez  : 1940      MRN: [id number]     Patient unavailable for physical therapy eval due to refusal, pt on bedpan. Will attempt PT evaluation at a later time. Thank you.        Angy Li, PT

## 2022-10-20 NOTE — PROGRESS NOTES
Subjective: The patient is awake and alert. No problems overnight. Denies chest pain, angina, and dyspnea. Denies abdominal pain. Tolerating diet. Has severe nausea from meds; no vomiting. Current Facility-Administered Medications: methylPREDNISolone sodium (SOLU-MEDROL) injection 125 mg, 125 mg, IntraVENous, Once PRN  EPINEPHrine PF 1 MG/ML injection (Anaphylaxis) 0.3 mg, 0.3 mg, IntraMUSCular, Once PRN  sertraline (ZOLOFT) tablet 50 mg, 50 mg, Oral, Daily  acetaminophen (TYLENOL) tablet 650 mg, 650 mg, Oral, Q4H PRN  pantoprazole (PROTONIX) tablet 40 mg, 40 mg, Oral, QAM AC  ascorbic acid (VITAMIN C) tablet 1,000 mg, 1,000 mg, Oral, Daily  vitamin B-6 (PYRIDOXINE) tablet 50 mg, 50 mg, Oral, Daily  albuterol-ipratropium (COMBIVENT RESPIMAT)  MCG/ACT inhaler 1 puff, 1 puff, Inhalation, Q6H  budesonide-formoterol (SYMBICORT) 80-4.5 MCG/ACT inhaler 2 puff, 2 puff, Inhalation, BID  thiamine mononitrate tablet 100 mg, 100 mg, Oral, Daily  prochlorperazine (COMPAZINE) injection 5 mg, 5 mg, IntraVENous, Q6H PRN  polyethylene glycol (GLYCOLAX) packet 17 g, 17 g, Oral, Daily PRN  dexamethasone (DECADRON) injection 6 mg, 6 mg, IntraVENous, Q24H  cefTRIAXone (ROCEPHIN) 1,000 mg in sterile water 10 mL IV syringe, 1,000 mg, IntraVENous, Q24H  azithromycin (ZITHROMAX) tablet 500 mg, 500 mg, Oral, Daily  enoxaparin Sodium (LOVENOX) injection 30 mg, 30 mg, SubCUTAneous, BID  0.9 % sodium chloride infusion, , IntraVENous, Continuous  Vitamin D (CHOLECALCIFEROL) tablet 2,000 Units, 2,000 Units, Oral, Daily  perflutren lipid microspheres (DEFINITY) injection 1.65 mg, 1.5 mL, IntraVENous, ONCE PRN    Objective:    /66   Pulse 64   Temp 98 °F (36.7 °C)   Resp 18   Ht 5' 7\" (1.702 m)   Wt 145 lb (65.8 kg)   SpO2 92%   BMI 22.71 kg/m²   No intake/output data recorded. No intake/output data recorded. RRR, no murmurs, no gallops, or rubs.   CTA bilaterally, no wheeze, rales or rhonchi  bowel sounds present, nontender, nondistended, no masses  No clubbing, cyanosis, or edema  No neuro changes     CBC with Differential:    Lab Results   Component Value Date/Time    WBC 2.1 10/19/2022 08:11 AM    RBC 3.85 10/19/2022 08:11 AM    HGB 11.6 10/19/2022 08:11 AM    HCT 35.5 10/19/2022 08:11 AM     10/19/2022 08:11 AM    MCV 92.2 10/19/2022 08:11 AM    MCH 30.1 10/19/2022 08:11 AM    MCHC 32.7 10/19/2022 08:11 AM    RDW 13.7 10/19/2022 08:11 AM    NRBC 0.5 11/30/2017 06:40 AM    BLASTSPCT 1.0 11/06/2017 11:35 PM    METASPCT 1.7 10/19/2022 08:11 AM    LYMPHOPCT 4.3 10/19/2022 08:11 AM    PROMYELOPCT 0.5 11/26/2017 05:30 AM    MONOPCT 2.6 10/19/2022 08:11 AM    MYELOPCT 1.7 10/18/2018 05:38 AM    BASOPCT 0.0 10/19/2022 08:11 AM    MONOSABS 0.06 10/19/2022 08:11 AM    LYMPHSABS 0.08 10/19/2022 08:11 AM    EOSABS 0.00 10/19/2022 08:11 AM    BASOSABS 0.00 10/19/2022 08:11 AM     CMP:    Lab Results   Component Value Date/Time     10/20/2022 11:39 AM    K 4.2 10/20/2022 11:39 AM    K 4.9 07/16/2021 06:15 AM     10/20/2022 11:39 AM    CO2 19 10/20/2022 11:39 AM    BUN 18 10/20/2022 11:39 AM    CREATININE 0.8 10/20/2022 11:39 AM    GFRAA 57 12/20/2021 12:50 PM    LABGLOM >60 10/20/2022 11:39 AM    GLUCOSE 122 10/20/2022 11:39 AM    PROT 6.9 10/20/2022 11:39 AM    LABALBU 3.5 10/20/2022 11:39 AM    CALCIUM 9.4 10/20/2022 11:39 AM    BILITOT 0.2 10/20/2022 11:39 AM    ALKPHOS 75 10/20/2022 11:39 AM    AST 33 10/20/2022 11:39 AM    ALT 26 10/20/2022 11:39 AM     BMP:    Lab Results   Component Value Date/Time     10/20/2022 11:39 AM    K 4.2 10/20/2022 11:39 AM    K 4.9 07/16/2021 06:15 AM     10/20/2022 11:39 AM    CO2 19 10/20/2022 11:39 AM    BUN 18 10/20/2022 11:39 AM    LABALBU 3.5 10/20/2022 11:39 AM    CREATININE 0.8 10/20/2022 11:39 AM    CALCIUM 9.4 10/20/2022 11:39 AM    GFRAA 57 12/20/2021 12:50 PM    LABGLOM >60 10/20/2022 11:39 AM    GLUCOSE 122 10/20/2022 11:39 AM     Hepatic Function Panel: Lab Results   Component Value Date/Time    ALKPHOS 75 10/20/2022 11:39 AM    ALT 26 10/20/2022 11:39 AM    AST 33 10/20/2022 11:39 AM    PROT 6.9 10/20/2022 11:39 AM    BILITOT 0.2 10/20/2022 11:39 AM    BILIDIR 0.4 11/12/2017 10:08 AM    IBILI 0.3 11/12/2017 10:08 AM    LABALBU 3.5 10/20/2022 11:39 AM     Albumin:    Lab Results   Component Value Date/Time    LABALBU 3.5 10/20/2022 11:39 AM     Last 3 Troponin:    Lab Results   Component Value Date/Time    TROPONINI <0.01 10/16/2018 11:12 PM    TROPONINI 0.01 12/21/2017 02:40 AM    TROPONINI 0.02 12/19/2017 10:09 AM     FLP:    Lab Results   Component Value Date/Time    TRIG 88 10/19/2022 08:11 AM    HDL 62 10/19/2022 08:11 AM    LDLCALC 77 10/19/2022 08:11 AM    LABVLDL 18 10/19/2022 08:11 AM     TSH:    Lab Results   Component Value Date/Time    TSH 1.420 10/19/2022 08:11 AM          Patient Active Problem List   Diagnosis    Rectal cancer ([facility]    Pneumatosis intestinalis    Enteritis    Severe protein-calorie malnutrition (HCC)    Altered mental status    Fatigue due to excessive exertion    Macular degeneration    Cancer (HCC)    Perforation of sigmoid colon (HCC)    PAF (paroxysmal atrial fibrillation) (HCC)    Ischemic bowel disease (HCC)    Hx of blood clots    SBO (small bowel obstruction) (HCC)    Chronic renal disease, stage III ([facility] [202517]    Acute respiratory failure with hypoxia (HCC)     Imp/Plan:     Acute hypoxic respiratory failure  Acute/subacute COVID-19 pneumonia-began 10/6/22  History of paroxysmal atrial fib-currently sinus rhythm  No evidence AMI/ACS  History of macular degeneration  History of perforated bowel- ischemic bowel;   Pneumatosis intestinalis  Generalized weakness and fatigue  History of left femoral DVT  History of colorectal CA  [place]        Monitor closely  Oxygen  Tocilizumab IV preferred over baricitinib po immune modulator given nausea and ischemic bowel issues  Echocardiogram  IV dexamethasone  Vitamin C 1 g p.o. daily, zinc 50 mg p.o. daily, vitamin B1 100 mg p.o. daily, vitamin B6 50 mg daily, vitamin D 2000 units p.o. daily  Incentive spirometry every 2 hours while awake  Consult pharmacy for COVID protocol  Urine for streptococcal Legionella antigen  Urine culture  Lovenox 30 mg subcu twice daily  C-reactive protein, D-dimer every other day  Procalcitonin now  Rocephin 1 g IV piggyback daily  Azithromycin 500 mg p.o. daily x5 days  Will stop zinc/vitamins given nausea

## 2022-10-20 NOTE — PROGRESS NOTES
Department of Internal Medicine        CHIEF COMPLAINT: Generalized fatigue    Reason for Admission: Acute hypoxic respiratory failure, COVID-19 infection    HISTORY OF PRESENT ILLNESS:      The patient is a 78 y.o. female who presents with generalized fatigue over the last 2 weeks. Patient also complains of a mildly scant clear productive cough. She denies any problem with any chest or abdominal pain. Patient denies any history of leg swelling or pain in her calf or thighs. Patient also has some intermittent episodes of nausea but this is somewhat chronic. She denies any fever/chills, unusual shortness of breath at rest,vomiting. Patient has a history of prior tobacco use for about 20 years about a half a pack to-pack of cigarettes a day. Patient is very hard of hearing. Patient has history of colorectal CA along with chronic kidney disease stage IIIa, history of DVT, ischemic bowel, macular degeneration, paroxysmal atrial fib. Patient was seen in the ED and was hypoxic and was O2 saturation on room air at rest and with placed on O2. Patient tested positive for COVID and 2 view chest x-ray showed suspected early bibasilar infiltrates slightly worse on the right. BUN/creatinine is 25/1.1 with normal electrolytes WBC 5.5 and hemoglobin 12.4. Urinalysis showed large leukocyte esterase and greater than 20 WBCs. 10/19/2022  Patient was seen examined on telemetry floor. Patient looks a little bit better today. Patient denies any problem chest pain, abdominal pain, nausea vomiting or fever and chills. Patient has an occasional nonproductive cough. CT of the chest showed patchy peripheral predominant groundglass opacities in both lungs with moderate pulmonary emphysema with evidence of prior granulomatous disease. Left hydronephrosis is incompletely imaged but has a history of chronic left hydronephrosis. BUN/creatinine was 29/0.9 with procalcitonin 0.17. CRP yesterday was 9.1.   Liver enzymes normal with a WBC of 2.1 hemoglobin 11.6. D-dimer 596 yesterday. Her temperature is 97.7 with heart rate sinus bradycardia with a rate of 51 with a blood pressure 123/59. O2 sat 97% on 4 L nasal cannula. 10/20/2022  Patient seen examined on telemetry floor. Patient states she feels fairly good. Patient's appetite is not as good today as yesterday. She denies any chest pain, abdominal pain, nausea or vomiting. The patient has an occasional nonproductive cough. Echocardiogram showed EF 60% with stage I diastolic dysfunction with trace MR, +1 TR, pulmonary pressure 22 mmHg. Temperature is 98.3 with heart rate of 64 blood pressure 134/66. O2 sat 92-96% on 4 L nasal cannula.     Past Medical History:    Past Medical History:   Diagnosis Date    Cancer [facility])     colorectal     Chronic renal disease, stage III [facility]) [671825] 6/27/2022    History of blood transfusion     Hx of blood clots     dvt, on eliquis    Ischemic bowel disease ([address]     Macular degeneration     MDRO (multiple drug resistant organisms) resistance     PAF (paroxysmal atrial fibrillation) (HCC)     Tinnitus      Past Surgical History:    Past Surgical History:   Procedure Laterality Date    ABDOMEN SURGERY      colostomy & reversal    CHOLECYSTECTOMY  07    COLONOSCOPY      CYSTOSCOPY N/A 12/4/2018    CYSTOSCOPY RETROGRADE PYELOGRAM performed by Beverly Slater MD at [address] [place] [address], [facility]      HAND SURGERY      carpel tunnel rigth hand    HYSTERECTOMY (CERVIX STATUS UNKNOWN)      ILEOSTOMY OR JEJUNOSTOMY      OTHER SURGICAL HISTORY  11/06/2017    diagnostic laparoscopy with exploratory laparotomy and reduction of internal hernia and closure of mesenteric defect, oversewing of multiple serosal tears    CO LAP,SURG,COLECT,TOT,W/PROCTECT,W/ILEOST N/A 6/28/2018    LAPAROSCOPIC DIVERTING COLOSTOMY performed by Dwayne Yoo MD at [facility] LAP,SURG,COLECT,TOT,W/PROCTECT,W/ILEOST N/A 10/18/2018    LAPAROSCOPIC REVISION LOOP COLOSTOMY, EXTENSIVE LYSIS OF ADHESIONS, DRAINAGE PELVIC ABSCESS, END COLOSTOMY performed by Vaughn Mackey MD at [address] (CERVIX REMOVED)      UPPER GASTROINTESTINAL ENDOSCOPY N/A 7/18/2018    EGD BIOPSY performed by Rachel Centeno DO at [place] [address]       Medications Prior to Admission:    @  Prior to Admission medications    Medication Sig Start Date End Date Taking? Authorizing Provider   Multiple Vitamins-Minerals (THERAPEUTIC MULTIVITAMIN-MINERALS) tablet Take 1 tablet by mouth daily    Historical Provider, MD   omeprazole (PRILOSEC) 20 MG delayed release capsule Take 20 mg by mouth daily    Historical Provider, MD   acetaminophen (TYLENOL) 325 MG tablet Take 2 tablets by mouth every 4 hours as needed for Pain or Fever 12/26/17   Rachel Centeno DO   sertraline (ZOLOFT) 50 MG tablet Take 50 mg by mouth daily    Historical Provider, MD       Allergies:  Aspirin and Tape [employer] tape]    Social History:   Social History     Socioeconomic History    Marital status:      Spouse name: Not on file    Number of children: Not on file    Years of education: Not on file    Highest education level: Not on file   Occupational History    Not on file   Tobacco Use    Smoking status: Former     Years: 20.00     Types: Cigarettes    Smokeless tobacco: Never    Tobacco comments:     quit at age 44    Substance and Sexual Activity    Alcohol use: No     Comment: socially    Drug use: No    Sexual activity: Not on file   Other Topics Concern    Not on file   Social History Narrative    Lives in [facility] with  (Markell Gibson / Bed). Worked in an office.      Social Determinants of Health     Financial Resource Strain: Low Risk     Difficulty of Paying Living Expenses: Not hard at all   Food Insecurity: No Food Insecurity    Worried About Running Out of Food in the Last Year: Never true    Ran Out of Food in the Last Year: Never true   Transportation Needs: Not on file   Physical Activity: Not on file   Stress: Not on file   Social Connections: Not on file   Intimate Partner Violence: Not on file   Housing Stability: Not on file       Family History:   Family History   Problem Relation Age of Onset    Arthritis Mother     Kidney Disease Father     Cancer Father         kidney cancer       REVIEW OF SYSTEMS:    Gen: + Generalized fatigue. Patient denies any lightheadedness or dizziness. No LOC or syncope. No fevers or chills. HEENT: No earache, sore throat or nasal congestion. Resp: Denies cough, hemoptysis or sputum production. Cardiac: Denies chest pain, SOB, diaphoresis or palpitations. GI: No nausea, vomiting, diarrhea or constipation. No melena or hematochezia. : No urinary complaints, dysuria, hematuria or frequency. MSK: No extremity weakness, paralysis or paresthesias. PHYSICAL EXAM:    Vitals:  /66   Pulse 64   Temp 98 °F (36.7 °C)   Resp 18   Ht 5' 7\" (1.702 m)   Wt 145 lb (65.8 kg)   SpO2 92%   BMI 22.71 kg/m²     General:  This is a 78 y.o. yo female who is alert and oriented in mild distress secondary to above  HEENT:  Head is normocephalic and atraumatic, PERRLA, EOMI, mucus membranes moist with no pharyngeal erythema or exudate. Neck:  Supple with no carotid bruits, JVD or thyromegaly.   No cervical adenopathy  CV:  Regular rate and rhythm, no murmurs  Lungs: Coarse decreased breath sounds to auscultation bilaterally with with scattered rails right greater than left and mild rhonchi in the right, no wheezes  Abdomen:  Soft, nontender, nondistended, bowel sounds present  Extremities:  No edema, peripheral pulses intact bilaterally  Neuro:  Cranial nerves II-XII grossly intact; motor and sensory function intact with no focal deficits  Skin:  No rashes, lesions or wounds    DATA:  CBC with Differential:    Lab Results   Component Value Date/Time    WBC 2.1 10/19/2022 08:11 AM    RBC 3.85 10/19/2022 08:11 AM    HGB 11.6 10/19/2022 08:11 AM    HCT 35.5 10/19/2022 08:11 AM     10/19/2022 08:11 AM    MCV 92.2 10/19/2022 08:11 AM    MCH 30.1 10/19/2022 08:11 AM    MCHC 32.7 10/19/2022 08:11 AM    RDW 13.7 10/19/2022 08:11 AM    NRBC 0.5 11/30/2017 06:40 AM    BLASTSPCT 1.0 11/06/2017 11:35 PM    METASPCT 1.7 10/19/2022 08:11 AM    LYMPHOPCT 4.3 10/19/2022 08:11 AM    PROMYELOPCT 0.5 11/26/2017 05:30 AM    MONOPCT 2.6 10/19/2022 08:11 AM    MYELOPCT 1.7 10/18/2018 05:38 AM    BASOPCT 0.0 10/19/2022 08:11 AM    MONOSABS 0.06 10/19/2022 08:11 AM    LYMPHSABS 0.08 10/19/2022 08:11 AM    EOSABS 0.00 10/19/2022 08:11 AM    BASOSABS 0.00 10/19/2022 08:11 AM     CMP:    Lab Results   Component Value Date/Time     10/19/2022 08:11 AM    K 4.3 10/19/2022 08:11 AM    K 4.9 07/16/2021 06:15 AM     10/19/2022 08:11 AM    CO2 20 10/19/2022 08:11 AM    BUN 20 10/19/2022 08:11 AM    CREATININE 0.9 10/19/2022 08:11 AM    GFRAA 57 12/20/2021 12:50 PM    LABGLOM >60 10/19/2022 08:11 AM    GLUCOSE 140 10/19/2022 08:11 AM    PROT 6.9 10/19/2022 08:11 AM    LABALBU 3.6 10/19/2022 08:11 AM    CALCIUM 9.1 10/19/2022 08:11 AM    BILITOT 0.2 10/19/2022 08:11 AM    ALKPHOS 81 10/19/2022 08:11 AM    AST 31 10/19/2022 08:11 AM    ALT 20 10/19/2022 08:11 AM     Magnesium:    Lab Results   Component Value Date/Time    MG 1.8 10/19/2022 08:11 AM     Phosphorus:    Lab Results   Component Value Date/Time    PHOS 2.9 10/19/2022 08:11 AM     PT/INR:    Lab Results   Component Value Date/Time    PROTIME 11.6 12/03/2018 02:50 PM    INR 1.0 12/03/2018 02:50 PM     Troponin:    Lab Results   Component Value Date/Time    TROPONINI <0.01 10/16/2018 11:12 PM     U/A:    Lab Results   Component Value Date/Time    COLORU Yellow 10/18/2022 02:22 PM    PROTEINU 30 10/18/2022 02:22 PM    PHUR 6.0 10/18/2022 02:22 PM    WBCUA >20 10/18/2022 02:22 PM    RBCUA >20 10/18/2022 02:22 PM    BACTERIA MODERATE 10/18/2022 02:22 PM    CLARITYU CLOUDY 10/18/2022 02:22 PM    SPECGRAV 1.020 10/18/2022 02:22 PM    LEUKOCYTESUR LARGE 10/18/2022 02:22 PM    UROBILINOGEN 0.2 10/18/2022 02:22 PM    BILIRUBINUR SMALL 10/18/2022 02:22 PM    BLOODU LARGE 10/18/2022 02:22 PM    GLUCOSEU Negative 10/18/2022 02:22 PM    AMORPHOUS FEW 11/23/2018 04:20 AM     ABG:    Lab Results   Component Value Date/Time    PH 7.514 10/20/2018 08:04 PM    PCO2 28.5 10/20/2018 08:04 PM    PO2 67.6 10/20/2018 08:04 PM    HCO3 22.4 10/20/2018 08:04 PM    BE 0.2 10/20/2018 08:04 PM    O2SAT 94.4 10/20/2018 08:04 PM     HgBA1c:  No results found for: LABA1C  FLP:    Lab Results   Component Value Date/Time    TRIG 88 10/19/2022 08:11 AM    HDL 62 10/19/2022 08:11 AM    LDLCALC 77 10/19/2022 08:11 AM    LABVLDL 18 10/19/2022 08:11 AM     TSH:    Lab Results   Component Value Date/Time    TSH 1.420 10/19/2022 08:11 AM     IRON:    Lab Results   Component Value Date/Time    IRON 44 06/28/2018 06:00 AM     LIPASE:    Lab Results   Component Value Date/Time    LIPASE 34 12/20/2021 12:50 PM       ASSESSMENT AND PLAN:      Patient Active Problem List    Diagnosis Date Noted    Acute respiratory failure with hypoxia ([address] 10/18/2022    Chronic renal disease, stage III ([address] [[id number]] 06/27/2022    SBO (small bowel obstruction) ([address] 07/16/2021    PAF (paroxysmal atrial fibrillation) ([address]     Ischemic bowel disease ([address]     Hx of blood clots     Perforation of sigmoid colon ([address] 10/17/2018    Altered mental status 06/25/2018    Fatigue due to excessive exertion 06/25/2018    Macular degeneration     Cancer ([address]     Enteritis 12/21/2017    Severe protein-calorie malnutrition ([address] 12/21/2017    Pneumatosis intestinalis     Rectal cancer ([address] 11/17/2016     Impression:  1. Acute hypoxic respiratory failure  2. Positive COVID-19 infection with bibasilar infiltrate  3. Bacteriuria-rule out UTI  4. History of paroxysmal atrial fibs-currently sinus rhythm  5. History of macular degeneration  6. History of perforated bowel- ischemic bowel  7.   Generalized weakness and fatigue  8. History of left femoral DVT  9. History of colorectal CA   10. [place]  11. Chronic left hydronephrosis    Plan:  Admit to monitored bed  Home medications reviewed  Monitor heart rate, blood pressure, O2 saturation  General diet  O2 nasal cannula and titrate to keep O2 sat greater than 92-96%  Sputum culture and sensitivity  Combivent Respimat-1 puff 4 times daily  Symbicort 80/4.5 inhaler-2 puffs twice daily  Decadron 6 mg IV push daily  Vitamin C 1 g p.o. daily, zinc 50 mg p.o. daily, vitamin B1 100 mg p.o. daily, vitamin B6 50 mg daily, vitamin D 2000 units p.o. daily  Incentive spirometry every 2 hours while awake  Consult pharmacy for COVID protocol  Urine for streptococcal Legionella antigen  Urine culture  Lovenox 30 mg subcu twice daily  C-reactive protein, D-dimer every other day  Procalcitonin 0.17  Rocephin 1 g IV piggyback daily X 7 days  Azithromycin 500 mg p.o. daily x 5 days  Consult cardiology  CT of the chest-see progress note 10/19  Repeat procalcitonin in a.m.    CMP, CBC in a.m.     Jazmin Sprague DO, D.OGiancarlo  10/20/2022  10:54 AM

## 2022-10-20 NOTE — CARE COORDINATION
10/20/2022 1347 CM note: POSITIVE COVID 10/18/22. ACP completed. Pt is very pleasant and [place]. She resides with her  in a 2 story home,3 step entry with rail. Bedroom on second level with full flight of steps with rail. Pt relays she can manage  personal care including ostomy care but her  and dtr assist with shopping, cooking and transportation. DME includes a cane and ww. She is wearing [email or web address] [place] and does not have home o2. Pt and her dtr are hopeful for pt to return home at d/c and states her dtr will provide transportation. Will await PT eval to assist with transition of care needs. CM to follow for possible HHC and home o2 needs.  Aisha GUERRERO

## 2022-10-21 PROBLEM — U07.1 COVID-19: Status: ACTIVE | Noted: 2022-10-21

## 2022-10-21 LAB
ALBUMIN SERPL-MCNC: 3.4 G/DL (ref 3.5–5.2)
ALP BLD-CCNC: 91 U/L (ref 35–104)
ALT SERPL-CCNC: 87 U/L (ref 0–32)
ANION GAP SERPL CALCULATED.3IONS-SCNC: 12 MMOL/L (ref 7–16)
AST SERPL-CCNC: 110 U/L (ref 0–31)
BASOPHILS ABSOLUTE: 0 E9/L (ref 0–0.2)
BASOPHILS RELATIVE PERCENT: 0 % (ref 0–2)
BILIRUB SERPL-MCNC: 0.4 MG/DL (ref 0–1.2)
BUN BLDV-MCNC: 18 MG/DL (ref 6–23)
C-REACTIVE PROTEIN: 1.4 MG/DL (ref 0–0.4)
CALCIUM SERPL-MCNC: 9.5 MG/DL (ref 8.6–10.2)
CHLORIDE BLD-SCNC: 105 MMOL/L (ref 98–107)
CO2: 21 MMOL/L (ref 22–29)
CREAT SERPL-MCNC: 1 MG/DL (ref 0.5–1)
EOSINOPHILS ABSOLUTE: 0 E9/L (ref 0.05–0.5)
EOSINOPHILS RELATIVE PERCENT: 0 % (ref 0–6)
GFR SERPL CREATININE-BSD FRML MDRD: 55 ML/MIN/1.73
GLUCOSE BLD-MCNC: 122 MG/DL (ref 74–99)
HCT VFR BLD CALC: 38.1 % (ref 34–48)
HEMOGLOBIN: 12.1 G/DL (ref 11.5–15.5)
IMMATURE GRANULOCYTES #: 0.03 E9/L
IMMATURE GRANULOCYTES %: 0.5 % (ref 0–5)
LYMPHOCYTES ABSOLUTE: 0.33 E9/L (ref 1.5–4)
LYMPHOCYTES RELATIVE PERCENT: 5.9 % (ref 20–42)
MCH RBC QN AUTO: 29.7 PG (ref 26–35)
MCHC RBC AUTO-ENTMCNC: 31.8 % (ref 32–34.5)
MCV RBC AUTO: 93.6 FL (ref 80–99.9)
MONOCYTES ABSOLUTE: 0.22 E9/L (ref 0.1–0.95)
MONOCYTES RELATIVE PERCENT: 3.9 % (ref 2–12)
NEUTROPHILS ABSOLUTE: 5.04 E9/L (ref 1.8–7.3)
NEUTROPHILS RELATIVE PERCENT: 89.7 % (ref 43–80)
ORGANISM: ABNORMAL
ORGANISM: ABNORMAL
PDW BLD-RTO: 13.9 FL (ref 11.5–15)
PLATELET # BLD: 239 E9/L (ref 130–450)
PMV BLD AUTO: 9.7 FL (ref 7–12)
POTASSIUM SERPL-SCNC: 4.2 MMOL/L (ref 3.5–5)
PROCALCITONIN: 0.11 NG/ML (ref 0–0.08)
RBC # BLD: 4.07 E12/L (ref 3.5–5.5)
SODIUM BLD-SCNC: 138 MMOL/L (ref 132–146)
TOTAL PROTEIN: 6.6 G/DL (ref 6.4–8.3)
URINE CULTURE, ROUTINE: ABNORMAL
URINE CULTURE, ROUTINE: ABNORMAL
WBC # BLD: 5.6 E9/L (ref 4.5–11.5)

## 2022-10-21 PROCEDURE — 80053 COMPREHEN METABOLIC PANEL: CPT

## 2022-10-21 PROCEDURE — 97530 THERAPEUTIC ACTIVITIES: CPT | Performed by: PHYSICAL THERAPIST

## 2022-10-21 PROCEDURE — 6360000002 HC RX W HCPCS: Performed by: INTERNAL MEDICINE

## 2022-10-21 PROCEDURE — 97161 PT EVAL LOW COMPLEX 20 MIN: CPT | Performed by: PHYSICAL THERAPIST

## 2022-10-21 PROCEDURE — 6370000000 HC RX 637 (ALT 250 FOR IP): Performed by: INTERNAL MEDICINE

## 2022-10-21 PROCEDURE — 1200000000 HC SEMI PRIVATE

## 2022-10-21 PROCEDURE — 36415 COLL VENOUS BLD VENIPUNCTURE: CPT

## 2022-10-21 PROCEDURE — 84145 PROCALCITONIN (PCT): CPT

## 2022-10-21 PROCEDURE — 2580000003 HC RX 258: Performed by: INTERNAL MEDICINE

## 2022-10-21 PROCEDURE — C9113 INJ PANTOPRAZOLE SODIUM, VIA: HCPCS | Performed by: INTERNAL MEDICINE

## 2022-10-21 PROCEDURE — 2700000000 HC OXYGEN THERAPY PER DAY

## 2022-10-21 PROCEDURE — A4216 STERILE WATER/SALINE, 10 ML: HCPCS | Performed by: INTERNAL MEDICINE

## 2022-10-21 PROCEDURE — 94640 AIRWAY INHALATION TREATMENT: CPT

## 2022-10-21 PROCEDURE — 85025 COMPLETE CBC W/AUTO DIFF WBC: CPT

## 2022-10-21 RX ORDER — LEVOFLOXACIN 5 MG/ML
250 INJECTION, SOLUTION INTRAVENOUS EVERY 24 HOURS
Status: DISCONTINUED | OUTPATIENT
Start: 2022-10-22 | End: 2022-10-21 | Stop reason: DRUGHIGH

## 2022-10-21 RX ORDER — LEVOFLOXACIN 5 MG/ML
500 INJECTION, SOLUTION INTRAVENOUS EVERY 24 HOURS
Status: DISCONTINUED | OUTPATIENT
Start: 2022-10-21 | End: 2022-10-21

## 2022-10-21 RX ORDER — LEVOFLOXACIN 5 MG/ML
500 INJECTION, SOLUTION INTRAVENOUS ONCE
Status: COMPLETED | OUTPATIENT
Start: 2022-10-21 | End: 2022-10-21

## 2022-10-21 RX ORDER — AMOXICILLIN 500 MG/1
500 CAPSULE ORAL EVERY 8 HOURS SCHEDULED
Status: DISCONTINUED | OUTPATIENT
Start: 2022-10-21 | End: 2022-10-23

## 2022-10-21 RX ORDER — LEVOFLOXACIN 5 MG/ML
250 INJECTION, SOLUTION INTRAVENOUS EVERY 24 HOURS
Status: DISCONTINUED | OUTPATIENT
Start: 2022-10-22 | End: 2022-10-23

## 2022-10-21 RX ADMIN — DEXAMETHASONE SODIUM PHOSPHATE 6 MG: 10 INJECTION INTRAMUSCULAR; INTRAVENOUS at 00:41

## 2022-10-21 RX ADMIN — PANTOPRAZOLE SODIUM 40 MG: 40 INJECTION, POWDER, FOR SOLUTION INTRAVENOUS at 08:25

## 2022-10-21 RX ADMIN — PROCHLORPERAZINE EDISYLATE 5 MG: 5 INJECTION INTRAMUSCULAR; INTRAVENOUS at 00:41

## 2022-10-21 RX ADMIN — IPRATROPIUM BROMIDE AND ALBUTEROL 1 PUFF: 20; 100 SPRAY, METERED RESPIRATORY (INHALATION) at 12:47

## 2022-10-21 RX ADMIN — BUDESONIDE AND FORMOTEROL FUMARATE DIHYDRATE 2 PUFF: 80; 4.5 AEROSOL RESPIRATORY (INHALATION) at 18:32

## 2022-10-21 RX ADMIN — ENOXAPARIN SODIUM 30 MG: 100 INJECTION SUBCUTANEOUS at 21:00

## 2022-10-21 RX ADMIN — AZITHROMYCIN MONOHYDRATE 500 MG: 250 TABLET ORAL at 08:26

## 2022-10-21 RX ADMIN — LEVOFLOXACIN 500 MG: 5 INJECTION, SOLUTION INTRAVENOUS at 13:44

## 2022-10-21 RX ADMIN — AMOXICILLIN 500 MG: 500 CAPSULE ORAL at 13:43

## 2022-10-21 RX ADMIN — BUDESONIDE AND FORMOTEROL FUMARATE DIHYDRATE 2 PUFF: 80; 4.5 AEROSOL RESPIRATORY (INHALATION) at 08:18

## 2022-10-21 RX ADMIN — ENOXAPARIN SODIUM 30 MG: 100 INJECTION SUBCUTANEOUS at 08:24

## 2022-10-21 RX ADMIN — AMOXICILLIN 500 MG: 500 CAPSULE ORAL at 22:23

## 2022-10-21 RX ADMIN — IPRATROPIUM BROMIDE AND ALBUTEROL 1 PUFF: 20; 100 SPRAY, METERED RESPIRATORY (INHALATION) at 08:18

## 2022-10-21 RX ADMIN — Medication 100 MG: at 08:25

## 2022-10-21 RX ADMIN — IPRATROPIUM BROMIDE AND ALBUTEROL 1 PUFF: 20; 100 SPRAY, METERED RESPIRATORY (INHALATION) at 18:32

## 2022-10-21 RX ADMIN — SERTRALINE 50 MG: 50 TABLET, FILM COATED ORAL at 08:25

## 2022-10-21 RX ADMIN — Medication 2000 UNITS: at 08:25

## 2022-10-21 RX ADMIN — IPRATROPIUM BROMIDE AND ALBUTEROL 1 PUFF: 20; 100 SPRAY, METERED RESPIRATORY (INHALATION) at 22:55

## 2022-10-21 NOTE — PROGRESS NOTES
Dr. Rafa English, DO,    Your patient is on a medication that requires a renal dose adjustment. Renal Function Assessment:    Date Body Weight IBW  Adjusted BW SCr  CrCl Dialysis status   10/21/2022 145 lb (65.8 kg)  Ideal body weight: 61.6 kg (135 lb 12.9 oz)  Adjusted ideal body weight: 63.3 kg (139 lb 7.7 oz) Serum creatinine: 1 mg/dL 10/21/22 0906  Estimated creatinine clearance: 41 mL/min N/a     Estimated Creatinine Clearance: 41 mL/min (based on SCr of 1 mg/dL). Recent Labs     10/19/22  0811 10/20/22  1139 10/21/22  0906   BUN 20 18 18   CREATININE 0.9 0.8 1.0       Pharmacy has renally dose-adjusted the following medication(s):    Date Original Order Renally Adjusted Order   10/21/2022 Levaquin 500mg q24H Levaquin 500 mg once, followed by 250 mg every 24 hours. These changes were made per protocol according to the [facility] Renal Function-Based Dose Adjustments Policy    *Please note this dose may need readjusted if your patient's renal function significantly improves. Please contact pharmacy with any questions regarding these changes. Katie Garcia, PharmD.   10/21/2022   1:09 PM

## 2022-10-21 NOTE — PLAN OF CARE
Problem: Safety - Adult  Goal: Free from fall injury  10/21/2022 1942 by Bhavani Jose RN  Outcome: Progressing  10/21/2022 1942 by Bhavani Jose RN  Outcome: Progressing     Problem: ABCDS Injury Assessment  Goal: Absence of physical injury  10/21/2022 1942 by Bhavani Jose RN  Outcome: Progressing  10/21/2022 1942 by Bhavani Jose RN  Outcome: Progressing     Problem: Skin/Tissue Integrity  Goal: Absence of new skin breakdown  Description: 1. Monitor for areas of redness and/or skin breakdown  2. Assess vascular access sites hourly  3. Every 4-6 hours minimum:  Change oxygen saturation probe site  4. Every 4-6 hours:  If on nasal continuous positive airway pressure, respiratory therapy assess nares and determine need for appliance change or resting period.   10/21/2022 1942 by Bhavani Jose RN  Outcome: Progressing  10/21/2022 1942 by Bhavani Jose RN  Outcome: Progressing     Problem: Respiratory - Adult  Goal: Achieves optimal ventilation and oxygenation  10/21/2022 1942 by Bhavani Jose RN  Outcome: Progressing  10/21/2022 1942 by Bhavani Jose RN  Outcome: Progressing

## 2022-10-21 NOTE — CARE COORDINATION
10/21/2022 1245 CM note: POSITIVE COVID 10/18/22. ACP completed. Pt resides with her  in a 2 story home,3 step entry with rail. Bedroom on second level with full flight of steps with rail. She is wearing [email or web address] and does not have home o2. Pt and her dtr are hopeful for pt to return home at d/c and states her dtr will provide transportation. Pt declines [facility]/HHC. CM/SW to follow for possible home o2 needs.  Aisha GUERRERO

## 2022-10-21 NOTE — PROGRESS NOTES
Physical Therapy  Physical Therapy Initial Evaluation/Plan of Care    Room #:  6957/7469-97  Patient Name: Antonio Márquez  YOB: 1940  MRN: [id number]    Date of Service: 10/21/2022     Tentative placement recommendation: Subacute rehab  Equipment recommendation: To be determined      Evaluating Physical Therapist: Koffi Grace PT, DPT #[id number]      Specific Provider Orders/Date/Referring Provider :     10/20/22 1545    PT eval and treat  Start:  10/20/22 1545,   End:  10/20/22 1545,   ONE TIME,   Standing Count:  1 Occurrences,   R         [place], DO Acknowledge New     Admitting Diagnosis:   Acute respiratory failure with hypoxia ([address] [J96.01]  COVID-19 virus infection [U07.1]      Surgery: none  Visit Diagnoses         Codes    COVID-19 virus infection     U07.1            Patient Active Problem List   Diagnosis    Rectal cancer ([address]    Pneumatosis intestinalis    Enteritis    Severe protein-calorie malnutrition ([address]    Altered mental status    Fatigue due to excessive exertion    Macular degeneration    Cancer ([address]    Perforation of sigmoid colon (HCC)    PAF (paroxysmal atrial fibrillation) (HCC)    Ischemic bowel disease (HCC)    Hx of blood clots    SBO (small bowel obstruction) ([address]    Chronic renal disease, stage III ([address] [261688]    Acute respiratory failure with hypoxia ([address]    COVID-19        ASSESSMENT of Current Deficits Patient exhibits decreased strength, balance, and endurance impairing functional mobility, transfers, gait , gait distance, and tolerance to activity. Pt mild-moderately unsteady with ambulation with decreased tolerance for function with pt O2 on 5L dropping from 92%-84% following ambulation. Pt agreeable to second walk with same results. Pt performed incentive spirometer 5x with instruction.         PHYSICAL THERAPY  PLAN OF CARE       Physical therapy plan of care is established based on physician order,  patient diagnosis and clinical assessment    Current Treatment Recommendations:    -Bed Mobility: Lower extremity exercises  and Trunk control activities   -Sitting Balance: Incorporate reaching activities to activate trunk muscles , Hands on support to maintain midline , Facilitate active trunk muscle engagement , Facilitate postural control in all planes , and Engage in core activities to allow for movement within base of support   -Standing Balance: Perform strengthening exercises in standing to promote motor control with or without upper extremity support , Instruct patient on adequate base of support to maintain balance, and Challenge balance utilizing reaching  activities beyond center of gravity    -Transfers: Provide instruction on proper hand and foot position for adequate transfer of weight onto lower extremities and use of gait device if needed, Cues for hand placement, technique and safety.  Provide stabilization to prevent fall , Facilitate weight shift forward on to lower extremities and provide necessary stabilization of bilateral lower extremities , Support transfer of weight on to lower extremities, and Assist with extension of knees trunk and hip to accept weight transfer   -Gait: Gait training, Standing activities to improve: base of support, weight shift, weight bearing , Exercises to improve trunk control, Exercises to improve hip and knee control, Performance of protected weight bearing activities, and Activities to increase weight bearing   -Endurance: Utilize Supervised activities to increase level of endurance to allow for safe functional mobility including transfers and gait  and Use graduated activities to promote good breathing techniques and provide support and education to maximize respiratory function  -Stairs: Stair training with instruction on proper technique and hand placement on rail    PT long term treatment goals are located in below grid    Patient and or family understand(s) diagnosis, prognosis, and plan of care.    Frequency of treatments: Patient will be seen  daily. Prior Level of Function: Patient ambulated with [facility]   Rehab Potential: fair + for baseline    Past medical history:   Past Medical History:   Diagnosis Date    Cancer [facility])     colorectal     Chronic renal disease, stage III ([facility] [843758] 6/27/2022    History of blood transfusion     Hx of blood clots     dvt, on eliquis    Ischemic bowel disease ([facility]     Macular degeneration     MDRO (multiple drug resistant organisms) resistance     PAF (paroxysmal atrial fibrillation) ([facility]     Tinnitus      Past Surgical History:   Procedure Laterality Date    ABDOMEN SURGERY      colostomy & reversal    CHOLECYSTECTOMY  07    COLONOSCOPY      CYSTOSCOPY N/A 12/4/2018    CYSTOSCOPY RETROGRADE PYELOGRAM performed by Orlando Diaz MD at [address] [facility], ESOPHAGUS      HAND SURGERY      carpel tunnel rigth hand    HYSTERECTOMY (CERVIX STATUS UNKNOWN)      ILEOSTOMY OR JEJUNOSTOMY      OTHER SURGICAL HISTORY  11/06/2017    diagnostic laparoscopy with exploratory laparotomy and reduction of internal hernia and closure of mesenteric defect, oversewing of multiple serosal tears    WA LAP,SURG,COLECT,TOT,W/PROCTECT,W/ILEOST N/A 6/28/2018    LAPAROSCOPIC DIVERTING COLOSTOMY performed by Burak Crocker MD at [address] [facility] LAP,SURG,COLECT,TOT,W/PROCTECT,W/ILEOST N/A 10/18/2018    LAPAROSCOPIC REVISION LOOP COLOSTOMY, EXTENSIVE LYSIS OF ADHESIONS, DRAINAGE PELVIC ABSCESS, END COLOSTOMY performed by Michaela Avila MD at [facility] (CERVIX REMOVED)      UPPER GASTROINTESTINAL ENDOSCOPY N/A 7/18/2018    EGD BIOPSY performed by Lashawn Rodgers DO at [address] [facility]:    Precautions:  Up with assistance, falls, Droplet plus/COVID-19, hard of hearing, and ostomy    Social history: Patient lives with spouse in a two story home bedroom and bathroom 2nd floor full flight stairs bilateral Rail  with 4 steps  to enter with [employer] Walk in shower grab bars    Equipment owned: [address], [employer] Walker, and Jayesh      AM-[facility] Basic Mobility       AM-[facility] Mobility Inpatient   How much difficulty turning over in bed?: A Little  How much difficulty sitting down on / standing up from a chair with arms?: A Little  How much difficulty moving from lying on back to sitting on side of bed?: A Little  How much help from another person moving to and from a bed to a chair?: A Little  How much help from another person needed to walk in hospital room?: A Little  How much help from another person for climbing 3-5 steps with a railing?: A Lot  AM-PAC Inpatient Mobility Raw Score : 17  AM-PAC Inpatient T-Scale Score : 42.13  Mobility Inpatient CMS 0-100% Score: 50.57  Mobility Inpatient CMS G-Code Modifier : CK    Nursing cleared patient for PT evaluation. The admitting diagnosis and active problem list as listed above have been reviewed prior to the initiation of this evaluation. OBJECTIVE;   Initial Evaluation  Date: 10/21/2022 Treatment Date:     Short Term/ Long Term   Goals   Was pt agreeable to Eval/treatment? Yes  To be met in 3 days   Pain level   0/10       Bed Mobility    Rolling: Not assessed     Supine to sit: Not assessed     Sit to supine: Not assessed     Scooting: Not assessed     Rolling: Independent    Supine to sit:  Independent    Sit to supine: Independent    Scooting: Independent     Transfers Sit to stand: Minimal assist of 1   Sit to stand: Independent    Ambulation     2 x 25 feet using  wheeled walker with Min-ModA   for walker control, walker approximation, balance, upright, weight shift, and safety    > 100 feet using  wheeled walker with Modified Independent    Stair negotiation: ascended and descended   Not assessed     10 steps with 1 HR with Jerrod   ROM Within functional limits    Increase range of motion 10% of affected joints    Strength BUE:  refer to OT eval  RLE:  4-/5  LLE:  4-/5  Increase strength in affected mm groups by 1/3 grade   Balance Sitting EOB:  fair +  Dynamic Standing:  fair with [employer]  Sitting EOB:  good   Dynamic Standing: fair + with [employer]     Patient is Alert & Oriented x person, place, time, and situation and follows directions    Sensation:  Patient  denies numbness/tingling   Edema:  no   Endurance: fair  -    Vitals:  5 liters nasal cannula   Blood Pressure at rest  Blood Pressure during session    Heart Rate at rest  Heart Rate during session    SPO2 at rest 92%  SPO2 during session 84-92%     Patient education  Patient educated on role of Physical Therapy, risks of immobility, safety and plan of care, energy conservation,  importance of mobility while in hospital , purse lip breathing, ankle pumps, quad set and glut set for edema control, blood clot prevention, importance and purpose of adaptive device and adjusted to proper height for the patient. , safety , O2 line management and safety , and proper use/technique of incentive spirometer     Patient response to education:   Pt verbalized understanding Pt demonstrated skill Pt requires further education in this area   Yes Partial Yes      Treatment:  Patient practiced and was instructed/facilitated in the following treatment: Patient Sat edge of bed 10 minutes with Supervision  to increase dynamic sitting balance and activity tolerance. Pt performed bed mobility, transfers, ambulation in room, instruction for PLB. Therapeutic Exercises:  not performed      At end of session, patient in chair with     call light and phone within reach,  all lines and tubes intact, nursing notified. Patient would benefit from continued skilled Physical Therapy to improve functional independence and quality of life.          Patient's/ family goals   get stronger    Time in  1030  Time out  1114    Total Treatment Time  24 minutes    Evaluation time includes thorough review of current medical information, gathering information on past medical history/social history and prior level of function, completion of standardized testing/informal observation of tasks, assessment of data, and development of Plan of care and goals.      CPT codes:  Low Complexity PT evaluation (72727)  Therapeutic activities (53954)   24 minutes  2 unit(s)    Marzena Hidalgo, PT

## 2022-10-21 NOTE — PROGRESS NOTES
Subjective: The patient is awake and alert. No problems overnight. Denies chest pain, angina, and dyspnea. Denies abdominal pain. Tolerating diet. Has severe nausea from meds; no vomiting. Current Facility-Administered Medications: amoxicillin (AMOXIL) capsule 500 mg, 500 mg, Oral, 3 times per day  [COMPLETED] levoFLOXacin (LEVAQUIN) 500 MG/100ML infusion 500 mg, 500 mg, IntraVENous, Once **FOLLOWED BY** [START ON 10/22/2022] levoFLOXacin (LEVAQUIN) 250 MG/50ML infusion 250 mg, 250 mg, IntraVENous, Q24H  pantoprazole (PROTONIX) 40 mg in sodium chloride (PF) 10 mL injection, 40 mg, IntraVENous, Daily  sertraline (ZOLOFT) tablet 50 mg, 50 mg, Oral, Daily  acetaminophen (TYLENOL) tablet 650 mg, 650 mg, Oral, Q4H PRN  ascorbic acid (VITAMIN C) tablet 1,000 mg, 1,000 mg, Oral, Daily  vitamin B-6 (PYRIDOXINE) tablet 50 mg, 50 mg, Oral, Daily  albuterol-ipratropium (COMBIVENT RESPIMAT)  MCG/ACT inhaler 1 puff, 1 puff, Inhalation, Q6H  budesonide-formoterol (SYMBICORT) 80-4.5 MCG/ACT inhaler 2 puff, 2 puff, Inhalation, BID  thiamine mononitrate tablet 100 mg, 100 mg, Oral, Daily  prochlorperazine (COMPAZINE) injection 5 mg, 5 mg, IntraVENous, Q6H PRN  polyethylene glycol (GLYCOLAX) packet 17 g, 17 g, Oral, Daily PRN  dexamethasone (DECADRON) injection 6 mg, 6 mg, IntraVENous, Q24H  enoxaparin Sodium (LOVENOX) injection 30 mg, 30 mg, SubCUTAneous, BID  0.9 % sodium chloride infusion, , IntraVENous, Continuous  Vitamin D (CHOLECALCIFEROL) tablet 2,000 Units, 2,000 Units, Oral, Daily  perflutren lipid microspheres (DEFINITY) injection 1.65 mg, 1.5 mL, IntraVENous, ONCE PRN    Objective:    BP (!) 143/71   Pulse 75   Temp 97.5 °F (36.4 °C) (Axillary)   Resp 15   Ht 5' 7\" (1.702 m)   Wt 145 lb (65.8 kg)   SpO2 91%   BMI 22.71 kg/m²   In: 240 [P.O.:240]  Out: 1500    In: 240   Out: 1500    RRR, no murmurs, no gallops, or rubs.   CTA bilaterally, no wheeze, rales or rhonchi  bowel sounds present, nontender, nondistended, no masses  No clubbing, cyanosis, or edema  No neuro changes     CBC with Differential:    Lab Results   Component Value Date/Time    WBC 5.6 10/21/2022 09:06 AM    RBC 4.07 10/21/2022 09:06 AM    HGB 12.1 10/21/2022 09:06 AM    HCT 38.1 10/21/2022 09:06 AM     10/21/2022 09:06 AM    MCV 93.6 10/21/2022 09:06 AM    MCH 29.7 10/21/2022 09:06 AM    MCHC 31.8 10/21/2022 09:06 AM    RDW 13.9 10/21/2022 09:06 AM    NRBC 0.5 11/30/2017 06:40 AM    BLASTSPCT 1.0 11/06/2017 11:35 PM    METASPCT 1.7 10/19/2022 08:11 AM    LYMPHOPCT 5.9 10/21/2022 09:06 AM    PROMYELOPCT 0.5 11/26/2017 05:30 AM    MONOPCT 3.9 10/21/2022 09:06 AM    MYELOPCT 1.7 10/18/2018 05:38 AM    BASOPCT 0.0 10/21/2022 09:06 AM    MONOSABS 0.22 10/21/2022 09:06 AM    LYMPHSABS 0.33 10/21/2022 09:06 AM    EOSABS 0.00 10/21/2022 09:06 AM    BASOSABS 0.00 10/21/2022 09:06 AM     CMP:    Lab Results   Component Value Date/Time     10/21/2022 09:06 AM    K 4.2 10/21/2022 09:06 AM    K 4.9 07/16/2021 06:15 AM     10/21/2022 09:06 AM    CO2 21 10/21/2022 09:06 AM    BUN 18 10/21/2022 09:06 AM    CREATININE 1.0 10/21/2022 09:06 AM    GFRAA 57 12/20/2021 12:50 PM    LABGLOM 55 10/21/2022 09:06 AM    GLUCOSE 122 10/21/2022 09:06 AM    PROT 6.6 10/21/2022 09:06 AM    LABALBU 3.4 10/21/2022 09:06 AM    CALCIUM 9.5 10/21/2022 09:06 AM    BILITOT 0.4 10/21/2022 09:06 AM    ALKPHOS 91 10/21/2022 09:06 AM     10/21/2022 09:06 AM    ALT 87 10/21/2022 09:06 AM     BMP:    Lab Results   Component Value Date/Time     10/21/2022 09:06 AM    K 4.2 10/21/2022 09:06 AM    K 4.9 07/16/2021 06:15 AM     10/21/2022 09:06 AM    CO2 21 10/21/2022 09:06 AM    BUN 18 10/21/2022 09:06 AM    LABALBU 3.4 10/21/2022 09:06 AM    CREATININE 1.0 10/21/2022 09:06 AM    CALCIUM 9.5 10/21/2022 09:06 AM    GFRAA 57 12/20/2021 12:50 PM    LABGLOM 55 10/21/2022 09:06 AM    GLUCOSE 122 10/21/2022 09:06 AM     Hepatic Function Panel:    Lab Results Component Value Date/Time    ALKPHOS 91 10/21/2022 09:06 AM    ALT 87 10/21/2022 09:06 AM     10/21/2022 09:06 AM    PROT 6.6 10/21/2022 09:06 AM    BILITOT 0.4 10/21/2022 09:06 AM    BILIDIR 0.4 11/12/2017 10:08 AM    IBILI 0.3 11/12/2017 10:08 AM    LABALBU 3.4 10/21/2022 09:06 AM     Albumin:    Lab Results   Component Value Date/Time    LABALBU 3.4 10/21/2022 09:06 AM     Last 3 Troponin:    Lab Results   Component Value Date/Time    TROPONINI <0.01 10/16/2018 11:12 PM    TROPONINI 0.01 12/21/2017 02:40 AM    TROPONINI 0.02 12/19/2017 10:09 AM     FLP:    Lab Results   Component Value Date/Time    TRIG 88 10/19/2022 08:11 AM    HDL 62 10/19/2022 08:11 AM    LDLCALC 77 10/19/2022 08:11 AM    LABVLDL 18 10/19/2022 08:11 AM     TSH:    Lab Results   Component Value Date/Time    TSH 1.420 10/19/2022 08:11 AM          Patient Active Problem List   Diagnosis    Rectal cancer ([address]    Pneumatosis intestinalis    Enteritis    Severe protein-calorie malnutrition (HCC)    Altered mental status    Fatigue due to excessive exertion    Macular degeneration    Cancer (HCC)    Perforation of sigmoid colon (HCC)    PAF (paroxysmal atrial fibrillation) ([address]    Ischemic bowel disease (HCC)    Hx of blood clots    SBO (small bowel obstruction) ([address]    Chronic renal disease, stage III ([address] [533243]    Acute respiratory failure with hypoxia ([address]    COVID-19     Imp/Plan:     Acute hypoxic respiratory failure  Acute/subacute COVID-19 pneumonia-began 10/6/22  History of paroxysmal atrial fib-currently sinus rhythm  No evidence AMI/ACS  History of macular degeneration  History of perforated bowel- ischemic bowel;   Pneumatosis intestinalis  Generalized weakness and fatigue  History of left femoral DVT  History of colorectal CA  [place]        Monitor closely  Oxygen  Tocilizumab IV preferred over baricitinib po immune modulator given nausea and ischemic bowel issues  Echocardiogram  IV dexamethasone  Vitamin C 1 g p.o. daily, zinc 50 mg p.o. daily, vitamin B1 100 mg p.o. daily, vitamin B6 50 mg daily, vitamin D 2000 units p.o. daily  Incentive spirometry every 2 hours while awake  Consult pharmacy for COVID protocol  Urine for streptococcal Legionella antigen  Urine culture  Lovenox 30 mg subcu twice daily  C-reactive protein, D-dimer every other day  Procalcitonin and CRP decreasing  Azithromycin 500 mg p.o. daily x5 days  Did stop zinc/vitamins given nausea; improved

## 2022-10-21 NOTE — PROGRESS NOTES
Department of Internal Medicine        CHIEF COMPLAINT: Generalized fatigue    Reason for Admission: Acute hypoxic respiratory failure, COVID-19 infection    HISTORY OF PRESENT ILLNESS:      The patient is a 78 y.o. female who presents with generalized fatigue over the last 2 weeks. Patient also complains of a mildly scant clear productive cough. She denies any problem with any chest or abdominal pain. Patient denies any history of leg swelling or pain in her calf or thighs. Patient also has some intermittent episodes of nausea but this is somewhat chronic. She denies any fever/chills, unusual shortness of breath at rest,vomiting. Patient has a history of prior tobacco use for about 20 years about a half a pack to-pack of cigarettes a day. Patient is very hard of hearing. Patient has history of colorectal CA along with chronic kidney disease stage IIIa, history of DVT, ischemic bowel, macular degeneration, paroxysmal atrial fib. Patient was seen in the ED and was hypoxic and was O2 saturation on room air at rest and with placed on O2. Patient tested positive for COVID and 2 view chest x-ray showed suspected early bibasilar infiltrates slightly worse on the right. BUN/creatinine is 25/1.1 with normal electrolytes WBC 5.5 and hemoglobin 12.4. Urinalysis showed large leukocyte esterase and greater than 20 WBCs. 10/19/2022  Patient was seen examined on telemetry floor. Patient looks a little bit better today. Patient denies any problem chest pain, abdominal pain, nausea vomiting or fever and chills. Patient has an occasional nonproductive cough. CT of the chest showed patchy peripheral predominant groundglass opacities in both lungs with moderate pulmonary emphysema with evidence of prior granulomatous disease. Left hydronephrosis is incompletely imaged but has a history of chronic left hydronephrosis. BUN/creatinine was 29/0.9 with procalcitonin 0.17. CRP yesterday was 9.1.   Liver enzymes normal with a WBC of 2.1 hemoglobin 11.6. D-dimer 596 yesterday. Her temperature is 97.7 with heart rate sinus bradycardia with a rate of 51 with a blood pressure 123/59. O2 sat 97% on 4 L nasal cannula. 10/20/2022  Patient seen examined on telemetry floor. Patient states she feels fairly good. Patient's appetite is not as good today as yesterday. She denies any chest pain, abdominal pain, nausea or vomiting. The patient has an occasional nonproductive cough. Echocardiogram showed EF 60% with stage I diastolic dysfunction with trace MR, +1 TR, pulmonary pressure 22 mmHg. Temperature is 98.3 with heart rate of 64 blood pressure 134/66. O2 sat 92-96% on 4 L nasal cannula. 10/21/2022  Patient seen examined on telemetry floor. Patient denies any problem chest pain, abdominal pain, nausea/vomiting. The patient states yesterday afternoon she did have a productive cough but today she does not. She denies shortness of breath at rest.  Patient does on O2 nasal cannula. WBC is 5.6 hemoglobin 12.1.  BUN/creatinine was 18/1.0 with normal electrolytes. Patient has elevation of transaminase today. Temperature is 97.5 with heart rate 75 blood pressure 143/71. O2 sat 90% on 4 L nasal cannula. Urinalysis showed 50,000 Pseudomonas along with 25,000 colony count of Enterococcus. Antibiotics were adjusted.     Past Medical History:    Past Medical History:   Diagnosis Date    Cancer [facility])     colorectal     Chronic renal disease, stage III [facility]) [[id number]] 6/27/2022    History of blood transfusion     Hx of blood clots     dvt, on eliquis    Ischemic bowel disease ([facility]     Macular degeneration     MDRO (multiple drug resistant organisms) resistance     PAF (paroxysmal atrial fibrillation) (HCC)     Tinnitus      Past Surgical History:    Past Surgical History:   Procedure Laterality Date    ABDOMEN SURGERY      colostomy & reversal    CHOLECYSTECTOMY  07    COLONOSCOPY      CYSTOSCOPY N/A 12/4/2018    CYSTOSCOPY RETROGRADE PYELOGRAM performed by Katia Abreu MD at [address] [facility], ESOPHAGUS      HAND SURGERY      carpel tunnel rigth hand    HYSTERECTOMY (CERVIX STATUS UNKNOWN)      ILEOSTOMY OR JEJUNOSTOMY      OTHER SURGICAL HISTORY  11/06/2017    diagnostic laparoscopy with exploratory laparotomy and reduction of internal hernia and closure of mesenteric defect, oversewing of multiple serosal tears    SD LAP,SURG,COLECT,TOT,W/PROCTECT,W/ILEOST N/A 6/28/2018    LAPAROSCOPIC DIVERTING COLOSTOMY performed by Jai Shook MD at [address] [facility] LAP,SURG,COLECT,TOT,W/PROCTECT,W/ILEOST N/A 10/18/2018    LAPAROSCOPIC REVISION LOOP COLOSTOMY, EXTENSIVE LYSIS OF ADHESIONS, DRAINAGE PELVIC ABSCESS, END COLOSTOMY performed by Vaughn Mackey MD at [address] [place] [address] (CERVIX REMOVED)      UPPER GASTROINTESTINAL ENDOSCOPY N/A 7/18/2018    EGD BIOPSY performed by Rachel Centeno DO at [place] [address]       Medications Prior to Admission:    @  Prior to Admission medications    Medication Sig Start Date End Date Taking?  Authorizing Provider   Multiple Vitamins-Minerals (THERAPEUTIC MULTIVITAMIN-MINERALS) tablet Take 1 tablet by mouth daily    Historical Provider, MD   omeprazole (PRILOSEC) 20 MG delayed release capsule Take 20 mg by mouth daily    Historical Provider, MD   acetaminophen (TYLENOL) 325 MG tablet Take 2 tablets by mouth every 4 hours as needed for Pain or Fever 12/26/17   Rachel Centeno DO   sertraline (ZOLOFT) 50 MG tablet Take 50 mg by mouth daily    Historical Provider, MD       Allergies:  Aspirin and Tape [facility]]    Social History:   Social History     Socioeconomic History    Marital status:      Spouse name: Not on file    Number of children: Not on file    Years of education: Not on file    Highest education level: Not on file   Occupational History    Not on file   Tobacco Use    Smoking status: Former     Years: 20.00     Types: Cigarettes    Smokeless tobacco: Never Tobacco comments:     quit at age 44    Substance and Sexual Activity    Alcohol use: No     Comment: socially    Drug use: No    Sexual activity: Not on file   Other Topics Concern    Not on file   Social History Narrative    Lives in [facility] with  (Lb [place] / Di). Worked in an office. Social Determinants of Health     Financial Resource Strain: Low Risk     Difficulty of Paying Living Expenses: Not hard at all   Food Insecurity: No Food Insecurity    Worried About Running Out of Food in the Last Year: Never true    Ran Out of Food in the Last Year: Never true   Transportation Needs: Not on file   Physical Activity: Not on file   Stress: Not on file   Social Connections: Not on file   Intimate Partner Violence: Not on file   Housing Stability: Not on file       Family History:   Family History   Problem Relation Age of Onset    Arthritis Mother     Kidney Disease Father     Cancer Father         kidney cancer       REVIEW OF SYSTEMS:    Gen: + Generalized fatigue. Patient denies any lightheadedness or dizziness. No LOC or syncope. No fevers or chills. HEENT: No earache, sore throat or nasal congestion. Resp: Denies cough, hemoptysis or sputum production. Cardiac: Denies chest pain, SOB, diaphoresis or palpitations. GI: No nausea, vomiting, diarrhea or constipation. No melena or hematochezia. : No urinary complaints, dysuria, hematuria or frequency. MSK: No extremity weakness, paralysis or paresthesias. PHYSICAL EXAM:    Vitals:  BP (!) 143/71   Pulse 75   Temp 97.5 °F (36.4 °C) (Axillary)   Resp 15   Ht 5' 7\" (1.702 m)   Wt 145 lb (65.8 kg)   SpO2 90%   BMI 22.71 kg/m²     General:  This is a 78 y.o. yo female who is alert and oriented in mild distress secondary to above  HEENT:  Head is normocephalic and atraumatic, PERRLA, EOMI, mucus membranes moist with no pharyngeal erythema or exudate. Neck:  Supple with no carotid bruits, JVD or thyromegaly.   No cervical adenopathy  CV:  Regular rate and rhythm, no murmurs  Lungs: Coarse decreased breath sounds to auscultation bilaterally with with scattered rails right greater than left and mild rhonchi in the right, no wheezes  Abdomen:  Soft, nontender, nondistended, bowel sounds present  Extremities:  No edema, peripheral pulses intact bilaterally  Neuro:  Cranial nerves II-XII grossly intact; motor and sensory function intact with no focal deficits  Skin:  No rashes, lesions or wounds    DATA:  CBC with Differential:    Lab Results   Component Value Date/Time    WBC 5.6 10/21/2022 09:06 AM    RBC 4.07 10/21/2022 09:06 AM    HGB 12.1 10/21/2022 09:06 AM    HCT 38.1 10/21/2022 09:06 AM     10/21/2022 09:06 AM    MCV 93.6 10/21/2022 09:06 AM    MCH 29.7 10/21/2022 09:06 AM    MCHC 31.8 10/21/2022 09:06 AM    RDW 13.9 10/21/2022 09:06 AM    NRBC 0.5 11/30/2017 06:40 AM    BLASTSPCT 1.0 11/06/2017 11:35 PM    METASPCT 1.7 10/19/2022 08:11 AM    LYMPHOPCT 4.3 10/19/2022 08:11 AM    PROMYELOPCT 0.5 11/26/2017 05:30 AM    MONOPCT 2.6 10/19/2022 08:11 AM    MYELOPCT 1.7 10/18/2018 05:38 AM    BASOPCT 0.0 10/19/2022 08:11 AM    MONOSABS 0.06 10/19/2022 08:11 AM    LYMPHSABS 0.08 10/19/2022 08:11 AM    EOSABS 0.00 10/19/2022 08:11 AM    BASOSABS 0.00 10/19/2022 08:11 AM     CMP:    Lab Results   Component Value Date/Time     10/20/2022 11:39 AM    K 4.2 10/20/2022 11:39 AM    K 4.9 07/16/2021 06:15 AM     10/20/2022 11:39 AM    CO2 19 10/20/2022 11:39 AM    BUN 18 10/20/2022 11:39 AM    CREATININE 0.8 10/20/2022 11:39 AM    GFRAA 57 12/20/2021 12:50 PM    LABGLOM >60 10/20/2022 11:39 AM    GLUCOSE 122 10/20/2022 11:39 AM    PROT 6.9 10/20/2022 11:39 AM    LABALBU 3.5 10/20/2022 11:39 AM    CALCIUM 9.4 10/20/2022 11:39 AM    BILITOT 0.2 10/20/2022 11:39 AM    ALKPHOS 75 10/20/2022 11:39 AM    AST 33 10/20/2022 11:39 AM    ALT 26 10/20/2022 11:39 AM     Magnesium:    Lab Results   Component Value Date/Time    MG 1.8 10/19/2022 08:11 AM     Phosphorus:    Lab Results   Component Value Date/Time    PHOS 2.9 10/19/2022 08:11 AM     PT/INR:    Lab Results   Component Value Date/Time    PROTIME 11.6 12/03/2018 02:50 PM    INR 1.0 12/03/2018 02:50 PM     Troponin:    Lab Results   Component Value Date/Time    TROPONINI <0.01 10/16/2018 11:12 PM     U/A:    Lab Results   Component Value Date/Time    COLORU Yellow 10/18/2022 02:22 PM    PROTEINU 30 10/18/2022 02:22 PM    PHUR 6.0 10/18/2022 02:22 PM    WBCUA >20 10/18/2022 02:22 PM    RBCUA >20 10/18/2022 02:22 PM    BACTERIA MODERATE 10/18/2022 02:22 PM    CLARITYU CLOUDY 10/18/2022 02:22 PM    SPECGRAV 1.020 10/18/2022 02:22 PM    LEUKOCYTESUR LARGE 10/18/2022 02:22 PM    UROBILINOGEN 0.2 10/18/2022 02:22 PM    BILIRUBINUR SMALL 10/18/2022 02:22 PM    BLOODU LARGE 10/18/2022 02:22 PM    GLUCOSEU Negative 10/18/2022 02:22 PM    AMORPHOUS FEW 11/23/2018 04:20 AM     ABG:    Lab Results   Component Value Date/Time    PH 7.514 10/20/2018 08:04 PM    PCO2 28.5 10/20/2018 08:04 PM    PO2 67.6 10/20/2018 08:04 PM    HCO3 22.4 10/20/2018 08:04 PM    BE 0.2 10/20/2018 08:04 PM    O2SAT 94.4 10/20/2018 08:04 PM     HgBA1c:  No results found for: LABA1C  FLP:    Lab Results   Component Value Date/Time    TRIG 88 10/19/2022 08:11 AM    HDL 62 10/19/2022 08:11 AM    LDLCALC 77 10/19/2022 08:11 AM    LABVLDL 18 10/19/2022 08:11 AM     TSH:    Lab Results   Component Value Date/Time    TSH 1.420 10/19/2022 08:11 AM     IRON:    Lab Results   Component Value Date/Time    IRON 44 06/28/2018 06:00 AM     LIPASE:    Lab Results   Component Value Date/Time    LIPASE 34 12/20/2021 12:50 PM       ASSESSMENT AND PLAN:      Patient Active Problem List    Diagnosis Date Noted    Acute respiratory failure with hypoxia ([facility] 10/18/2022    Chronic renal disease, stage III ([facility] [669073] 06/27/2022    SBO (small bowel obstruction) ([facility] 07/16/2021    PAF (paroxysmal atrial fibrillation) (HCC)     Ischemic bowel disease ([facility]     Hx of blood clots     Perforation of sigmoid colon ([facility] 10/17/2018    Altered mental status 06/25/2018    Fatigue due to excessive exertion 06/25/2018    Macular degeneration     Cancer ([facility]     Enteritis 12/21/2017    Severe protein-calorie malnutrition ([facility] 12/21/2017    Pneumatosis intestinalis     Rectal cancer ([facility] 11/17/2016     Impression:  1. Acute hypoxic respiratory failure  2. Positive COVID-19 infection with bibasilar infiltrate  3. Bacteriuria- UTI  4. History of paroxysmal atrial fibs-currently sinus rhythm  5. History of macular degeneration  6. History of perforated bowel- ischemic bowel  7. Generalized weakness and fatigue  8. History of left femoral DVT  9. History of colorectal CA   10. [place]  11. Chronic left hydronephrosis    Plan:  Admit to monitored bed  Home medications reviewed  Monitor heart rate, blood pressure, O2 saturation  General diet  O2 nasal cannula and titrate to keep O2 sat greater than 92-96%  Sputum culture and sensitivity  Combivent Respimat-1 puff 4 times daily  Symbicort 80/4.5 inhaler-2 puffs twice daily  Decadron 6 mg IV push daily  Vitamin C 1 g p.o. daily, zinc 50 mg p.o. daily, vitamin B1 100 mg p.o. daily, vitamin B6 50 mg daily, vitamin D 2000 units p.o. daily  Incentive spirometry every 2 hours while awake  Consult pharmacy for COVID protocol  Urine for streptococcal Legionella antigen  Urine culture  Lovenox 30 mg subcu twice daily  C-reactive protein, D-dimer every other day  Procalcitonin 0.17  Consult cardiology  CT of the chest-see progress note 10/19  Repeat procalcitonin in a.m. Levaquin 500 mg IV piggyback daily  Amoxicillin 500 mg 3 times daily    CMP, CBC in a.m.     Terra Swann, DO, D.O.  10/21/2022  10:20 AM

## 2022-10-22 LAB
ALBUMIN SERPL-MCNC: 3.5 G/DL (ref 3.5–5.2)
ALP BLD-CCNC: 80 U/L (ref 35–104)
ALT SERPL-CCNC: 65 U/L (ref 0–32)
ANION GAP SERPL CALCULATED.3IONS-SCNC: 10 MMOL/L (ref 7–16)
AST SERPL-CCNC: 54 U/L (ref 0–31)
BASOPHILS ABSOLUTE: 0 E9/L (ref 0–0.2)
BASOPHILS RELATIVE PERCENT: 0 % (ref 0–2)
BILIRUB SERPL-MCNC: 0.3 MG/DL (ref 0–1.2)
BUN BLDV-MCNC: 15 MG/DL (ref 6–23)
C-REACTIVE PROTEIN: 0.5 MG/DL (ref 0–0.4)
CALCIUM SERPL-MCNC: 9.3 MG/DL (ref 8.6–10.2)
CHLORIDE BLD-SCNC: 107 MMOL/L (ref 98–107)
CO2: 21 MMOL/L (ref 22–29)
CREAT SERPL-MCNC: 1.1 MG/DL (ref 0.5–1)
D DIMER: 536 NG/ML DDU
EOSINOPHILS ABSOLUTE: 0 E9/L (ref 0.05–0.5)
EOSINOPHILS RELATIVE PERCENT: 0 % (ref 0–6)
GFR SERPL CREATININE-BSD FRML MDRD: 49 ML/MIN/1.73
GLUCOSE BLD-MCNC: 125 MG/DL (ref 74–99)
HCT VFR BLD CALC: 35.2 % (ref 34–48)
HEMOGLOBIN: 11 G/DL (ref 11.5–15.5)
IMMATURE GRANULOCYTES #: 0.04 E9/L
IMMATURE GRANULOCYTES %: 0.9 % (ref 0–5)
LYMPHOCYTES ABSOLUTE: 0.19 E9/L (ref 1.5–4)
LYMPHOCYTES RELATIVE PERCENT: 4.2 % (ref 20–42)
MCH RBC QN AUTO: 29.2 PG (ref 26–35)
MCHC RBC AUTO-ENTMCNC: 31.3 % (ref 32–34.5)
MCV RBC AUTO: 93.4 FL (ref 80–99.9)
MONOCYTES ABSOLUTE: 0.15 E9/L (ref 0.1–0.95)
MONOCYTES RELATIVE PERCENT: 3.3 % (ref 2–12)
NEUTROPHILS ABSOLUTE: 4.13 E9/L (ref 1.8–7.3)
NEUTROPHILS RELATIVE PERCENT: 91.6 % (ref 43–80)
PDW BLD-RTO: 13.5 FL (ref 11.5–15)
PLATELET # BLD: 208 E9/L (ref 130–450)
PMV BLD AUTO: 9.6 FL (ref 7–12)
POTASSIUM SERPL-SCNC: 4.1 MMOL/L (ref 3.5–5)
RBC # BLD: 3.77 E12/L (ref 3.5–5.5)
SODIUM BLD-SCNC: 138 MMOL/L (ref 132–146)
TOTAL PROTEIN: 6.2 G/DL (ref 6.4–8.3)
WBC # BLD: 4.5 E9/L (ref 4.5–11.5)

## 2022-10-22 PROCEDURE — 2580000003 HC RX 258: Performed by: INTERNAL MEDICINE

## 2022-10-22 PROCEDURE — 85025 COMPLETE CBC W/AUTO DIFF WBC: CPT

## 2022-10-22 PROCEDURE — A4216 STERILE WATER/SALINE, 10 ML: HCPCS | Performed by: INTERNAL MEDICINE

## 2022-10-22 PROCEDURE — 1200000000 HC SEMI PRIVATE

## 2022-10-22 PROCEDURE — 6360000002 HC RX W HCPCS: Performed by: INTERNAL MEDICINE

## 2022-10-22 PROCEDURE — 85378 FIBRIN DEGRADE SEMIQUANT: CPT

## 2022-10-22 PROCEDURE — 86140 C-REACTIVE PROTEIN: CPT

## 2022-10-22 PROCEDURE — 94640 AIRWAY INHALATION TREATMENT: CPT

## 2022-10-22 PROCEDURE — 36415 COLL VENOUS BLD VENIPUNCTURE: CPT

## 2022-10-22 PROCEDURE — 2700000000 HC OXYGEN THERAPY PER DAY

## 2022-10-22 PROCEDURE — 80053 COMPREHEN METABOLIC PANEL: CPT

## 2022-10-22 PROCEDURE — 6370000000 HC RX 637 (ALT 250 FOR IP): Performed by: INTERNAL MEDICINE

## 2022-10-22 PROCEDURE — C9113 INJ PANTOPRAZOLE SODIUM, VIA: HCPCS | Performed by: INTERNAL MEDICINE

## 2022-10-22 RX ORDER — PANTOPRAZOLE SODIUM 40 MG/1
40 TABLET, DELAYED RELEASE ORAL
Status: DISCONTINUED | OUTPATIENT
Start: 2022-10-23 | End: 2022-10-24 | Stop reason: HOSPADM

## 2022-10-22 RX ADMIN — DEXAMETHASONE SODIUM PHOSPHATE 6 MG: 10 INJECTION INTRAMUSCULAR; INTRAVENOUS at 00:38

## 2022-10-22 RX ADMIN — IPRATROPIUM BROMIDE AND ALBUTEROL 1 PUFF: 20; 100 SPRAY, METERED RESPIRATORY (INHALATION) at 09:55

## 2022-10-22 RX ADMIN — IPRATROPIUM BROMIDE AND ALBUTEROL 1 PUFF: 20; 100 SPRAY, METERED RESPIRATORY (INHALATION) at 16:24

## 2022-10-22 RX ADMIN — OXYCODONE HYDROCHLORIDE AND ACETAMINOPHEN 1000 MG: 500 TABLET ORAL at 08:21

## 2022-10-22 RX ADMIN — ENOXAPARIN SODIUM 30 MG: 100 INJECTION SUBCUTANEOUS at 08:21

## 2022-10-22 RX ADMIN — BUDESONIDE AND FORMOTEROL FUMARATE DIHYDRATE 2 PUFF: 80; 4.5 AEROSOL RESPIRATORY (INHALATION) at 04:53

## 2022-10-22 RX ADMIN — SODIUM CHLORIDE: 900 INJECTION, SOLUTION INTRAVENOUS at 21:02

## 2022-10-22 RX ADMIN — IPRATROPIUM BROMIDE AND ALBUTEROL 1 PUFF: 20; 100 SPRAY, METERED RESPIRATORY (INHALATION) at 04:54

## 2022-10-22 RX ADMIN — SERTRALINE 50 MG: 50 TABLET, FILM COATED ORAL at 08:21

## 2022-10-22 RX ADMIN — AMOXICILLIN 500 MG: 500 CAPSULE ORAL at 05:49

## 2022-10-22 RX ADMIN — BUDESONIDE AND FORMOTEROL FUMARATE DIHYDRATE 2 PUFF: 80; 4.5 AEROSOL RESPIRATORY (INHALATION) at 16:24

## 2022-10-22 RX ADMIN — Medication 100 MG: at 08:21

## 2022-10-22 RX ADMIN — SODIUM CHLORIDE: 900 INJECTION, SOLUTION INTRAVENOUS at 05:49

## 2022-10-22 RX ADMIN — PYRIDOXINE HCL TAB 50 MG 50 MG: 50 TAB at 08:21

## 2022-10-22 RX ADMIN — Medication 2000 UNITS: at 08:21

## 2022-10-22 RX ADMIN — IPRATROPIUM BROMIDE AND ALBUTEROL 1 PUFF: 20; 100 SPRAY, METERED RESPIRATORY (INHALATION) at 21:31

## 2022-10-22 RX ADMIN — AMOXICILLIN 500 MG: 500 CAPSULE ORAL at 21:01

## 2022-10-22 RX ADMIN — PANTOPRAZOLE SODIUM 40 MG: 40 INJECTION, POWDER, FOR SOLUTION INTRAVENOUS at 08:22

## 2022-10-22 RX ADMIN — ENOXAPARIN SODIUM 30 MG: 100 INJECTION SUBCUTANEOUS at 21:01

## 2022-10-22 RX ADMIN — AMOXICILLIN 500 MG: 500 CAPSULE ORAL at 13:49

## 2022-10-22 RX ADMIN — LEVOFLOXACIN 250 MG: 5 INJECTION, SOLUTION INTRAVENOUS at 13:49

## 2022-10-22 NOTE — PROGRESS NOTES
Internal Medicine Progress Note    [facility]    Larissa Curiel. Maribel Cardenas., ARVIND.TANIOGiancarloI. Dejah Robledo D.O., AUDREYOGiancarloI. Bee Snow D.O. Twila Elizalde, MSN, APRN, NP-C  Guillermina Montero. Gustavo Macias, MSN, APRN-CNP     Primary Care Physician: Cecille Gilmore APRN - CNP   Admitting Physician:  Mica Lentz DO  Admission date and time: 10/18/2022 12:13 PM    Room:  [address] [place] [address]  Admitting diagnosis: Acute [facility] failure with [facility] [J96.01]  COVID-19 virus infection [U07.1]    Patient Name: Eliazar Baer  MRN: [id number]    Date of Service: 10/22/2022     Subjective:  Iwona Mtz is a 78 y.o. female who was seen and examined today,10/22/2022, at the bedside. We are currently covering for Dr. Sherry Schulte and extensive chart review was undertaken. Iwona Mtz is a very polite and pleasant 13-year-old female who is currently hospitalized in the setting of COVID-pneumonia resulting in acute hypoxic respiratory failure. She is improving on a regular basis and has been weaned to 3 L of nasal cannula oxygen. She has some coughing during my examination today but states she feels better in general.  She is tolerating a diet and I visualized her utilizing the incentive spirometer. She has been ambulatory with the assistance of a walker to the bathroom. She is maintained on maximal COVID protocol. Her nausea has improved. Review of System:   Constitutional:   Admits to improving malaise and fatigue. HEENT:   Denies ear pain, sore throat, sinus or eye problems. Nasal cannula oxygen is in place at 3 L. Cardiovascular:   Denies any chest pain, irregular heartbeats, or palpitations. Respiratory:   Admits to mild shortness of breath with exertion. She has less shortness of breath while in bed. She continues to have intermittent coughing. Gastrointestinal:   Complete resolution of her nausea. She is tolerating breakfast during my examination.   Genitourinary:    Denies any urgency, frequency, hematuria. Voiding  without difficulty. Extremities:   Denies lower extremity swelling, edema or cyanosis. Neurology:    Denies any headache or focal neurological deficits, admits to improving malaise and fatigue. Improving weakness. Psch:   Denies being anxious or depressed. Musculoskeletal:    Denies  myalgias, joint complaints or back pain. Integumentary:   Denies any rashes, ulcers, or excoriations. Denies bruising. Hematologic/Lymphatic:  Denies bruising or bleeding. Physical Exam:  No intake/output data recorded. Intake/Output Summary (Last 24 hours) at 10/22/2022 0921  Last data filed at 10/22/2022 0622  Gross per 24 hour   Intake --   Output 1750 ml   Net -1750 ml   I/O last 3 completed shifts: In: 0   Out: 2400 [Urine:900; Stool:1500]  Patient Vitals for the past 96 hrs (Last 3 readings):   Weight   10/18/22 1219 145 lb (65.8 kg)     Vital Signs:   Blood pressure (!) 140/71, pulse 74, temperature 97.7 °F (36.5 °C), temperature source Oral, resp. rate 17, height 5' 7\" (1.702 m), weight 145 lb (65.8 kg), SpO2 92 %, not currently breastfeeding. General appearance:  Alert, responsive, oriented to person, place, and time. Well preserved, alert, no distress. Head:  Normocephalic. No masses, lesions or tenderness. Eyes:  PERRLA. EOMI. Sclera clear. Buccal mucosa moist.  ENT:  Ears normal. Mucosa normal.  Nasal cannula oxygen is in place at 3 L. Neck:    Supple. Trachea midline. No thyromegaly. No JVD. No bruits. Heart:    Rhythm regular. Rate controlled. Systolic murmur. Lungs:    Symmetrical.  Good aeration throughout. I visualized a coughing paroxysm with mild sputum production. Abdomen:   Soft. Non-tender. Non-distended. Bowel sounds positive. No organomegaly or masses. No pain on palpation. Extremities:    Peripheral pulses present. No peripheral edema. No ulcers. No cyanosis. No clubbing. Neurologic:    Alert x 3. No focal deficit.   Cranial nerves grossly intact. No focal weakness. Psych:   Behavior is normal. Mood appears normal. Speech is not rapid and/or pressured. Musculoskeletal:   Spine ROM normal. Muscular strength intact. Gait not assessed. Integumentary:  No rashes  Skin normal color and texture.   Genitalia/Breast:  Deferred    Medication:  Scheduled Meds:   [START ON 10/23/2022] pantoprazole  40 mg Oral QAM AC    amoxicillin  500 mg Oral 3 times per day    levofloxacin  250 mg IntraVENous Q24H    sertraline  50 mg Oral Daily    vitamin C  1,000 mg Oral Daily    vitamin B-6  50 mg Oral Daily    albuterol-ipratropium  1 puff Inhalation Q6H    budesonide-formoterol  2 puff Inhalation BID    vitamin B-1  100 mg Oral Daily    dexamethasone  6 mg IntraVENous Q24H    enoxaparin  30 mg SubCUTAneous BID    Vitamin D  2,000 Units Oral Daily     Continuous Infusions:   sodium chloride 65 mL/hr at 10/22/22 0549       Objective Data:  CBC with Differential:    Lab Results   Component Value Date/Time    WBC 4.5 10/22/2022 05:29 AM    RBC 3.77 10/22/2022 05:29 AM    HGB 11.0 10/22/2022 05:29 AM    HCT 35.2 10/22/2022 05:29 AM     10/22/2022 05:29 AM    MCV 93.4 10/22/2022 05:29 AM    MCH 29.2 10/22/2022 05:29 AM    MCHC 31.3 10/22/2022 05:29 AM    RDW 13.5 10/22/2022 05:29 AM    NRBC 0.5 11/30/2017 06:40 AM    BLASTSPCT 1.0 11/06/2017 11:35 PM    METASPCT 1.7 10/19/2022 08:11 AM    LYMPHOPCT 4.2 10/22/2022 05:29 AM    PROMYELOPCT 0.5 11/26/2017 05:30 AM    MONOPCT 3.3 10/22/2022 05:29 AM    MYELOPCT 1.7 10/18/2018 05:38 AM    BASOPCT 0.0 10/22/2022 05:29 AM    MONOSABS 0.15 10/22/2022 05:29 AM    LYMPHSABS 0.19 10/22/2022 05:29 AM    EOSABS 0.00 10/22/2022 05:29 AM    BASOSABS 0.00 10/22/2022 05:29 AM     BMP:    Lab Results   Component Value Date/Time     10/22/2022 05:29 AM    K 4.1 10/22/2022 05:29 AM    K 4.9 07/16/2021 06:15 AM     10/22/2022 05:29 AM    CO2 21 10/22/2022 05:29 AM    BUN 15 10/22/2022 05:29 AM    LABALBU 3.5 10/22/2022 05:29 AM CREATININE 1.1 10/22/2022 05:29 AM    CALCIUM 9.3 10/22/2022 05:29 AM    GFRAA 57 12/20/2021 12:50 PM    LABGLOM 49 10/22/2022 05:29 AM    GLUCOSE 125 10/22/2022 05:29 AM       Assessment:  COVID-pneumonia resulting in acute respiratory failure with hypoxia  Paroxysmal atrial fibrillation  History of bowel perforation in the past  History of colorectal cancer  Generalized malaise and fatigue  History of DVT    Plan:   Extensive chart review has been undertaken as we are currently covering for Dr. Sunday Lunsford. Arliss Bones seems to be progressing as expected in the setting of COVID-pneumonia. Maximal COVID protocol is being employed including IV corticosteroids. Inhaler therapy is being provided and I personally visualized her Arliss Bones utilizing the incentive spirometer effectively. I would consider increasing the dose of Levaquin and transitioning to oral corticosteroids at this point. Her renal function is stable. She has become increasingly ambulatory and will continue working with the therapy teams. I anticipate additional nasal cannula oxygen weaning today. Continue current therapy. See orders for further plan of care. More than 50% of my  time was spent at the bedside counseling/coordinating care with the patient and/or family with face to face contact. This time was spent reviewing notes and laboratory data as well as instructing and counseling the patient. Time I spent with the family or surrogate(s) is included only if the patient was incapable of providing the necessary information or participating in medical decisions. I also discussed the differential diagnosis and all of the proposed management plans with the patient and individuals accompanying the patient. Arliss Bones requires this high level of physician care and nursing on the IMC/Telemetry unit due the complexity of decision management and chance of rapid decline or death. Continued cardiac monitoring and higher level of nursing are required.  I am readily available for any further decision-making and intervention.      Karine Jimenez DO, F.A.C.O.I.  10/22/2022  9:21 AM

## 2022-10-22 NOTE — PROGRESS NOTES
Subjective: The patient is awake and alert. No problems overnight. Denies chest pain, angina, and dyspnea. Denies abdominal pain. Tolerating diet. Has severe nausea from meds; no vomiting. Current Facility-Administered Medications: [START ON 10/23/2022] pantoprazole (PROTONIX) tablet 40 mg, 40 mg, Oral, QAM AC  amoxicillin (AMOXIL) capsule 500 mg, 500 mg, Oral, 3 times per day  [COMPLETED] levoFLOXacin (LEVAQUIN) 500 MG/100ML infusion 500 mg, 500 mg, IntraVENous, Once **FOLLOWED BY** levoFLOXacin (LEVAQUIN) 250 MG/50ML infusion 250 mg, 250 mg, IntraVENous, Q24H  sertraline (ZOLOFT) tablet 50 mg, 50 mg, Oral, Daily  acetaminophen (TYLENOL) tablet 650 mg, 650 mg, Oral, Q4H PRN  ascorbic acid (VITAMIN C) tablet 1,000 mg, 1,000 mg, Oral, Daily  vitamin B-6 (PYRIDOXINE) tablet 50 mg, 50 mg, Oral, Daily  albuterol-ipratropium (COMBIVENT RESPIMAT)  MCG/ACT inhaler 1 puff, 1 puff, Inhalation, Q6H  budesonide-formoterol (SYMBICORT) 80-4.5 MCG/ACT inhaler 2 puff, 2 puff, Inhalation, BID  thiamine mononitrate tablet 100 mg, 100 mg, Oral, Daily  prochlorperazine (COMPAZINE) injection 5 mg, 5 mg, IntraVENous, Q6H PRN  polyethylene glycol (GLYCOLAX) packet 17 g, 17 g, Oral, Daily PRN  dexamethasone (DECADRON) injection 6 mg, 6 mg, IntraVENous, Q24H  enoxaparin Sodium (LOVENOX) injection 30 mg, 30 mg, SubCUTAneous, BID  0.9 % sodium chloride infusion, , IntraVENous, Continuous  Vitamin D (CHOLECALCIFEROL) tablet 2,000 Units, 2,000 Units, Oral, Daily  perflutren lipid microspheres (DEFINITY) injection 1.65 mg, 1.5 mL, IntraVENous, ONCE PRN    Objective:    BP (!) 140/71   Pulse 74   Temp 97.7 °F (36.5 °C) (Oral)   Resp 17   Ht 5' 7\" (1.702 m)   Wt 145 lb (65.8 kg)   SpO2 92%   BMI 22.71 kg/m²   In: 0   Out: 1750    In: 0   Out: 1750 [Urine:900]   RRR, no murmurs, no gallops, or rubs.   CTA bilaterally, no wheeze, rales or rhonchi  bowel sounds present, nontender, nondistended, no masses  No clubbing, cyanosis, or edema  No neuro changes     CBC with Differential:    Lab Results   Component Value Date/Time    WBC 4.5 10/22/2022 05:29 AM    RBC 3.77 10/22/2022 05:29 AM    HGB 11.0 10/22/2022 05:29 AM    HCT 35.2 10/22/2022 05:29 AM     10/22/2022 05:29 AM    MCV 93.4 10/22/2022 05:29 AM    MCH 29.2 10/22/2022 05:29 AM    MCHC 31.3 10/22/2022 05:29 AM    RDW 13.5 10/22/2022 05:29 AM    NRBC 0.5 11/30/2017 06:40 AM    BLASTSPCT 1.0 11/06/2017 11:35 PM    METASPCT 1.7 10/19/2022 08:11 AM    LYMPHOPCT 4.2 10/22/2022 05:29 AM    PROMYELOPCT 0.5 11/26/2017 05:30 AM    MONOPCT 3.3 10/22/2022 05:29 AM    MYELOPCT 1.7 10/18/2018 05:38 AM    BASOPCT 0.0 10/22/2022 05:29 AM    MONOSABS 0.15 10/22/2022 05:29 AM    LYMPHSABS 0.19 10/22/2022 05:29 AM    EOSABS 0.00 10/22/2022 05:29 AM    BASOSABS 0.00 10/22/2022 05:29 AM     CMP:    Lab Results   Component Value Date/Time     10/22/2022 05:29 AM    K 4.1 10/22/2022 05:29 AM    K 4.9 07/16/2021 06:15 AM     10/22/2022 05:29 AM    CO2 21 10/22/2022 05:29 AM    BUN 15 10/22/2022 05:29 AM    CREATININE 1.1 10/22/2022 05:29 AM    GFRAA 57 12/20/2021 12:50 PM    LABGLOM 49 10/22/2022 05:29 AM    GLUCOSE 125 10/22/2022 05:29 AM    PROT 6.2 10/22/2022 05:29 AM    LABALBU 3.5 10/22/2022 05:29 AM    CALCIUM 9.3 10/22/2022 05:29 AM    BILITOT 0.3 10/22/2022 05:29 AM    ALKPHOS 80 10/22/2022 05:29 AM    AST 54 10/22/2022 05:29 AM    ALT 65 10/22/2022 05:29 AM     BMP:    Lab Results   Component Value Date/Time     10/22/2022 05:29 AM    K 4.1 10/22/2022 05:29 AM    K 4.9 07/16/2021 06:15 AM     10/22/2022 05:29 AM    CO2 21 10/22/2022 05:29 AM    BUN 15 10/22/2022 05:29 AM    LABALBU 3.5 10/22/2022 05:29 AM    CREATININE 1.1 10/22/2022 05:29 AM    CALCIUM 9.3 10/22/2022 05:29 AM    GFRAA 57 12/20/2021 12:50 PM    LABGLOM 49 10/22/2022 05:29 AM    GLUCOSE 125 10/22/2022 05:29 AM     Hepatic Function Panel:    Lab Results   Component Value Date/Time    [facility] 80 10/22/2022 05:29 AM    ALT 65 10/22/2022 05:29 AM    AST 54 10/22/2022 05:29 AM    PROT 6.2 10/22/2022 05:29 AM    BILITOT 0.3 10/22/2022 05:29 AM    BILIDIR 0.4 11/12/2017 10:08 AM    IBILI 0.3 11/12/2017 10:08 AM    LABALBU 3.5 10/22/2022 05:29 AM     Albumin:    Lab Results   Component Value Date/Time    LABALBU 3.5 10/22/2022 05:29 AM     Last 3 Troponin:    Lab Results   Component Value Date/Time    TROPONINI <0.01 10/16/2018 11:12 PM    TROPONINI 0.01 12/21/2017 02:40 AM    TROPONINI 0.02 12/19/2017 10:09 AM     FLP:    Lab Results   Component Value Date/Time    TRIG 88 10/19/2022 08:11 AM    HDL 62 10/19/2022 08:11 AM    LDLCALC 77 10/19/2022 08:11 AM    LABVLDL 18 10/19/2022 08:11 AM     TSH:    Lab Results   Component Value Date/Time    TSH 1.420 10/19/2022 08:11 AM          Patient Active Problem List   Diagnosis    Rectal cancer ([address]    Pneumatosis intestinalis    Enteritis    Severe protein-calorie malnutrition (HCC)    Altered mental status    Fatigue due to excessive exertion    Macular degeneration    Cancer (HCC)    Perforation of sigmoid colon (HCC)    PAF (paroxysmal atrial fibrillation) ([address]    Ischemic bowel disease (HCC)    Hx of blood clots    SBO (small bowel obstruction) ([address]    Chronic renal disease, stage III ([address] [014028]    Acute respiratory failure with hypoxia ([address]    COVID-19     Imp/Plan:     Acute hypoxic respiratory failure  Acute/subacute COVID-19 pneumonia-began 10/6/22  History of paroxysmal atrial fib-currently sinus rhythm  No evidence AMI/ACS  History of macular degeneration  History of perforated bowel- ischemic bowel;   Pneumatosis intestinalis  Generalized weakness and fatigue  History of left femoral DVT  History of colorectal CA  [place]        Monitor closely  Oxygen  Tocilizumab IV preferred over baricitinib po immune modulator given nausea and ischemic bowel issues  Echocardiogram  IV dexamethasone  Vitamin C 1 g p.o. daily, zinc 50 mg p.o. daily, vitamin B1 100 mg p.o. daily, vitamin B6 50 mg daily, vitamin D 2000 units p.o. daily  Incentive spirometry every 2 hours while awake  Consult pharmacy for COVID protocol  Urine for streptococcal Legionella antigen  Urine culture  Lovenox 30 mg subcu twice daily  C-reactive protein, D-dimer every other day  Procalcitonin and CRP decreasing  Azithromycin 500 mg p.o. daily x5 days  Did stop zinc/vitamins given nausea; improved  No A Fib on rhythm review

## 2022-10-22 NOTE — PLAN OF CARE
Problem: Safety - Adult  Goal: Free from fall injury  Outcome: Progressing     Problem: Skin/Tissue Integrity  Goal: Absence of new skin breakdown  Description: 1. Monitor for areas of redness and/or skin breakdown  2. Assess vascular access sites hourly  3. Every 4-6 hours minimum:  Change oxygen saturation probe site  4. Every 4-6 hours:  If on nasal continuous positive airway pressure, respiratory therapy assess nares and determine need for appliance change or resting period.   Outcome: Progressing     Problem: Respiratory - Adult  Goal: Achieves optimal ventilation and oxygenation  Outcome: Progressing

## 2022-10-23 LAB
ALBUMIN SERPL-MCNC: 3.4 G/DL (ref 3.5–5.2)
ALP BLD-CCNC: 77 U/L (ref 35–104)
ALT SERPL-CCNC: 66 U/L (ref 0–32)
ANION GAP SERPL CALCULATED.3IONS-SCNC: 11 MMOL/L (ref 7–16)
AST SERPL-CCNC: 52 U/L (ref 0–31)
BASOPHILS ABSOLUTE: 0.01 E9/L (ref 0–0.2)
BASOPHILS RELATIVE PERCENT: 0.2 % (ref 0–2)
BILIRUB SERPL-MCNC: 0.4 MG/DL (ref 0–1.2)
BUN BLDV-MCNC: 15 MG/DL (ref 6–23)
CALCIUM SERPL-MCNC: 9.4 MG/DL (ref 8.6–10.2)
CHLORIDE BLD-SCNC: 106 MMOL/L (ref 98–107)
CO2: 22 MMOL/L (ref 22–29)
CREAT SERPL-MCNC: 0.9 MG/DL (ref 0.5–1)
EOSINOPHILS ABSOLUTE: 0.01 E9/L (ref 0.05–0.5)
EOSINOPHILS RELATIVE PERCENT: 0.2 % (ref 0–6)
GFR SERPL CREATININE-BSD FRML MDRD: >60 ML/MIN/1.73
GLUCOSE BLD-MCNC: 136 MG/DL (ref 74–99)
HCT VFR BLD CALC: 34.2 % (ref 34–48)
HEMOGLOBIN: 11 G/DL (ref 11.5–15.5)
IMMATURE GRANULOCYTES #: 0.08 E9/L
IMMATURE GRANULOCYTES %: 1.7 % (ref 0–5)
LYMPHOCYTES ABSOLUTE: 0.16 E9/L (ref 1.5–4)
LYMPHOCYTES RELATIVE PERCENT: 3.3 % (ref 20–42)
MCH RBC QN AUTO: 29.9 PG (ref 26–35)
MCHC RBC AUTO-ENTMCNC: 32.2 % (ref 32–34.5)
MCV RBC AUTO: 92.9 FL (ref 80–99.9)
MONOCYTES ABSOLUTE: 0.12 E9/L (ref 0.1–0.95)
MONOCYTES RELATIVE PERCENT: 2.5 % (ref 2–12)
NEUTROPHILS ABSOLUTE: 4.45 E9/L (ref 1.8–7.3)
NEUTROPHILS RELATIVE PERCENT: 92.1 % (ref 43–80)
PDW BLD-RTO: 13.6 FL (ref 11.5–15)
PLATELET # BLD: 220 E9/L (ref 130–450)
PMV BLD AUTO: 9.4 FL (ref 7–12)
POTASSIUM SERPL-SCNC: 4.2 MMOL/L (ref 3.5–5)
PROCALCITONIN: 0.11 NG/ML (ref 0–0.08)
RBC # BLD: 3.68 E12/L (ref 3.5–5.5)
SODIUM BLD-SCNC: 139 MMOL/L (ref 132–146)
TOTAL PROTEIN: 6.4 G/DL (ref 6.4–8.3)
WBC # BLD: 4.8 E9/L (ref 4.5–11.5)

## 2022-10-23 PROCEDURE — 6370000000 HC RX 637 (ALT 250 FOR IP): Performed by: NURSE PRACTITIONER

## 2022-10-23 PROCEDURE — 2700000000 HC OXYGEN THERAPY PER DAY

## 2022-10-23 PROCEDURE — 97530 THERAPEUTIC ACTIVITIES: CPT

## 2022-10-23 PROCEDURE — 80053 COMPREHEN METABOLIC PANEL: CPT

## 2022-10-23 PROCEDURE — 1200000000 HC SEMI PRIVATE

## 2022-10-23 PROCEDURE — 85025 COMPLETE CBC W/AUTO DIFF WBC: CPT

## 2022-10-23 PROCEDURE — 94640 AIRWAY INHALATION TREATMENT: CPT

## 2022-10-23 PROCEDURE — 6370000000 HC RX 637 (ALT 250 FOR IP): Performed by: INTERNAL MEDICINE

## 2022-10-23 PROCEDURE — 36415 COLL VENOUS BLD VENIPUNCTURE: CPT

## 2022-10-23 PROCEDURE — 6360000002 HC RX W HCPCS: Performed by: INTERNAL MEDICINE

## 2022-10-23 PROCEDURE — 84145 PROCALCITONIN (PCT): CPT

## 2022-10-23 RX ORDER — MAGNESIUM SULFATE 1 G/100ML
1000 INJECTION INTRAVENOUS PRN
Status: DISCONTINUED | OUTPATIENT
Start: 2022-10-23 | End: 2022-10-24 | Stop reason: HOSPADM

## 2022-10-23 RX ORDER — LEVOFLOXACIN 750 MG/1
750 TABLET ORAL EVERY OTHER DAY
Status: DISCONTINUED | OUTPATIENT
Start: 2022-10-23 | End: 2022-10-24 | Stop reason: HOSPADM

## 2022-10-23 RX ORDER — POTASSIUM CHLORIDE 20 MEQ/1
40 TABLET, EXTENDED RELEASE ORAL PRN
Status: DISCONTINUED | OUTPATIENT
Start: 2022-10-23 | End: 2022-10-24 | Stop reason: HOSPADM

## 2022-10-23 RX ORDER — POTASSIUM CHLORIDE 7.45 MG/ML
10 INJECTION INTRAVENOUS PRN
Status: DISCONTINUED | OUTPATIENT
Start: 2022-10-23 | End: 2022-10-24 | Stop reason: HOSPADM

## 2022-10-23 RX ORDER — DEXAMETHASONE 6 MG/1
6 TABLET ORAL
Status: DISCONTINUED | OUTPATIENT
Start: 2022-10-24 | End: 2022-10-24 | Stop reason: HOSPADM

## 2022-10-23 RX ORDER — GRANULES FOR ORAL 3 G/1
3 POWDER ORAL ONCE
Status: COMPLETED | OUTPATIENT
Start: 2022-10-23 | End: 2022-10-23

## 2022-10-23 RX ADMIN — Medication 2000 UNITS: at 09:57

## 2022-10-23 RX ADMIN — BUDESONIDE AND FORMOTEROL FUMARATE DIHYDRATE 2 PUFF: 80; 4.5 AEROSOL RESPIRATORY (INHALATION) at 19:05

## 2022-10-23 RX ADMIN — IPRATROPIUM BROMIDE AND ALBUTEROL 1 PUFF: 20; 100 SPRAY, METERED RESPIRATORY (INHALATION) at 21:54

## 2022-10-23 RX ADMIN — AMOXICILLIN 500 MG: 500 CAPSULE ORAL at 06:07

## 2022-10-23 RX ADMIN — IPRATROPIUM BROMIDE AND ALBUTEROL 1 PUFF: 20; 100 SPRAY, METERED RESPIRATORY (INHALATION) at 10:44

## 2022-10-23 RX ADMIN — LEVOFLOXACIN 750 MG: 750 TABLET, FILM COATED ORAL at 13:14

## 2022-10-23 RX ADMIN — OXYCODONE HYDROCHLORIDE AND ACETAMINOPHEN 1000 MG: 500 TABLET ORAL at 09:57

## 2022-10-23 RX ADMIN — SERTRALINE 50 MG: 50 TABLET, FILM COATED ORAL at 09:57

## 2022-10-23 RX ADMIN — Medication 100 MG: at 09:57

## 2022-10-23 RX ADMIN — IPRATROPIUM BROMIDE AND ALBUTEROL 1 PUFF: 20; 100 SPRAY, METERED RESPIRATORY (INHALATION) at 19:05

## 2022-10-23 RX ADMIN — IPRATROPIUM BROMIDE AND ALBUTEROL 1 PUFF: 20; 100 SPRAY, METERED RESPIRATORY (INHALATION) at 06:20

## 2022-10-23 RX ADMIN — PANTOPRAZOLE SODIUM 40 MG: 40 TABLET, DELAYED RELEASE ORAL at 06:07

## 2022-10-23 RX ADMIN — FOSFOMYCIN TROMETHAMINE 1 PACKET: 3 GRANULE, FOR SOLUTION ORAL at 16:46

## 2022-10-23 RX ADMIN — BUDESONIDE AND FORMOTEROL FUMARATE DIHYDRATE 2 PUFF: 80; 4.5 AEROSOL RESPIRATORY (INHALATION) at 06:20

## 2022-10-23 RX ADMIN — ENOXAPARIN SODIUM 30 MG: 100 INJECTION SUBCUTANEOUS at 21:13

## 2022-10-23 RX ADMIN — ENOXAPARIN SODIUM 30 MG: 100 INJECTION SUBCUTANEOUS at 09:57

## 2022-10-23 RX ADMIN — PYRIDOXINE HCL TAB 50 MG 50 MG: 50 TAB at 09:56

## 2022-10-23 RX ADMIN — DEXAMETHASONE SODIUM PHOSPHATE 6 MG: 10 INJECTION INTRAMUSCULAR; INTRAVENOUS at 00:55

## 2022-10-23 NOTE — PROGRESS NOTES
Subjective: The patient is awake and alert. No problems overnight. Denies chest pain, angina, and dyspnea. Denies abdominal pain. Tolerating diet. Has severe nausea from meds; no vomiting.     Current Facility-Administered Medications: levoFLOXacin (LEVAQUIN) tablet 750 mg, 750 mg, Oral, Every Other Day  [START ON 10/24/2022] dexamethasone (DECADRON) tablet 6 mg, 6 mg, Oral, Daily with breakfast  magnesium sulfate 1000 mg in dextrose 5% 100 mL IVPB, 1,000 mg, IntraVENous, PRN  sodium phosphate 10.53 mmol in sodium chloride 0.9 % 250 mL IVPB, 0.16 mmol/kg, IntraVENous, PRN **OR** sodium phosphate 21.06 mmol in sodium chloride 0.9 % 250 mL IVPB, 0.32 mmol/kg, IntraVENous, PRN  potassium chloride (KLOR-CON M) extended release tablet 40 mEq, 40 mEq, Oral, PRN **OR** potassium bicarb-citric acid (EFFER-K) effervescent tablet 40 mEq, 40 mEq, Oral, PRN **OR** potassium chloride 10 mEq/100 mL IVPB (Peripheral Line), 10 mEq, IntraVENous, PRN  pantoprazole (PROTONIX) tablet 40 mg, 40 mg, Oral, QAM AC  sertraline (ZOLOFT) tablet 50 mg, 50 mg, Oral, Daily  acetaminophen (TYLENOL) tablet 650 mg, 650 mg, Oral, Q4H PRN  ascorbic acid (VITAMIN C) tablet 1,000 mg, 1,000 mg, Oral, Daily  vitamin B-6 (PYRIDOXINE) tablet 50 mg, 50 mg, Oral, Daily  albuterol-ipratropium (COMBIVENT RESPIMAT)  MCG/ACT inhaler 1 puff, 1 puff, Inhalation, Q6H  budesonide-formoterol (SYMBICORT) 80-4.5 MCG/ACT inhaler 2 puff, 2 puff, Inhalation, BID  thiamine mononitrate tablet 100 mg, 100 mg, Oral, Daily  prochlorperazine (COMPAZINE) injection 5 mg, 5 mg, IntraVENous, Q6H PRN  polyethylene glycol (GLYCOLAX) packet 17 g, 17 g, Oral, Daily PRN  enoxaparin Sodium (LOVENOX) injection 30 mg, 30 mg, SubCUTAneous, BID  Vitamin D (CHOLECALCIFEROL) tablet 2,000 Units, 2,000 Units, Oral, Daily  perflutren lipid microspheres (DEFINITY) injection 1.65 mg, 1.5 mL, IntraVENous, ONCE PRN    Objective:    /86   Pulse 78   Temp 98 °F (36.7 °C) (Oral) Resp 18   Ht 5' 7\" (1.702 m)   Wt 145 lb (65.8 kg)   SpO2 94%   BMI 22.71 kg/m²   In: -   Out: 1725    In: -   Out: 9195 [Urine:1100]   RRR, no murmurs, no gallops, or rubs.   CTA bilaterally, no wheeze, rales or rhonchi  bowel sounds present, nontender, nondistended, no masses  No clubbing, cyanosis, or edema  No neuro changes     CBC with Differential:    Lab Results   Component Value Date/Time    WBC 4.8 10/23/2022 05:07 AM    RBC 3.68 10/23/2022 05:07 AM    HGB 11.0 10/23/2022 05:07 AM    HCT 34.2 10/23/2022 05:07 AM     10/23/2022 05:07 AM    MCV 92.9 10/23/2022 05:07 AM    MCH 29.9 10/23/2022 05:07 AM    MCHC 32.2 10/23/2022 05:07 AM    RDW 13.6 10/23/2022 05:07 AM    NRBC 0.5 11/30/2017 06:40 AM    BLASTSPCT 1.0 11/06/2017 11:35 PM    METASPCT 1.7 10/19/2022 08:11 AM    LYMPHOPCT 3.3 10/23/2022 05:07 AM    PROMYELOPCT 0.5 11/26/2017 05:30 AM    MONOPCT 2.5 10/23/2022 05:07 AM    MYELOPCT 1.7 10/18/2018 05:38 AM    BASOPCT 0.2 10/23/2022 05:07 AM    MONOSABS 0.12 10/23/2022 05:07 AM    LYMPHSABS 0.16 10/23/2022 05:07 AM    EOSABS 0.01 10/23/2022 05:07 AM    BASOSABS 0.01 10/23/2022 05:07 AM     CMP:    Lab Results   Component Value Date/Time     10/23/2022 05:07 AM    K 4.2 10/23/2022 05:07 AM    K 4.9 07/16/2021 06:15 AM     10/23/2022 05:07 AM    CO2 22 10/23/2022 05:07 AM    BUN 15 10/23/2022 05:07 AM    CREATININE 0.9 10/23/2022 05:07 AM    GFRAA 57 12/20/2021 12:50 PM    LABGLOM >60 10/23/2022 05:07 AM    GLUCOSE 136 10/23/2022 05:07 AM    PROT 6.4 10/23/2022 05:07 AM    LABALBU 3.4 10/23/2022 05:07 AM    CALCIUM 9.4 10/23/2022 05:07 AM    BILITOT 0.4 10/23/2022 05:07 AM    ALKPHOS 77 10/23/2022 05:07 AM    AST 52 10/23/2022 05:07 AM    ALT 66 10/23/2022 05:07 AM     BMP:    Lab Results   Component Value Date/Time     10/23/2022 05:07 AM    K 4.2 10/23/2022 05:07 AM    K 4.9 07/16/2021 06:15 AM     10/23/2022 05:07 AM    CO2 22 10/23/2022 05:07 AM    BUN 15 10/23/2022 05:07 AM    LABALBU 3.4 10/23/2022 05:07 AM    CREATININE 0.9 10/23/2022 05:07 AM    CALCIUM 9.4 10/23/2022 05:07 AM    GFRAA 57 12/20/2021 12:50 PM    LABGLOM >60 10/23/2022 05:07 AM    GLUCOSE 136 10/23/2022 05:07 AM     Hepatic Function Panel:    Lab Results   Component Value Date/Time    ALKPHOS 77 10/23/2022 05:07 AM    ALT 66 10/23/2022 05:07 AM    AST 52 10/23/2022 05:07 AM    PROT 6.4 10/23/2022 05:07 AM    BILITOT 0.4 10/23/2022 05:07 AM    BILIDIR 0.4 11/12/2017 10:08 AM    IBILI 0.3 11/12/2017 10:08 AM    LABALBU 3.4 10/23/2022 05:07 AM     Albumin:    Lab Results   Component Value Date/Time    LABALBU 3.4 10/23/2022 05:07 AM     Last 3 Troponin:    Lab Results   Component Value Date/Time    TROPONINI <0.01 10/16/2018 11:12 PM    TROPONINI 0.01 12/21/2017 02:40 AM    TROPONINI 0.02 12/19/2017 10:09 AM     FLP:    Lab Results   Component Value Date/Time    TRIG 88 10/19/2022 08:11 AM    HDL 62 10/19/2022 08:11 AM    LDLCALC 77 10/19/2022 08:11 AM    LABVLDL 18 10/19/2022 08:11 AM     TSH:    Lab Results   Component Value Date/Time    TSH 1.420 10/19/2022 08:11 AM          Patient Active Problem List   Diagnosis    Rectal cancer ([facility]    Pneumatosis intestinalis    Enteritis    Severe protein-calorie malnutrition (HCC)    Altered mental status    Fatigue due to excessive exertion    Macular degeneration    Cancer (HCC)    Perforation of sigmoid colon (HCC)    PAF (paroxysmal atrial fibrillation) (HCC)    Ischemic bowel disease (HCC)    Hx of blood clots    SBO (small bowel obstruction) (HCC)    Chronic renal disease, stage III ([facility] [189039]    Acute respiratory failure with hypoxia ([facility]    COVID-19     Imp/Plan:     Acute hypoxic respiratory failure  Acute/subacute COVID-19 pneumonia-began 10/6/22  History of paroxysmal atrial fib-currently sinus rhythm  No evidence AMI/ACS  History of macular degeneration  History of perforated bowel- ischemic bowel;   Pneumatosis intestinalis  Generalized weakness and fatigue  History of left femoral DVT  History of colorectal CA  [place]        Monitor closely  Oxygen  Echocardiogram stable findings; EF good  IV dexamethasone  Vitamin C 1 g p.o. daily, zinc 50 mg p.o. daily, vitamin B1 100 mg p.o. daily, vitamin B6 50 mg daily, vitamin D 2000 units p.o. daily  Incentive spirometry every 2 hours while awake  Consult pharmacy for COVID protocol  Urine for streptococcal Legionella antigen  Urine culture noted  Lovenox 30 mg subcu twice daily  C-reactive protein, D-dimer every other day  Procalcitonin and CRP decreasing  Azithromycin 500 mg p.o. daily x5 days  Did stop zinc/vitamins given nausea; improved  No A Fib on rhythm review  Tocilizumab IV preferred and was given 10/19/22  Sats stable on decreased O2  Can go home tomorrow if ok with Dr Neptali Booth

## 2022-10-23 NOTE — PROGRESS NOTES
Internal Medicine Progress Note    [facility]    [facility]. Hermilo Drew., F.A.C.O.I. Liberty Crain D.O., ARVIND.TANIOGiancarloIGiancarlo Singh D.O. Jena Givens, MSN, APRN, NP-C  Sariah Abebe. Elham Montalvo, MSN, APRN-CNP     Primary Care Physician: ISHAN Smith - CNP   Admitting Physician:  Unique Brush DO  Admission date and time: 10/18/2022 12:13 PM    Room:  [address] [place] [address]  Admitting diagnosis: Acute respiratory failure with hypoxia ([facility] [J96.01]  COVID-19 virus infection [U07.1]    Patient Name: Mabel Lopez  MRN: [id number]    Date of Service: 10/23/2022     Subjective:  Merlinda Glance is a 78 y.o. female who was seen and examined today,10/23/2022, at the bedside. She is feeling much better today. She has been weaned to minimal nasal cannula oxygen and we have discussed further weaning today. We have also reinforced nonpharmacologic management in the form of activity, ambulation and sitting up in chair throughout the day for breakfast or dinner. Cardiovascular team is following as well and the recommendations have been reviewed. Is tolerating a diet. She has no other acute symptoms or concerns. No family present for update. Review of System:   Constitutional:   Admits to improving malaise and fatigue. HEENT:   Denies ear pain, sore throat, sinus or eye problems. Nasal cannula oxygen is in place at 2L. Cardiovascular:   Denies any chest pain, irregular heartbeats, or palpitations. Respiratory:   Admits to mild shortness of breath with exertion. He has no resting dyspnea. She continues to have intermittent coughing to a lesser degree. Gastrointestinal:   No abdominal pain or nausea. Tolerating a diet. No bowel changes. Genitourinary:    Denies any urgency, frequency, hematuria. Voiding  without difficulty. Extremities:   Denies lower extremity swelling, edema or cyanosis.    Neurology:    Denies any headache or focal neurological deficits, admits to improving malaise and fatigue. Improving weakness. Psch:   Denies being anxious or depressed. Musculoskeletal:    Denies  myalgias, joint complaints or back pain. Integumentary:   Denies any rashes, ulcers, or excoriations. Denies bruising. Hematologic/Lymphatic:  Denies bruising or bleeding. Physical Exam:  No intake/output data recorded. Intake/Output Summary (Last 24 hours) at 10/23/2022 1004  Last data filed at 10/23/2022 0400  Gross per 24 hour   Intake --   Output 1400 ml   Net -1400 ml     I/O last 3 completed shifts:  In: -   Out: 2300 [Urine:1800; Stool:500]  No data found. Vital Signs:   Blood pressure (!) 140/61, pulse 76, temperature 97.9 °F (36.6 °C), temperature source Oral, resp. rate 18, height 5' 7\" (1.702 m), weight 145 lb (65.8 kg), SpO2 93 %, not currently breastfeeding. General appearance:  Alert, responsive, oriented to person, place, and time. More comfortable appearing, no distress. Head:  Normocephalic. No masses, lesions or tenderness. Eyes:  PERRLA. EOMI. Sclera clear. Buccal mucosa moist.  ENT:  Ears normal. Mucosa normal.  Nasal cannula oxygen is in place at 2 L. Neck:    Supple. Trachea midline. No thyromegaly. No JVD. No bruits. Heart:    Rhythm regular. Rate controlled. Systolic murmur. Lungs:    Symmetrical.  Regular, even and nonlabored pattern. Visually demonstrated incentive spirometer technique with 2250 mL tidal volume inspired. No wheezing, rales or rhonchi. Abdomen:   Soft. Non-tender. Non-distended. Bowel sounds positive. No organomegaly or masses. No pain on palpation. Extremities:    Peripheral pulses present. No peripheral edema. No ulcers. No cyanosis. No clubbing. Neurologic:    Alert x 3. Very mild generalized weakness without focal deficit. Cranial nerves grossly intact. No focal weakness. Psych:   Behavior is normal. Mood appears normal. Speech is not rapid and/or pressured.   Musculoskeletal:   No unilateral joint edema, erythema or warmth. Gait not assessed. Integumentary:  No rashes  Skin normal color and texture.   Genitalia/Breast:  Deferred    Medication:  Scheduled Meds:   levoFLOXacin  750 mg Oral Every Other Day    fosfomycin tromethamine  3 g Oral Once    [START ON 10/24/2022] dexamethasone  6 mg Oral Daily with breakfast    pantoprazole  40 mg Oral QAM AC    sertraline  50 mg Oral Daily    vitamin C  1,000 mg Oral Daily    vitamin B-6  50 mg Oral Daily    albuterol-ipratropium  1 puff Inhalation Q6H    budesonide-formoterol  2 puff Inhalation BID    vitamin B-1  100 mg Oral Daily    enoxaparin  30 mg SubCUTAneous BID    Vitamin D  2,000 Units Oral Daily     Continuous Infusions:        Objective Data:  CBC with Differential:    Lab Results   Component Value Date/Time    WBC 4.8 10/23/2022 05:07 AM    RBC 3.68 10/23/2022 05:07 AM    HGB 11.0 10/23/2022 05:07 AM    HCT 34.2 10/23/2022 05:07 AM     10/23/2022 05:07 AM    MCV 92.9 10/23/2022 05:07 AM    MCH 29.9 10/23/2022 05:07 AM    MCHC 32.2 10/23/2022 05:07 AM    RDW 13.6 10/23/2022 05:07 AM    NRBC 0.5 11/30/2017 06:40 AM    BLASTSPCT 1.0 11/06/2017 11:35 PM    METASPCT 1.7 10/19/2022 08:11 AM    LYMPHOPCT 3.3 10/23/2022 05:07 AM    PROMYELOPCT 0.5 11/26/2017 05:30 AM    MONOPCT 2.5 10/23/2022 05:07 AM    MYELOPCT 1.7 10/18/2018 05:38 AM    BASOPCT 0.2 10/23/2022 05:07 AM    MONOSABS 0.12 10/23/2022 05:07 AM    LYMPHSABS 0.16 10/23/2022 05:07 AM    EOSABS 0.01 10/23/2022 05:07 AM    BASOSABS 0.01 10/23/2022 05:07 AM     BMP:    Lab Results   Component Value Date/Time     10/23/2022 05:07 AM    K 4.2 10/23/2022 05:07 AM    K 4.9 07/16/2021 06:15 AM     10/23/2022 05:07 AM    CO2 22 10/23/2022 05:07 AM    BUN 15 10/23/2022 05:07 AM    LABALBU 3.4 10/23/2022 05:07 AM    CREATININE 0.9 10/23/2022 05:07 AM    CALCIUM 9.4 10/23/2022 05:07 AM    GFRAA 57 12/20/2021 12:50 PM    LABGLOM >60 10/23/2022 05:07 AM    GLUCOSE 136 10/23/2022 05:07 AM       Assessment:  Acute respiratory failure with hypoxia secondary to COVID-19 pneumonia   Acute cystitis with cultures positive for Pseudomonas in moderate growth and E faecalis in light growth being treated with dose-reduced Levaquin and single dose fosfomycin  Paroxysmal atrial fibrillation  History of bowel perforation in the past  History of colorectal cancer  Generalized malaise and fatigue  History of DVT    Plan:   Jann Gosselin is doing much better overall. Nausea and abdominal discomfort have entirely resolved with transition off of vitamin supplementation. She is tolerating food and liquids well. Fluids will be discontinued. Diet has been advanced. All medications will be transitioned to oral as we prepare the patient for her eventual discharge. Urine cultures have returned positive for Pseudomonas and E faecalis. Pseudomonas necessitates high-dose Levaquin and, based upon the patient's renal function, will be dosed every other day to complete 5-day course. E faecalis, treated with single dose fosfomycin eliminate further oral medications if needed and is will be provided today. She is being treated maximally from a COVID standpoint as her condition allows. Decadron will be transitioned to oral.  Inhalers are being provided. She was intolerant of vitamins. Incentive parameter technique is excellent and we have reinforced maximal nonpharmacologic management. We have asked nursing staff to attempt to wean the patient off of oxygen and increase her activity today. PT, OT and  following as the patient is nearing discharge, likely to occur in the next 24 to 48 hours. Cardiovascular recommendations have been noted. Continue current therapy. Care will return to Dr. Neptali Booth tomorrow. See orders for further plan of care. More than 50% of my  time was spent at the bedside counseling/coordinating care with the patient and/or family with face to face contact.   This time was spent reviewing notes and laboratory data as well as instructing and counseling the patient. Time I spent with the family or surrogate(s) is included only if the patient was incapable of providing the necessary information or participating in medical decisions. I also discussed the differential diagnosis and all of the proposed management plans with the patient and individuals accompanying the patient. Maykel Boland requires this high level of physician care and nursing on the IMC/Telemetry unit due the complexity of decision management and chance of rapid decline or death. Continued cardiac monitoring and higher level of nursing are required. I am readily available for any further decision-making and intervention.      ISHAN Malik - JEANNETTE,   10/23/2022  10:04 AM

## 2022-10-23 NOTE — PROGRESS NOTES
Physical Therapy  Physical Therapy Treatment Note/Plan of Care    Room #:  3706/7608-55  Patient Name: Alison Toney  YOB: 1940  MRN: [id number]    Date of Service: 10/23/2022     Tentative placement recommendation: Subacute rehab  Equipment recommendation: To be determined      Evaluating Physical Therapist: Kasey Gillette PT, DPT #[id number]      Specific Provider Orders/Date/Referring Provider :     10/20/22 1545    PT eval and treat  Start:  10/20/22 1545,   End:  10/20/22 1545,   ONE TIME,   Standing Count:  1 Occurrences,   R         Manning Harada, DO Acknowledge New     Admitting Diagnosis:   Acute respiratory failure with hypoxia ([facility] [address] [J96.01]  COVID-19 virus infection [U07.1]      Surgery: none  Visit Diagnoses         Codes    COVID-19 virus infection     U07.1            Patient Active Problem List   Diagnosis    Rectal cancer ([address]    Pneumatosis intestinalis    Enteritis    Severe protein-calorie malnutrition ([address]    Altered mental status    Fatigue due to excessive exertion    Macular degeneration    Cancer ([address]    Perforation of sigmoid colon (HCC)    PAF (paroxysmal atrial fibrillation) (HCC)    Ischemic bowel disease (HCC)    Hx of blood clots    SBO (small bowel obstruction) ([address]    Chronic renal disease, stage III ([address] [588160]    Acute respiratory failure with hypoxia ([address]    COVID-19        ASSESSMENT of Current Deficits Patient exhibits decreased strength, balance, and endurance impairing functional mobility, transfers, gait , gait distance, and tolerance to activity. Patient unaware of safety deficits at times, needing max assist for managing O2 line and IV pole. Patient was on 2L at start of session and 86%. Patient improved to 90% with pursed lip breathing and amb to bathroom. Patinet fatigued and once sitting back in chair, SPO2 measured 86% on 2L. Patient increased to 3L and maintained 90%. Nursing made aware of inc in O2 level with activity and recovery.  Patient hard of hearing, proving to be a barrier for completing tasks as well as safety. PHYSICAL THERAPY  PLAN OF CARE       Physical therapy plan of care is established based on physician order,  patient diagnosis and clinical assessment    Current Treatment Recommendations:    -Bed Mobility: Lower extremity exercises  and Trunk control activities   -Sitting Balance: Incorporate reaching activities to activate trunk muscles , Hands on support to maintain midline , Facilitate active trunk muscle engagement , Facilitate postural control in all planes , and Engage in core activities to allow for movement within base of support   -Standing Balance: Perform strengthening exercises in standing to promote motor control with or without upper extremity support , Instruct patient on adequate base of support to maintain balance, and Challenge balance utilizing reaching  activities beyond center of gravity    -Transfers: Provide instruction on proper hand and foot position for adequate transfer of weight onto lower extremities and use of gait device if needed, Cues for hand placement, technique and safety.  Provide stabilization to prevent fall , Facilitate weight shift forward on to lower extremities and provide necessary stabilization of bilateral lower extremities , Support transfer of weight on to lower extremities, and Assist with extension of knees trunk and hip to accept weight transfer   -Gait: Gait training, Standing activities to improve: base of support, weight shift, weight bearing , Exercises to improve trunk control, Exercises to improve hip and knee control, Performance of protected weight bearing activities, and Activities to increase weight bearing   -Endurance: Utilize Supervised activities to increase level of endurance to allow for safe functional mobility including transfers and gait  and Use graduated activities to promote good breathing techniques and provide support and education to maximize respiratory function  -Stairs: Stair training with instruction on proper technique and hand placement on rail    PT long term treatment goals are located in below grid    Patient and or family understand(s) diagnosis, prognosis, and plan of care. Frequency of treatments: Patient will be seen  daily. Prior Level of Function: Patient ambulated with [employer]   Rehab Potential: fair + for baseline    Past medical history:   Past Medical History:   Diagnosis Date    Cancer [facility])     colorectal     Chronic renal disease, stage III ([facility] [address] [341079] 6/27/2022    History of blood transfusion     Hx of blood clots     dvt, on eliquis    Ischemic bowel disease ([facility] [address]     Macular degeneration     MDRO (multiple drug resistant organisms) resistance     PAF (paroxysmal atrial fibrillation) ([facility] [address]     Tinnitus      Past Surgical History:   Procedure Laterality Date    ABDOMEN SURGERY      colostomy & reversal    CHOLECYSTECTOMY  07    COLONOSCOPY      CYSTOSCOPY N/A 12/4/2018    CYSTOSCOPY RETROGRADE PYELOGRAM performed by Shannon Oropeza MD at [address] [facility], ESOPHAGUS      HAND SURGERY      carpel tunnel rigth hand    HYSTERECTOMY (CERVIX STATUS UNKNOWN)      ILEOSTOMY OR JEJUNOSTOMY      OTHER SURGICAL HISTORY  11/06/2017    diagnostic laparoscopy with exploratory laparotomy and reduction of internal hernia and closure of mesenteric defect, oversewing of multiple serosal tears    NH LAP,SURG,COLECT,TOT,W/PROCTECT,W/ILEOST N/A 6/28/2018    LAPAROSCOPIC DIVERTING COLOSTOMY performed by Elvis Hoff MD at [address] [facility] LAP,SURG,COLECT,TOT,W/PROCTECT,W/ILEOST N/A 10/18/2018    LAPAROSCOPIC REVISION LOOP COLOSTOMY, EXTENSIVE LYSIS OF ADHESIONS, DRAINAGE PELVIC ABSCESS, END COLOSTOMY performed by Blaze Foster MD at [address] (CERVIX REMOVED)      UPPER GASTROINTESTINAL ENDOSCOPY N/A 7/18/2018    EGD BIOPSY performed by Emir Grigsby DO at [address]:    Precautions:  Up with assistance, falls, Droplet plus/COVID-19, hard of hearing, and ostomy    Social history: Patient lives with spouse in a two story home bedroom and bathroom 2nd floor full flight stairs bilateral Rail  with 4 steps  to enter with [employer] in shower grab bars    Equipment owned: [place], [address], [address], Standard Walker, and Jayesh,      [address] [facility],[address] Mobility Inpatient   How much difficulty turning over in bed?: A Little  How much difficulty sitting down on / standing up from a chair with arms?: A Little  How much difficulty moving from lying on back to sitting on side of bed?: A Little  How much help from another person moving to and from a bed to a chair?: A Little  How much help from another person needed to walk in hospital room?: A Little  How much help from another person for climbing 3-5 steps with a railing?: A Lot  AM-PAC Inpatient Mobility Raw Score : 17  AM-PAC Inpatient T-Scale Score : 42.13  Mobility Inpatient CMS 0-100% Score: 50.57  Mobility Inpatient CMS G-Code Modifier : CK    Nursing cleared patient for PT treatment. Patient needing to use bathroom with call light on. OBJECTIVE;   Initial Evaluation  Date: 10/21/2022 Treatment Date:   10/23/2022    Short Term/ Long Term   Goals   Was pt agreeable to Eval/treatment? Yes yes To be met in 3 days   Pain level   0/10   None reported    Bed Mobility    Rolling: Not assessed     Supine to sit: Not assessed     Sit to supine: Not assessed     Scooting: Not assessed    Rolling: Supervision    Supine to sit: Supervision    Sit to supine: Not assessed patient in chair   Scooting: Not assessed patient in chair    Rolling: Independent    Supine to sit:  Independent    Sit to supine: Independent    Scooting: Independent     Transfers Sit to stand: Minimal assist of 1  Sit to stand: Minimal assist of 1    Sit to stand: Independent    Ambulation     2 x 25 feet using  wheeled walker with Min-ModA   for walker control, walker approximation, balance, upright, weight shift, and safety 2x20 feet using  wheeled walker with Minimal assist of 1   cues for safety and pacing  Patient needing max assit for managing O2 line and IV pole, patient seems unaware of lines and safety   > 100 feet using  wheeled walker with Modified Independent    Stair negotiation: ascended and descended   Not assessed     10 steps with 1 HR with Jerrod   ROM Within functional limits    Increase range of motion 10% of affected joints    Strength BUE:  refer to OT eval  RLE:  4-/5  LLE:  4-/5  Increase strength in affected mm groups by 1/3 grade   Balance Sitting EOB:  fair +  Dynamic Standing:  fair with [employer] Sitting EOB: fair +  Dynamic Standing: fair with wheeled walker   Sitting EOB:  good   Dynamic Standing: fair + with [employer]     Patient is Alert & Oriented x person, place, time, and situation and follows directions    Sensation:  Patient  denies numbness/tingling   Edema:  no   Endurance: fair  -    Vitals:  2 liters nasal cannula patient inc to 3L be end of session  Blood Pressure at rest  Blood Pressure during session    Heart Rate at rest  Heart Rate during session    SPO2 at rest 86%  SPO2 during session 84-91%     Patient education  Patient educated on role of Physical Therapy, risks of immobility, safety and plan of care, energy conservation,  importance of mobility while in hospital , purse lip breathing, importance and purpose of adaptive device and adjusted to proper height for the patient. , safety , and O2 line management and safety      Patient response to education:   Pt verbalized understanding Pt demonstrated skill Pt requires further education in this area   Yes Partial Yes      Treatment:  Patient practiced and was instructed/facilitated in the following treatment: Patient assisted to edge of bed and Sat edge of bed 3 minutes with Supervision  to increase dynamic sitting balance and activity tolerance. Obtained SPO2 and cues for PLB.  Patient stood to amb to bathroom, assist on/off toilet and changing depends. Patient amb back to bedside and positioned with lunch tray. Therapeutic Exercises:  not performed      At end of session, patient in chair with  lunch tray  call light and phone within reach,  all lines and tubes intact, nursing notified. Patient would benefit from continued skilled Physical Therapy to improve functional independence and quality of life.          Patient's/ family goals   get stronger    Time in  1133  Time out  1200    Total Treatment Time  27 minutes    CPT codes:  Therapeutic activities (05427)   27 minutes  2 unit(s)    Kristine Lopez, PTA   #[id number]

## 2022-10-24 VITALS
DIASTOLIC BLOOD PRESSURE: 66 MMHG | HEIGHT: 67 IN | TEMPERATURE: 97.3 F | RESPIRATION RATE: 21 BRPM | OXYGEN SATURATION: 93 % | WEIGHT: 145 LBS | HEART RATE: 81 BPM | SYSTOLIC BLOOD PRESSURE: 146 MMHG | BODY MASS INDEX: 22.76 KG/M2

## 2022-10-24 LAB
ALBUMIN SERPL-MCNC: 3.5 G/DL (ref 3.5–5.2)
ALP BLD-CCNC: 74 U/L (ref 35–104)
ALT SERPL-CCNC: 64 U/L (ref 0–32)
ANION GAP SERPL CALCULATED.3IONS-SCNC: 9 MMOL/L (ref 7–16)
AST SERPL-CCNC: 40 U/L (ref 0–31)
BACTERIA: ABNORMAL /HPF
BASOPHILS ABSOLUTE: 0.01 E9/L (ref 0–0.2)
BASOPHILS RELATIVE PERCENT: 0.2 % (ref 0–2)
BILIRUB SERPL-MCNC: 0.3 MG/DL (ref 0–1.2)
BILIRUBIN URINE: NEGATIVE
BLOOD, URINE: ABNORMAL
BUN BLDV-MCNC: 15 MG/DL (ref 6–23)
CALCIUM SERPL-MCNC: 9.5 MG/DL (ref 8.6–10.2)
CHLORIDE BLD-SCNC: 106 MMOL/L (ref 98–107)
CLARITY: ABNORMAL
CO2: 24 MMOL/L (ref 22–29)
COLOR: YELLOW
CREAT SERPL-MCNC: 0.9 MG/DL (ref 0.5–1)
EOSINOPHILS ABSOLUTE: 0.03 E9/L (ref 0.05–0.5)
EOSINOPHILS RELATIVE PERCENT: 0.6 % (ref 0–6)
GFR SERPL CREATININE-BSD FRML MDRD: >60 ML/MIN/1.73
GLUCOSE BLD-MCNC: 109 MG/DL (ref 74–99)
GLUCOSE URINE: NEGATIVE MG/DL
HCT VFR BLD CALC: 34.5 % (ref 34–48)
HEMOGLOBIN: 11 G/DL (ref 11.5–15.5)
IMMATURE GRANULOCYTES #: 0.08 E9/L
IMMATURE GRANULOCYTES %: 1.5 % (ref 0–5)
KETONES, URINE: NEGATIVE MG/DL
LEUKOCYTE ESTERASE, URINE: ABNORMAL
LYMPHOCYTES ABSOLUTE: 0.21 E9/L (ref 1.5–4)
LYMPHOCYTES RELATIVE PERCENT: 4 % (ref 20–42)
MAGNESIUM: 1.3 MG/DL (ref 1.6–2.6)
MCH RBC QN AUTO: 29.7 PG (ref 26–35)
MCHC RBC AUTO-ENTMCNC: 31.9 % (ref 32–34.5)
MCV RBC AUTO: 93.2 FL (ref 80–99.9)
MONOCYTES ABSOLUTE: 0.41 E9/L (ref 0.1–0.95)
MONOCYTES RELATIVE PERCENT: 7.8 % (ref 2–12)
NEUTROPHILS ABSOLUTE: 4.49 E9/L (ref 1.8–7.3)
NEUTROPHILS RELATIVE PERCENT: 85.9 % (ref 43–80)
NITRITE, URINE: NEGATIVE
PDW BLD-RTO: 13.8 FL (ref 11.5–15)
PH UA: 6 (ref 5–9)
PHOSPHORUS: 2.6 MG/DL (ref 2.5–4.5)
PLATELET # BLD: 238 E9/L (ref 130–450)
PMV BLD AUTO: 9.3 FL (ref 7–12)
POTASSIUM SERPL-SCNC: 3.3 MMOL/L (ref 3.5–5)
PROTEIN UA: NEGATIVE MG/DL
RBC # BLD: 3.7 E12/L (ref 3.5–5.5)
RBC UA: ABNORMAL /HPF (ref 0–2)
SODIUM BLD-SCNC: 139 MMOL/L (ref 132–146)
SPECIFIC GRAVITY UA: 1.02 (ref 1–1.03)
TOTAL PROTEIN: 6.2 G/DL (ref 6.4–8.3)
UROBILINOGEN, URINE: 0.2 E.U./DL
WBC # BLD: 5.2 E9/L (ref 4.5–11.5)
WBC UA: ABNORMAL /HPF (ref 0–5)

## 2022-10-24 PROCEDURE — 6360000002 HC RX W HCPCS: Performed by: INTERNAL MEDICINE

## 2022-10-24 PROCEDURE — 6370000000 HC RX 637 (ALT 250 FOR IP): Performed by: INTERNAL MEDICINE

## 2022-10-24 PROCEDURE — 80053 COMPREHEN METABOLIC PANEL: CPT

## 2022-10-24 PROCEDURE — 84100 ASSAY OF PHOSPHORUS: CPT

## 2022-10-24 PROCEDURE — 81001 URINALYSIS AUTO W/SCOPE: CPT

## 2022-10-24 PROCEDURE — 97535 SELF CARE MNGMENT TRAINING: CPT

## 2022-10-24 PROCEDURE — 83735 ASSAY OF MAGNESIUM: CPT

## 2022-10-24 PROCEDURE — 6370000000 HC RX 637 (ALT 250 FOR IP): Performed by: NURSE PRACTITIONER

## 2022-10-24 PROCEDURE — 85025 COMPLETE CBC W/AUTO DIFF WBC: CPT

## 2022-10-24 PROCEDURE — 36415 COLL VENOUS BLD VENIPUNCTURE: CPT

## 2022-10-24 PROCEDURE — 94640 AIRWAY INHALATION TREATMENT: CPT

## 2022-10-24 RX ORDER — LEVOFLOXACIN 750 MG/1
750 TABLET ORAL EVERY OTHER DAY
Qty: 1 TABLET | Refills: 0 | Status: SHIPPED | OUTPATIENT
Start: 2022-10-25 | End: 2022-10-26

## 2022-10-24 RX ORDER — DEXAMETHASONE 6 MG/1
6 TABLET ORAL
Qty: 5 TABLET | Refills: 0 | Status: SHIPPED | OUTPATIENT
Start: 2022-10-25 | End: 2022-10-30

## 2022-10-24 RX ORDER — CHOLECALCIFEROL (VITAMIN D3) 50 MCG
2000 TABLET ORAL DAILY
Qty: 60 TABLET | COMMUNITY
Start: 2022-10-25

## 2022-10-24 RX ORDER — PYRIDOXINE HCL (VITAMIN B6) 50 MG
50 TABLET ORAL DAILY
Qty: 30 TABLET | Refills: 3 | COMMUNITY
Start: 2022-10-25

## 2022-10-24 RX ORDER — GAUZE BANDAGE 2" X 2"
100 BANDAGE TOPICAL DAILY
COMMUNITY
Start: 2022-10-25

## 2022-10-24 RX ORDER — POTASSIUM CHLORIDE 20 MEQ/1
20 TABLET, EXTENDED RELEASE ORAL ONCE
Status: COMPLETED | OUTPATIENT
Start: 2022-10-24 | End: 2022-10-24

## 2022-10-24 RX ORDER — MAGNESIUM SULFATE IN WATER 40 MG/ML
2000 INJECTION, SOLUTION INTRAVENOUS ONCE
Status: COMPLETED | OUTPATIENT
Start: 2022-10-24 | End: 2022-10-24

## 2022-10-24 RX ADMIN — Medication 2000 UNITS: at 09:31

## 2022-10-24 RX ADMIN — ENOXAPARIN SODIUM 30 MG: 100 INJECTION SUBCUTANEOUS at 09:32

## 2022-10-24 RX ADMIN — MAGNESIUM SULFATE HEPTAHYDRATE 2000 MG: 40 INJECTION, SOLUTION INTRAVENOUS at 13:51

## 2022-10-24 RX ADMIN — PANTOPRAZOLE SODIUM 40 MG: 40 TABLET, DELAYED RELEASE ORAL at 06:25

## 2022-10-24 RX ADMIN — BUDESONIDE AND FORMOTEROL FUMARATE DIHYDRATE 2 PUFF: 80; 4.5 AEROSOL RESPIRATORY (INHALATION) at 06:57

## 2022-10-24 RX ADMIN — OXYCODONE HYDROCHLORIDE AND ACETAMINOPHEN 1000 MG: 500 TABLET ORAL at 09:31

## 2022-10-24 RX ADMIN — POTASSIUM CHLORIDE 20 MEQ: 1500 TABLET, EXTENDED RELEASE ORAL at 13:50

## 2022-10-24 RX ADMIN — DEXAMETHASONE 6 MG: 6 TABLET ORAL at 09:31

## 2022-10-24 RX ADMIN — IPRATROPIUM BROMIDE AND ALBUTEROL 1 PUFF: 20; 100 SPRAY, METERED RESPIRATORY (INHALATION) at 06:57

## 2022-10-24 RX ADMIN — Medication 100 MG: at 09:31

## 2022-10-24 RX ADMIN — SERTRALINE 50 MG: 50 TABLET, FILM COATED ORAL at 09:32

## 2022-10-24 RX ADMIN — PYRIDOXINE HCL TAB 50 MG 50 MG: 50 TAB at 09:31

## 2022-10-24 NOTE — DISCHARGE INSTRUCTIONS
[facility]-home oxygen ([phone])--IF Doyle Lacy does not call you before leaving the hospital, call them upon returning home to set up home oxygen        Your information:  Name: Trista Chen  : 1940    Your instructions:    Discharged home. Please make and keep any follow up appointments. If you have increased shortness of breath, increased cough or sputum production, have increased weakness, become dizzy, fall or pass out, have nausea or vomiting, fever or chills, please call [facility] Moreno or return to the emergency room. What to do after you leave the hospital:    Recommended diet: regular diet    Recommended activity: activity as tolerated        The following personal items were collected during your admission and were returned to you:    Belongings  Dental Appliances: Uppers  Vision - Corrective Lenses: Eyeglasses  Hearing Aid: Bilateral hearing aids  Clothing: Pajamas, Socks, Undergarments, Footwear  Jewelry: None  Body Piercings Removed: No  Electronic Devices: Cell Phone  Weapons (Notify Protective Services/Security): None  Other Valuables: At home  Home Medications: None  Valuables Given To: Patient  Provide Name(s) of Who Valuable(s) Were Given To: Tomremi Gustavo  Responsible person(s) in the waiting room: n/a  Patient approves for provider to speak to responsible person post operatively: Yes    Information obtained by:  By signing below, I understand that if any problems occur once I leave the hospital I am to contact [facility] Moreno. I understand and acknowledge receipt of the instructions indicated above.

## 2022-10-24 NOTE — CARE COORDINATION
10/24/2022 1215 CM note: POSITIVE COVID 10/18/22. ACP completed. Pt resides with her  in a 2 story home,3 step entry with rail. Bedroom on second level with full flight of steps with rail. She is wearing [employer] [place] and does not wear home o2. Spoke with pt and her dtr Annette regarding DME preference if pt qualifies for home o2-no DME preference. Referral placed to [facility] [place]-await pox testing. Plan is for pt to return home at d/c and her dtr will provide transportation. Pt/dtr Annette decline [facility].  Aisha GUERRERO

## 2022-10-24 NOTE — PROGRESS NOTES
Pulse ox was 87% on room air at rest. Ambulated patient on room air. Oxygen saturation was 83% on room air while ambulating. 4L of oxygen applied. Recovery pulse ox was 92% on 4L of oxygen while ambulating.

## 2022-10-24 NOTE — DISCHARGE SUMMARY
Department of Internal Medicine        CHIEF COMPLAINT: Generalized fatigue    Reason for Admission: Acute hypoxic respiratory failure, COVID-19 infection    HISTORY OF PRESENT ILLNESS:      The patient is a 78 y.o. female who presents with generalized fatigue over the last 2 weeks. Patient also complains of a mildly scant clear productive cough. She denies any problem with any chest or abdominal pain. Patient denies any history of leg swelling or pain in her calf or thighs. Patient also has some intermittent episodes of nausea but this is somewhat chronic. She denies any fever/chills, unusual shortness of breath at rest,vomiting. Patient has a history of prior tobacco use for about 20 years about a half a pack to-pack of cigarettes a day. Patient is very hard of hearing. Patient has history of colorectal CA along with chronic kidney disease stage IIIa, history of DVT, ischemic bowel, macular degeneration, paroxysmal atrial fib. Patient was seen in the ED and was hypoxic and was O2 saturation on room air at rest and with placed on O2. Patient tested positive for COVID and 2 view chest x-ray showed suspected early bibasilar infiltrates slightly worse on the right. BUN/creatinine is 25/1.1 with normal electrolytes WBC 5.5 and hemoglobin 12.4. Urinalysis showed large leukocyte esterase and greater than 20 WBCs. 10/19/2022  Patient was seen examined on telemetry floor. Patient looks a little bit better today. Patient denies any problem chest pain, abdominal pain, nausea vomiting or fever and chills. Patient has an occasional nonproductive cough. CT of the chest showed patchy peripheral predominant groundglass opacities in both lungs with moderate pulmonary emphysema with evidence of prior granulomatous disease. Left hydronephrosis is incompletely imaged but has a history of chronic left hydronephrosis. BUN/creatinine was 29/0.9 with procalcitonin 0.17. CRP yesterday was 9.1.   Liver enzymes normal with a WBC of 2.1 hemoglobin 11.6. D-dimer 596 yesterday. Her temperature is 97.7 with heart rate sinus bradycardia with a rate of 51 with a blood pressure 123/59. O2 sat 97% on 4 L nasal cannula. 10/20/2022  Patient seen examined on telemetry floor. Patient states she feels fairly good. Patient's appetite is not as good today as yesterday. She denies any chest pain, abdominal pain, nausea or vomiting. The patient has an occasional nonproductive cough. Echocardiogram showed EF 60% with stage I diastolic dysfunction with trace MR, +1 TR, pulmonary pressure 22 mmHg. Temperature is 98.3 with heart rate of 64 blood pressure 134/66. O2 sat 92-96% on 4 L nasal cannula. 10/21/2022  Patient seen examined on telemetry floor. Patient denies any problem chest pain, abdominal pain, nausea/vomiting. The patient states yesterday afternoon she did have a productive cough but today she does not. She denies shortness of breath at rest.  Patient does on O2 nasal cannula. WBC is 5.6 hemoglobin 12.1.  BUN/creatinine was 18/1.0 with normal electrolytes. Patient has elevation of transaminase today. Temperature is 97.5 with heart rate 75 blood pressure 143/71. O2 sat 90% on 4 L nasal cannula. Urinalysis showed 50,000 Pseudomonas along with 25,000 colony count of Enterococcus. Antibiotics were adjusted. 10/24/2022  Patient seen examined on telemetry floor. Patient states she feels good. Patient states she wants to go home. She denies any chest or abdominal pain. Patient has good output from ostomy. She denies any shortness of breath at rest but does complain of some shortness of breath with activity and she states her strength is fairly good. BUN/creatinine is 15/0.9 with potassium 3.3 today. Serum magnesium was low at 1.3. Patient still with mild ovation transaminase. WBC is 5.2 and hemoglobin 11.0. Urinalysis still shows large leukocyte esterase and 10-20 WBCs and rare bacteria.   Temperature is 97.3 with patient's heart rate 81 blood pressure 146/66. O2 sat 93% on 3 L nasal cannula at rest.  Urinary output is fairly good. Cardiology note from last night states the patient possibly discharge home today. Patient will be ambulated in the hallway with nursing to assess her pulmonary status and if she does need home oxygen how much she will need with activity.     Past Medical History:    Past Medical History:   Diagnosis Date    Cancer [facility])     colorectal     Chronic renal disease, stage III [facility]) [618400] 6/27/2022    History of blood transfusion     Hx of blood clots     dvt, on eliquis    Ischemic bowel disease ([address]     Macular degeneration     MDRO (multiple drug resistant organisms) resistance     PAF (paroxysmal atrial fibrillation) (HCC)     Tinnitus      Past Surgical History:    Past Surgical History:   Procedure Laterality Date    ABDOMEN SURGERY      colostomy & reversal    CHOLECYSTECTOMY  07    COLONOSCOPY      CYSTOSCOPY N/A 12/4/2018    CYSTOSCOPY RETROGRADE PYELOGRAM performed by Fer Wilkinson MD at [address] [place] [address], ESOPHAGUS      HAND SURGERY      carpel tunnel rigth hand    HYSTERECTOMY (CERVIX STATUS UNKNOWN)      ILEOSTOMY OR JEJUNOSTOMY      OTHER SURGICAL HISTORY  11/06/2017    diagnostic laparoscopy with exploratory laparotomy and reduction of internal hernia and closure of mesenteric defect, oversewing of multiple serosal tears    CA LAP,SURG,COLECT,TOT,W/PROCTECT,W/ILEOST N/A 6/28/2018    LAPAROSCOPIC DIVERTING COLOSTOMY performed by Uyen Villegas MD at [facility] LAP,SURG,COLECT,TOT,W/PROCTECT,W/ILEOST N/A 10/18/2018    LAPAROSCOPIC REVISION LOOP COLOSTOMY, EXTENSIVE LYSIS OF ADHESIONS, DRAINAGE PELVIC ABSCESS, END COLOSTOMY performed by Day Bazzi MD at [facility] (CERVIX REMOVED)      UPPER GASTROINTESTINAL ENDOSCOPY N/A 7/18/2018    EGD BIOPSY performed by Mica Lentz DO at [facility]       Medications Prior to Admission:    @  Prior to Admission medications    Medication Sig Start Date End Date Taking? Authorizing Provider   Multiple Vitamins-Minerals (THERAPEUTIC MULTIVITAMIN-MINERALS) tablet Take 1 tablet by mouth daily    Historical Provider, MD   omeprazole (PRILOSEC) 20 MG delayed release capsule Take 20 mg by mouth daily    Historical Provider, MD   acetaminophen (TYLENOL) 325 MG tablet Take 2 tablets by mouth every 4 hours as needed for Pain or Fever 12/26/17   Sugey Spivey DO   sertraline (ZOLOFT) 50 MG tablet Take 50 mg by mouth daily    Historical Provider, MD       Allergies:  Aspirin and Tape Fabiola Levels tape]    Social History:   Social History     Socioeconomic History    Marital status:      Spouse name: Not on file    Number of children: Not on file    Years of education: Not on file    Highest education level: Not on file   Occupational History    Not on file   Tobacco Use    Smoking status: Former     Years: 20.00     Types: Cigarettes    Smokeless tobacco: Never    Tobacco comments:     quit at age 44    Substance and Sexual Activity    Alcohol use: No     Comment: socially    Drug use: No    Sexual activity: Not on file   Other Topics Concern    Not on file   Social History Narrative    Lives in [place] with  (Ruth Ann Winston / Di). Worked in an office.      Social Determinants of Health     Financial Resource Strain: Low Risk     Difficulty of Paying Living Expenses: Not hard at all   Food Insecurity: No Food Insecurity    Worried About Running Out of Food in the Last Year: Never true    Ran Out of Food in the Last Year: Never true   Transportation Needs: Not on file   Physical Activity: Not on file   Stress: Not on file   Social Connections: Not on file   Intimate Partner Violence: Not on file   Housing Stability: Not on file       Family History:   Family History   Problem Relation Age of Onset    Arthritis Mother     Kidney Disease Father     Cancer Father         kidney cancer       REVIEW OF SYSTEMS:    Gen: + Generalized fatigue. Patient denies any lightheadedness or dizziness. No LOC or syncope. No fevers or chills. HEENT: No earache, sore throat or nasal congestion. Resp: Denies cough, hemoptysis or sputum production. Cardiac: Denies chest pain, SOB, diaphoresis or palpitations. GI: No nausea, vomiting, diarrhea or constipation. No melena or hematochezia. : No urinary complaints, dysuria, hematuria or frequency. MSK: No extremity weakness, paralysis or paresthesias. PHYSICAL EXAM:    Vitals:  BP (!) 146/66   Pulse 81   Temp 97.3 °F (36.3 °C) (Oral)   Resp 21   Ht 5' 7\" (1.702 m)   Wt 145 lb (65.8 kg)   SpO2 93%   BMI 22.71 kg/m²     General:  This is a 78 y.o. yo female who is alert and oriented in mild distress secondary to above  HEENT:  Head is normocephalic and atraumatic, PERRLA, EOMI, mucus membranes moist with no pharyngeal erythema or exudate. Neck:  Supple with no carotid bruits, JVD or thyromegaly.   No cervical adenopathy  CV:  Regular rate and rhythm, no murmurs  Lungs: Coarse decreased breath sounds to auscultation bilaterally with with scattered rails right greater than left and mild rhonchi in the right, no wheezes  Abdomen:  Soft, nontender, nondistended, bowel sounds present  Extremities:  No edema, peripheral pulses intact bilaterally  Neuro:  Cranial nerves II-XII grossly intact; motor and sensory function intact with no focal deficits  Skin:  No rashes, lesions or wounds    DATA:  CBC with Differential:    Lab Results   Component Value Date/Time    WBC 5.2 10/24/2022 09:29 AM    RBC 3.70 10/24/2022 09:29 AM    HGB 11.0 10/24/2022 09:29 AM    HCT 34.5 10/24/2022 09:29 AM     10/24/2022 09:29 AM    MCV 93.2 10/24/2022 09:29 AM    MCH 29.7 10/24/2022 09:29 AM    MCHC 31.9 10/24/2022 09:29 AM    RDW 13.8 10/24/2022 09:29 AM    NRBC 0.5 11/30/2017 06:40 AM    BLASTSPCT 1.0 11/06/2017 11:35 PM    METASPCT 1.7 10/19/2022 08:11 AM    LYMPHOPCT 4.0 10/24/2022 09:29 AM    PROMYELOPCT 0.5 11/26/2017 05:30 AM    MONOPCT 7.8 10/24/2022 09:29 AM    MYELOPCT 1.7 10/18/2018 05:38 AM    BASOPCT 0.2 10/24/2022 09:29 AM    MONOSABS 0.41 10/24/2022 09:29 AM    LYMPHSABS 0.21 10/24/2022 09:29 AM    EOSABS 0.03 10/24/2022 09:29 AM    BASOSABS 0.01 10/24/2022 09:29 AM     CMP:    Lab Results   Component Value Date/Time     10/24/2022 09:29 AM    K 3.3 10/24/2022 09:29 AM    K 4.9 07/16/2021 06:15 AM     10/24/2022 09:29 AM    CO2 24 10/24/2022 09:29 AM    BUN 15 10/24/2022 09:29 AM    CREATININE 0.9 10/24/2022 09:29 AM    GFRAA 57 12/20/2021 12:50 PM    LABGLOM >60 10/24/2022 09:29 AM    GLUCOSE 109 10/24/2022 09:29 AM    PROT 6.2 10/24/2022 09:29 AM    LABALBU 3.5 10/24/2022 09:29 AM    CALCIUM 9.5 10/24/2022 09:29 AM    BILITOT 0.3 10/24/2022 09:29 AM    ALKPHOS 74 10/24/2022 09:29 AM    AST 40 10/24/2022 09:29 AM    ALT 64 10/24/2022 09:29 AM     Magnesium:    Lab Results   Component Value Date/Time    MG 1.3 10/24/2022 09:29 AM     Phosphorus:    Lab Results   Component Value Date/Time    PHOS 2.6 10/24/2022 09:29 AM     PT/INR:    Lab Results   Component Value Date/Time    PROTIME 11.6 12/03/2018 02:50 PM    INR 1.0 12/03/2018 02:50 PM     Troponin:    Lab Results   Component Value Date/Time    TROPONINI <0.01 10/16/2018 11:12 PM     U/A:    Lab Results   Component Value Date/Time    COLORU Yellow 10/24/2022 12:33 AM    PROTEINU Negative 10/24/2022 12:33 AM    PHUR 6.0 10/24/2022 12:33 AM    WBCUA 10-20 10/24/2022 12:33 AM    RBCUA 0-1 10/24/2022 12:33 AM    BACTERIA RARE 10/24/2022 12:33 AM    CLARITYU SLCLOUDY 10/24/2022 12:33 AM    SPECGRAV 1.020 10/24/2022 12:33 AM    LEUKOCYTESUR LARGE 10/24/2022 12:33 AM    UROBILINOGEN 0.2 10/24/2022 12:33 AM    BILIRUBINUR Negative 10/24/2022 12:33 AM    BLOODU TRACE 10/24/2022 12:33 AM    GLUCOSEU Negative 10/24/2022 12:33 AM    AMORPHOUS FEW 11/23/2018 04:20 AM     ABG:    Lab Results   Component Value Date/Time    PH 7.514 10/20/2018 08:04 PM    PCO2 28.5 10/20/2018 08:04 PM    PO2 67.6 10/20/2018 08:04 PM    HCO3 22.4 10/20/2018 08:04 PM    BE 0.2 10/20/2018 08:04 PM    O2SAT 94.4 10/20/2018 08:04 PM     HgBA1c:  No results found for: LABA1C  FLP:    Lab Results   Component Value Date/Time    TRIG 88 10/19/2022 08:11 AM    HDL 62 10/19/2022 08:11 AM    LDLCALC 77 10/19/2022 08:11 AM    LABVLDL 18 10/19/2022 08:11 AM     TSH:    Lab Results   Component Value Date/Time    TSH 1.420 10/19/2022 08:11 AM     IRON:    Lab Results   Component Value Date/Time    IRON 44 06/28/2018 06:00 AM     LIPASE:    Lab Results   Component Value Date/Time    LIPASE 34 12/20/2021 12:50 PM       ASSESSMENT AND PLAN:      Patient Active Problem List    Diagnosis Date Noted    COVID-19 10/21/2022    Acute respiratory failure with hypoxia ([address] 10/18/2022    Chronic renal disease, stage III ([address] [002156] 06/27/2022    SBO (small bowel obstruction) ([address] 07/16/2021    PAF (paroxysmal atrial fibrillation) ([address]     Ischemic bowel disease ([address]     Hx of blood clots     Perforation of sigmoid colon ([address] 10/17/2018    Altered mental status 06/25/2018    Fatigue due to excessive exertion 06/25/2018    Macular degeneration     Cancer ([address]     Enteritis 12/21/2017    Severe protein-calorie malnutrition ([address] 12/21/2017    Pneumatosis intestinalis     Rectal cancer ([address] 11/17/2016     Impression:  1. Acute hypoxic respiratory failure  2. Positive COVID-19 infection with bibasilar infiltrate  3. Bacteriuria- UTI  4. History of paroxysmal atrial fibs-currently sinus rhythm  5. History of macular degeneration  6. History of perforated bowel- ischemic bowel  7. Generalized weakness and fatigue  8. History of left femoral DVT  9. History of colorectal CA   10. [place]  11.   Chronic left hydronephrosis    Plan:  Discharge home today    Prescribe O2 nasal cannula, O2 2-3 L nasal cannula at rest, 4 L nasal cannula with activity and sleep    Prescription for Combivent Respimat 20-1 100-1 puff 4 times daily  Prescribed Breo Ellipta 100/25 1 puff daily  Prescription Decadron 6 mg-1 tab daily for 5 days  Prescription for Levaquin 750 mg every other day for 1 dose  Over-the-counter vitamin C, vitamin D, thiamine, vitamin B6    Same home meds    Patient to follow-up with PCP in 1 week    Repeat chest x-ray or CT in 4 weeks    Repeat CMP, magnesium and CBC 2 weeks      Wendy Garcia DO, SMOOTH  10/24/2022  11:48 AM

## 2022-10-24 NOTE — PROGRESS NOTES
Physical Therapy    [id number]    Patient unavailable for physical therapy treatment due to patient stated she is going home today. Ronel Etienne.  Yris  [facility]  LIC # [id number]

## 2022-10-24 NOTE — CARE COORDINATION
10/24/2022 1320 CM note: POSITIVE COVID 10/18/22. ACP completed. Pt resides with her  in a 2 story home,3 step entry with rail. Pt qualified for home o2. Clinical information, pox testing and home o2 Rx faxed to [facility]. [facility] will deliver portable o2 tank to pt's room today and arrange home o2 set up. Plan is for pt to return home at d/c and her dtr will provide transportation.  Aisha GUERRERO

## 2022-10-24 NOTE — PROGRESS NOTES
OT BEDSIDE TREATMENT NOTE      Date:10/24/2022  Patient Name: Kiara Dominguez  MRN: [id number]  : 1940  Room: [address] [place] [address]       Evaluating OT: Nimesh Osullivan OTR/L #[id number]      Referring Provider and Specific Provider Orders/Date:      10/18/22 1530   OT eval and treat  Start:  10/18/22 1530,   End:  10/18/22 1530,   ONE TIME,   Standing Count:  1 Occurrences,   R         Jazmin Sprague DO       Placement Recommendation: Subacute vs Home Health Occupational Therapy if patient is able to meet goals         Diagnosis:   1.  Acute respiratory failure with hypoxia ([facility]    2. COVID-19 virus infection            Surgery: None       Pertinent Medical History:       Past Medical History        Past Medical History:   Diagnosis Date    Cancer [facility])       colorectal     Chronic renal disease, stage III ([facility] [319797] 2022    History of blood transfusion      Hx of blood clots       dvt, on eliquis    Ischemic bowel disease ([facility]      Macular degeneration      MDRO (multiple drug resistant organisms) resistance      PAF (paroxysmal atrial fibrillation) (HCC)      Tinnitus              Past Surgical History         Past Surgical History:   Procedure Laterality Date    ABDOMEN SURGERY         colostomy & reversal    CHOLECYSTECTOMY   07    COLONOSCOPY        CYSTOSCOPY N/A 2018     CYSTOSCOPY RETROGRADE PYELOGRAM performed by Robb Fox MD at [address] [place] [address], [facility]        HAND SURGERY         carpel tunnel rigth hand    HYSTERECTOMY (CERVIX STATUS UNKNOWN)        ILEOSTOMY OR JEJUNOSTOMY        OTHER SURGICAL HISTORY   2017     diagnostic laparoscopy with exploratory laparotomy and reduction of internal hernia and closure of mesenteric defect, oversewing of multiple serosal tears    FL LAP,SURG,COLECT,TOT,W/PROCTECT,W/ILEOST N/A 2018     LAPAROSCOPIC DIVERTING COLOSTOMY performed by Ramon Bynum MD at [address] [place] [address] LAP,SURG,COLECT,TOT,W/PROCTECT,W/ILEOST N/A 10/18/2018 LAPAROSCOPIC REVISION LOOP COLOSTOMY, EXTENSIVE LYSIS OF ADHESIONS, DRAINAGE PELVIC ABSCESS, END COLOSTOMY performed by Magalie Clifton MD at [address] (CERVIX REMOVED)        UPPER GASTROINTESTINAL ENDOSCOPY N/A 7/18/2018     EGD BIOPSY performed by Iftikhar Tellez DO at [facility] ENDOSCOPY            Precautions:  Fall Risk, alarm, chair BID, activity as tolerated, bathroom privileges with assistance, hard of hearing, ostomy, O2, incontinence.        Assessment of current deficits    [x] Functional mobility            [x]ADLs           [x] Strength                   []Cognition    [x] Functional transfers          [x] IADLs          [x] Safety Awareness   [x]Endurance    [x] Fine Coordination              [x] Balance      [x] Vision/perception    []Sensation      []Gross Motor Coordination  [] ROM           [] Delirium                   [] Motor Control      OT PLAN OF CARE   OT POC based on physician orders, patient diagnosis and results of clinical assessment     Frequency/Duration 1-3 days/wk for 2 weeks PRN      Specific OT Treatment Interventions to include:   * Instruction/training on adapted ADL techniques and AE recommendations to increase functional independence within precautions       * Training on energy conservation strategies, correct breathing pattern and techniques to improve independence/tolerance for self-care routine  * Functional transfer/mobility training/DME recommendations for increased independence, safety, and fall prevention  * Patient/Family education to increase follow through with safety techniques and functional independence  * Recommendation of environmental modifications for increased safety with functional transfers/mobility and ADLs  * Therapeutic exercise to improve motor endurance, ROM, and functional strength for ADLs/functional transfers  * Therapeutic activities to facilitate/challenge dynamic balance, stand tolerance for increased safety and independence with ADLs     Recommended Adaptive Equipment: TBD       Home Living: Lives with spouse, single family home, 2 story, 3 steps to enter with rail. Flight of stairs with rail to 2nd floor bedroom and bathroom. Half bathroom on 1st floor. Bathroom set-up: walk-in shower , shower chair          Equipment owned: cane, wheeled walker      Prior Level of Function: Moderate [place] with ADLs , spouse and/or daughter assist with IADLs; ambulated independently with cane. Driving: no      Pain Level: pt denied pain; Nursing notified.                  Cognition: A&O: 4/4; Follows 2-3 step directions              Memory: fair               Sequencing: fair               Problem solving: fair               Judgement/safety: fair      [facility]   AM-PAC Daily Activity Inpatient   How much help for putting on and taking off regular lower body clothing?: A Lot  How much help for Bathing?: A Lot  How much help for Toileting?: A Lot  How much help for putting on and taking off regular upper body clothing?: A Little  How much help for taking care of personal grooming?: A Little  How much help for eating meals?: A Little  AM-PAC Inpatient Daily Activity Raw Score: 15                Functional Assessment:     Initial Eval Status  Date: 10/19/22    Treatment Status  Date: 10/24/22 STGs = LTGs  Time frame: 10-14 days   Feeding Supervision    N/T  Independent    Grooming Minimal Assist     N/T Moderate [place]    UB Dressing Moderate Assist   For gown mgmt to doff/don seated in chair     Min A to untie back of gown; pt able to don shirt when seated EOB; increased time to adjust sleeves; assist for O2 line management; pt able to don outer shirt (pajama shirt) without assist Moderate [place]    LB Dressing Maximal Assist   To thread LEs through briefs and don over hips while standing supported at walker     Mod A ; pt able to don pants over B feet when seated EOB with assist. Pt able to don pants over B hips when standing with min A for balance Moderate [place]    Bathing Moderate Assist     N/T; pt declined  Moderate [place]    Toileting Maximal Assist   Extensive time for hygiene care d/t x 3 episodes on urinary incontinence upon standing at bedside. Dep for set-up/removal of bedpan at EOB. N/T; pt declined; pt has ostomy bag and depends on 2* to incontinence of urine Moderate [place]    Bed Mobility  Supine to sit: Minimal Assist   Sit to supine: Not Assessed     Min A for supine to sit (for safety and O2 line management); min A for scooting; sit to supine at SBA  Supine to sit: Independent   Sit to supine: Independent    Functional Transfers Sit to stand: Minimal Assist   Stand to sit: Minimal Assist      Transfer training with verbal cues for hand placement throughout session to improve safety. SIt to stand transfers to/from EOB at min A with FWW; cuing for hand placement; sit to stand from EOB x 2 reps during dressing tasks  Independent    Functional Mobility Minimal Assist with standard walker to improve balance from EOB to chair, verbal cues for walker sequence and safety.      Min A; pt able to side step to [facility] with FWW and min A  Moderate [place] with use of wheeled walker    Balance Sitting:     Static: fair     Dynamic: fair   Standing: fair/fair minus with walker ; retropulsive upon standing     Sitting:     Static: fair     Dynamic: fair   Standing: fair/fair minus with walker ;slight posterior lean upon standing   Sitting:     Static: good    Dynamic: good  Standing: fair plus with walker    Activity Tolerance fair   fair  Increase standing tolerance >3 minutes for improved engagement with functional transfers and indep in ADLs      Visual/  Perceptual Glasses: yes      Reports changes in vision since admission: no ; hx of macular degeneration      NA       Hand Dominance: right      Hearing: Hard of hearing    Sensation:  B LE neuropathy at eval; no c/o at this time  Tone:  WFL   Edema: none noted      Comments: Patient cleared by nursing staff. Upon arrival pt seated in bed with HOB elevated. Pt agreeable to OT tx session. Pt educated with regards to bed mobility, hand placement, safety awareness, static sitting balance,  standing balance, transfer training,  LE/UE dressing,  ECT's. At end of session pt seated in bed with HOB elevated  with all lines and tubes intact, call light within reach. Assist for O2 line management. Overall, pt demonstrated fair minus independence and safety during completion of ADL/functional transfers/mobility tasks. Pt would benefit from continued skilled OT to increase safety and independence with completion of ADL/IADL tasks for functional independence and quality of life. Daughter arrived to pt's room after tx session. Pt required cues and education as noted above for safe facilitation and completion of tasks. Therapist provided skilled monitoring of patient's response during treatment session. Prior to and at the end of session, environmental modifications completed for patients safety and efficiency of treatment session. Overall, patient demonstrates minimal/moderate difficulties with completion of BADLs and IADLs. Factors contributing to these difficulties include difficulty donning pants over B feet,  decreased endurance, and generalized weakness. As noted above, patient likely to benefit from further OT intervention to increase independence, safety, and overall quality of life. Treatment:     Bed mobility: Facilitated bed mobility with cues for proper body mechanics and sequencing to prepare for ADL completion. Functional transfers: Facilitated transfers to/from EOB with cues for body alignment, safety and hand placement. ADL completion: Self-care retraining for the above-mentioned ADLs; training on proper hand placement, safety technique, sequencing, and energy conservation techniques.   Postural Balance: Sitting/standing balance retraining to improve righting reactions with postural changes during ADLs. Skilled positioning: Proper positioning to improve interaction with environment, overall functioning      Pt has made fair progress towards set goals    OT 1-3 days/wk for 2 weeks PRN     Treatment Time also includes thorough review of current medical information, gathering information on past medical history/social history and prior level of function, informal observation of tasks, assessment of data and education on plan of care and goals.     Treatment Time In: 4:04 PM     Treatment Time Out: 4:20 PM            Treatment Charges: Mins Units   ADL/Home Mgt     00088 16 1   Thera Activities     07233       Ther Ex                 40580       Manual Therapy    09692     Neuro Re-ed         47576     Orthotic manage/training                               73029     Non Billable Time     Total Timed Treatment 16 1313 STEPHANE Fu/JACINTO #[id number]

## 2022-10-25 ENCOUNTER — CARE COORDINATION (OUTPATIENT)
Dept: CASE MANAGEMENT | Age: 82
End: 2022-10-25

## 2022-10-25 NOTE — PROGRESS NOTES
Physician Progress Note      PATIENT:               Moira Blankenship  [facility] #:                  [id number]  :                       1940  ADMIT DATE:       10/18/2022 12:13 PM  [address] DATE:        10/24/2022 6:16 PM  RESPONDING  PROVIDER #:        Lizeth Mooney DO          QUERY TEXT:    Pt admitted with Acute respiratory failure. Pt noted to have elevated procal,   crp, decreased wbc, + COVID-19, + UC. If possible, please document in the   progress notes and discharge summary if you are evaluating and /or treating   any of the following: The medical record reflects the following:  Risk Factors: COVID-19 infection, Acute resp failure, Emphysema, COVID-19   pneumonia per Cardiology, UTI. Clinical Indicators: 10/18: COVID-19 NAAT +. Procalc 0.17, CRP 9.1, Wbc 4.1,   CT chest- Commonly reported  features of COVID-19 pneumonia are   present. Celestia Murphy Celestia Murphy Pulmonary emphysema. UC 50,000 Pseudomonas aeruginosa  Treatment: Decadron, Rocephin, Zmax, vitamins, Combivent, Symbicort, Olumiant,   Actemra, 10/21 Amoxil, Levaquin. Thank you, Lazarus Agosto RN [phone]  Options provided:  -- Sepsis, present on admission  -- Sepsis, present on admission, now resolved  -- Sepsis present on admission due to COVID-19 pneumonia  -- Sepsis present on admission due to COVID-19 infection  -- Sepsis was ruled out  -- Other - I will add my own diagnosis  -- Disagree - Not applicable / Not valid  -- Disagree - Clinically unable to determine / Unknown  -- Refer to Clinical Documentation Reviewer    PROVIDER RESPONSE TEXT:    After further study, sepsis was ruled out for this patient. Query created by: Ga Lance on 10/21/2022 12:02 PM      QUERY TEXT:    Pt admitted with Acute hypoxic respiratory failure . Noted documentation of   COVID-19 pneumonia in Cardiology consult, pn and bibasilar infiltrates in H&P,   pn.   If possible, please document in progress notes and discharge summary:    The medical record reflects the following:  Risk Factors: COVID-19 infection, Acute resp failure, Emphysema,  Clinical Indicators: COVID-19 NAAT +. Procalc 0.17, CRP 9.1, Wbc 4.1, CT   chest: Commonly reported  features of COVID-19 pneumonia are   present. Jennifer Annalee Jennifer Annalee Pulmonary emphysema. Treatment: Decadron, Rocephin, Zmax, vitamins, Combivent, Symbicort, Olumiant,   Actemra, 10/21 Amoxil, Levaquin. Thank you, Domenic Zapata RN [phone]  Options provided:  -- COVID-19 pneumonia confirmed present on admission  -- Pneumonia ruled out  -- Other - I will add my own diagnosis  -- Disagree - Not applicable / Not valid  -- Disagree - Clinically unable to determine / Unknown  -- Refer to Clinical Documentation Reviewer    PROVIDER RESPONSE TEXT:    The diagnosis of COVID-19 pneumonia was confirmed as present on admission.     Query created by: Suzi Chavira on 10/21/2022 12:02 PM      Electronically signed by:  Ulysses Bars DO 10/25/2022 11:34 AM

## 2022-10-25 NOTE — CARE COORDINATION
[facility] Care Transitions Initial Follow Up Call    Call within 2 business days of discharge: Yes        Patient: Brandt Magana Patient : 1940   MRN: [id number]  Reason for Admission: Acute Respiratory Failure with Hypoxia; COVID-19 Virus  Discharge Date: 10/24/22 RARS: Readmission Risk Score: 13.2      Last Discharge  Street       Date Complaint Diagnosis Description Type Department Provider    10/18/22 Fatigue Acute respiratory failure with hypoxia ([facility] . .. ED to Hosp-Admission (Discharged) (ADMITTED) [address], DO; Avel Vazquez. .. As requested, attempted to reach patient by phone after 10:00 am for initial post hospital discharge follow up. HIPAA compliant message left on patient's voicemail; CTN contact information provided.         Rafia Rowe RN

## 2022-10-25 NOTE — PROGRESS NOTES
CLINICAL PHARMACY NOTE: MEDS TO BEDS    Total # of Prescriptions Filled: 4   The following medications were delivered to the patient:  Combivent respimat   Breo ellipta 100-25  Dexamethasone 6 mg  Levofloxacin 750 mg    Additional Documentation:

## 2022-10-25 NOTE — CARE COORDINATION
[address] Care Transitions Initial Follow Up Call    Call within 2 business days of discharge: Yes        Patient: Amado Davenport Patient : 1940   MRN: [id number]  Reason for Admission: Acute Respiratory Failure with Hypoxia; COVID-19 Virus  Discharge Date: 10/24/22 RARS: Readmission Risk Score: 13.2      Last Discharge  Street       Date Complaint Diagnosis Description Type Department Provider    10/18/22 Fatigue Acute respiratory failure with hypoxia ([facility] . .. ED to Hosp-Admission (Discharged) (ADMITTED) [address], DO; Lurdes Valentino. .. Attempted to reach patient by phone for initial post hospital discharge follow up. CTN spoke with patient's spouse, Paty Garcia. CTN introduced self and explained role and reason for call; HIPAA compliant.    Yuridia Hensley reports the patient is sleeping at time of this call and requests a call back after 10:00 am; CTN acknowledged request.            Telly Jaime RN

## 2022-10-26 ENCOUNTER — CARE COORDINATION (OUTPATIENT)
Dept: CASE MANAGEMENT | Age: 82
End: 2022-10-26

## 2022-10-26 NOTE — CARE COORDINATION
[facility] Care Transitions Initial Follow Up Call    Call within 2 business days of discharge: Yes      Patient: Ariane Cantu Patient : 1940   MRN: [id number]  Reason for Admission: COVID-19  Discharge Date: 10/24/22 RARS: Readmission Risk Score: 13.2      Last Discharge [address]       Date Complaint Diagnosis Description Type Department Provider    10/18/22 Fatigue Acute respiratory failure with hypoxia ([facility] . .. ED to Hosp-Admission (Discharged) (ADMITTED) [address], DO; Richelle Jensen. .. Third attempt to reach the patient for initial Care Transition call post hospital discharge. HIPAA compliant message left on patient's voicemail; CTN contact information provided. Episode to be resolved and CTN to sign off if return call is not received from the patient.         Chelsey Ceja RN

## 2023-02-23 ENCOUNTER — TELEPHONE (OUTPATIENT)
Dept: FAMILY MEDICINE CLINIC | Age: 83
End: 2023-02-23

## 2023-02-24 ENCOUNTER — TELEPHONE (OUTPATIENT)
Dept: FAMILY MEDICINE CLINIC | Age: 83
End: 2023-02-24

## 2023-02-27 ENCOUNTER — TELEPHONE (OUTPATIENT)
Dept: FAMILY MEDICINE CLINIC | Age: 83
End: 2023-02-27

## 2023-04-04 ENCOUNTER — OFFICE VISIT (OUTPATIENT)
Dept: FAMILY MEDICINE CLINIC | Age: 83
End: 2023-04-04
Payer: COMMERCIAL

## 2023-04-04 VITALS
SYSTOLIC BLOOD PRESSURE: 112 MMHG | WEIGHT: 152 LBS | BODY MASS INDEX: 23.86 KG/M2 | DIASTOLIC BLOOD PRESSURE: 62 MMHG | RESPIRATION RATE: 14 BRPM | HEIGHT: 67 IN | TEMPERATURE: 97.1 F | HEART RATE: 76 BPM

## 2023-04-04 DIAGNOSIS — I48.0 PAF (PAROXYSMAL ATRIAL FIBRILLATION) (HCC): ICD-10-CM

## 2023-04-04 DIAGNOSIS — N18.31 STAGE 3A CHRONIC KIDNEY DISEASE (HCC): ICD-10-CM

## 2023-04-04 DIAGNOSIS — B36.9 FUNGAL DERMATITIS: ICD-10-CM

## 2023-04-04 DIAGNOSIS — K55.9 ISCHEMIC BOWEL DISEASE (HCC): ICD-10-CM

## 2023-04-04 DIAGNOSIS — J44.9 CHRONIC OBSTRUCTIVE PULMONARY DISEASE, UNSPECIFIED COPD TYPE (HCC): ICD-10-CM

## 2023-04-04 DIAGNOSIS — Z00.00 INITIAL MEDICARE ANNUAL WELLNESS VISIT: Primary | ICD-10-CM

## 2023-04-04 DIAGNOSIS — E43 SEVERE PROTEIN-CALORIE MALNUTRITION (HCC): ICD-10-CM

## 2023-04-04 DIAGNOSIS — C20 RECTAL CANCER (HCC): ICD-10-CM

## 2023-04-04 PROBLEM — K63.1 PERFORATION OF SIGMOID COLON (HCC): Status: RESOLVED | Noted: 2018-10-17 | Resolved: 2023-04-04

## 2023-04-04 PROBLEM — K56.609 SBO (SMALL BOWEL OBSTRUCTION) (HCC): Status: RESOLVED | Noted: 2021-07-16 | Resolved: 2023-04-04

## 2023-04-04 PROCEDURE — G0438 PPPS, INITIAL VISIT: HCPCS | Performed by: NURSE PRACTITIONER

## 2023-04-04 PROCEDURE — 1123F ACP DISCUSS/DSCN MKR DOCD: CPT | Performed by: NURSE PRACTITIONER

## 2023-04-04 RX ORDER — KETOCONAZOLE 20 MG/G
CREAM TOPICAL
Qty: 30 G | Refills: 1 | Status: SHIPPED | OUTPATIENT
Start: 2023-04-04

## 2023-04-04 SDOH — ECONOMIC STABILITY: INCOME INSECURITY: HOW HARD IS IT FOR YOU TO PAY FOR THE VERY BASICS LIKE FOOD, HOUSING, MEDICAL CARE, AND HEATING?: NOT HARD AT ALL

## 2023-04-04 SDOH — ECONOMIC STABILITY: FOOD INSECURITY: WITHIN THE PAST 12 MONTHS, YOU WORRIED THAT YOUR FOOD WOULD RUN OUT BEFORE YOU GOT MONEY TO BUY MORE.: NEVER TRUE

## 2023-04-04 SDOH — ECONOMIC STABILITY: HOUSING INSECURITY
IN THE LAST 12 MONTHS, WAS THERE A TIME WHEN YOU DID NOT HAVE A STEADY PLACE TO SLEEP OR SLEPT IN A SHELTER (INCLUDING NOW)?: NO

## 2023-04-04 SDOH — ECONOMIC STABILITY: FOOD INSECURITY: WITHIN THE PAST 12 MONTHS, THE FOOD YOU BOUGHT JUST DIDN'T LAST AND YOU DIDN'T HAVE MONEY TO GET MORE.: NEVER TRUE

## 2023-04-04 ASSESSMENT — PATIENT HEALTH QUESTIONNAIRE - PHQ9
8. MOVING OR SPEAKING SO SLOWLY THAT OTHER PEOPLE COULD HAVE NOTICED. OR THE OPPOSITE, BEING SO FIGETY OR RESTLESS THAT YOU HAVE BEEN MOVING AROUND A LOT MORE THAN USUAL: 0
1. LITTLE INTEREST OR PLEASURE IN DOING THINGS: 2
SUM OF ALL RESPONSES TO PHQ QUESTIONS 1-9: 6
SUM OF ALL RESPONSES TO PHQ QUESTIONS 1-9: 6
9. THOUGHTS THAT YOU WOULD BE BETTER OFF DEAD, OR OF HURTING YOURSELF: 0
2. FEELING DOWN, DEPRESSED OR HOPELESS: 2
3. TROUBLE FALLING OR STAYING ASLEEP: 0
SUM OF ALL RESPONSES TO PHQ QUESTIONS 1-9: 6
10. IF YOU CHECKED OFF ANY PROBLEMS, HOW DIFFICULT HAVE THESE PROBLEMS MADE IT FOR YOU TO DO YOUR WORK, TAKE CARE OF THINGS AT HOME, OR GET ALONG WITH OTHER PEOPLE: 0
4. FEELING TIRED OR HAVING LITTLE ENERGY: 1
SUM OF ALL RESPONSES TO PHQ QUESTIONS 1-9: 6
6. FEELING BAD ABOUT YOURSELF - OR THAT YOU ARE A FAILURE OR HAVE LET YOURSELF OR YOUR FAMILY DOWN: 0
5. POOR APPETITE OR OVEREATING: 1
SUM OF ALL RESPONSES TO PHQ9 QUESTIONS 1 & 2: 4
7. TROUBLE CONCENTRATING ON THINGS, SUCH AS READING THE NEWSPAPER OR WATCHING TELEVISION: 0

## 2023-04-04 ASSESSMENT — LIFESTYLE VARIABLES
HOW OFTEN DO YOU HAVE A DRINK CONTAINING ALCOHOL: NEVER
HOW MANY STANDARD DRINKS CONTAINING ALCOHOL DO YOU HAVE ON A TYPICAL DAY: PATIENT DOES NOT DRINK

## 2023-04-04 NOTE — PATIENT INSTRUCTIONS
products.     Limit drinks and foods with added sugar. These include candy, desserts, and soda pop. Lifestyle changes    If your doctor recommends it, get more exercise. Walking is a good choice. Bit by bit, increase the amount you walk every day. Try for at least 30 minutes on most days of the week. You also may want to swim, bike, or do other activities.     Do not smoke. If you need help quitting, talk to your doctor about stop-smoking programs and medicines. These can increase your chances of quitting for good. Quitting smoking may be the most important step you can take to protect your heart. It is never too late to quit.     Limit alcohol to 2 drinks a day for men and 1 drink a day for women. Too much alcohol can cause health problems.     Manage other health problems such as diabetes, high blood pressure, and high cholesterol. If you think you may have a problem with alcohol or drug use, talk to your doctor. Medicines    Take your medicines exactly as prescribed. Call your doctor if you think you are having a problem with your medicine.     If your doctor recommends aspirin, take the amount directed each day. Make sure you take aspirin and not another kind of pain reliever, such as acetaminophen (Tylenol). When should you call for help? Call 911 if you have symptoms of a heart attack. These may include:    Chest pain or pressure, or a strange feeling in the chest.     Sweating.     Shortness of breath.     Pain, pressure, or a strange feeling in the back, neck, jaw, or upper belly or in one or both shoulders or arms.     Lightheadedness or sudden weakness.     A fast or irregular heartbeat. After you call 911, the  may tell you to chew 1 adult-strength or 2 to 4 low-dose aspirin. Wait for an ambulance. Do not try to drive yourself. Watch closely for changes in your health, and be sure to contact your doctor if you have any problems. Where can you learn more?   Go to

## 2023-04-04 NOTE — PROGRESS NOTES
Medicare Annual Wellness Visit    Amado Davenport is here for Medicare AWV (Mainly in good health; no issues) and Cyst (Boil/cyst area in groin area close to labia; started 6 months ago; pinkish redish drainage)    Assessment & Plan   Initial Medicare annual wellness visit  Fungal dermatitis  -     ketoconazole (NIZORAL) 2 % cream; Apply topically daily. , Disp-30 g, R-1, Normal  Change to cotton absorbable undergarments such as Knix  Radiagel to area  Call with new or worsening of symptoms    Severe protein-calorie malnutrition ([facility]  Improved, encouraged adequate protein intake  Recheck in one year    Rectal cancer ([facility]  By history-originally treated at [address] [place] [address] with Dr Ian Hicks, Dr Sara Deras per Dr Tessa Hashimoto in one year    Ischemic bowel disease [facility])  Resolved, has colostomy  Recheck in one year    PAF (paroxysmal atrial fibrillation) ([facility]  Stable, Being evaluated/managed by Dr Isamar Dunne. No acute findings today meriting change in management plan. Recheck in one year    Stage 3a chronic kidney disease ([facility]  Improved, The current medical regimen is effective;  continue present plan and medications. Recheck in 1 year    Chronic obstructive pulmonary disease, unspecified COPD type (HCC)  Stable, The current medical regimen is effective;  continue present plan and medications. Recheck in 1 year      Recommendations for Preventive Services Due: see orders and patient instructions/AVS.  Recommended screening schedule for the next 5-10 years is provided to the patient in written form: see Patient Instructions/AVS.     Return for Medicare Annual Wellness Visit in 1 year. Subjective   The following acute and/or chronic problems were also addressed today:  Complains of painful area/cyst to left inguinal area. Noticed several months ago. Draining for past 2 months. Lets warm water rinse it at night. Got smaller since started draining.  Using antibacterial ointment for boils from

## 2023-05-29 DIAGNOSIS — B36.9 FUNGAL DERMATITIS: ICD-10-CM

## 2023-05-30 RX ORDER — KETOCONAZOLE 20 MG/G
CREAM TOPICAL
Qty: 30 G | Refills: 0 | Status: SHIPPED | OUTPATIENT
Start: 2023-05-30

## 2023-07-04 DIAGNOSIS — B36.9 FUNGAL DERMATITIS: ICD-10-CM

## 2023-07-05 RX ORDER — KETOCONAZOLE 20 MG/G
CREAM TOPICAL
Qty: 30 G | Refills: 0 | Status: SHIPPED | OUTPATIENT
Start: 2023-07-05

## 2023-11-25 ENCOUNTER — APPOINTMENT (OUTPATIENT)
Dept: CT IMAGING | Age: 83
End: 2023-11-25
Payer: MEDICARE

## 2023-11-25 ENCOUNTER — HOSPITAL ENCOUNTER (EMERGENCY)
Age: 83
Discharge: HOME | End: 2023-11-25
Attending: EMERGENCY MEDICINE
Payer: MEDICARE

## 2023-11-25 VITALS
RESPIRATION RATE: 16 BRPM | BODY MASS INDEX: 21.93 KG/M2 | TEMPERATURE: 98.2 F | DIASTOLIC BLOOD PRESSURE: 83 MMHG | HEART RATE: 64 BPM | OXYGEN SATURATION: 98 % | SYSTOLIC BLOOD PRESSURE: 170 MMHG | WEIGHT: 140 LBS

## 2023-11-25 DIAGNOSIS — K62.5 RECTAL BLEEDING: Primary | ICD-10-CM

## 2023-11-25 LAB
ABO + RH BLD: NORMAL
ALBUMIN SERPL-MCNC: 4.2 G/DL (ref 3.5–5.2)
ALP SERPL-CCNC: 81 U/L (ref 35–104)
ALT SERPL-CCNC: 8 U/L (ref 0–32)
ANION GAP SERPL CALCULATED.3IONS-SCNC: 8 MMOL/L (ref 7–16)
ARM BAND NUMBER: NORMAL
AST SERPL-CCNC: 17 U/L (ref 0–31)
BASOPHILS # BLD: 0.01 K/UL (ref 0–0.2)
BASOPHILS NFR BLD: 0 % (ref 0–2)
BILIRUB SERPL-MCNC: 0.3 MG/DL (ref 0–1.2)
BLOOD BANK SAMPLE EXPIRATION: NORMAL
BLOOD GROUP ANTIBODIES SERPL: NEGATIVE
BUN SERPL-MCNC: 11 MG/DL (ref 6–23)
CALCIUM SERPL-MCNC: 9.7 MG/DL (ref 8.6–10.2)
CHLORIDE SERPL-SCNC: 105 MMOL/L (ref 98–107)
CO2 SERPL-SCNC: 24 MMOL/L (ref 22–29)
CREAT SERPL-MCNC: 1 MG/DL (ref 0.5–1)
EOSINOPHIL # BLD: 0.03 K/UL (ref 0.05–0.5)
EOSINOPHILS RELATIVE PERCENT: 1 % (ref 0–6)
ERYTHROCYTE [DISTWIDTH] IN BLOOD BY AUTOMATED COUNT: 13.5 % (ref 11.5–15)
GFR SERPL CREATININE-BSD FRML MDRD: 54 ML/MIN/1.73M2
GLUCOSE SERPL-MCNC: 104 MG/DL (ref 74–99)
HCT VFR BLD AUTO: 40.1 % (ref 34–48)
HGB BLD-MCNC: 12.8 G/DL (ref 11.5–15.5)
IMM GRANULOCYTES # BLD AUTO: <0.03 K/UL (ref 0–0.58)
IMM GRANULOCYTES NFR BLD: 0 % (ref 0–5)
INR PPP: 1
LACTATE BLDV-SCNC: 2 MMOL/L (ref 0.5–2.2)
LIPASE SERPL-CCNC: 34 U/L (ref 13–60)
LYMPHOCYTES NFR BLD: 0.84 K/UL (ref 1.5–4)
LYMPHOCYTES RELATIVE PERCENT: 16 % (ref 20–42)
MCH RBC QN AUTO: 29.7 PG (ref 26–35)
MCHC RBC AUTO-ENTMCNC: 31.9 G/DL (ref 32–34.5)
MCV RBC AUTO: 93 FL (ref 80–99.9)
MONOCYTES NFR BLD: 0.61 K/UL (ref 0.1–0.95)
MONOCYTES NFR BLD: 12 % (ref 2–12)
NEUTROPHILS NFR BLD: 72 % (ref 43–80)
NEUTS SEG NFR BLD: 3.8 K/UL (ref 1.8–7.3)
PARTIAL THROMBOPLASTIN TIME: 35 SEC (ref 24.5–35.1)
PLATELET # BLD AUTO: 244 K/UL (ref 130–450)
PMV BLD AUTO: 9.6 FL (ref 7–12)
POTASSIUM SERPL-SCNC: 4.2 MMOL/L (ref 3.5–5)
PROT SERPL-MCNC: 7.2 G/DL (ref 6.4–8.3)
PROTHROMBIN TIME: 11.2 SEC (ref 9.3–12.4)
RBC # BLD AUTO: 4.31 M/UL (ref 3.5–5.5)
SODIUM SERPL-SCNC: 137 MMOL/L (ref 132–146)
WBC OTHER # BLD: 5.3 K/UL (ref 4.5–11.5)

## 2023-11-25 PROCEDURE — 80053 COMPREHEN METABOLIC PANEL: CPT

## 2023-11-25 PROCEDURE — 86900 BLOOD TYPING SEROLOGIC ABO: CPT

## 2023-11-25 PROCEDURE — 83605 ASSAY OF LACTIC ACID: CPT

## 2023-11-25 PROCEDURE — 85610 PROTHROMBIN TIME: CPT

## 2023-11-25 PROCEDURE — 6360000004 HC RX CONTRAST MEDICATION: Performed by: RADIOLOGY

## 2023-11-25 PROCEDURE — 99285 EMERGENCY DEPT VISIT HI MDM: CPT

## 2023-11-25 PROCEDURE — 83690 ASSAY OF LIPASE: CPT

## 2023-11-25 PROCEDURE — 86850 RBC ANTIBODY SCREEN: CPT

## 2023-11-25 PROCEDURE — 86901 BLOOD TYPING SEROLOGIC RH(D): CPT

## 2023-11-25 PROCEDURE — 74177 CT ABD & PELVIS W/CONTRAST: CPT

## 2023-11-25 PROCEDURE — 93005 ELECTROCARDIOGRAM TRACING: CPT

## 2023-11-25 PROCEDURE — 85730 THROMBOPLASTIN TIME PARTIAL: CPT

## 2023-11-25 PROCEDURE — 85025 COMPLETE CBC W/AUTO DIFF WBC: CPT

## 2023-11-25 RX ADMIN — IOPAMIDOL 75 ML: 755 INJECTION, SOLUTION INTRAVENOUS at 16:53

## 2023-11-25 ASSESSMENT — LIFESTYLE VARIABLES
HOW MANY STANDARD DRINKS CONTAINING ALCOHOL DO YOU HAVE ON A TYPICAL DAY: PATIENT DOES NOT DRINK
HOW OFTEN DO YOU HAVE A DRINK CONTAINING ALCOHOL: NEVER

## 2023-11-25 NOTE — DISCHARGE INSTRUCTIONS
CT ABDOMEN PELVIS W IV CONTRAST Additional Contrast? None   Final Result   1. Gas fluid levels in the distal small bowel loops some of which are mildly   dilated. Liquid stool is noted in the right colon. No evidence of   intestinal obstruction. No evidence of wall inflammation or pneumatosis. Consider nonspecific enterocolitis. No free air or free fluid. 2.  Unchanged evidence of previous distal colon resection. Stable soft   tissue thickening of the presacral soft tissues with postop/post therapeutic   change. No new focal mass or bone destruction identified. 3.  Stable severe dilatation of the left renal collecting system and ureter      4. Status post cholecystectomy with significant dilatation of the   extrahepatic common bile duct not significantly changed.

## 2023-11-26 LAB
EKG ATRIAL RATE: 62 BPM
EKG P AXIS: 69 DEGREES
EKG P-R INTERVAL: 136 MS
EKG Q-T INTERVAL: 410 MS
EKG QRS DURATION: 70 MS
EKG QTC CALCULATION (BAZETT): 416 MS
EKG R AXIS: 72 DEGREES
EKG T AXIS: 79 DEGREES
EKG VENTRICULAR RATE: 62 BPM

## 2023-11-26 PROCEDURE — 93010 ELECTROCARDIOGRAM REPORT: CPT | Performed by: INTERNAL MEDICINE

## 2023-11-29 ENCOUNTER — TELEPHONE (OUTPATIENT)
Dept: SURGERY | Age: 83
End: 2023-11-29

## 2023-11-29 ENCOUNTER — OFFICE VISIT (OUTPATIENT)
Dept: SURGERY | Age: 83
End: 2023-11-29
Payer: MEDICARE

## 2023-11-29 VITALS
WEIGHT: 140 LBS | HEIGHT: 67 IN | TEMPERATURE: 97.6 F | SYSTOLIC BLOOD PRESSURE: 149 MMHG | BODY MASS INDEX: 21.97 KG/M2 | HEART RATE: 68 BPM | DIASTOLIC BLOOD PRESSURE: 85 MMHG | RESPIRATION RATE: 18 BRPM

## 2023-11-29 DIAGNOSIS — K62.5 RECTAL BLEEDING: ICD-10-CM

## 2023-11-29 DIAGNOSIS — K62.5 RECTAL BLEEDING: Primary | ICD-10-CM

## 2023-11-29 PROCEDURE — 1123F ACP DISCUSS/DSCN MKR DOCD: CPT | Performed by: SURGERY

## 2023-11-29 PROCEDURE — 99213 OFFICE O/P EST LOW 20 MIN: CPT | Performed by: SURGERY

## 2023-11-29 PROCEDURE — G8427 DOCREV CUR MEDS BY ELIG CLIN: HCPCS | Performed by: SURGERY

## 2023-11-29 PROCEDURE — 1090F PRES/ABSN URINE INCON ASSESS: CPT | Performed by: SURGERY

## 2023-11-29 PROCEDURE — G8484 FLU IMMUNIZE NO ADMIN: HCPCS | Performed by: SURGERY

## 2023-11-29 PROCEDURE — G8420 CALC BMI NORM PARAMETERS: HCPCS | Performed by: SURGERY

## 2023-11-29 PROCEDURE — 1036F TOBACCO NON-USER: CPT | Performed by: SURGERY

## 2023-11-29 PROCEDURE — G8400 PT W/DXA NO RESULTS DOC: HCPCS | Performed by: SURGERY

## 2023-11-29 NOTE — TELEPHONE ENCOUNTER
Per Dr. Hugh Aden, patient is scheduled for Flexible sigmoidoscopy at [facility] on 23. Surgery scheduled via [facility], surgeon's calendar updated. Dr. Hugh Aden to enter orders. Follow up appointment scheduled. No prep required prior to procedure. Electronically signed by Precious Lefort, RN on 2023 at 11:14 AM    Prior Authorization Form:      DEMOGRAPHICS:                     Patient Name:  Robe Garcia  Patient :  1940            Insurance:  Payor: Kade Monahan / Plan: [employer] MEDICARE / Product Type: *No Product type* /   Insurance ID Number:    Payer/Plan Subscr  Sex Relation Sub. Ins. ID Effective Group Num   1. MEDICARE - Keith Garcia 1940 Female Self [id number] 1/1/15                                    [address] [address]   2.  [address] MAGANA 1940 Female Self [id number] 1/1/15 plan C                                   [address]         DIAGNOSIS & PROCEDURE:                       Procedure/Operation: Flexible sigmoidoscopy           CPT Code: 88125    Diagnosis:  rectal bleeding    ICD10 Code: k62.5    Location:  [facility]    Surgeon:  Sneha Vigil INFORMATION:                          Date: 23    Time: TBD              Anesthesia:  MAC/TIVA                                                       Status:  Outpatient        Special Comments:         Electronically signed by Precious Lefort, RN on 2023 at 11:14 AM

## 2023-11-29 NOTE — PROGRESS NOTES
General Surgery History and Physical  [facility]    Patient's Name/Date of Birth: Daniella Barreto / 1940    Date: November 29, 2023     Surgeon: Tod Campos M.D.    PCP: ISHAN Charles CNP     Chief Complaint: rectal bleeding    HPI:   Daniella Barreto is a 78 y.o. female who presents for evaluation of rectal bleeding. Timing is intermittent, radiation to rectum, alleviated by none and started unk and severity is 7/10. 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 Presents with rectal bleeding. States at least a week of intermittent rectal bleeding with some large clots. She was seen in emergency department had labs drawn showed no evidence of anemia. Denies any vaginal bleeding. She is accompanied by her daughter who confirms her story. Her original surgery was done for colon cancer and she has not had any endoscopic evaluation since 2018. CT examination done while she was in the emergency department did not show any evidence of pelvic mass or significant abdominal pathology aside from her previous surgical history.     Patient Active Problem List   Diagnosis    Rectal cancer ([facility])    Pneumatosis intestinalis    Enteritis    Severe protein-calorie malnutrition (HCC)    Altered mental status    Fatigue due to excessive exertion    Macular degeneration    PAF (paroxysmal atrial fibrillation) (HCC)    Ischemic bowel disease (HCC)    Hx of blood clots    Chronic renal disease, stage III ([facility]) [371889]    Acute respiratory failure with hypoxia (HCC)    COVID-19    Chronic obstructive pulmonary disease, unspecified       Past Medical History:   Diagnosis Date    Cancer ([facility])     colorectal     Cancer ([address])     colorectal     Chronic obstructive pulmonary disease, unspecified 4/4/2023    Chronic renal disease, stage III [facility]) [[id number]] 6/27/2022    History of blood transfusion     Hx of blood clots

## 2023-12-04 ENCOUNTER — HOSPITAL ENCOUNTER (OUTPATIENT)
Age: 83
Setting detail: OUTPATIENT SURGERY
Discharge: HOME | End: 2023-12-04
Attending: SURGERY | Admitting: SURGERY
Payer: MEDICARE

## 2023-12-04 ENCOUNTER — ANESTHESIA EVENT (OUTPATIENT)
Dept: ENDOSCOPY | Age: 83
End: 2023-12-04
Payer: MEDICARE

## 2023-12-04 ENCOUNTER — ANESTHESIA (OUTPATIENT)
Dept: ENDOSCOPY | Age: 83
End: 2023-12-04
Payer: MEDICARE

## 2023-12-04 VITALS
RESPIRATION RATE: 16 BRPM | SYSTOLIC BLOOD PRESSURE: 174 MMHG | WEIGHT: 151 LBS | DIASTOLIC BLOOD PRESSURE: 85 MMHG | HEART RATE: 68 BPM | TEMPERATURE: 97.6 F | BODY MASS INDEX: 23.7 KG/M2 | HEIGHT: 67 IN | OXYGEN SATURATION: 99 %

## 2023-12-04 PROCEDURE — 7100000010 HC PHASE II RECOVERY - FIRST 15 MIN: Performed by: SURGERY

## 2023-12-04 PROCEDURE — 3700000001 HC ADD 15 MINUTES (ANESTHESIA): Performed by: SURGERY

## 2023-12-04 PROCEDURE — 3700000000 HC ANESTHESIA ATTENDED CARE: Performed by: SURGERY

## 2023-12-04 PROCEDURE — 3609008400 HC SIGMOIDOSCOPY DIAGNOSTIC: Performed by: SURGERY

## 2023-12-04 PROCEDURE — 7100000011 HC PHASE II RECOVERY - ADDTL 15 MIN: Performed by: SURGERY

## 2023-12-04 PROCEDURE — 2500000003 HC RX 250 WO HCPCS: Performed by: ANESTHESIOLOGIST ASSISTANT

## 2023-12-04 PROCEDURE — 2709999900 HC NON-CHARGEABLE SUPPLY: Performed by: SURGERY

## 2023-12-04 PROCEDURE — 6360000002 HC RX W HCPCS: Performed by: ANESTHESIOLOGIST ASSISTANT

## 2023-12-04 PROCEDURE — 45330 DIAGNOSTIC SIGMOIDOSCOPY: CPT | Performed by: SURGERY

## 2023-12-04 PROCEDURE — 2580000003 HC RX 258: Performed by: ANESTHESIOLOGIST ASSISTANT

## 2023-12-04 RX ORDER — SODIUM CHLORIDE 0.9 % (FLUSH) 0.9 %
5-40 SYRINGE (ML) INJECTION PRN
Status: DISCONTINUED | OUTPATIENT
Start: 2023-12-04 | End: 2023-12-04 | Stop reason: HOSPADM

## 2023-12-04 RX ORDER — SODIUM CHLORIDE 0.9 % (FLUSH) 0.9 %
5-40 SYRINGE (ML) INJECTION EVERY 12 HOURS SCHEDULED
Status: DISCONTINUED | OUTPATIENT
Start: 2023-12-04 | End: 2023-12-04 | Stop reason: HOSPADM

## 2023-12-04 RX ORDER — SODIUM CHLORIDE 9 MG/ML
INJECTION, SOLUTION INTRAVENOUS CONTINUOUS
Status: DISCONTINUED | OUTPATIENT
Start: 2023-12-04 | End: 2023-12-04 | Stop reason: HOSPADM

## 2023-12-04 RX ORDER — SODIUM CHLORIDE 9 MG/ML
INJECTION, SOLUTION INTRAVENOUS CONTINUOUS PRN
Status: DISCONTINUED | OUTPATIENT
Start: 2023-12-04 | End: 2023-12-04 | Stop reason: SDUPTHER

## 2023-12-04 RX ORDER — LIDOCAINE HYDROCHLORIDE 10 MG/ML
INJECTION, SOLUTION INFILTRATION; PERINEURAL PRN
Status: DISCONTINUED | OUTPATIENT
Start: 2023-12-04 | End: 2023-12-04 | Stop reason: SDUPTHER

## 2023-12-04 RX ORDER — PROPOFOL 10 MG/ML
INJECTION, EMULSION INTRAVENOUS PRN
Status: DISCONTINUED | OUTPATIENT
Start: 2023-12-04 | End: 2023-12-04 | Stop reason: SDUPTHER

## 2023-12-04 RX ORDER — SODIUM CHLORIDE 9 MG/ML
INJECTION, SOLUTION INTRAVENOUS PRN
Status: DISCONTINUED | OUTPATIENT
Start: 2023-12-04 | End: 2023-12-04 | Stop reason: HOSPADM

## 2023-12-04 RX ADMIN — SODIUM CHLORIDE: 900 INJECTION, SOLUTION INTRAVENOUS at 12:48

## 2023-12-04 RX ADMIN — LIDOCAINE HYDROCHLORIDE 50 MG: 10 INJECTION, SOLUTION INFILTRATION; PERINEURAL at 13:12

## 2023-12-04 RX ADMIN — PROPOFOL 80 MG: 10 INJECTION, EMULSION INTRAVENOUS at 13:12

## 2023-12-04 ASSESSMENT — PAIN - FUNCTIONAL ASSESSMENT: PAIN_FUNCTIONAL_ASSESSMENT: 0-10

## 2023-12-04 NOTE — H&P
General Surgery History and Physical  T [facility]    Patient's Name/Date of Birth: Aneudy Jennings / 1940    Date: December 4, 2023     Surgeon: Aure Butt M.D.    PCP: ISHAN Bryan - JEANNETTE     Chief Complaint: rectal bleeding    HPI:   Aneudy Jennings is a 78 y.o. female who presents for evaluation of rectal bleeding. Timing is intermittent, radiation to rectum, alleviated by none and started unk and severity is 7/10. Hx of colostomy after LAR at 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 Presents with rectal bleeding. States at least a week of intermittent rectal bleeding with some large clots. She was seen in emergency department had labs drawn showed no evidence of anemia. Denies any vaginal bleeding. She is accompanied by her daughter who confirms her story. Her original surgery was done for colon cancer and she has not had any endoscopic evaluation since 2018. CT examination done while she was in the emergency department did not show any evidence of pelvic mass or significant abdominal pathology aside from her previous surgical history.     Patient Active Problem List   Diagnosis    Rectal cancer ([facility])    Pneumatosis intestinalis    Enteritis    Severe protein-calorie malnutrition (HCC)    Altered mental status    Fatigue due to excessive exertion    Macular degeneration    PAF (paroxysmal atrial fibrillation) (HCC)    Ischemic bowel disease (HCC)    Hx of blood clots    Chronic renal disease, stage III ([address] [[id number]]    Acute respiratory failure with hypoxia (HCC)    COVID-19    Chronic obstructive pulmonary disease, unspecified    Rectal bleeding       Past Medical History:   Diagnosis Date    Cancer ([facility])     colorectal     Cancer ([address])     colorectal     Chronic obstructive pulmonary disease, unspecified 04/04/2023    Chronic renal disease, stage III [facility]) [[id number]] 06/27/2022    History of blood transfusion

## 2023-12-04 NOTE — OP NOTE
[facility]  Colonoscopy Operative Report    DATE OF PROCEDURE: 12/4/2023     PREOPERATIVE DIAGNOSIS: Rectal bleeding    POSTOPERATIVE DIAGNOSIS/FINDINGS: Diverticulosis, 10cm rectal pouch, anal stricture    SURGEON: Karrie Winston MD    ASSISTANT: None    OPERATION: Flexible sigmoidoscopy    ANESTHESIA: Local monitored anesthesia. COMPLICATIONS: None. EBL: None      PRIOR TO THE EXAM: Gen: comfortable, no distress, awake and alert; Lungs: Clear;  Heart:regular rate and rhythm, normal S1S2     BRIEF HISTORY:  This is a 78 y.o. female who presents for rectal bleeding. The patient has history or rectovaginal fistula and LAR for cancer. My recommendation is to proceed with colonoscopy. The patient was advised of the risks, benefits, complications and options including the risk of bleeding and perforation. The patient understood and agreed to proceed. PROCEDURE:  In the left side down decubitus position, a digital rectal exam was performed. There was a stricture at the anal verge which was gently dilated with finger. There were no masses or abnormalities noted. The scope was inserted into the vagina due to the history of a fistula. The vaginal cuff was intact and no mass or bleeding present. The scope was lubricated and inserted into the anus. The scope was then passed under direct visualization in a retrograde fashion to the staple line. There was a small track that extended past the staples but was narrow and the scop would not advance further. The blind end was observed but could not be advanced to. There were diverticula at the staple line with no signs of bleeding. There were no hemorrhoids. The scope is retroflexed at the anal verge. No abnormalities are seen at the anal verge. The scope was then straightened and removed.      The patient tolerated the procedure well        SPECIMENS:  none    PLAN:    Resume high fiber diet  64oz water intake daily    No

## 2023-12-04 NOTE — PROGRESS NOTES
SBAR form completed. Placed on chart. Chart with patient in transit to [facility].

## 2023-12-04 NOTE — ANESTHESIA POSTPROCEDURE EVALUATION
Department of Anesthesiology  Postprocedure Note    Patient: Mar Delgado  MRN: [id number]  [address] [facility] [employer]: 1940  Date of evaluation: 12/4/2023      Procedure Summary     Date: 12/04/23 Room / Location: [address] [place] [address] 01 / [address] [address]illa    Anesthesia Start: 9116 Anesthesia Stop: 9217    Procedure: FLEXIBLE SIGMOIDOSCOPY Diagnosis:       Rectal bleeding      (Rectal bleeding [K62.5])    Surgeons: Angel Jimenez MD Responsible Provider: Shabbir Aponte MD    Anesthesia Type: MAC ASA Status: 3          Anesthesia Type: No value filed.     Skip Phase I: Skip Score: 10    Skip Phase II: Skip Score: 10      Anesthesia Post Evaluation    Patient location during evaluation: PACU  Patient participation: complete - patient participated  Level of consciousness: awake  Airway patency: patent  Nausea & Vomiting: no nausea and no vomiting  Complications: no  Cardiovascular status: hemodynamically stable  Respiratory status: acceptable  Hydration status: euvolemic  Pain management: adequate

## 2023-12-04 NOTE — DISCHARGE INSTRUCTIONS
Discharge Instructions for Colonoscopy  [facility]  Ty Stephenson MD, MS    The results of your colonoscopy and pathology results of biopsies, if taken, will be discussed at your follow up appointment. Colonoscopy is a visual exam of the lining of the large intestine, also called the bowel or colon, with a colonoscope. A colonoscope is a flexible tube with a light and a viewing device. It allows the doctor to view the inside of the colon through a tiny video camera. Colonoscopy is performed for many reasons: unexplained anemia , pain, diarrhea , bloody stools, cancer screening, among many other reasons. Complications from a colonoscopy are rare. Some possible serious complications include perforated bowel (which might require surgery) and bleeding (which could require blood transfusion ). Minor complications include bloating, gas, and cramping that can last for 1-2 days after the procedure. Because air is put into your colon during the procedure, it is normal to pass large amounts of air from your rectum. You may not have a bowel movement for 1-3 days after the procedure. What You Will Need   Someone to drive you home after the procedure    Steps to [address] [place] [address] when you get home. Because the sedative will make you drowsy, don't drive, operate machinery, or make important decisions the day of the procedure. Feelings of bloating, gas, or cramping may persist for 24 hours. Diet    Try sips of water first. If tolerated, resume regular diet or the diet recommended by your physician. Do not drink alcohol for 24 hours. Physical Activity    Ask your doctor when you will be able to return to work. Do not drive, operate heavy machinery, or do activities that require coordination or balance for 24 hours. Otherwise, return to your normal routine as soon as you are comfortable to do so, which is usually the next day after the procedure.     Medications    When

## 2024-03-26 ENCOUNTER — HOSPITAL ENCOUNTER (EMERGENCY)
Age: 84
Discharge: HOME | End: 2024-03-26
Attending: EMERGENCY MEDICINE
Payer: MEDICARE

## 2024-03-26 ENCOUNTER — APPOINTMENT (OUTPATIENT)
Dept: CT IMAGING | Age: 84
End: 2024-03-26
Payer: MEDICARE

## 2024-03-26 VITALS
HEART RATE: 65 BPM | DIASTOLIC BLOOD PRESSURE: 69 MMHG | SYSTOLIC BLOOD PRESSURE: 153 MMHG | OXYGEN SATURATION: 96 % | WEIGHT: 145 LBS | TEMPERATURE: 97.4 F | RESPIRATION RATE: 18 BRPM | BODY MASS INDEX: 22.71 KG/M2

## 2024-03-26 DIAGNOSIS — R31.9 URINARY TRACT INFECTION WITH HEMATURIA, SITE UNSPECIFIED: ICD-10-CM

## 2024-03-26 DIAGNOSIS — R11.0 NAUSEA: Primary | ICD-10-CM

## 2024-03-26 DIAGNOSIS — R53.83 OTHER FATIGUE: ICD-10-CM

## 2024-03-26 DIAGNOSIS — N39.0 URINARY TRACT INFECTION WITH HEMATURIA, SITE UNSPECIFIED: ICD-10-CM

## 2024-03-26 LAB
ALBUMIN SERPL-MCNC: 4.4 G/DL (ref 3.5–5.2)
ALP SERPL-CCNC: 81 U/L (ref 35–104)
ALT SERPL-CCNC: 9 U/L (ref 0–32)
ANION GAP SERPL CALCULATED.3IONS-SCNC: 11 MMOL/L (ref 7–16)
AST SERPL-CCNC: 16 U/L (ref 0–31)
BACTERIA URNS QL MICRO: ABNORMAL
BASOPHILS # BLD: 0.02 K/UL (ref 0–0.2)
BASOPHILS NFR BLD: 0 % (ref 0–2)
BILIRUB DIRECT SERPL-MCNC: <0.2 MG/DL (ref 0–0.3)
BILIRUB INDIRECT SERPL-MCNC: NORMAL MG/DL (ref 0–1)
BILIRUB SERPL-MCNC: 0.3 MG/DL (ref 0–1.2)
BILIRUB UR QL STRIP: NEGATIVE
BUN SERPL-MCNC: 12 MG/DL (ref 6–23)
CALCIUM SERPL-MCNC: 10.3 MG/DL (ref 8.6–10.2)
CHLORIDE SERPL-SCNC: 105 MMOL/L (ref 98–107)
CLARITY UR: ABNORMAL
CO2 SERPL-SCNC: 26 MMOL/L (ref 22–29)
COLOR UR: YELLOW
CREAT SERPL-MCNC: 1.1 MG/DL (ref 0.5–1)
EOSINOPHIL # BLD: 0.02 K/UL (ref 0.05–0.5)
EOSINOPHILS RELATIVE PERCENT: 0 % (ref 0–6)
ERYTHROCYTE [DISTWIDTH] IN BLOOD BY AUTOMATED COUNT: 13.5 % (ref 11.5–15)
GFR SERPL CREATININE-BSD FRML MDRD: 51 ML/MIN/1.73M2
GLUCOSE SERPL-MCNC: 116 MG/DL (ref 74–99)
GLUCOSE UR STRIP-MCNC: NEGATIVE MG/DL
HCT VFR BLD AUTO: 43.6 % (ref 34–48)
HGB BLD-MCNC: 14.5 G/DL (ref 11.5–15.5)
HGB UR QL STRIP.AUTO: ABNORMAL
IMM GRANULOCYTES # BLD AUTO: 0.05 K/UL (ref 0–0.58)
IMM GRANULOCYTES NFR BLD: 1 % (ref 0–5)
INFLUENZA A BY PCR: NOT DETECTED
INFLUENZA B BY PCR: NOT DETECTED
KETONES UR STRIP-MCNC: NEGATIVE MG/DL
LACTATE BLDV-SCNC: 2 MMOL/L (ref 0.5–2.2)
LEUKOCYTE ESTERASE UR QL STRIP: ABNORMAL
LIPASE SERPL-CCNC: 42 U/L (ref 13–60)
LYMPHOCYTES NFR BLD: 0.97 K/UL (ref 1.5–4)
LYMPHOCYTES RELATIVE PERCENT: 11 % (ref 20–42)
MCH RBC QN AUTO: 30.6 PG (ref 26–35)
MCHC RBC AUTO-ENTMCNC: 33.3 G/DL (ref 32–34.5)
MCV RBC AUTO: 92 FL (ref 80–99.9)
MONOCYTES NFR BLD: 0.45 K/UL (ref 0.1–0.95)
MONOCYTES NFR BLD: 5 % (ref 2–12)
NEUTROPHILS NFR BLD: 83 % (ref 43–80)
NEUTS SEG NFR BLD: 7.44 K/UL (ref 1.8–7.3)
NITRITE UR QL STRIP: NEGATIVE
PH UR STRIP: 7.5 [PH] (ref 5–9)
PLATELET # BLD AUTO: 248 K/UL (ref 130–450)
PMV BLD AUTO: 9.5 FL (ref 7–12)
POTASSIUM SERPL-SCNC: 4.3 MMOL/L (ref 3.5–5)
PROT SERPL-MCNC: 7.7 G/DL (ref 6.4–8.3)
PROT UR STRIP-MCNC: ABNORMAL MG/DL
RBC # BLD AUTO: 4.74 M/UL (ref 3.5–5.5)
RBC #/AREA URNS HPF: ABNORMAL /HPF
SARS-COV-2 RDRP RESP QL NAA+PROBE: NOT DETECTED
SODIUM SERPL-SCNC: 142 MMOL/L (ref 132–146)
SP GR UR STRIP: 1.01 (ref 1–1.03)
SPECIMEN DESCRIPTION: NORMAL
UROBILINOGEN UR STRIP-ACNC: 0.2 EU/DL (ref 0–1)
WBC #/AREA URNS HPF: ABNORMAL /HPF
WBC OTHER # BLD: 9 K/UL (ref 4.5–11.5)

## 2024-03-26 PROCEDURE — 99285 EMERGENCY DEPT VISIT HI MDM: CPT

## 2024-03-26 PROCEDURE — 80053 COMPREHEN METABOLIC PANEL: CPT

## 2024-03-26 PROCEDURE — 83690 ASSAY OF LIPASE: CPT

## 2024-03-26 PROCEDURE — 96374 THER/PROPH/DIAG INJ IV PUSH: CPT

## 2024-03-26 PROCEDURE — 6370000000 HC RX 637 (ALT 250 FOR IP)

## 2024-03-26 PROCEDURE — 81001 URINALYSIS AUTO W/SCOPE: CPT

## 2024-03-26 PROCEDURE — 83605 ASSAY OF LACTIC ACID: CPT

## 2024-03-26 PROCEDURE — 87502 INFLUENZA DNA AMP PROBE: CPT

## 2024-03-26 PROCEDURE — 6360000004 HC RX CONTRAST MEDICATION: Performed by: RADIOLOGY

## 2024-03-26 PROCEDURE — 6360000002 HC RX W HCPCS: Performed by: STUDENT IN AN ORGANIZED HEALTH CARE EDUCATION/TRAINING PROGRAM

## 2024-03-26 PROCEDURE — 85025 COMPLETE CBC W/AUTO DIFF WBC: CPT

## 2024-03-26 PROCEDURE — 74177 CT ABD & PELVIS W/CONTRAST: CPT

## 2024-03-26 PROCEDURE — 87635 SARS-COV-2 COVID-19 AMP PRB: CPT

## 2024-03-26 PROCEDURE — 87086 URINE CULTURE/COLONY COUNT: CPT

## 2024-03-26 PROCEDURE — 82248 BILIRUBIN DIRECT: CPT

## 2024-03-26 PROCEDURE — 96375 TX/PRO/DX INJ NEW DRUG ADDON: CPT

## 2024-03-26 PROCEDURE — 2580000003 HC RX 258: Performed by: STUDENT IN AN ORGANIZED HEALTH CARE EDUCATION/TRAINING PROGRAM

## 2024-03-26 RX ORDER — AMOXICILLIN 875 MG/1
875 TABLET, COATED ORAL EVERY 12 HOURS SCHEDULED
Status: DISCONTINUED | OUTPATIENT
Start: 2024-03-26 | End: 2024-03-26

## 2024-03-26 RX ORDER — 0.9 % SODIUM CHLORIDE 0.9 %
1000 INTRAVENOUS SOLUTION INTRAVENOUS ONCE
Status: COMPLETED | OUTPATIENT
Start: 2024-03-26 | End: 2024-03-26

## 2024-03-26 RX ORDER — LEVOFLOXACIN 750 MG/1
750 TABLET, FILM COATED ORAL EVERY OTHER DAY
Qty: 2 TABLET | Refills: 0 | Status: ON HOLD | OUTPATIENT
Start: 2024-03-26 | End: 2024-04-01

## 2024-03-26 RX ORDER — PROCHLORPERAZINE EDISYLATE 5 MG/ML
10 INJECTION INTRAMUSCULAR; INTRAVENOUS ONCE
Status: COMPLETED | OUTPATIENT
Start: 2024-03-26 | End: 2024-03-26

## 2024-03-26 RX ORDER — AMOXICILLIN 875 MG/1
875 TABLET, COATED ORAL 2 TIMES DAILY
Qty: 14 TABLET | Refills: 0 | Status: ON HOLD | OUTPATIENT
Start: 2024-03-26 | End: 2024-04-02

## 2024-03-26 RX ORDER — LEVOFLOXACIN 750 MG/1
750 TABLET, FILM COATED ORAL ONCE
Status: COMPLETED | OUTPATIENT
Start: 2024-03-26 | End: 2024-03-26

## 2024-03-26 RX ORDER — ONDANSETRON 2 MG/ML
4 INJECTION INTRAMUSCULAR; INTRAVENOUS ONCE
Status: COMPLETED | OUTPATIENT
Start: 2024-03-26 | End: 2024-03-26

## 2024-03-26 RX ORDER — ONDANSETRON 4 MG/1
4 TABLET, FILM COATED ORAL 3 TIMES DAILY PRN
Qty: 15 TABLET | Refills: 0 | Status: ON HOLD | OUTPATIENT
Start: 2024-03-26

## 2024-03-26 RX ORDER — AMOXICILLIN 875 MG/1
875 TABLET, COATED ORAL ONCE
Status: COMPLETED | OUTPATIENT
Start: 2024-03-26 | End: 2024-03-26

## 2024-03-26 RX ORDER — LEVOFLOXACIN 500 MG/1
500 TABLET, FILM COATED ORAL ONCE
Status: DISCONTINUED | OUTPATIENT
Start: 2024-03-26 | End: 2024-03-26

## 2024-03-26 RX ADMIN — SODIUM CHLORIDE 1000 ML: 9 INJECTION, SOLUTION INTRAVENOUS at 13:09

## 2024-03-26 RX ADMIN — PROCHLORPERAZINE EDISYLATE 10 MG: 5 INJECTION INTRAMUSCULAR; INTRAVENOUS at 14:48

## 2024-03-26 RX ADMIN — AMOXICILLIN 875 MG: 875 TABLET, FILM COATED ORAL at 19:10

## 2024-03-26 RX ADMIN — IOPAMIDOL 75 ML: 755 INJECTION, SOLUTION INTRAVENOUS at 14:16

## 2024-03-26 RX ADMIN — ONDANSETRON 4 MG: 2 INJECTION INTRAMUSCULAR; INTRAVENOUS at 13:10

## 2024-03-26 RX ADMIN — LEVOFLOXACIN 750 MG: 750 TABLET, FILM COATED ORAL at 18:45

## 2024-03-26 ASSESSMENT — PAIN - FUNCTIONAL ASSESSMENT: PAIN_FUNCTIONAL_ASSESSMENT: NONE - DENIES PAIN

## 2024-03-26 NOTE — DISCHARGE INSTRUCTIONS
Start taking antibiotics  Make sure you are home as well lit especially if you are getting up at night  Use a cane/walker whenever you are mobile in your home

## 2024-03-26 NOTE — ED NOTES
Patient was provided a glass of water and pack of crackers. Tolerated well. No nausea or vomiting at this time.

## 2024-03-26 NOTE — ED PROVIDER NOTES
[facility] EMERGENCY DEPARTMENT  EMERGENCY DEPARTMENT ENCOUNTER      Pt Name: Yen Plasencia  MRN: [id number]  Birthdate 1940  Date of evaluation: 3/26/2024  Provider: Vern Johnson DO  PCP: Jared Nielson MD  Note Started: 12:12 PM EDT 3/26/24    CHIEF COMPLAINT       Chief Complaint   Patient presents with    Fatigue     Started last night. Generalized body aches.    Emesis       HISTORY OF PRESENT ILLNESS: 1 or more Elements   History From: Patient  Limitations to history : None    Yen Plasencia is a 83 y.o. female who presents to the ED due to fatigue, nausea and vomiting.  Patient states that she started experiencing nausea with several episodes of vomiting last night.  She endorses generalized fatigue and bodyaches associated the symptoms.  She denies any associated abdominal pain at this time.  Denies any dysuria, urinary urgency, frequency or hematuria.  She does have an ileostomy in place and notes that the output has been normal in her ostomy site which is herniated but general surgeon is aware.  She denies any associated fever or chills at this time.    Nursing Notes were all reviewed and agreed with or any disagreements were addressed in the HPI.    REVIEW OF SYSTEMS :    Positives and Pertinent negatives as per HPI.     PAST MEDICAL HISTORY/Chronic Conditions Affecting Care    has a past medical history of Cancer ([facility]), Cancer ([facility]), Chronic obstructive pulmonary disease, unspecified (04/04/2023), Chronic renal disease, stage III ([facility]) [[id number]] (06/27/2022), History of blood transfusion, blood clots, Ischemic bowel disease ([facility]), Macular degeneration, MDRO (multiple drug resistant organisms) resistance, PAF (paroxysmal atrial fibrillation) ([facility]), Perforation of sigmoid colon ([facility]) (10/17/2018), SBO (small bowel obstruction) ([facility]) (07/16/2021), and Tinnitus.     SURGICAL HISTORY     Past Surgical History:   Procedure Laterality Date    ABDOMEN SURGERY      colostomy &

## 2024-03-28 LAB
MICROORGANISM SPEC CULT: ABNORMAL
MICROORGANISM SPEC CULT: ABNORMAL
SPECIMEN DESCRIPTION: ABNORMAL

## 2024-03-31 ENCOUNTER — APPOINTMENT (OUTPATIENT)
Dept: GENERAL RADIOLOGY | Age: 84
DRG: 536 | End: 2024-03-31
Payer: MEDICARE

## 2024-03-31 ENCOUNTER — HOSPITAL ENCOUNTER (INPATIENT)
Age: 84
LOS: 2 days | Discharge: ANOTHER HOSPITAL | DRG: 536 | End: 2024-04-02
Attending: EMERGENCY MEDICINE | Admitting: INTERNAL MEDICINE
Payer: MEDICARE

## 2024-03-31 DIAGNOSIS — I44.30 AV BLOCK: ICD-10-CM

## 2024-03-31 DIAGNOSIS — S72.141A CLOSED INTERTROCHANTERIC FRACTURE, RIGHT, INITIAL ENCOUNTER (HCC): ICD-10-CM

## 2024-03-31 DIAGNOSIS — S72.144A CLOSED NONDISPLACED INTERTROCHANTERIC FRACTURE OF RIGHT FEMUR, INITIAL ENCOUNTER (HCC): Primary | ICD-10-CM

## 2024-03-31 LAB
25(OH)D3 SERPL-MCNC: 35.8 NG/ML (ref 30–100)
ABO + RH BLD: NORMAL
ANION GAP SERPL CALCULATED.3IONS-SCNC: 12 MMOL/L (ref 7–16)
ARM BAND NUMBER: NORMAL
BACTERIA URNS QL MICRO: ABNORMAL
BASOPHILS # BLD: 0.02 K/UL (ref 0–0.2)
BASOPHILS NFR BLD: 0 % (ref 0–2)
BILIRUB UR QL STRIP: NEGATIVE
BLOOD BANK SAMPLE EXPIRATION: NORMAL
BLOOD GROUP ANTIBODIES SERPL: NEGATIVE
BUN SERPL-MCNC: 18 MG/DL (ref 6–23)
CALCIUM SERPL-MCNC: 10.1 MG/DL (ref 8.6–10.2)
CHLORIDE SERPL-SCNC: 106 MMOL/L (ref 98–107)
CHOLEST SERPL-MCNC: 189 MG/DL
CLARITY UR: ABNORMAL
CO2 SERPL-SCNC: 25 MMOL/L (ref 22–29)
COLOR UR: YELLOW
CREAT SERPL-MCNC: 1.1 MG/DL (ref 0.5–1)
EKG ATRIAL RATE: 58 BPM
EKG P AXIS: 73 DEGREES
EKG P-R INTERVAL: 158 MS
EKG Q-T INTERVAL: 430 MS
EKG QRS DURATION: 72 MS
EKG QTC CALCULATION (BAZETT): 422 MS
EKG R AXIS: 75 DEGREES
EKG T AXIS: 79 DEGREES
EKG VENTRICULAR RATE: 58 BPM
EOSINOPHIL # BLD: 0.08 K/UL (ref 0.05–0.5)
EOSINOPHILS RELATIVE PERCENT: 1 % (ref 0–6)
EPI CELLS #/AREA URNS HPF: ABNORMAL /HPF
ERYTHROCYTE [DISTWIDTH] IN BLOOD BY AUTOMATED COUNT: 13.6 % (ref 11.5–15)
ERYTHROCYTE [DISTWIDTH] IN BLOOD BY AUTOMATED COUNT: 13.7 % (ref 11.5–15)
GFR SERPL CREATININE-BSD FRML MDRD: 48 ML/MIN/1.73M2
GLUCOSE SERPL-MCNC: 117 MG/DL (ref 74–99)
GLUCOSE UR STRIP-MCNC: NEGATIVE MG/DL
HCT VFR BLD AUTO: 37.8 % (ref 34–48)
HCT VFR BLD AUTO: 39.8 % (ref 34–48)
HDLC SERPL-MCNC: 55 MG/DL
HGB BLD-MCNC: 12.2 G/DL (ref 11.5–15.5)
HGB BLD-MCNC: 13.1 G/DL (ref 11.5–15.5)
HGB UR QL STRIP.AUTO: ABNORMAL
IMM GRANULOCYTES # BLD AUTO: 0.03 K/UL (ref 0–0.58)
IMM GRANULOCYTES NFR BLD: 1 % (ref 0–5)
INR PPP: 0.9
KETONES UR STRIP-MCNC: NEGATIVE MG/DL
LDLC SERPL CALC-MCNC: 114 MG/DL
LEUKOCYTE ESTERASE UR QL STRIP: ABNORMAL
LYMPHOCYTES NFR BLD: 1.14 K/UL (ref 1.5–4)
LYMPHOCYTES RELATIVE PERCENT: 19 % (ref 20–42)
MAGNESIUM SERPL-MCNC: 1.9 MG/DL (ref 1.6–2.6)
MCH RBC QN AUTO: 30.3 PG (ref 26–35)
MCH RBC QN AUTO: 31 PG (ref 26–35)
MCHC RBC AUTO-ENTMCNC: 32.3 G/DL (ref 32–34.5)
MCHC RBC AUTO-ENTMCNC: 32.9 G/DL (ref 32–34.5)
MCV RBC AUTO: 94 FL (ref 80–99.9)
MCV RBC AUTO: 94.1 FL (ref 80–99.9)
MONOCYTES NFR BLD: 0.62 K/UL (ref 0.1–0.95)
MONOCYTES NFR BLD: 10 % (ref 2–12)
MUCOUS THREADS URNS QL MICRO: PRESENT
NEUTROPHILS NFR BLD: 68 % (ref 43–80)
NEUTS SEG NFR BLD: 4.08 K/UL (ref 1.8–7.3)
NITRITE UR QL STRIP: NEGATIVE
PARTIAL THROMBOPLASTIN TIME: 34.8 SEC (ref 24.5–35.1)
PH UR STRIP: 6 [PH] (ref 5–9)
PHOSPHATE SERPL-MCNC: 2.7 MG/DL (ref 2.5–4.5)
PLATELET # BLD AUTO: 221 K/UL (ref 130–450)
PLATELET # BLD AUTO: 228 K/UL (ref 130–450)
PMV BLD AUTO: 9.5 FL (ref 7–12)
PMV BLD AUTO: 9.9 FL (ref 7–12)
POTASSIUM SERPL-SCNC: 4.1 MMOL/L (ref 3.5–5)
PROT UR STRIP-MCNC: ABNORMAL MG/DL
PROTHROMBIN TIME: 10.4 SEC (ref 9.3–12.4)
RBC # BLD AUTO: 4.02 M/UL (ref 3.5–5.5)
RBC # BLD AUTO: 4.23 M/UL (ref 3.5–5.5)
RBC #/AREA URNS HPF: ABNORMAL /HPF
SODIUM SERPL-SCNC: 143 MMOL/L (ref 132–146)
SP GR UR STRIP: <1.005 (ref 1–1.03)
T4 FREE SERPL-MCNC: 1 NG/DL (ref 0.9–1.7)
TRIGL SERPL-MCNC: 99 MG/DL
TSH SERPL DL<=0.05 MIU/L-ACNC: 7.82 UIU/ML (ref 0.27–4.2)
UROBILINOGEN UR STRIP-ACNC: 0.2 EU/DL (ref 0–1)
VLDLC SERPL CALC-MCNC: 20 MG/DL
WBC #/AREA URNS HPF: ABNORMAL /HPF
WBC OTHER # BLD: 10.9 K/UL (ref 4.5–11.5)
WBC OTHER # BLD: 6 K/UL (ref 4.5–11.5)

## 2024-03-31 PROCEDURE — 83735 ASSAY OF MAGNESIUM: CPT

## 2024-03-31 PROCEDURE — 6370000000 HC RX 637 (ALT 250 FOR IP): Performed by: INTERNAL MEDICINE

## 2024-03-31 PROCEDURE — 84443 ASSAY THYROID STIM HORMONE: CPT

## 2024-03-31 PROCEDURE — 93010 ELECTROCARDIOGRAM REPORT: CPT | Performed by: INTERNAL MEDICINE

## 2024-03-31 PROCEDURE — 80048 BASIC METABOLIC PNL TOTAL CA: CPT

## 2024-03-31 PROCEDURE — 73552 X-RAY EXAM OF FEMUR 2/>: CPT

## 2024-03-31 PROCEDURE — 96374 THER/PROPH/DIAG INJ IV PUSH: CPT

## 2024-03-31 PROCEDURE — 99231 SBSQ HOSP IP/OBS SF/LOW 25: CPT | Performed by: ORTHOPAEDIC SURGERY

## 2024-03-31 PROCEDURE — 82306 VITAMIN D 25 HYDROXY: CPT

## 2024-03-31 PROCEDURE — 86850 RBC ANTIBODY SCREEN: CPT

## 2024-03-31 PROCEDURE — 86900 BLOOD TYPING SEROLOGIC ABO: CPT

## 2024-03-31 PROCEDURE — 85610 PROTHROMBIN TIME: CPT

## 2024-03-31 PROCEDURE — 84100 ASSAY OF PHOSPHORUS: CPT

## 2024-03-31 PROCEDURE — 85025 COMPLETE CBC W/AUTO DIFF WBC: CPT

## 2024-03-31 PROCEDURE — 81001 URINALYSIS AUTO W/SCOPE: CPT

## 2024-03-31 PROCEDURE — 85027 COMPLETE CBC AUTOMATED: CPT

## 2024-03-31 PROCEDURE — 99285 EMERGENCY DEPT VISIT HI MDM: CPT

## 2024-03-31 PROCEDURE — 85730 THROMBOPLASTIN TIME PARTIAL: CPT

## 2024-03-31 PROCEDURE — 80061 LIPID PANEL: CPT

## 2024-03-31 PROCEDURE — 1200000000 HC SEMI PRIVATE

## 2024-03-31 PROCEDURE — 93005 ELECTROCARDIOGRAM TRACING: CPT

## 2024-03-31 PROCEDURE — 86901 BLOOD TYPING SEROLOGIC RH(D): CPT

## 2024-03-31 PROCEDURE — 51702 INSERT TEMP BLADDER CATH: CPT

## 2024-03-31 PROCEDURE — 73521 X-RAY EXAM HIPS BI 2 VIEWS: CPT

## 2024-03-31 PROCEDURE — 71045 X-RAY EXAM CHEST 1 VIEW: CPT

## 2024-03-31 PROCEDURE — 6360000002 HC RX W HCPCS: Performed by: EMERGENCY MEDICINE

## 2024-03-31 PROCEDURE — 84439 ASSAY OF FREE THYROXINE: CPT

## 2024-03-31 RX ORDER — PROCHLORPERAZINE EDISYLATE 5 MG/ML
10 INJECTION INTRAMUSCULAR; INTRAVENOUS EVERY 6 HOURS PRN
Status: DISCONTINUED | OUTPATIENT
Start: 2024-03-31 | End: 2024-03-31

## 2024-03-31 RX ORDER — 0.9 % SODIUM CHLORIDE 0.9 %
500 INTRAVENOUS SOLUTION INTRAVENOUS ONCE
Status: DISCONTINUED | OUTPATIENT
Start: 2024-03-31 | End: 2024-04-02 | Stop reason: HOSPADM

## 2024-03-31 RX ORDER — FENTANYL CITRATE 0.05 MG/ML
50 INJECTION, SOLUTION INTRAMUSCULAR; INTRAVENOUS EVERY 4 HOURS PRN
Status: DISCONTINUED | OUTPATIENT
Start: 2024-03-31 | End: 2024-04-02 | Stop reason: HOSPADM

## 2024-03-31 RX ORDER — ACETAMINOPHEN 500 MG
500 TABLET ORAL EVERY 6 HOURS PRN
Status: DISCONTINUED | OUTPATIENT
Start: 2024-03-31 | End: 2024-04-02 | Stop reason: HOSPADM

## 2024-03-31 RX ORDER — OXYCODONE HYDROCHLORIDE AND ACETAMINOPHEN 5; 325 MG/1; MG/1
1 TABLET ORAL EVERY 6 HOURS PRN
Status: DISCONTINUED | OUTPATIENT
Start: 2024-03-31 | End: 2024-03-31

## 2024-03-31 RX ORDER — ENOXAPARIN SODIUM 100 MG/ML
30 INJECTION SUBCUTANEOUS DAILY
Status: DISCONTINUED | OUTPATIENT
Start: 2024-04-01 | End: 2024-03-31

## 2024-03-31 RX ORDER — ONDANSETRON 4 MG/1
4 TABLET, FILM COATED ORAL 3 TIMES DAILY PRN
Status: DISCONTINUED | OUTPATIENT
Start: 2024-03-31 | End: 2024-04-01

## 2024-03-31 RX ORDER — POLYETHYLENE GLYCOL 3350 17 G/17G
17 POWDER, FOR SOLUTION ORAL DAILY PRN
Status: DISCONTINUED | OUTPATIENT
Start: 2024-03-31 | End: 2024-04-02 | Stop reason: HOSPADM

## 2024-03-31 RX ORDER — PROCHLORPERAZINE EDISYLATE 5 MG/ML
5 INJECTION INTRAMUSCULAR; INTRAVENOUS EVERY 6 HOURS PRN
Status: DISCONTINUED | OUTPATIENT
Start: 2024-03-31 | End: 2024-04-02 | Stop reason: HOSPADM

## 2024-03-31 RX ORDER — OXYCODONE HYDROCHLORIDE AND ACETAMINOPHEN 5; 325 MG/1; MG/1
1 TABLET ORAL EVERY 4 HOURS PRN
Status: DISCONTINUED | OUTPATIENT
Start: 2024-03-31 | End: 2024-04-02 | Stop reason: HOSPADM

## 2024-03-31 RX ORDER — IPRATROPIUM BROMIDE AND ALBUTEROL SULFATE 2.5; .5 MG/3ML; MG/3ML
1 SOLUTION RESPIRATORY (INHALATION) EVERY 4 HOURS PRN
Status: DISCONTINUED | OUTPATIENT
Start: 2024-03-31 | End: 2024-04-02 | Stop reason: HOSPADM

## 2024-03-31 RX ORDER — FENTANYL CITRATE 0.05 MG/ML
50 INJECTION, SOLUTION INTRAMUSCULAR; INTRAVENOUS ONCE
Status: COMPLETED | OUTPATIENT
Start: 2024-03-31 | End: 2024-03-31

## 2024-03-31 RX ORDER — ENOXAPARIN SODIUM 100 MG/ML
30 INJECTION SUBCUTANEOUS DAILY
Status: DISCONTINUED | OUTPATIENT
Start: 2024-04-01 | End: 2024-04-02 | Stop reason: HOSPADM

## 2024-03-31 RX ORDER — PANTOPRAZOLE SODIUM 40 MG/1
40 TABLET, DELAYED RELEASE ORAL
Status: DISCONTINUED | OUTPATIENT
Start: 2024-03-31 | End: 2024-04-02 | Stop reason: HOSPADM

## 2024-03-31 RX ORDER — TRAMADOL HYDROCHLORIDE 50 MG/1
50 TABLET ORAL EVERY 6 HOURS PRN
Status: DISCONTINUED | OUTPATIENT
Start: 2024-03-31 | End: 2024-04-02 | Stop reason: HOSPADM

## 2024-03-31 RX ADMIN — FENTANYL CITRATE 50 MCG: 0.05 INJECTION, SOLUTION INTRAMUSCULAR; INTRAVENOUS at 05:43

## 2024-03-31 RX ADMIN — FENTANYL CITRATE 50 MCG: 0.05 INJECTION, SOLUTION INTRAMUSCULAR; INTRAVENOUS at 04:16

## 2024-03-31 RX ADMIN — OXYCODONE AND ACETAMINOPHEN 1 TABLET: 5; 325 TABLET ORAL at 22:10

## 2024-03-31 ASSESSMENT — PAIN DESCRIPTION - ORIENTATION
ORIENTATION: RIGHT
ORIENTATION: RIGHT

## 2024-03-31 ASSESSMENT — PAIN SCALES - GENERAL
PAINLEVEL_OUTOF10: 5
PAINLEVEL_OUTOF10: 3
PAINLEVEL_OUTOF10: 6

## 2024-03-31 ASSESSMENT — PAIN DESCRIPTION - PAIN TYPE: TYPE: ACUTE PAIN

## 2024-03-31 ASSESSMENT — PAIN DESCRIPTION - LOCATION
LOCATION: HIP
LOCATION: HIP

## 2024-03-31 ASSESSMENT — PAIN DESCRIPTION - DESCRIPTORS: DESCRIPTORS: ACHING

## 2024-03-31 ASSESSMENT — PAIN DESCRIPTION - FREQUENCY: FREQUENCY: CONTINUOUS

## 2024-03-31 ASSESSMENT — PAIN - FUNCTIONAL ASSESSMENT: PAIN_FUNCTIONAL_ASSESSMENT: 0-10

## 2024-03-31 NOTE — H&P
Department of Internal Medicine        CHIEF COMPLAINT:  R hip pain    Reason for Admission:  Right intertrochanteric fracture Right femur    HISTORY OF PRESENT ILLNESS:      The patient is a 83 y.o. female who presents with R hip pain.  Patient normally walks with her walker.  Patient states she has been weak for the last week or so when she was having problems with increasing loose stools.  Patient states she went to the bathroom and when she got up to move she was very weak and little bit lightheaded and her legs gave out on her.  She did not hit her head or lose consciousness.  There is no associated chest pain, palpitation.  There is no unusual shortness of breath.  Patient was unable to ambulate after the fall was brought into the see room with x-ray of the hip showing mildly comminuted, displaced fracture of the right anterior trochanteric femur.  Routine lab work showed BUN/creatinine 18/1.1 with normal electrolytes.  TSH was elevated 7.82 with WBC 10.9 with hemoglobin 12.2.  Temperature 97.5 with heart rate 66 blood pressure 143/57.  O2 sat 100% room air at rest.    Past Medical History:    Past Medical History:   Diagnosis Date    Cancer (HCC)     colorectal     Cancer (HCC)     colorectal     Chronic obstructive pulmonary disease, unspecified 04/04/2023    Chronic renal disease, stage III ([facility]) [[id number]] 06/27/2022    History of blood transfusion     Hx of blood clots     dvt, on eliquis    Ischemic bowel disease (HCC)     Macular degeneration     MDRO (multiple drug resistant organisms) resistance     PAF (paroxysmal atrial fibrillation) ([facility])     Perforation of sigmoid colon ([facility]) 10/17/2018    SBO (small bowel obstruction) ([facility]) 07/16/2021    Tinnitus      Past Surgical History:    Past Surgical History:   Procedure Laterality Date    ABDOMEN SURGERY      colostomy & reversal    CHOLECYSTECTOMY  07    COLONOSCOPY      CYSTOSCOPY N/A 12/4/2018    CYSTOSCOPY RETROGRADE PYELOGRAM performed by Darwin MAGANA

## 2024-03-31 NOTE — PROGRESS NOTES
Physical Therapy    Room #: 0325/0325-02  Date: 3/31/2024       Patient Name: Yen Plasencia  : 1940      MRN: [id number]     Patient unavailable for physical therapy eval due to awaiting ortho consult. Will attempt PT evaluation at a later time. Thank you.       Kitty Sorenson, PT

## 2024-03-31 NOTE — CONSULTS
Department of Orthopedic Surgery  Consult Note    Reason for Consult: Right hip pain    HISTORY OF PRESENT ILLNESS:       Patient is a 83 y.o. female who presents with pain in the right hip following a fall from standing height.  Patient was heading to the bathroom, when she lost her balance and fell landing on her right side.  She reports immediate pain and inability to ambulate, leading to her transport to the ED by EMS.  Denies hitting her head, denies loss of consciousness.  She was recently admitted over a week ago due to stomach ache.  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  Her medical history significant for colorectal cancer, COPD, and chronic renal disease.  She has a history of blood transfusion is on Eliquis for DVT prophylaxis.  She uses a cane at baseline for ambulation as an assistive device.    Denies any other orthopedic complaints at this time.      Past Medical History:        Diagnosis Date    Cancer (HCC)     colorectal     Cancer (HCC)     colorectal     Chronic obstructive pulmonary disease, unspecified 04/04/2023    Chronic renal disease, stage III (HCC) [[id number]] 06/27/2022    History of blood transfusion     Hx of blood clots     dvt, on eliquis    Ischemic bowel disease (HCC)     Macular degeneration     MDRO (multiple drug resistant organisms) resistance     PAF (paroxysmal atrial fibrillation) ([facility])     Perforation of sigmoid colon (HCC) 10/17/2018    SBO (small bowel obstruction) ([facility]) 07/16/2021    Tinnitus      Past Surgical History:        Procedure Laterality Date    ABDOMEN SURGERY      colostomy & reversal    CHOLECYSTECTOMY  07    COLONOSCOPY      CYSTOSCOPY N/A 12/4/2018    CYSTOSCOPY RETROGRADE PYELOGRAM performed by Darwin Tucker MD at [facility] OR    DILATATION, ESOPHAGUS      HAND SURGERY      carpel tunnel rigth hand     HYSTERECTOMY (CERVIX STATUS UNKNOWN)      ILEOSTOMY OR JEJUNOSTOMY      OTHER SURGICAL HISTORY  11/06/2017    diagnostic laparoscopy with exploratory laparotomy and reduction of internal hernia and closure of mesenteric defect, oversewing of multiple serosal tears    MO LAPS COLECTOMY ABDL W/PROCTECTOMY W/ILEOSTOMY N/A 6/28/2018    LAPAROSCOPIC DIVERTING COLOSTOMY performed by Raudel Javier MD at [facility] OR    MO LAPS COLECTOMY ABDL W/PROCTECTOMY W/ILEOSTOMY N/A 10/18/2018    LAPAROSCOPIC REVISION LOOP COLOSTOMY, EXTENSIVE LYSIS OF ADHESIONS, DRAINAGE PELVIC ABSCESS, END COLOSTOMY performed by Nolan Huff MD at [facility] OR    SIGMOIDOSCOPY N/A 12/4/2023    FLEXIBLE SIGMOIDOSCOPY performed by Nolan Huff MD at [facility] ENDOSCOPY    ALICIA AND BSO (CERVIX REMOVED)      UPPER GASTROINTESTINAL ENDOSCOPY N/A 7/18/2018    EGD BIOPSY performed by Cecilio Edwards DO at [facility] ENDOSCOPY     Current Medications:   Current Facility-Administered Medications: acetaminophen (TYLENOL) tablet 500 mg, 500 mg, Oral, Q6H PRN  [START ON 4/1/2024] enoxaparin Sodium (LOVENOX) injection 30 mg, 30 mg, SubCUTAneous, Daily  polyethylene glycol (GLYCOLAX) packet 17 g, 17 g, Oral, Daily PRN  fentaNYL (SUBLIMAZE) injection 50 mcg, 50 mcg, IntraVENous, Q4H PRN  traMADol (ULTRAM) tablet 50 mg, 50 mg, Oral, Q6H PRN  oxyCODONE-acetaminophen (PERCOCET) 5-325 MG per tablet 1 tablet, 1 tablet, Oral, Q4H PRN  prochlorperazine (COMPAZINE) injection 5 mg, 5 mg, IntraVENous, Q6H PRN  pantoprazole (PROTONIX) tablet 40 mg, 40 mg, Oral, QAM AC  ondansetron (ZOFRAN) tablet 4 mg, 4 mg, Oral, TID PRN  Allergies:  Aspirin and Tape [adhesive tape]    Social History:   TOBACCO:   reports that she has quit smoking. Her smoking use included cigarettes. She has never used smokeless tobacco.  ETOH:   reports no history of alcohol use.  DRUGS:   reports no history of drug use.  ACTIVITIES OF DAILY LIVING:    OCCUPATION:    Family History:       Problem Relation

## 2024-03-31 NOTE — PROGRESS NOTES
Date:3/31/2024  Patient Name: Yen Plasencia  MRN: [id number]  : 1940  ROOM #: 0325/0325-02    Occupational Therapy order received, chart reviewed and evaluation attempted this date. Patient is unavailable for OT evaluation due to Mildly comminuted, displaced fracture of the right intertrochanteric femur. Awaiting orthopedic consult.     Update - Orthopedic consult - Plan for right hip CMN tomorrow  with Dr. Rene.     Will attempt OT evaluation at a later time. Thank you.   Felisha Olsen OTR/L #[id number]

## 2024-03-31 NOTE — ED PROVIDER NOTES
Department of Emergency Medicine     Written by: Lorenzo Greenfield MD  Patient Name: Yen Plasencia  Admit Date: 3/31/2024  3:21 AM  MRN: [id number]                   : 1940    HPI  Chief Complaint   Patient presents with    Fall     Fall to floor, denies head injury, c/o right hip pain       Yen Plasencia is a 83 y.o. female that presents to the ED with right hip pain.  Was walking with a walker which is her baseline at home.  Her legs gave way.  She fell on her right hip.  Did not hit her head, did not lose consciousness.  Not on anticoagulation antiplatelet therapy.  She was unable to ambulate after the fall.  She arrives with a shortened externally rotated right lower extremity.  She has tenderness to the right hip.  No ecchymosis or bruising or other deformities noted to the right hip.    Review of systems:  Pertinent positives and negatives mentioned in the HPI/MDM.    Physical Exam  Constitutional:       General: She is not in acute distress.     Appearance: Normal appearance.   Eyes:      Extraocular Movements: Extraocular movements intact.      Pupils: Pupils are equal, round, and reactive to light.   Cardiovascular:      Rate and Rhythm: Normal rate and regular rhythm.   Pulmonary:      Effort: Pulmonary effort is normal. No respiratory distress.   Abdominal:      Palpations: Abdomen is soft.      Tenderness: There is no abdominal tenderness.   Musculoskeletal:         General: Swelling, tenderness and signs of injury present.      Comments: Right hip   Neurological:      Mental Status: She is alert and oriented to person, place, and time. Mental status is at baseline.          Chart review: The patient had flexible sigmoidoscopy on 2023    Social determinants: the patient has a PCP to follow up with outpatient    Acute or chronic illness limiting or affecting care: Unable to ambulate after fall with right hip pain    ------------------------- PAST HISTORY -------------------------    I  VIEWS)   Final Result   1. Mildly comminuted, displaced fracture of the right intertrochanteric femur.   2. Slight irregularity of the left pubic body, which appears chronic.             MDM and ED course  History is obtained from patient and EMS for patient's acute onset of moderate right hip pain after mechanical fall    Differential diagnoses: Plain radiographs ordered to evaluate for fractures or other bony abnormalities.     EKG: Sinus bradycardia, rate 58, normal axis, normal MA interval, normal QRS duration, QTc 422, no acute ST changes, stable compared to prior  This is an 83-year-old female that presents to the ER with right hip pain and shortening of the right lower extremity with external rotation after mechanical fall that occurred at home.  She was unable to ambulate after the fall.  X-ray revealed a mildly comminuted fracture of the right intratrochanteric femur.  In anticipation of possible surgery, preoperative labs were obtained in addition to an EKG and chest x-ray.  Orthopedic consultation was placed, the patient was admitted to internal medicine.    Clinical Impression  1. Closed nondisplaced intertrochanteric fracture of right femur, initial encounter ([facility])         Disposition  Patient's disposition: Admit to telemetry    Lorenzo Greenfield MD  Resident PGY-2

## 2024-04-01 ENCOUNTER — APPOINTMENT (OUTPATIENT)
Dept: GENERAL RADIOLOGY | Age: 84
DRG: 536 | End: 2024-04-01
Payer: MEDICARE

## 2024-04-01 ENCOUNTER — APPOINTMENT (OUTPATIENT)
Age: 84
DRG: 536 | End: 2024-04-01
Payer: MEDICARE

## 2024-04-01 VITALS
DIASTOLIC BLOOD PRESSURE: 60 MMHG | HEART RATE: 82 BPM | OXYGEN SATURATION: 98 % | BODY MASS INDEX: 22.76 KG/M2 | WEIGHT: 145 LBS | RESPIRATION RATE: 20 BRPM | SYSTOLIC BLOOD PRESSURE: 145 MMHG | HEIGHT: 67 IN | TEMPERATURE: 97.6 F

## 2024-04-01 PROBLEM — S72.144A CLOSED NONDISPLACED INTERTROCHANTERIC FRACTURE OF RIGHT FEMUR (HCC): Status: ACTIVE | Noted: 2024-03-31

## 2024-04-01 LAB
ALBUMIN SERPL-MCNC: 3.7 G/DL (ref 3.5–5.2)
ALP SERPL-CCNC: 66 U/L (ref 35–104)
ALT SERPL-CCNC: 8 U/L (ref 0–32)
ANION GAP SERPL CALCULATED.3IONS-SCNC: 7 MMOL/L (ref 7–16)
AST SERPL-CCNC: 11 U/L (ref 0–31)
BASOPHILS # BLD: 0.02 K/UL (ref 0–0.2)
BASOPHILS NFR BLD: 0 % (ref 0–2)
BILIRUB SERPL-MCNC: 0.4 MG/DL (ref 0–1.2)
BUN SERPL-MCNC: 16 MG/DL (ref 6–23)
CALCIUM SERPL-MCNC: 9.2 MG/DL (ref 8.6–10.2)
CHLORIDE SERPL-SCNC: 104 MMOL/L (ref 98–107)
CO2 SERPL-SCNC: 24 MMOL/L (ref 22–29)
CREAT SERPL-MCNC: 1 MG/DL (ref 0.5–1)
ECHO AV AREA PEAK VELOCITY: 2.8 CM2
ECHO AV AREA PEAK VELOCITY: 2.9 CM2
ECHO AV AREA VTI: 3 CM2
ECHO AV AREA/BSA VTI: 1.7 CM2/M2
ECHO AV CUSP MM: 1.3 CM
ECHO AV MEAN GRADIENT: 3 MMHG
ECHO AV MEAN VELOCITY: 0.8 M/S
ECHO AV PEAK GRADIENT: 6 MMHG
ECHO AV PEAK GRADIENT: 6 MMHG
ECHO AV PEAK VELOCITY: 1.2 M/S
ECHO AV PEAK VELOCITY: 1.2 M/S
ECHO AV VTI: 25.1 CM
ECHO BSA: 1.76 M2
ECHO EST RA PRESSURE: 3 MMHG
ECHO LA DIAMETER INDEX: 1.19 CM/M2
ECHO LA DIAMETER: 2.1 CM
ECHO LA VOL A-L A2C: 29 ML (ref 22–52)
ECHO LA VOL A-L A4C: 24 ML (ref 22–52)
ECHO LA VOL MOD A2C: 29 ML (ref 22–52)
ECHO LA VOL MOD A4C: 21 ML (ref 22–52)
ECHO LA VOLUME AREA LENGTH: 27 ML
ECHO LA VOLUME INDEX A-L A2C: 16 ML/M2 (ref 16–34)
ECHO LA VOLUME INDEX A-L A4C: 14 ML/M2 (ref 16–34)
ECHO LA VOLUME INDEX AREA LENGTH: 15 ML/M2 (ref 16–34)
ECHO LA VOLUME INDEX MOD A2C: 16 ML/M2 (ref 16–34)
ECHO LA VOLUME INDEX MOD A4C: 12 ML/M2 (ref 16–34)
ECHO LV FRACTIONAL SHORTENING: 36 % (ref 28–44)
ECHO LV INTERNAL DIMENSION DIASTOLE INDEX: 2.05 CM/M2
ECHO LV INTERNAL DIMENSION DIASTOLIC: 3.6 CM (ref 3.9–5.3)
ECHO LV INTERNAL DIMENSION SYSTOLIC INDEX: 1.31 CM/M2
ECHO LV INTERNAL DIMENSION SYSTOLIC: 2.3 CM
ECHO LV IVSD: 0.8 CM (ref 0.6–0.9)
ECHO LV IVSS: 1.4 CM
ECHO LV MASS 2D: 92.8 G (ref 67–162)
ECHO LV MASS INDEX 2D: 52.7 G/M2 (ref 43–95)
ECHO LV POSTERIOR WALL DIASTOLIC: 1 CM (ref 0.6–0.9)
ECHO LV POSTERIOR WALL SYSTOLIC: 1.2 CM
ECHO LV RELATIVE WALL THICKNESS RATIO: 0.56
ECHO LVOT AREA: 3.1 CM2
ECHO LVOT AV VTI INDEX: 0.99
ECHO LVOT DIAM: 2 CM
ECHO LVOT MEAN GRADIENT: 2 MMHG
ECHO LVOT PEAK GRADIENT: 5 MMHG
ECHO LVOT PEAK VELOCITY: 1.1 M/S
ECHO LVOT STROKE VOLUME INDEX: 44.2 ML/M2
ECHO LVOT SV: 77.9 ML
ECHO LVOT VTI: 24.8 CM
ECHO MV "A" WAVE DURATION: 166.1 MSEC
ECHO MV A VELOCITY: 1.06 M/S
ECHO MV AREA PHT: 2.3 CM2
ECHO MV AREA VTI: 2.3 CM2
ECHO MV E DECELERATION TIME (DT): 318.7 MS
ECHO MV E VELOCITY: 0.8 M/S
ECHO MV E/A RATIO: 0.75
ECHO MV LVOT VTI INDEX: 1.39
ECHO MV MAX VELOCITY: 1.3 M/S
ECHO MV MEAN GRADIENT: 2 MMHG
ECHO MV MEAN VELOCITY: 0.6 M/S
ECHO MV PEAK GRADIENT: 6 MMHG
ECHO MV PRESSURE HALF TIME (PHT): 95.5 MS
ECHO MV VTI: 34.5 CM
ECHO PV MAX VELOCITY: 1 M/S
ECHO PV MEAN GRADIENT: 2 MMHG
ECHO PV MEAN VELOCITY: 0.7 M/S
ECHO PV PEAK GRADIENT: 4 MMHG
ECHO PV VTI: 22.4 CM
ECHO PVEIN A DURATION: 138.4 MS
ECHO PVEIN A VELOCITY: 0.4 M/S
ECHO PVEIN PEAK D VELOCITY: 0.3 M/S
ECHO PVEIN PEAK S VELOCITY: 0.5 M/S
ECHO PVEIN S/D RATIO: 1.7
ECHO RIGHT VENTRICULAR SYSTOLIC PRESSURE (RVSP): 14 MMHG
ECHO RV INTERNAL DIMENSION: 2.2 CM
ECHO RVOT PEAK GRADIENT: 1 MMHG
ECHO RVOT PEAK VELOCITY: 0.6 M/S
ECHO TV REGURGITANT MAX VELOCITY: 1.66 M/S
ECHO TV REGURGITANT PEAK GRADIENT: 11 MMHG
EOSINOPHIL # BLD: 0.06 K/UL (ref 0.05–0.5)
EOSINOPHILS RELATIVE PERCENT: 1 % (ref 0–6)
ERYTHROCYTE [DISTWIDTH] IN BLOOD BY AUTOMATED COUNT: 14 % (ref 11.5–15)
GFR SERPL CREATININE-BSD FRML MDRD: 56 ML/MIN/1.73M2
GLUCOSE SERPL-MCNC: 125 MG/DL (ref 74–99)
HCT VFR BLD AUTO: 34.6 % (ref 34–48)
HGB BLD-MCNC: 11.6 G/DL (ref 11.5–15.5)
IMM GRANULOCYTES # BLD AUTO: 0.05 K/UL (ref 0–0.58)
IMM GRANULOCYTES NFR BLD: 1 % (ref 0–5)
LYMPHOCYTES NFR BLD: 0.68 K/UL (ref 1.5–4)
LYMPHOCYTES RELATIVE PERCENT: 9 % (ref 20–42)
MCH RBC QN AUTO: 30.5 PG (ref 26–35)
MCHC RBC AUTO-ENTMCNC: 33.5 G/DL (ref 32–34.5)
MCV RBC AUTO: 91.1 FL (ref 80–99.9)
MONOCYTES NFR BLD: 1.01 K/UL (ref 0.1–0.95)
MONOCYTES NFR BLD: 13 % (ref 2–12)
NEUTROPHILS NFR BLD: 77 % (ref 43–80)
NEUTS SEG NFR BLD: 6.08 K/UL (ref 1.8–7.3)
PLATELET # BLD AUTO: 208 K/UL (ref 130–450)
PMV BLD AUTO: 9.9 FL (ref 7–12)
POTASSIUM SERPL-SCNC: 3.4 MMOL/L (ref 3.5–5)
PROT SERPL-MCNC: 6.3 G/DL (ref 6.4–8.3)
RBC # BLD AUTO: 3.8 M/UL (ref 3.5–5.5)
SODIUM SERPL-SCNC: 135 MMOL/L (ref 132–146)
WBC OTHER # BLD: 7.9 K/UL (ref 4.5–11.5)

## 2024-04-01 PROCEDURE — 6370000000 HC RX 637 (ALT 250 FOR IP): Performed by: INTERNAL MEDICINE

## 2024-04-01 PROCEDURE — C8929 TTE W OR WO FOL WCON,DOPPLER: HCPCS

## 2024-04-01 PROCEDURE — 99223 1ST HOSP IP/OBS HIGH 75: CPT | Performed by: INTERNAL MEDICINE

## 2024-04-01 PROCEDURE — 87077 CULTURE AEROBIC IDENTIFY: CPT

## 2024-04-01 PROCEDURE — 6360000002 HC RX W HCPCS: Performed by: INTERNAL MEDICINE

## 2024-04-01 PROCEDURE — 2060000000 HC ICU INTERMEDIATE R&B

## 2024-04-01 PROCEDURE — 85025 COMPLETE CBC W/AUTO DIFF WBC: CPT

## 2024-04-01 PROCEDURE — 93005 ELECTROCARDIOGRAM TRACING: CPT | Performed by: INTERNAL MEDICINE

## 2024-04-01 PROCEDURE — APPSS60 APP SPLIT SHARED TIME 46-60 MINUTES

## 2024-04-01 PROCEDURE — 87086 URINE CULTURE/COLONY COUNT: CPT

## 2024-04-01 PROCEDURE — 36415 COLL VENOUS BLD VENIPUNCTURE: CPT

## 2024-04-01 PROCEDURE — 93306 TTE W/DOPPLER COMPLETE: CPT | Performed by: INTERNAL MEDICINE

## 2024-04-01 PROCEDURE — 80053 COMPREHEN METABOLIC PANEL: CPT

## 2024-04-01 RX ORDER — MAGNESIUM SULFATE IN WATER 40 MG/ML
2000 INJECTION, SOLUTION INTRAVENOUS ONCE
Status: COMPLETED | OUTPATIENT
Start: 2024-04-01 | End: 2024-04-01

## 2024-04-01 RX ORDER — ENOXAPARIN SODIUM 100 MG/ML
30 INJECTION SUBCUTANEOUS DAILY
Status: ON HOLD | COMMUNITY
Start: 2024-04-01 | End: 2024-04-04 | Stop reason: HOSPADM

## 2024-04-01 RX ORDER — FENTANYL CITRATE 0.05 MG/ML
50 INJECTION, SOLUTION INTRAMUSCULAR; INTRAVENOUS EVERY 4 HOURS PRN
Qty: 1 ML | Refills: 0 | Status: ON HOLD | COMMUNITY
Start: 2024-04-01 | End: 2024-04-04 | Stop reason: HOSPADM

## 2024-04-01 RX ORDER — SODIUM CHLORIDE AND POTASSIUM CHLORIDE 150; 900 MG/100ML; MG/100ML
INJECTION, SOLUTION INTRAVENOUS CONTINUOUS
Status: DISCONTINUED | OUTPATIENT
Start: 2024-04-01 | End: 2024-04-02 | Stop reason: HOSPADM

## 2024-04-01 RX ORDER — OXYCODONE HYDROCHLORIDE AND ACETAMINOPHEN 5; 325 MG/1; MG/1
1 TABLET ORAL EVERY 4 HOURS PRN
Refills: 0 | Status: ON HOLD | COMMUNITY
Start: 2024-04-01 | End: 2024-04-02 | Stop reason: HOSPADM

## 2024-04-01 RX ORDER — PROCHLORPERAZINE EDISYLATE 5 MG/ML
5 INJECTION INTRAMUSCULAR; INTRAVENOUS EVERY 6 HOURS PRN
Qty: 240 ML | Status: ON HOLD | COMMUNITY
Start: 2024-04-01 | End: 2024-04-04 | Stop reason: HOSPADM

## 2024-04-01 RX ORDER — IPRATROPIUM BROMIDE AND ALBUTEROL SULFATE 2.5; .5 MG/3ML; MG/3ML
3 SOLUTION RESPIRATORY (INHALATION) EVERY 4 HOURS PRN
Qty: 360 ML | Status: ON HOLD | COMMUNITY
Start: 2024-04-01 | End: 2024-04-04 | Stop reason: HOSPADM

## 2024-04-01 RX ORDER — POTASSIUM CHLORIDE 7.45 MG/ML
10 INJECTION INTRAVENOUS
Status: COMPLETED | OUTPATIENT
Start: 2024-04-01 | End: 2024-04-01

## 2024-04-01 RX ORDER — POLYETHYLENE GLYCOL 3350 17 G/17G
17 POWDER, FOR SOLUTION ORAL DAILY PRN
Qty: 30 PACKET | Refills: 0 | Status: ON HOLD | COMMUNITY
Start: 2024-04-01 | End: 2024-04-04 | Stop reason: HOSPADM

## 2024-04-01 RX ADMIN — MAGNESIUM SULFATE HEPTAHYDRATE 2000 MG: 40 INJECTION, SOLUTION INTRAVENOUS at 16:30

## 2024-04-01 RX ADMIN — PANTOPRAZOLE SODIUM 40 MG: 40 TABLET, DELAYED RELEASE ORAL at 05:54

## 2024-04-01 RX ADMIN — PROCHLORPERAZINE EDISYLATE 5 MG: 5 INJECTION INTRAMUSCULAR; INTRAVENOUS at 09:33

## 2024-04-01 RX ADMIN — PROCHLORPERAZINE EDISYLATE 5 MG: 5 INJECTION INTRAMUSCULAR; INTRAVENOUS at 18:49

## 2024-04-01 RX ADMIN — POTASSIUM CHLORIDE 10 MEQ: 7.46 INJECTION, SOLUTION INTRAVENOUS at 13:16

## 2024-04-01 RX ADMIN — POTASSIUM CHLORIDE 10 MEQ: 7.46 INJECTION, SOLUTION INTRAVENOUS at 14:52

## 2024-04-01 RX ADMIN — POTASSIUM CHLORIDE 10 MEQ: 7.46 INJECTION, SOLUTION INTRAVENOUS at 12:07

## 2024-04-01 RX ADMIN — POTASSIUM CHLORIDE AND SODIUM CHLORIDE: 900; 150 INJECTION, SOLUTION INTRAVENOUS at 12:06

## 2024-04-01 ASSESSMENT — PAIN SCALES - GENERAL
PAINLEVEL_OUTOF10: 3
PAINLEVEL_OUTOF10: 2
PAINLEVEL_OUTOF10: 3
PAINLEVEL_OUTOF10: 2

## 2024-04-01 ASSESSMENT — PAIN DESCRIPTION - LOCATION
LOCATION: HIP
LOCATION: HIP

## 2024-04-01 ASSESSMENT — PAIN DESCRIPTION - ORIENTATION
ORIENTATION: RIGHT
ORIENTATION: RIGHT

## 2024-04-01 ASSESSMENT — PAIN DESCRIPTION - FREQUENCY: FREQUENCY: CONTINUOUS

## 2024-04-01 ASSESSMENT — PAIN DESCRIPTION - DESCRIPTORS
DESCRIPTORS: SORE
DESCRIPTORS: ACHING

## 2024-04-01 ASSESSMENT — PAIN DESCRIPTION - PAIN TYPE: TYPE: ACUTE PAIN

## 2024-04-01 NOTE — PROGRESS NOTES
4 Eyes Skin Assessment     NAME:  Yen Plasencia  YOB: 1940  MEDICAL RECORD NUMBER:  [id number]    The patient is being assessed for  Transfer to New Unit    I agree that at least one RN has performed a thorough Head to Toe Skin Assessment on the patient. ALL assessment sites listed below have been assessed.      Areas assessed by both nurses:    Head, Face, Ears, Shoulders, Back, Chest, Arms, Elbows, Hands, Sacrum. Buttock, Coccyx, Ischium, Legs. Feet and Heels, and Under Medical Devices         Does the Patient have a Wound? No noted wound(s)    Left Great Toe has dry, cracked skin. Coccyx is red blanchable.        Frank Prevention initiated by RN: Yes  Wound Care Orders initiated by RN: No    Pressure Injury (Stage 3,4, Unstageable, DTI, NWPT, and Complex wounds) if present, place Wound referral order by RN under : No    New Ostomies, if present place, Ostomy referral order under : No     Ostomy has been in place for roughly 5 years and is maintained well by PT and her family.     Nurse 1 eSignature: Electronically signed by Darwin Harvey RN on 4/1/24 at 7:45 PM EDT    **SHARE this note so that the co-signing nurse can place an eSignature**    Nurse 2 eSignature: {Esignature:[id number]}

## 2024-04-01 NOTE — PROGRESS NOTES
Department of Internal Medicine        CHIEF COMPLAINT:  R hip pain    Reason for Admission:  Right intertrochanteric fracture Right femur    HISTORY OF PRESENT ILLNESS:      The patient is a 83 y.o. female who presents with R hip pain.  Patient normally walks with her walker.  Patient states she has been weak for the last week or so when she was having problems with increasing loose stools.  Patient states she went to the bathroom and when she got up to move she was very weak and little bit lightheaded and her legs gave out on her.  She did not hit her head or lose consciousness.  There is no associated chest pain, palpitation.  There is no unusual shortness of breath.  Patient was unable to ambulate after the fall was brought into the see room with x-ray of the hip showing mildly comminuted, displaced fracture of the right anterior trochanteric femur.  Routine lab work showed BUN/creatinine 18/1.1 with normal electrolytes.  TSH was elevated 7.82 with WBC 10.9 with hemoglobin 12.2.  Temperature 97.5 with heart rate 66 blood pressure 143/57.  O2 sat 100% room air at rest.    4/1/2024  Patient seen examined on medical surgical floor.  Patient complains of nausea which started this morning.  She denies any chest heaviness or chest pain.  Patient states she just did not feel like herself.  Rhythm strips showed the patient with third-degree heart block with ventricular standstill with patient being symptomatic with being nausea and lightheaded.  BUN/creatinine 16/1.0 serum potassium 3.4.  WBC 7.9 with hemoglobin 1.6.  Free T4 was 1.0 with a TSH 7.8.  Urinalysis has +3 bacteria with 21-50 WBCs with moderate leukocyte esterase.  Temperature is 98.3 with heart rate of 70 and blood pressure 109/56.  O2 sat 95% on room air at rest.  Echo from October 2022 showed normal EF 60% with stage I diastolic filling dysfunction, trace MR, trace-+1 TR. I have a call out to her son-in-law who is her cardiologist.  Rhythm strips faxed to  Partners: Male   Other Topics Concern    Not on file   Social History Narrative    Lives in [place] with  (Galdino / Di).    Worked in an office.     Social Determinants of Health     Financial Resource Strain: Low Risk  (4/4/2023)    Overall Financial Resource Strain (CARDIA)     Difficulty of Paying Living Expenses: Not hard at all   Food Insecurity: No Food Insecurity (3/31/2024)    Hunger Vital Sign     Worried About Running Out of Food in the Last Year: Never true     Ran Out of Food in the Last Year: Never true   Transportation Needs: No Transportation Needs (3/31/2024)    PRAPARE - Transportation     Lack of Transportation (Medical): No     Lack of Transportation (Non-Medical): No   Physical Activity: Inactive (4/4/2023)    Exercise Vital Sign     Days of Exercise per Week: 0 days     Minutes of Exercise per Session: 0 min   Stress: Not on file   Social Connections: Not on file   Intimate Partner Violence: Not on file   Housing Stability: Low Risk  (3/31/2024)    Housing Stability Vital Sign     Unable to Pay for Housing in the Last Year: No     Number of Places Lived in the Last Year: 1     Unstable Housing in the Last Year: No       Family History:   Family History   Problem Relation Age of Onset    Arthritis Mother     Kidney Disease Father     Cancer Father         kidney cancer       REVIEW OF SYSTEMS:    Gen: Patient denies any lightheadedness or dizziness.  No LOC or syncope.  No fevers or chills.    HEENT: No earache, sore throat or nasal congestion.    Resp: Denies cough, hemoptysis or sputum production.    Cardiac: Denies chest pain, SOB, diaphoresis or palpitations.    GI: No nausea, vomiting, diarrhea or constipation.  No melena or hematochezia.    : No urinary complaints, dysuria, hematuria or frequency.    MSK:R hip pain.  No extremity weakness, paralysis or paresthesias.     PHYSICAL EXAM:    Vitals:  BP (!) 109/56   Pulse 70   Temp 98.3 °F (36.8 °C) (Oral)   Resp 18   Ht 1.702 m (5'

## 2024-04-01 NOTE — PROGRESS NOTES
Nurse to Nurse called to Hermilo on 5th floor.     Patient's daughter Annette called and updated about patient's transfer to [facility].

## 2024-04-01 NOTE — CONSULTS
Inpatient Cardiology Consultation      Reason for Consult: Preop eval, third-degree block    Consulting Physician: Dr Randall    Requesting Physician:  Cecilio Edwards DO    Date of Consultation: 4/1/2024    HISTORY OF PRESENT ILLNESS:   Patient is an 83-year-old female who is previously unknown to [facility] cardiology and follows with Dr. Nielson cardiologist.  She was last seen inpatient by him 10/18/2022.    HPI:  Patient presented to ER 3/31/2024 after fall from her legs giving out 2/2 weakness.  She denies head injury or LOC.  She was complaining of right hip pain.  Patient was found to have comminuted displaced fracture of the right intertrochanteric femur.  Patient reporting loose stools x 1 week and fatigue and weakness.  Orthopedic surgery consulted and plan for surgical repair.  Per primary service today concerns for third-degree heart block and ventricular standstill telemetry with patient being symptomatic with nausea and lightheadedness.  Cardiology consulted for preoperative stratification with telemetry changes.  VS upon arrival 97.7, 18 respirations, 63 pulse, 155/64, 99% room air.  Labs: Potassium 3.4, BUN 16, creatinine 1.0, GFR 16, magnesium 1.9, glucose 125, serum calcium 9.2, phosphorus 2.7, total protein 6.3, cholesterol 189, HDL 55, , triglycerides 99, albumin 3.7, TSH 7.82, T4 free 1.0, WBC 7.9, H&H 11.6/34.6, platelet 208, INR 0.9, UA with moderate leukocytes and WBCs present.  CXR: No acute process  X-ray right femur: Intertrochanteric fracture of right femur.    Upon assessment today 4/1/2024 patient is lying Semi-Gracia in hospital bed on room air.  Patient is alert and oriented and only complaint of weakness at this time.  She reports prior to arrival her legs gave out while walking and she fell injuring her right hip with no LOC or head injury.  She denies any syncope or dizziness.  No prodromes prior to falling.  She reports this morning at time of EKG changes she became  03/31/2024 08:39 AM     LIVER PROFILE:  Recent Labs     04/01/24  0359   AST 11   ALT 8   LABALBU 3.7      Latest Reference Range & Units 03/31/24 08:39   Cholesterol, Total <200 mg/dL 189   HDL Cholesterol >40 mg/dL 55   LDL Cholesterol <100 mg/dL 114 (H)   Triglycerides <150 mg/dL 99   VLDL mg/dL 20   (H): Data is abnormally high    X-ray right femur:  Redemonstration of the intertrochanteric fracture of the right femur with no  additional acute abnormality present.              RCRI 0 points.  Class I risk, 0.4% risk of major cardiac event.      Assessment and plan to follow as per Dr Enamorado.      Electronically signed by ISHAN Child CNP on 4/1/2024 at 11:09 AM     CARDIOLOGY ATTENDING ATTESTATION (DR. ENAMORADO)  Our ZAIN exam, assessment reviewed and reflect my work.   I personally saw, examined, and evaluated the patient today.  I personally reviewed the medications, rhythm strips, pertinent labs and test reports.    I directly participated in the medical-decision making, ordering pertinent tests and medication adjustment.  I am the primary author for the consult notes.  I spent > 51% of the total time involved with the encounter and 100% of assessment and recommendations.  The total time included the following:  Independently interviewing the patient (HPI, ROS, PMH, PSH, FMH, SH, allergies, and medications)  Reviewing available records, results of previously ordered testing/procedures, and current problem list  Independently performing a medically appropriate examination  Reviewing the above documentation completed by the ZAIN  Ordering medications, tests, and/or procedures as required  Formulating the assessment/plan and reviewing the rationale for the above recommendations  Counseling/educating the patient  Coordinating care with other healthcare professionals  Communicating results to the patient's family/caregiver as available  Documenting clinical information in the patient's electronic health

## 2024-04-01 NOTE — DISCHARGE INSTRUCTIONS
[facility] Department of Orthopedics   [facility] [address]   [place] [address]    MD Ori Nur DO K Brian Williams, DO    Orthopaedics Discharge Instructions   {Weight Bearin} on {Anatomy; location extremity:12159} extremity  Pain medication Per Prescriptions  Contact Office for Medication Refill- [phone]  Office can refill pain med every 7 days  If patient discharging to facility then pain control will be continued per facility physician  Ice to operative/injured site for 15-30 minutes of each hour for next 5 days    Recommend that you continue to ice the area 2-3 times per day after this   Elevate operative/injured limb on 2 pillows at home  Goal is to have limb above the heart if able  Wound care -dressings remain clean dry intact.  On postoperative day 7 can be removed and surgical site can be clean with soapy water.  Fracture Care -  If your splint/cast becomes too tight, too loose, wet or damaged please contact our office right away we will need to change out the splint/cast.    DVT prophylaxis, {PostOAC:32709}.      Follow Up in Office in 2 weeks.       Call the office at [phone] for directions or with any questions.  Watch for these significant complications.  Call physician if they or any other problems occur:  Fever over 101°, redness, swelling or warmth at the operative site  Unrelieved nausea    Foul smelling or cloudy drainage at the operative site   Unrelieved pain    Blood soaked dressing. (Some oozing may be normal)     Numb, pale, blue, cold or tingling extremity

## 2024-04-01 NOTE — PROGRESS NOTES
Physical Therapy    Room #: 0325/0325-02  Date: 2024       Patient Name: Yen Plasencia  : 1940      MRN: [id number]     Patient unavailable for physical therapy eval due to scheduled for procedure @1600 right hip . Will attempt PT evaluation after surgery. Thank you.       Kitty Sorenson, PT

## 2024-04-01 NOTE — PROGRESS NOTES
Department of Internal Medicine        CHIEF COMPLAINT:  R hip pain    Reason for Admission:  Right intertrochanteric fracture Right femur    HISTORY OF PRESENT ILLNESS:      The patient is a 83 y.o. female who presents with R hip pain.  Patient normally walks with her walker.  Patient states she has been weak for the last week or so when she was having problems with increasing loose stools.  Patient states she went to the bathroom and when she got up to move she was very weak and little bit lightheaded and her legs gave out on her.  She did not hit her head or lose consciousness.  There is no associated chest pain, palpitation.  There is no unusual shortness of breath.  Patient was unable to ambulate after the fall was brought into the see room with x-ray of the hip showing mildly comminuted, displaced fracture of the right anterior trochanteric femur.  Routine lab work showed BUN/creatinine 18/1.1 with normal electrolytes.  TSH was elevated 7.82 with WBC 10.9 with hemoglobin 12.2.  Temperature 97.5 with heart rate 66 blood pressure 143/57.  O2 sat 100% room air at rest.    4/1/2024  Patient seen examined on medical surgical floor.  Patient complains of nausea which started this morning.  She denies any chest heaviness or chest pain.  Patient states she just did not feel like herself.  Rhythm strips showed the patient with third-degree heart block with ventricular standstill with patient being symptomatic with being nausea and lightheaded.  BUN/creatinine 16/1.0 serum potassium 3.4.  WBC 7.9 with hemoglobin 1.6.  Free T4 was 1.0 with a TSH 7.8.  Urinalysis has +3 bacteria with 21-50 WBCs with moderate leukocyte esterase.  Temperature is 98.3 with heart rate of 70 and blood pressure 109/56.  O2 sat 95% on room air at rest.  Echo from October 2022 showed normal EF 60% with stage I diastolic filling dysfunction, trace MR, trace-+1 TR. I have a call out to her son-in-law who is her cardiologist.  Rhythm strips faxed to

## 2024-04-02 ENCOUNTER — APPOINTMENT (OUTPATIENT)
Dept: GENERAL RADIOLOGY | Age: 84
DRG: 481 | End: 2024-04-02
Attending: STUDENT IN AN ORGANIZED HEALTH CARE EDUCATION/TRAINING PROGRAM
Payer: MEDICARE

## 2024-04-02 ENCOUNTER — ANESTHESIA (OUTPATIENT)
Dept: OPERATING ROOM | Age: 84
DRG: 481 | End: 2024-04-02
Payer: MEDICARE

## 2024-04-02 ENCOUNTER — ANESTHESIA EVENT (OUTPATIENT)
Dept: OPERATING ROOM | Age: 84
DRG: 481 | End: 2024-04-02
Payer: MEDICARE

## 2024-04-02 ENCOUNTER — HOSPITAL ENCOUNTER (INPATIENT)
Age: 84
LOS: 2 days | Discharge: NURSING, REHAB OR LONG-TERM CARE | DRG: 481 | End: 2024-04-04
Attending: STUDENT IN AN ORGANIZED HEALTH CARE EDUCATION/TRAINING PROGRAM | Admitting: STUDENT IN AN ORGANIZED HEALTH CARE EDUCATION/TRAINING PROGRAM
Payer: MEDICARE

## 2024-04-02 DIAGNOSIS — I63.9 STROKE (HCC): ICD-10-CM

## 2024-04-02 DIAGNOSIS — S72.144A CLOSED NONDISPLACED INTERTROCHANTERIC FRACTURE OF RIGHT FEMUR, INITIAL ENCOUNTER (HCC): Primary | ICD-10-CM

## 2024-04-02 PROBLEM — I45.9 HEART BLOCK: Status: ACTIVE | Noted: 2024-04-02

## 2024-04-02 PROBLEM — E87.6 HYPOKALEMIA: Status: ACTIVE | Noted: 2024-04-02

## 2024-04-02 PROBLEM — E03.8 SUBCLINICAL HYPOTHYROIDISM: Status: ACTIVE | Noted: 2024-04-02

## 2024-04-02 PROBLEM — I44.2 CHB (COMPLETE HEART BLOCK) (HCC): Status: ACTIVE | Noted: 2024-04-02

## 2024-04-02 PROBLEM — R55 VASOVAGAL SYNCOPE: Status: ACTIVE | Noted: 2024-04-02

## 2024-04-02 PROBLEM — K59.09 OTHER CONSTIPATION: Status: ACTIVE | Noted: 2024-04-02

## 2024-04-02 PROBLEM — N30.00 ACUTE CYSTITIS WITHOUT HEMATURIA: Status: ACTIVE | Noted: 2024-04-02

## 2024-04-02 PROBLEM — Z93.3 COLOSTOMY PRESENT (HCC): Status: ACTIVE | Noted: 2024-04-02

## 2024-04-02 LAB
ALBUMIN SERPL-MCNC: 3.7 G/DL (ref 3.5–5.2)
ALP SERPL-CCNC: 69 U/L (ref 35–104)
ALT SERPL-CCNC: 7 U/L (ref 0–32)
ANION GAP SERPL CALCULATED.3IONS-SCNC: 13 MMOL/L (ref 7–16)
AST SERPL-CCNC: 14 U/L (ref 0–31)
BASOPHILS # BLD: 0.03 K/UL (ref 0–0.2)
BASOPHILS NFR BLD: 0 % (ref 0–2)
BILIRUB SERPL-MCNC: 0.6 MG/DL (ref 0–1.2)
BUN SERPL-MCNC: 17 MG/DL (ref 6–23)
CALCIUM SERPL-MCNC: 9.4 MG/DL (ref 8.6–10.2)
CHLORIDE SERPL-SCNC: 104 MMOL/L (ref 98–107)
CO2 SERPL-SCNC: 20 MMOL/L (ref 22–29)
CREAT SERPL-MCNC: 0.9 MG/DL (ref 0.5–1)
EOSINOPHIL # BLD: 0.02 K/UL (ref 0.05–0.5)
EOSINOPHILS RELATIVE PERCENT: 0 % (ref 0–6)
ERYTHROCYTE [DISTWIDTH] IN BLOOD BY AUTOMATED COUNT: 14.1 % (ref 11.5–15)
GFR SERPL CREATININE-BSD FRML MDRD: 63 ML/MIN/1.73M2
GLUCOSE SERPL-MCNC: 117 MG/DL (ref 74–99)
HCT VFR BLD AUTO: 40.4 % (ref 34–48)
HGB BLD-MCNC: 13.1 G/DL (ref 11.5–15.5)
IMM GRANULOCYTES # BLD AUTO: 0.07 K/UL (ref 0–0.58)
IMM GRANULOCYTES NFR BLD: 1 % (ref 0–5)
LYMPHOCYTES NFR BLD: 0.67 K/UL (ref 1.5–4)
LYMPHOCYTES RELATIVE PERCENT: 6 % (ref 20–42)
MAGNESIUM SERPL-MCNC: 2.2 MG/DL (ref 1.6–2.6)
MCH RBC QN AUTO: 30.6 PG (ref 26–35)
MCHC RBC AUTO-ENTMCNC: 32.4 G/DL (ref 32–34.5)
MCV RBC AUTO: 94.4 FL (ref 80–99.9)
MONOCYTES NFR BLD: 1.19 K/UL (ref 0.1–0.95)
MONOCYTES NFR BLD: 10 % (ref 2–12)
NEUTROPHILS NFR BLD: 84 % (ref 43–80)
NEUTS SEG NFR BLD: 10.18 K/UL (ref 1.8–7.3)
PLATELET # BLD AUTO: 203 K/UL (ref 130–450)
PMV BLD AUTO: 10.1 FL (ref 7–12)
POTASSIUM SERPL-SCNC: 4.4 MMOL/L (ref 3.5–5)
PROT SERPL-MCNC: 6.7 G/DL (ref 6.4–8.3)
RBC # BLD AUTO: 4.28 M/UL (ref 3.5–5.5)
SODIUM SERPL-SCNC: 137 MMOL/L (ref 132–146)
WBC OTHER # BLD: 12.2 K/UL (ref 4.5–11.5)

## 2024-04-02 PROCEDURE — 6370000000 HC RX 637 (ALT 250 FOR IP): Performed by: NURSE PRACTITIONER

## 2024-04-02 PROCEDURE — 2140000000 HC CCU INTERMEDIATE R&B

## 2024-04-02 PROCEDURE — 6360000002 HC RX W HCPCS

## 2024-04-02 PROCEDURE — 3700000001 HC ADD 15 MINUTES (ANESTHESIA): Performed by: STUDENT IN AN ORGANIZED HEALTH CARE EDUCATION/TRAINING PROGRAM

## 2024-04-02 PROCEDURE — 6360000002 HC RX W HCPCS: Performed by: NURSE ANESTHETIST, CERTIFIED REGISTERED

## 2024-04-02 PROCEDURE — 6360000002 HC RX W HCPCS: Performed by: NURSE PRACTITIONER

## 2024-04-02 PROCEDURE — 36415 COLL VENOUS BLD VENIPUNCTURE: CPT

## 2024-04-02 PROCEDURE — C1769 GUIDE WIRE: HCPCS | Performed by: STUDENT IN AN ORGANIZED HEALTH CARE EDUCATION/TRAINING PROGRAM

## 2024-04-02 PROCEDURE — 27245 TREAT THIGH FRACTURE: CPT | Performed by: STUDENT IN AN ORGANIZED HEALTH CARE EDUCATION/TRAINING PROGRAM

## 2024-04-02 PROCEDURE — 0QS604Z REPOSITION RIGHT UPPER FEMUR WITH INTERNAL FIXATION DEVICE, OPEN APPROACH: ICD-10-PCS | Performed by: STUDENT IN AN ORGANIZED HEALTH CARE EDUCATION/TRAINING PROGRAM

## 2024-04-02 PROCEDURE — 99223 1ST HOSP IP/OBS HIGH 75: CPT | Performed by: INTERNAL MEDICINE

## 2024-04-02 PROCEDURE — 3600000006 HC SURGERY LEVEL 6 BASE: Performed by: STUDENT IN AN ORGANIZED HEALTH CARE EDUCATION/TRAINING PROGRAM

## 2024-04-02 PROCEDURE — 2580000003 HC RX 258

## 2024-04-02 PROCEDURE — 85025 COMPLETE CBC W/AUTO DIFF WBC: CPT

## 2024-04-02 PROCEDURE — 2709999900 HC NON-CHARGEABLE SUPPLY: Performed by: STUDENT IN AN ORGANIZED HEALTH CARE EDUCATION/TRAINING PROGRAM

## 2024-04-02 PROCEDURE — 3700000000 HC ANESTHESIA ATTENDED CARE: Performed by: STUDENT IN AN ORGANIZED HEALTH CARE EDUCATION/TRAINING PROGRAM

## 2024-04-02 PROCEDURE — 80053 COMPREHEN METABOLIC PANEL: CPT

## 2024-04-02 PROCEDURE — 2500000003 HC RX 250 WO HCPCS

## 2024-04-02 PROCEDURE — 6370000000 HC RX 637 (ALT 250 FOR IP)

## 2024-04-02 PROCEDURE — 83735 ASSAY OF MAGNESIUM: CPT

## 2024-04-02 PROCEDURE — 2720000010 HC SURG SUPPLY STERILE: Performed by: STUDENT IN AN ORGANIZED HEALTH CARE EDUCATION/TRAINING PROGRAM

## 2024-04-02 PROCEDURE — 7100000001 HC PACU RECOVERY - ADDTL 15 MIN: Performed by: STUDENT IN AN ORGANIZED HEALTH CARE EDUCATION/TRAINING PROGRAM

## 2024-04-02 PROCEDURE — C1713 ANCHOR/SCREW BN/BN,TIS/BN: HCPCS | Performed by: STUDENT IN AN ORGANIZED HEALTH CARE EDUCATION/TRAINING PROGRAM

## 2024-04-02 PROCEDURE — 7100000000 HC PACU RECOVERY - FIRST 15 MIN: Performed by: STUDENT IN AN ORGANIZED HEALTH CARE EDUCATION/TRAINING PROGRAM

## 2024-04-02 PROCEDURE — 3600000016 HC SURGERY LEVEL 6 ADDTL 15MIN: Performed by: STUDENT IN AN ORGANIZED HEALTH CARE EDUCATION/TRAINING PROGRAM

## 2024-04-02 DEVICE — SCREW BNE L40MM DIA5MM ST TIB LT GRN TI ST CANN LOK FULL: Type: IMPLANTABLE DEVICE | Site: HIP | Status: FUNCTIONAL

## 2024-04-02 DEVICE — SCREW BNE L36MM DIA5MM TIB LT GRN TI ST CANN LOK FULL THRD: Type: IMPLANTABLE DEVICE | Site: HIP | Status: FUNCTIONAL

## 2024-04-02 DEVICE — NAIL IM L360MM DST DIA10MM NK 125DEG LNG R PROX FEM GRN: Type: IMPLANTABLE DEVICE | Site: HIP | Status: FUNCTIONAL

## 2024-04-02 DEVICE — SCREW BNE L95MM DIA10.35MM PROX FEM G TI CANN FOR TFN ADV: Type: IMPLANTABLE DEVICE | Site: HIP | Status: FUNCTIONAL

## 2024-04-02 DEVICE — SCREW BNE L90MM DIA10.35MM PROX FEM G TI CANN FOR TFN ADV: Type: IMPLANTABLE DEVICE | Site: HIP | Status: FUNCTIONAL

## 2024-04-02 RX ORDER — CEFAZOLIN SODIUM 1 G/3ML
INJECTION, POWDER, FOR SOLUTION INTRAMUSCULAR; INTRAVENOUS PRN
Status: DISCONTINUED | OUTPATIENT
Start: 2024-04-02 | End: 2024-04-02 | Stop reason: SDUPTHER

## 2024-04-02 RX ORDER — SODIUM CHLORIDE 9 MG/ML
INJECTION, SOLUTION INTRAVENOUS PRN
Status: DISCONTINUED | OUTPATIENT
Start: 2024-04-02 | End: 2024-04-04 | Stop reason: HOSPADM

## 2024-04-02 RX ORDER — SODIUM CHLORIDE 9 MG/ML
INJECTION, SOLUTION INTRAVENOUS PRN
Status: DISCONTINUED | OUTPATIENT
Start: 2024-04-02 | End: 2024-04-02 | Stop reason: HOSPADM

## 2024-04-02 RX ORDER — POLYETHYLENE GLYCOL 3350 17 G/17G
17 POWDER, FOR SOLUTION ORAL DAILY PRN
Status: DISCONTINUED | OUTPATIENT
Start: 2024-04-02 | End: 2024-04-04 | Stop reason: HOSPADM

## 2024-04-02 RX ORDER — SODIUM CHLORIDE 0.9 % (FLUSH) 0.9 %
5-40 SYRINGE (ML) INJECTION EVERY 12 HOURS SCHEDULED
Status: DISCONTINUED | OUTPATIENT
Start: 2024-04-02 | End: 2024-04-02 | Stop reason: HOSPADM

## 2024-04-02 RX ORDER — NALOXONE HYDROCHLORIDE 0.4 MG/ML
INJECTION, SOLUTION INTRAMUSCULAR; INTRAVENOUS; SUBCUTANEOUS PRN
Status: DISCONTINUED | OUTPATIENT
Start: 2024-04-02 | End: 2024-04-02 | Stop reason: HOSPADM

## 2024-04-02 RX ORDER — ASPIRIN 325 MG
325 TABLET, DELAYED RELEASE (ENTERIC COATED) ORAL 2 TIMES DAILY
Status: DISCONTINUED | OUTPATIENT
Start: 2024-04-02 | End: 2024-04-04 | Stop reason: HOSPADM

## 2024-04-02 RX ORDER — PANTOPRAZOLE SODIUM 40 MG/1
40 TABLET, DELAYED RELEASE ORAL
Status: DISCONTINUED | OUTPATIENT
Start: 2024-04-02 | End: 2024-04-04 | Stop reason: HOSPADM

## 2024-04-02 RX ORDER — ONDANSETRON 2 MG/ML
4 INJECTION INTRAMUSCULAR; INTRAVENOUS
Status: DISCONTINUED | OUTPATIENT
Start: 2024-04-02 | End: 2024-04-02 | Stop reason: HOSPADM

## 2024-04-02 RX ORDER — IPRATROPIUM BROMIDE AND ALBUTEROL SULFATE 2.5; .5 MG/3ML; MG/3ML
1 SOLUTION RESPIRATORY (INHALATION) EVERY 4 HOURS PRN
Status: DISCONTINUED | OUTPATIENT
Start: 2024-04-02 | End: 2024-04-04 | Stop reason: HOSPADM

## 2024-04-02 RX ORDER — LIDOCAINE HYDROCHLORIDE 20 MG/ML
INJECTION, SOLUTION EPIDURAL; INFILTRATION; INTRACAUDAL; PERINEURAL PRN
Status: DISCONTINUED | OUTPATIENT
Start: 2024-04-02 | End: 2024-04-02 | Stop reason: SDUPTHER

## 2024-04-02 RX ORDER — ROCURONIUM BROMIDE 10 MG/ML
INJECTION, SOLUTION INTRAVENOUS PRN
Status: DISCONTINUED | OUTPATIENT
Start: 2024-04-02 | End: 2024-04-02 | Stop reason: SDUPTHER

## 2024-04-02 RX ORDER — HYDROMORPHONE HYDROCHLORIDE 1 MG/ML
0.25 INJECTION, SOLUTION INTRAMUSCULAR; INTRAVENOUS; SUBCUTANEOUS EVERY 5 MIN PRN
Status: DISCONTINUED | OUTPATIENT
Start: 2024-04-02 | End: 2024-04-02 | Stop reason: HOSPADM

## 2024-04-02 RX ORDER — HYDROMORPHONE HYDROCHLORIDE 1 MG/ML
0.5 INJECTION, SOLUTION INTRAMUSCULAR; INTRAVENOUS; SUBCUTANEOUS EVERY 5 MIN PRN
Status: DISCONTINUED | OUTPATIENT
Start: 2024-04-02 | End: 2024-04-02 | Stop reason: HOSPADM

## 2024-04-02 RX ORDER — FENTANYL CITRATE 50 UG/ML
INJECTION, SOLUTION INTRAMUSCULAR; INTRAVENOUS PRN
Status: DISCONTINUED | OUTPATIENT
Start: 2024-04-02 | End: 2024-04-02 | Stop reason: SDUPTHER

## 2024-04-02 RX ORDER — ONDANSETRON 2 MG/ML
INJECTION INTRAMUSCULAR; INTRAVENOUS PRN
Status: DISCONTINUED | OUTPATIENT
Start: 2024-04-02 | End: 2024-04-02 | Stop reason: SDUPTHER

## 2024-04-02 RX ORDER — ACETAMINOPHEN 500 MG
500 TABLET ORAL EVERY 6 HOURS PRN
Status: DISCONTINUED | OUTPATIENT
Start: 2024-04-02 | End: 2024-04-04 | Stop reason: HOSPADM

## 2024-04-02 RX ORDER — PROPOFOL 10 MG/ML
INJECTION, EMULSION INTRAVENOUS PRN
Status: DISCONTINUED | OUTPATIENT
Start: 2024-04-02 | End: 2024-04-02 | Stop reason: SDUPTHER

## 2024-04-02 RX ORDER — SENNOSIDES A AND B 8.6 MG/1
1 TABLET, FILM COATED ORAL NIGHTLY
Status: DISCONTINUED | OUTPATIENT
Start: 2024-04-02 | End: 2024-04-04 | Stop reason: HOSPADM

## 2024-04-02 RX ORDER — SODIUM CHLORIDE 9 MG/ML
INJECTION, SOLUTION INTRAVENOUS CONTINUOUS PRN
Status: DISCONTINUED | OUTPATIENT
Start: 2024-04-02 | End: 2024-04-02 | Stop reason: SDUPTHER

## 2024-04-02 RX ORDER — FENTANYL CITRATE 50 UG/ML
25 INJECTION, SOLUTION INTRAMUSCULAR; INTRAVENOUS
Status: DISCONTINUED | OUTPATIENT
Start: 2024-04-02 | End: 2024-04-02

## 2024-04-02 RX ORDER — SODIUM CHLORIDE 0.9 % (FLUSH) 0.9 %
5-40 SYRINGE (ML) INJECTION PRN
Status: DISCONTINUED | OUTPATIENT
Start: 2024-04-02 | End: 2024-04-04 | Stop reason: HOSPADM

## 2024-04-02 RX ORDER — ATROPINE SULFATE 0.1 MG/ML
1 INJECTION INTRAVENOUS
Status: ACTIVE | OUTPATIENT
Start: 2024-04-02 | End: 2024-04-03

## 2024-04-02 RX ORDER — OXYCODONE HYDROCHLORIDE AND ACETAMINOPHEN 5; 325 MG/1; MG/1
1 TABLET ORAL EVERY 4 HOURS PRN
Status: DISCONTINUED | OUTPATIENT
Start: 2024-04-02 | End: 2024-04-02

## 2024-04-02 RX ORDER — OXYCODONE HYDROCHLORIDE AND ACETAMINOPHEN 5; 325 MG/1; MG/1
1 TABLET ORAL EVERY 6 HOURS PRN
Qty: 28 TABLET | Refills: 0 | Status: SHIPPED | OUTPATIENT
Start: 2024-04-02 | End: 2024-04-04

## 2024-04-02 RX ORDER — OXYCODONE HYDROCHLORIDE 10 MG/1
10 TABLET ORAL EVERY 4 HOURS PRN
Status: DISCONTINUED | OUTPATIENT
Start: 2024-04-02 | End: 2024-04-04 | Stop reason: HOSPADM

## 2024-04-02 RX ORDER — SODIUM CHLORIDE 0.9 % (FLUSH) 0.9 %
5-40 SYRINGE (ML) INJECTION EVERY 12 HOURS SCHEDULED
Status: DISCONTINUED | OUTPATIENT
Start: 2024-04-02 | End: 2024-04-04 | Stop reason: HOSPADM

## 2024-04-02 RX ORDER — DOCUSATE SODIUM 100 MG/1
100 CAPSULE, LIQUID FILLED ORAL DAILY
Status: DISCONTINUED | OUTPATIENT
Start: 2024-04-02 | End: 2024-04-04 | Stop reason: HOSPADM

## 2024-04-02 RX ORDER — SODIUM CHLORIDE 0.9 % (FLUSH) 0.9 %
5-40 SYRINGE (ML) INJECTION PRN
Status: DISCONTINUED | OUTPATIENT
Start: 2024-04-02 | End: 2024-04-02 | Stop reason: HOSPADM

## 2024-04-02 RX ORDER — PROCHLORPERAZINE EDISYLATE 5 MG/ML
5 INJECTION INTRAMUSCULAR; INTRAVENOUS EVERY 6 HOURS PRN
Status: DISCONTINUED | OUTPATIENT
Start: 2024-04-02 | End: 2024-04-04 | Stop reason: HOSPADM

## 2024-04-02 RX ORDER — SODIUM CHLORIDE AND POTASSIUM CHLORIDE 150; 900 MG/100ML; MG/100ML
INJECTION, SOLUTION INTRAVENOUS CONTINUOUS
Status: DISCONTINUED | OUTPATIENT
Start: 2024-04-02 | End: 2024-04-03

## 2024-04-02 RX ORDER — ASPIRIN 325 MG
325 TABLET, DELAYED RELEASE (ENTERIC COATED) ORAL 2 TIMES DAILY
Qty: 56 TABLET | Refills: 0 | Status: SHIPPED | OUTPATIENT
Start: 2024-04-02 | End: 2024-04-30

## 2024-04-02 RX ORDER — OXYCODONE HYDROCHLORIDE 5 MG/1
5 TABLET ORAL EVERY 4 HOURS PRN
Status: DISCONTINUED | OUTPATIENT
Start: 2024-04-02 | End: 2024-04-04 | Stop reason: HOSPADM

## 2024-04-02 RX ORDER — DEXAMETHASONE SODIUM PHOSPHATE 10 MG/ML
INJECTION, EMULSION INTRAMUSCULAR; INTRAVENOUS PRN
Status: DISCONTINUED | OUTPATIENT
Start: 2024-04-02 | End: 2024-04-02 | Stop reason: SDUPTHER

## 2024-04-02 RX ADMIN — ROCURONIUM BROMIDE 50 MG: 10 INJECTION, SOLUTION INTRAVENOUS at 16:16

## 2024-04-02 RX ADMIN — CEFAZOLIN 2 G: 1 INJECTION, POWDER, FOR SOLUTION INTRAMUSCULAR; INTRAVENOUS at 16:39

## 2024-04-02 RX ADMIN — PANTOPRAZOLE SODIUM 40 MG: 40 TABLET, DELAYED RELEASE ORAL at 05:57

## 2024-04-02 RX ADMIN — PHENYLEPHRINE HYDROCHLORIDE 100 MCG: 10 INJECTION INTRAVENOUS at 16:48

## 2024-04-02 RX ADMIN — PHENYLEPHRINE HYDROCHLORIDE 100 MCG: 10 INJECTION INTRAVENOUS at 16:22

## 2024-04-02 RX ADMIN — FENTANYL CITRATE 75 MCG: 0.05 INJECTION, SOLUTION INTRAMUSCULAR; INTRAVENOUS at 16:16

## 2024-04-02 RX ADMIN — PROPOFOL 100 MG: 10 INJECTION, EMULSION INTRAVENOUS at 16:16

## 2024-04-02 RX ADMIN — PHENYLEPHRINE HYDROCHLORIDE 100 MCG: 10 INJECTION INTRAVENOUS at 17:13

## 2024-04-02 RX ADMIN — PROCHLORPERAZINE EDISYLATE 5 MG: 5 INJECTION INTRAMUSCULAR; INTRAVENOUS at 01:46

## 2024-04-02 RX ADMIN — ASPIRIN 325 MG: 325 TABLET, COATED ORAL at 20:51

## 2024-04-02 RX ADMIN — POTASSIUM CHLORIDE AND SODIUM CHLORIDE: 900; 150 INJECTION, SOLUTION INTRAVENOUS at 01:37

## 2024-04-02 RX ADMIN — FENTANYL CITRATE 25 MCG: 0.05 INJECTION, SOLUTION INTRAMUSCULAR; INTRAVENOUS at 16:57

## 2024-04-02 RX ADMIN — SODIUM CHLORIDE: 9 INJECTION, SOLUTION INTRAVENOUS at 16:11

## 2024-04-02 RX ADMIN — POTASSIUM CHLORIDE AND SODIUM CHLORIDE: 900; 150 INJECTION, SOLUTION INTRAVENOUS at 19:27

## 2024-04-02 RX ADMIN — ONDANSETRON 4 MG: 2 INJECTION INTRAMUSCULAR; INTRAVENOUS at 17:17

## 2024-04-02 RX ADMIN — SENNOSIDES 8.6 MG: 8.6 TABLET, FILM COATED ORAL at 20:51

## 2024-04-02 RX ADMIN — LIDOCAINE HYDROCHLORIDE 60 MG: 20 INJECTION, SOLUTION EPIDURAL; INFILTRATION; INTRACAUDAL; PERINEURAL at 16:16

## 2024-04-02 RX ADMIN — SODIUM CHLORIDE, PRESERVATIVE FREE 10 ML: 5 INJECTION INTRAVENOUS at 20:52

## 2024-04-02 RX ADMIN — WATER 2000 MG: 1 INJECTION INTRAMUSCULAR; INTRAVENOUS; SUBCUTANEOUS at 20:51

## 2024-04-02 RX ADMIN — DEXAMETHASONE SODIUM PHOSPHATE 10 MG: 10 INJECTION, EMULSION INTRAMUSCULAR; INTRAVENOUS at 16:16

## 2024-04-02 ASSESSMENT — PAIN - FUNCTIONAL ASSESSMENT: PAIN_FUNCTIONAL_ASSESSMENT: ADULT NONVERBAL PAIN SCALE (NPVS)

## 2024-04-02 ASSESSMENT — PAIN SCALES - GENERAL: PAINLEVEL_OUTOF10: 0

## 2024-04-02 NOTE — CONSULTS
Patient seen @ [facility] for initial consult by Dr Randall, see consult dated 4/1/2024. Patient transferred to [facility] for EP evaluation/treatment. No need for  full consult.   Electronically signed by SHAY TROTTER on 4/2/2024 at 6:41 AM

## 2024-04-02 NOTE — PROGRESS NOTES
[facility]    Patient seen and examined at bedside at bedside today a.m. denies any dizziness, shortness of breath, palpitation. Her pain is well controlled    O/E:  GC fair  Chest: B/L Clear  CVS: s1, s2, no murmur  Extremities: range of motion painful right leg    Assessment and plan  Intermittent complete heart block  Right femur fracture      Plan  EP note noted no indication for pacemaker  Malou Patch at the time of discharge  Cleared for surgery from EP standpoint  Awaiting orthopedic recommendation for timing of surgery

## 2024-04-02 NOTE — ANESTHESIA POSTPROCEDURE EVALUATION
Department of Anesthesiology  Postprocedure Note    Patient: Yen Plasencia  MRN: [id number]  YOB: 1940  Date of evaluation: 4/2/2024    Procedure Summary       Date: 04/02/24 Room / Location: [address]    Anesthesia Start: 1611 Anesthesia Stop: 1738    Procedure: RIGHT HIP OPEN REDUCTION INTERNAL FIXATION (Right) Diagnosis:       Displaced intertrochanteric fracture of right femur, initial encounter for closed fracture (HCC)      (Displaced intertrochanteric fracture of right femur, initial encounter for closed fracture (HCC) [S72.141A])    Surgeons: Sachin Hernández DO Responsible Provider: Jennifer Ibarra MD    Anesthesia Type: General ASA Status: 3            Anesthesia Type: General    Skip Phase I:      Skip Phase II:      Anesthesia Post Evaluation    Patient location during evaluation: PACU  Patient participation: complete - patient participated  Level of consciousness: awake  Pain score: 2  Airway patency: patent  Nausea & Vomiting: no nausea  Cardiovascular status: hemodynamically stable  Respiratory status: acceptable  Hydration status: stable  Multimodal analgesia pain management approach    No notable events documented.

## 2024-04-02 NOTE — PROGRESS NOTES
4 Eyes Skin Assessment     NAME:  Yen Plasencia  YOB: 1940  MEDICAL RECORD NUMBER:  [id number]    The patient is being assessed for  Admission    I agree that at least one RN has performed a thorough Head to Toe Skin Assessment on the patient. ALL assessment sites listed below have been assessed.      Areas assessed by both nurses:    Head, Face, Ears, Shoulders, Back, Chest, Arms, Elbows, Hands, Sacrum. Buttock, Coccyx, Ischium, Legs. Feet and Heels, and Under Medical Devices         Does the Patient have a Wound? No noted wound(s)       Frank Prevention initiated by RN: Yes  Wound Care Orders initiated by RN: No    Pressure Injury (Stage 3,4, Unstageable, DTI, NWPT, and Complex wounds) if present, place Wound referral order by RN under : No    New Ostomies, if present place, Ostomy referral order under : No     Nurse 1 eSignature: Electronically signed by Brigette Neely RN on 4/2/24 at 2:03 AM EDT    **SHARE this note so that the co-signing nurse can place an eSignature**    Nurse 2 eSignature: Electronically signed by Marylin Odom RN on 4/2/24 at 2:04 AM EDT

## 2024-04-02 NOTE — ANESTHESIA PRE PROCEDURE
BP Readings from Last 3 Encounters:   04/02/24 (!) 120/54   04/01/24 (!) 145/60   03/26/24 (!) 153/69       NPO Status: Time of last liquid consumption: 2000                        Time of last solid consumption: 1700                        Date of last liquid consumption: 04/01/24                        Date of last solid food consumption: 03/31/24    BMI:   Wt Readings from Last 3 Encounters:   04/02/24 69.4 kg (152 lb 14.4 oz)   04/01/24 65.8 kg (145 lb)   03/26/24 65.8 kg (145 lb)     Body mass index is 23.95 kg/m².    CBC:   Lab Results   Component Value Date/Time    WBC 12.2 04/02/2024 05:00 AM    RBC 4.28 04/02/2024 05:00 AM    HGB 13.1 04/02/2024 05:00 AM    HCT 40.4 04/02/2024 05:00 AM    MCV 94.4 04/02/2024 05:00 AM    RDW 14.1 04/02/2024 05:00 AM     04/02/2024 05:00 AM       CMP:   Lab Results   Component Value Date/Time     04/02/2024 05:00 AM    K 4.4 04/02/2024 05:00 AM    K 4.9 07/16/2021 06:15 AM     04/02/2024 05:00 AM    CO2 20 04/02/2024 05:00 AM    BUN 17 04/02/2024 05:00 AM    CREATININE 0.9 04/02/2024 05:00 AM    GFRAA 57 12/20/2021 12:50 PM    LABGLOM 63 04/02/2024 05:00 AM    GLUCOSE 117 04/02/2024 05:00 AM    PROT 6.7 04/02/2024 05:00 AM    CALCIUM 9.4 04/02/2024 05:00 AM    BILITOT 0.6 04/02/2024 05:00 AM    ALKPHOS 69 04/02/2024 05:00 AM    AST 14 04/02/2024 05:00 AM    ALT 7 04/02/2024 05:00 AM       POC Tests: No results for input(s): \"POCGLU\", \"POCNA\", \"POCK\", \"POCCL\", \"POCBUN\", \"POCHEMO\", \"POCHCT\" in the last 72 hours.    Coags:   Lab Results   Component Value Date/Time    PROTIME 10.4 03/31/2024 03:39 AM    INR 0.9 03/31/2024 03:39 AM    APTT 34.8 03/31/2024 08:39 AM    APTT 32.5 11/12/2017 10:08 AM       HCG (If Applicable): No results found for: \"PREGTESTUR\", \"PREGSERUM\", \"HCG\", \"HCGQUANT\"     ABGs: No results found for: \"PHART\", \"PO2ART\", \"DBM7SCE\", \"ANV1VYS\", \"BEART\", \"M5ETVFDA\"     Type & Screen (If Applicable):  No results found for: \"LABABO\",

## 2024-04-02 NOTE — DISCHARGE INSTRUCTIONS
medication we will facilitate this process.    __________________________________________________________________________________________________________________________    [facility] Electrophysiology  Implantable Cardiac Monitor Discharge Instructions For Patients      Medications: Resume all prescribed medications.    Follow-Up: You will be seen on Thursday 4/18/24 at 2:15 pm  for an incision and device check at the [place] [facility].  Please call the office if you need to reschedule this appointment. The number is [phone].     Incision Care:   Leave (brown) aquacel dressing on for 7 days.  Remove aquacel dressing on Wednesday 4/10/24 but do not remove the Steri-Strips from your incision. They will fall off on their own, or they will be removed at your follow-up appointment.    You may shower starting tomorrow, but do not let the water run directly on the incision  for 7 days.   Check the area daily. If you find any redness, swelling, drainage, warmth, or have a fever greater than 100 degrees, notify the office immediately at the number listed below.     Activity: You may continue regular daily activities, but try to prevent any hard blows to the incision site.    Driving: ***    Special Instructions: Let your dentist, doctor, or medical specialist know that you have an implantable cardiac monitor so precautions can be taken to protect the device. There is no need to take antibiotics prior to dental procedures. Your device is considered to be \"MRI conditional,\" meaning that under certain circumstances, MRI exams may be performed. Your device may set off a metal detector, such as those used in airports and courtrooms. Let security know you have an implantable cardiac monitor and show them your ID card.    ID Card: You will have a temporary ID card until a permanent card is sent to you by the device company. The permanent card will look like a ’s license or credit card and should  arrive within 8 weeks. Carry your ID card with you at all times.     [place] [facility]:   [address]  [facility] [address]  Office: [phone]     If no answer at the above number please call [phone] for assistance

## 2024-04-02 NOTE — OP NOTE
Operative Note      Patient: Yen Plasencia  YOB: 1940  MRN: [id number]    Date of Procedure: 4/2/2024    Pre-Op Diagnosis Codes:     * Displaced intertrochanteric fracture of right femur, initial encounter for closed fracture (HCC) [S72.141A]    Post-Op Diagnosis: Same       Procedure(s):  RIGHT HIP OPEN REDUCTION INTERNAL FIXATION with cephalomedullary nail    Surgeon(s):  Sachin Hernández DO    Assistant:   Resident: Geoff Navas DO    Anesthesia: General    Estimated Blood Loss (mL): less than 100     Complications: None    Specimens:   * No specimens in log *    Implants:  Implant Name Type Inv. Item Serial No.  Lot No. LRB No. Used Action   NAIL IM L360MM DST [id number] NK 125DEG LNG R PROX FEM GRN - [id number]  NAIL IM L360MM DST [id number] NK 125DEG LNG R PROX FEM GRN  DEPUY [employer] [id number] Right 1 Implanted   SCREW BNE L90MM DIA10.35MM PROX FEM G TI DUSTY FOR TFN ADV - [id number]  SCREW BNE L90MM DIA10.35MM PROX FEM G TI DUSTY FOR TFN ADV  DEPU[employer] [id number] Right 1 Implanted   SCREW BNE L95MM DIA10.35MM PROX FEM G TI DUSTY FOR TFN ADV - [id number]  SCREW BNE L95MM DIA10.35MM PROX FEM G TI DUSTY FOR TFN ADV  DEPUY [employer] [id number] Right 1 Implanted   SCREW BNE L36MM DIA5MM TIB LT GRN TI ST DUSTY CARROL FULL THRD - [id number]  SCREW BNE L36MM DIA5MM TIB LT GRN TI ST DUSTY CARROL FULL THRD  DEPUY [employer]WD [id number] Right 1 Implanted   SCREW BNE L40MM DIA5MM ST TIB LT GRN TI ST DUSTY CARROL FULL - [id number]  SCREW BNE L40MM DIA5MM ST TIB LT GRN TI ST DUSTY CARROL FULL  DEPUY [employer] [id number] Right 1 Implanted         Drains:   Colostomy LLQ (Active)   Stomal Appliance 1 piece 04/02/24 1530   Stoma  Assessment Red;Protrudes 04/02/24 1530   Peristomal Assessment Clean, dry & intact 04/02/24 1530   Stool Appearance Loose 04/02/24 1530   Stool Color Brown 04/02/24 1530   Stool Amount Medium 04/02/24 1530   Output (mL) 75 ml 04/02/24 1525       Urinary Catheter 03/31/24  function.  All questions answered in detail in preoperative holding.  Informed consent was obtained and signed and placed in chart.  My initials were then placed on her right hip.  Patient was brought to the operating room and general anesthesia was induced.  Fracture boots were placed and she was carefully transferred to the [place] table and positioned supine.  She was positioned against the post with all bony prominences well-padded.  Left arm was placed on an armboard and right arm was draped across her chest.  Reduction maneuver was performed and traction internal rotation and abduction.  Fracture reduced anatomically on the table.  Preoperative biotics were infused.  The right hip was then prepped and draped in standard sterile orthopedic fashion.  Appropriate timeout was performed confirming side and site of surgery.    An incision was created just proximal to the greater trochanter and dissection was carried down through skin and deep fascia to tip the greater trochanter.  Starting guidewire for a Synthes TFN a was inserted tip of the greater trochanter and advanced in the proximal femur.  Position of the wire was determined to be in appropriate position on AP and lateral imaging.  Opening reamer was then used to enter the proximal femur.  Ball-tipped guidewire was passed down the knee while reduction was maintained.  We measured for 360 mm nail.  We then sequentially reamed up to an 11.5 mm reamer with good chatter.  I then selected a Synthes 360 x 10 mm TFN a with 125 degree neck angle.  This was impacted down our guidewire to the appropriate depth and the guidewire was removed.  Reduction was maintained during nail passage.  Aiming arm was used to create a incision over the lateral femur.  Dissection was carried down lateral cortex and trocar was inserted down the lateral cortex of the femur.  Guidewire for a Synthes lag screw was inserted through our aiming arm into a center center position on AP and

## 2024-04-02 NOTE — BRIEF OP NOTE
Brief Postoperative Note      Patient: Yen Plasencia  YOB: 1940  MRN: [id number]    Date of Procedure: 4/2/2024    Pre-Op Diagnosis Codes:     * Displaced intertrochanteric fracture of right femur, initial encounter for closed fracture (HCC) [S72.141A]    Post-Op Diagnosis: Same       Procedure(s):  RIGHT HIP OPEN REDUCTION INTERNAL FIXATION with cephalomedullary nail    Surgeon(s):  Sachin Hernández DO    Assistant:  Resident: Geoff Navas DO    Anesthesia: General    Estimated Blood Loss (mL): less than 100     Complications: None    Specimens:   * No specimens in log *    Implants:  Implant Name Type Inv. Item Serial No.  Lot No. LRB No. Used Action   NAIL IM L360MM DST [id number] 125DEG LNG R PROX FEM GRN - [id number]  NAIL IM L360MM DST [id number] NK 125DEG LNG R PROX FEM GRN  DEPU[employer] [id number] Right 1 Implanted   SCREW BNE L90MM DIA10.35MM PROX FEM G TI DUSTY FOR TFN ADV - [id number]  SCREW BNE L90MM DIA10.35MM PROX FEM G TI DUSTY FOR TFN ADV  DEPU[employer] [id number] Right 1 Implanted   SCREW BNE L95MM DIA10.35MM PROX FEM G TI DUSTY FOR TFN ADV - [id number]  SCREW BNE L95MM DIA10.35MM PROX FEM G TI DUSTY FOR TFN ADV  DEPU[employer] [id number] Right 1 Implanted   SCREW BNE L36MM DIA5MM TIB LT GRN TI ST DUSTY CARROL FULL THRD - [id number]  SCREW BNE L36MM DIA5MM TIB LT GRN TI ST DUSTY CARROL FULL THRD  DEPU[employer]WD [id number] Right 1 Implanted   SCREW BNE L40MM DIA5MM ST TIB LT GRN TI ST DUSTY CARROL FULL - [id number]  SCREW BNE L40MM DIA5MM ST TIB LT GRN TI ST DUSTY CARROL FULL  DEPUY [employer]WD [id number] Right 1 Implanted         Drains:   Colostomy LLQ (Active)   Stomal Appliance 1 piece 04/02/24 1530   Stoma  Assessment Red;Protrudes 04/02/24 1530   Peristomal Assessment Clean, dry & intact 04/02/24 1530   Stool Appearance Loose 04/02/24 1530   Stool Color Brown 04/02/24 1530   Stool Amount Medium 04/02/24 1530   Output (mL) 75 ml 04/02/24 1525       Urinary Catheter

## 2024-04-02 NOTE — CONSULTS
Department of Orthopedic Surgery  Consult Note    Reason for Consult: Right hip pain    HISTORY OF PRESENT ILLNESS:       Patient is a 83 y.o. female who presents to [facility] [place] after being transferred from [facility] secondary to right hip fracture requiring pacemaker.  Patient was initially seen at [facility] diagnosed with a right intertrochanteric fracture, upon clearance was recommended by cardiology to undergo pacemaker placement prior to operative intervention.  Patient seen and examined, denies acute complaints.  Pain controlled with current medications, no other acute orthopedic surgery complaints at this time.  Patient uses cane at baseline for ambulation.    Past Medical History:        Diagnosis Date    Cancer (HCC)     colorectal     Cancer (HCC)     colorectal     Chronic obstructive pulmonary disease, unspecified 04/04/2023    Chronic renal disease, stage III (HCC) [636410] 06/27/2022    History of blood transfusion     Hx of blood clots     dvt, on eliquis    Ischemic bowel disease (HCC)     Macular degeneration     MDRO (multiple drug resistant organisms) resistance     PAF (paroxysmal atrial fibrillation) ([facility])     Perforation of sigmoid colon ([facility]) 10/17/2018    SBO (small bowel obstruction) ([facility]) 07/16/2021    Subclinical hypothyroidism 4/2/2024    Tinnitus      Past Surgical History:        Procedure Laterality Date    ABDOMEN SURGERY      colostomy & reversal    CHOLECYSTECTOMY  07    COLONOSCOPY      CYSTOSCOPY N/A 12/4/2018    CYSTOSCOPY RETROGRADE PYELOGRAM performed by Darwin Tucker MD at [facility] OR    DILATATION, ESOPHAGUS      HAND SURGERY      carpel tunnel rigth hand    HYSTERECTOMY (CERVIX STATUS UNKNOWN)      ILEOSTOMY OR JEJUNOSTOMY      OTHER SURGICAL HISTORY  11/06/2017    diagnostic laparoscopy with exploratory laparotomy and reduction of internal hernia and closure of mesenteric defect, oversewing of multiple serosal tears    WI LAPS COLECTOMY ABDL  W/PROCTECTOMY W/ILEOSTOMY N/A 6/28/2018    LAPAROSCOPIC DIVERTING COLOSTOMY performed by Raudel Javier MD at [facility] OR    DC LAPS COLECTOMY ABDL W/PROCTECTOMY W/ILEOSTOMY N/A 10/18/2018    LAPAROSCOPIC REVISION LOOP COLOSTOMY, EXTENSIVE LYSIS OF ADHESIONS, DRAINAGE PELVIC ABSCESS, END COLOSTOMY performed by Nolan Huff MD at [facility] OR    SIGMOIDOSCOPY N/A 12/4/2023    FLEXIBLE SIGMOIDOSCOPY performed by Nolan Huff MD at [facility] ENDOSCOPY    ALICIA AND BSO (CERVIX REMOVED)      UPPER GASTROINTESTINAL ENDOSCOPY N/A 7/18/2018    EGD BIOPSY performed by Cecilio Edwards DO at [facility] ENDOSCOPY     Current Medications:   Current Facility-Administered Medications: pantoprazole (PROTONIX) tablet 40 mg, 40 mg, Oral, QAM AC  0.9% NaCl with KCl 20 mEq infusion, , IntraVENous, Continuous  acetaminophen (TYLENOL) tablet 500 mg, 500 mg, Oral, Q6H PRN  fentaNYL (SUBLIMAZE) injection 25 mcg, 25 mcg, IntraVENous, Q2H PRN  ipratropium 0.5 mg-albuterol 2.5 mg (DUONEB) nebulizer solution 1 Dose, 1 Dose, Inhalation, Q4H PRN  oxyCODONE-acetaminophen (PERCOCET) 5-325 MG per tablet 1 tablet, 1 tablet, Oral, Q4H PRN  perflutren lipid microspheres (DEFINITY) injection 1.5 mL, 1.5 mL, IntraVENous, ONCE PRN  polyethylene glycol (GLYCOLAX) packet 17 g, 17 g, Oral, Daily PRN  prochlorperazine (COMPAZINE) injection 5 mg, 5 mg, IntraVENous, Q6H PRN  atropine injection 1 mg, 1 mg, IntraVENous, Once PRN  docusate sodium (COLACE) capsule 100 mg, 100 mg, Oral, Daily  senna (SENOKOT) tablet 8.6 mg, 1 tablet, Oral, Nightly  perflutren lipid microspheres (DEFINITY) injection 1.5 mL, 1.5 mL, IntraVENous, ONCE PRN  Allergies:  Aspirin and Tape [adhesive tape]    Social History:   TOBACCO:   reports that she has quit smoking. Her smoking use included cigarettes. She has never used smokeless tobacco.  ETOH:   reports no history of alcohol use.  DRUGS:   reports no history of drug use.  ACTIVITIES OF DAILY LIVING:    OCCUPATION:    Family History:

## 2024-04-02 NOTE — CONSULTS
reviewed all of the ECGs and rhythm strips per above     Assessment/Plan:     1.  Complete AV block accompanying sinus bradycardia.  1 episode associated with symptoms of nausea and lightheadedness.  Her symptoms have been present since hospitalization the previous day.  No recurrence of AV block and baseline EKG shows normal NJ interval as well as QRS duration.  History and EKG findings suggest AV block/sinus bradycardia related to vasovagal episode.    2.  Mechanical fall with fracture of the right femur awaiting surgical intervention.    3.  History of paroxysmal atrial fibrillation in November 2017 when admitted with sepsis, perforated bowel without recurrence since that time.    4.  Prior DVT    5.  Rectal CA --status post extensive surgery as noted above       Recommendations:    Since the episode of AV block was most likely vasovagal in etiology, the patient is cleared for orthopedic surgery without any EP intervention at the present time.  In view of her advanced age however, I would suggest transfer to a monitored unit post surgery i.e. intensive care unit or PCCU for close monitoring  Consideration for loop recorder at discharge to monitor long-term for AV conduction abnormalities    All of the above was with the patient and Dr. Nielson reviewing the above stated recommendations.  And a total of >50% of that time involved face-to-face time providing counseling and or coordination of care with the other providers, reviewing records/tests, counseling/education of the patient, ordering medications/tests/procedures, coordinating care, and documenting clinical information in the EHR.   I personally and independently saw and examined patient and reviewed all done pertinent laboratory data, imaging studies, ECGs and rhythm strips. I have reviewed and agree with the APRN history and physical exam as documented in the above note.  History and physical data as well as assessment and plan were modified by  me.    Thank you for allowing me to participate in your patient's care.  Please call me if there are any questions or concerns.      Theresa Maria MD  Cardiac Electrophysiology  [facility]  [facility]  8:39 AM  4/2/2024

## 2024-04-02 NOTE — PLAN OF CARE
Problem: Safety - Adult  Goal: Free from fall injury  Outcome: Progressing     Problem: Discharge Planning  Goal: Discharge to home or other facility with appropriate resources  Outcome: Progressing     Problem: Skin/Tissue Integrity  Goal: Absence of new skin breakdown  Description: 1.  Monitor for areas of redness and/or skin breakdown  2.  Assess vascular access sites hourly  3.  Every 4-6 hours minimum:  Change oxygen saturation probe site  4.  Every 4-6 hours:  If on nasal continuous positive airway pressure, respiratory therapy assess nares and determine need for appliance change or resting period.  Outcome: Progressing     Problem: ABCDS Injury Assessment  Goal: Absence of physical injury  Outcome: Progressing      Subjective   Patient ID: KAJAL Murillo is a 52 year old female.  Referring provider is Cecilio Cifuentes MD.    Chief Complaint   Patient presents with   • New Patient     Referred by Dr Zainab Brown- PCP. Had MRI of Thoraic spine done 4/21/21.   • Office Visit     Thoracic pain that travels inbetween shoulder blades that has been worsening for the last year. She remembers a time when she woke up and could not get out of bed, until she had help and felt a \"ball\" her back that she could feel \"undoing itself\". Denies any radiating pain to extremities. Denies any extreme numbness, tingling or weakness to BLE. Occasional numbness in Left arm. Denies any previous injections or surgery to thoracic spine. Takes Reumophan for 3 months. Denies any trial of PT.     HPI  Patient is a 52-year-old right-hand-dominant Ghanaian-speaking female complaining of intrascapular pain and left scapular pain for the past year.  She denies any trauma.  Patient also states that she has right-sided neck pain at night for the past year that does wake her up at night.  She has a history of right carpal tunnel release.   Patient is also complaining of L  hand numbness and tingling of the fingers.  Our medical assistant Liss is present to translate in Ghanaian.  Patient complains of intrascapular pain with standing activities while doing the dishes.  Patient states that she wore a lumbar brace that helps with her posture and helps with her intrascapular pain  Pt denies any pain in UE/LE. No pain radiating to chest area  No numbness/tingling B UE/LE except for her R hand x 1 year  Pt denies any pain in thoraco lumbar and lumbar region.   Pt takes Rheumophan from [place] which is diclofenac, dexamethasone, methocarbamol as needed for pain. Pt does not take it daily.   Patient denies any treatments with physical therapy injections or chiropractic treatments  Pt underwent MRI thoracic spine wo contrast Preferred Open MRI 4/21/21 that showed:  1.  No  definite MRI evidence for an acute osseous abnormality of the thoracic spine.  There is thoracic spondylosis and multilevel degenerative disc disease as described above.  2.  T12-L1 broad-based central posterior disc protrusion which may measure up to 2 mm  3.  Findings compatible with either right perineural or synovial cyst measuring up to 7 to 8 mm in diameter occupying the neuroforaminal bilaterally at T12L1.   4.  Limited partial imaging of the lower cervical spine based on limited sagittal sequences demonstrate incidental spondylosis, degenerative disc disease, and posterior disc bulges at visualized levels at C4-C5 through C6-C7.  These findings are not well evaluated on this exam.  Clinical correlation is advised.  A dedicated MRI of the cervical spine could be obtained for further evaluation as clinically warranted.      KAJAL Murillo has a past medical history of Osteopenia.  KAJAL Murillo has Cervical radiculopathy and Interscapular pain on their problem list.  KAJAL Murillo has a past surgical history that includes Rotator cuff repair (Right); carpal tunnel release (Right); and  delivery only.  Her family history is not on file.  KAJAL Murillo reports that she has never smoked. She has never used smokeless tobacco. She reports current alcohol use. She reports that she does not use drugs.  KAJAL Murillo has a current medication list which includes the following prescription(s): calcium.  KAJAL Murillo   Current Outpatient Medications   Medication Sig Dispense Refill   • Calcium 150 MG Tab        No current facility-administered medications for this visit.     KAJAL Murillo has No Known Allergies.    Review of Systems   Musculoskeletal: Positive for back pain and neck pain.   Neurological: Positive for numbness.       Objective   Physical Exam  Constitutional:       Appearance: Normal appearance.   HENT:      Head: Normocephalic.      Neck: Neck supple.   Eyes:      General: Lids are normal.      Extraocular  Movements: Extraocular movements intact.      Pupils: Pupils are equal, round, and reactive to light.   Pulmonary:      Effort: Pulmonary effort is normal.   Skin:     General: Skin is warm and dry.   Neurological:      Mental Status: She is alert and oriented to person, place, and time.      Coordination: Coordination is intact.      Deep Tendon Reflexes: Strength normal and reflexes are normal and symmetric.   Psychiatric:         Mood and Affect: Mood normal.         Speech: Speech normal.         Behavior: Behavior normal.       Neurological Exam  Mental Status  Alert. Recent and remote memory are intact. Speech is normal. Language is fluent with no aphasia. Attention and concentration are normal. Fund of knowledge is appropriate for level of education.    Cranial Nerves  CN I: Sense of smell is normal.  CN II: Visual acuity is normal. Visual fields full to confrontation.  CN III, IV, VI: Extraocular movements intact bilaterally. Normal lids and orbits bilaterally. Pupils equal round and reactive to light bilaterally.  CN V: Facial sensation is normal.  CN VII: Full and symmetric facial movement.  CN VIII: Hearing is normal.  CN IX, X: Palate elevates symmetrically  CN XI: Shoulder shrug strength is normal.  CN XII: Tongue midline without atrophy or fasciculations.    Motor  Normal muscle bulk throughout. No fasciculations present. Normal muscle tone. Strength is 5/5 throughout all four extremities.    Sensory  Light touch is normal in upper and lower extremities. Pinprick is normal in upper and lower extremities. Vibration is normal in upper and lower extremities.     Reflexes  Deep tendon reflexes are 2+ and symmetric in all four extremities with downgoing toes bilaterally.    Coordination  Finger-to-nose, rapid alternating movements and heel-to-shin normal bilaterally without dysmetria.    Gait  Normal casual, toe, heel and tandem gait.    ROM cervical spine with rotation to the left and right, lateral  flexion to the left and right, and flexion-extension views did not produce any pain in her cervical spine    Well healed R CTR scar  Neg Tinel's B elbow  Neg Tinel's B wrist  Positive Phalen's causing worsening numbness/tingling in her left hand  No pain with palpation over thoracolumbar region    Imaging/Labs  I have personally reviewed imaging/labs.  Dr. Cifuentes also reviewed MRI thoracic spine wo contrast Preferred Open MRI 4/21/21 that showed bulging disc seen at distal cervical spine. There is no impingement thoracic spine.    Report as follows:  1.  No definite MRI evidence for an acute osseous abnormality of the thoracic spine.  There is thoracic spondylosis and multilevel degenerative disc disease as described above.  2.  T12-L1 broad-based central posterior disc protrusion which may measure up to 2 mm  3.  Findings compatible with either right perineural or synovial cyst measuring up to 7 to 8 mm in diameter occupying the neuroforaminal bilaterally at T12L1.   4.  Limited partial imaging of the lower cervical spine based on limited sagittal sequences demonstrate incidental spondylosis, degenerative disc disease, and posterior disc bulges at visualized levels at C4-C5 through C6-C7.  These findings are not well evaluated on this exam.  Clinical correlation is advised.  A dedicated MRI of the cervical spine could be obtained for further evaluation as clinically warranted.    Greater than 50% of the visit was spent counseling regarding above issues; total time spent 35 minutes.    Pt seen under supervision of Dr. Cecilio Cifuentes.    Assessment   Problem List Items Addressed This Visit        Musculoskeletal and Injuries    Interscapular pain       Neuro    Cervical radiculopathy     Intrascapular pain, L scapular pain, R sided neck pain.   MRI thoracic spine shows no impingement. I believe her symptoms are due to her cervical radiculopathy. Rec MRI of the cervical spine without contrast for further  evaluation.  Patient states that she is self-pay and recommend to naproxen sodium 220mg twice a day with food instead of taking a prescription naprosyn 500mg BID. Pt to stop taking medication from [facility].  Patient is to start physical therapy cervical spine to include cervical traction home exercise program for her cervical radiculopathy and intrascapular pain.  We will see her back in 1 month for reevaluation. No sx for thoracic spine recommended. L hand numbness/tingling could be due to L carpal tunnel syndrome as she had +Phalen's on exam and hx of R CTR.          Relevant Orders    SERVICE TO PHYSICAL THERAPY    MRI CERVICAL SPINE WO CONTRAST      Other Visit Diagnoses     Mid back pain    -  Primary    Relevant Orders    SERVICE TO PHYSICAL THERAPY        I have personally interviewed, examined the patient and reviewed all imaging studies with the patient via face-to-face visit. I confirmed the findings listed below:     Intrascapular pain  Cervical radiculopathy    This was discussed with my PA, Allegra Moreira, and I agree with the assessment and plan as documented. The plan of care was discussed with the patient. I personally reviewed the imaging witht he patient and discussed the relationship of the findings to his clinical symptoms and the rationale for the proposed treatment.  She is in agreement with the recommendations.  All her questions were answered to their satisfaction.     Cecilio Cifuentes MD FACS FAANS  ABNS Board Certified Neurosurgeon  Clinical , [facility]  Fellow, American Association of Neurological Surgeons  Fellow, [employer] Society  Fellow, American College of Surgeons

## 2024-04-02 NOTE — PROGRESS NOTES
Physical Therapy    Date: 2024       Patient Name: Yen Plasencia  : 1940      MRN: [id number]    Physical therapy order received and chart reviewed. PT evaluation held at this time pending possible ortho operative intervention for R intertrochanteric femur fracture.   Will follow up as appropriate.     Kayla Guerra PT, DPT  [id number]

## 2024-04-02 NOTE — PROGRESS NOTES
Ortho resident messaged regarding family wondering if pt is still having surgery today    Carolann Javier RN

## 2024-04-02 NOTE — CARE COORDINATION
Spoke with patient, also called and spoke with daughterAnnette on speaker phone in the room. Patient from home, lives with spouse. She is typically ambulatory within the home without assistive device, uses a cane in the community. 2 steps to enter home from garage, 5 from front of the home. Half bath on first floor, bed and full bath on second floor. She does not drive, spouse continues to drive. Typically independent with ADLs prior to admission. History at [facility] 4-5 years ago, was very positive. No current homecare. PCP Dr. Nielson. Discussed probable need for GIANNA when released. Both patient and daughter in agreement. GIANNA list left at bedside, daughter will be in later and review with patient. Will review and let me know choices. EP cleared for right femur repair.     Case Management Assessment  Initial Evaluation    Date/Time of Evaluation: 4/2/2024 11:39 AM  Assessment Completed by: DUSTY Mar    If patient is discharged prior to next notation, then this note serves as note for discharge by case management.    Patient Name: Yen Plasencia                   YOB: 1940  Diagnosis: Heart block [I45.9]                   Date / Time: 4/2/2024  1:12 AM    Patient Admission Status: Inpatient   Readmission Risk (Low < 19, Mod (19-27), High > 27): Readmission Risk Score: 15    Current PCP: Jared Nielson MD  PCP verified by ? Yes    Chart Reviewed: Yes      History Provided by: Patient  Patient Orientation: Alert and Oriented    Patient Cognition: Alert    Hospitalization in the last 30 days (Readmission):  No    If yes, Readmission Assessment in  Navigator will be completed.    Advance Directives:      Code Status: Prior   Patient's Primary Decision Maker is: Legal Next of Kin    Primary Decision Maker: Mynor Plasencia - Spouse - [phone]    Primary Decision Maker: Annette Nielson - Child - [phone]    Secondary Decision Maker: Jared Nielson - Other - [phone]    Discharge

## 2024-04-02 NOTE — ACP (ADVANCE CARE PLANNING)
Advance Care Planning   Healthcare Decision Maker:    Primary Decision Maker: Mynor Plasencia - Spouse - [phone]    Primary Decision Maker: Annette Nielson - Luna - [phone]    Secondary Decision Maker: Jared Nielson - Other - [phone]    Click here to complete Healthcare Decision Makers including selection of the Healthcare Decision Maker Relationship (ie \"Primary\").

## 2024-04-02 NOTE — PATIENT CARE CONFERENCE
P Quality Flow/Interdisciplinary Rounds Progress Note        Quality Flow Rounds held on April 2, 2024    Disciplines Attending:  Bedside Nurse, , , and Nursing Unit Leadership    Yen Plasencia was admitted on 4/2/2024  1:12 AM    Anticipated Discharge Date:       Disposition:    Frank Score:  Frank Scale Score: 18    Readmission Risk              Risk of Unplanned Readmission:  21           Discussed patient goal for the day, patient clinical progression, and barriers to discharge.  The following Goal(s) of the Day/Commitment(s) have been identified: Report labs/diagnostics       Katherin Camacho RN  April 2, 2024

## 2024-04-02 NOTE — PROGRESS NOTES
Occupational Therapy    Date:2024  Patient Name: Yen Plasencia  MRN: [id number]  : 1940  Room: [address] [place] [address]-A     OT orders received and chart reviewed. OT eval on hold at this time pending ortho consult.  OT will follow and re-attempt eval as appropriate at a later time/date.    Jeff Calhoun OTR/L; [id number]

## 2024-04-02 NOTE — FLOWSHEET NOTE
Preop checklist completed. Blood and procedure consent signed and in pt soft chart.   Pt will be sent with hearing aides down to surgery d/t very impaired hearing. Labeled denture cup sent with pt to place hearing aides in. Called surgery to update     04/02/24 1526   Belongings   Dental Appliances Uppers  (bridges, bonded in)   Vision - Corrective Lenses Eyeglasses;At bedside  (will stay in room)   Hearing Aid Bilateral hearing aids  (needs to go to PACU with)   Clothing   (gown)   Jewelry None   Body Piercings Removed N/A   Electronic Devices Cell Phone;;At bedside   Weapons (Notify Protective Services/Security) None   Other Valuables Other (Comment)  (none)   Home Medications None   Valuables Given To Patient   Provide Name(s) of Who Valuable(s) Were Given To jevon

## 2024-04-02 NOTE — FLOWSHEET NOTE
Pt arrived to unit with the following belongings:   04/02/24 0114   Belongings   Dental Appliances Other (Comment)  (upper bridges)   Vision - Corrective Lenses Eyeglasses   Hearing Aid Bilateral hearing aids   Clothing Pants;Socks  (colostomy bag supplies)   Jewelry None   Body Piercings Removed No   Electronic Devices Cell Phone;   Weapons (Notify Protective Services/Security) None   Other Valuables Other (Comment)   Home Medications None   Valuables Given To Patient   Provide Name(s) of Who Valuable(s) Were Given To Yen

## 2024-04-02 NOTE — H&P
Hospitalist History & Physical      PCP: Jared Nielson MD    Date of Service: Pt seen/examined on 4/2/2024     Chief Complaint:  Transfer from [facility] for EP c/s w/ dx CHB.     History of Present Illness:    Ms. Yen Plasencia, a 83 y.o. year old female  who  has a past medical history of Cancer (HCC), Cancer (HCC), Chronic obstructive pulmonary disease, unspecified, Chronic renal disease, stage III (HCC) [209810], History of blood transfusion, Hx of blood clots, Ischemic bowel disease (HCC), Macular degeneration, MDRO (multiple drug resistant organisms) resistance, PAF (paroxysmal atrial fibrillation) (HCC), Perforation of sigmoid colon (HCC), SBO (small bowel obstruction) (HCC), Subclinical hypothyroidism, and Tinnitus.     Patient admitted as a transfer from [facility] for EP c/s w/ dx CHB.     Patient originally presented to [facility] on 3/31 with right hip pain after a fall and was admitted for right intertrochanteric femur fracture.  Plan was for CMN 4/1 with orthopedic surgery.  Cardiology was consulted for preoperative cardiac evaluation.  Prior to their evaluation patient had a 7-second ventricular standstill followed by a secondary heart block associated with weakness, nausea, dizziness, and diaphoresis.  Cardiology discussed case with Dr. Maria with EP and decision was made to transfer here for evaluation for pacemaker insertion.  EP advised holding any anticoagulation, AV zluy blocking agents.     On my exam patient reports she is still feeling nauseous and dizzy but its better today than it has been. She reports she was just seen in ED for this.  Per chart review she presented to ED on 3/26 for N/V, fatigue, dizziness and was found to have a UTI. She was discharged from ED on a 7 day course of amoxicillin and levofloxacin. CT AP from 3/26 reviewed and w/ constipation. She denies any lightheadedness, chest pain, palpitations, SOB. She denies any previous heart issues and reports her son is a cardiologist.

## 2024-04-02 NOTE — PLAN OF CARE
Problem: Discharge Planning  Goal: Discharge to home or other facility with appropriate resources  4/2/2024 0047 by Ingrid Rai RN  Outcome: Progressing  Flowsheets (Taken 4/1/2024 1508 by Darwin Harvey, RN)  Discharge to home or other facility with appropriate resources:   Identify barriers to discharge with patient and caregiver   Arrange for needed discharge resources and transportation as appropriate  4/1/2024 1221 by Erika Strauss RN  Outcome: Progressing     Problem: Pain  Goal: Verbalizes/displays adequate comfort level or baseline comfort level  4/2/2024 0047 by Ingrid Rai RN  Outcome: Progressing  Flowsheets (Taken 4/1/2024 1508 by Darwin Harvey RN)  Verbalizes/displays adequate comfort level or baseline comfort level: Encourage patient to monitor pain and request assistance  4/1/2024 1221 by Erika Strauss RN  Outcome: Progressing     Problem: Skin/Tissue Integrity  Goal: Absence of new skin breakdown  Description: 1.  Monitor for areas of redness and/or skin breakdown  2.  Assess vascular access sites hourly  3.  Every 4-6 hours minimum:  Change oxygen saturation probe site  4.  Every 4-6 hours:  If on nasal continuous positive airway pressure, respiratory therapy assess nares and determine need for appliance change or resting period.  4/2/2024 0047 by Ingrid Rai RN  Outcome: Progressing  4/1/2024 1221 by Erika Strauss RN  Outcome: Progressing     Problem: Safety - Adult  Goal: Free from fall injury  4/2/2024 0047 by Ingrid Rai RN  Outcome: Progressing  Flowsheets (Taken 4/1/2024 1508 by Darwin Harvey RN)  Free From Fall Injury: Instruct family/caregiver on patient safety  4/1/2024 1221 by Erika Strauss RN  Outcome: Progressing     Problem: ABCDS Injury Assessment  Goal: Absence of physical injury  4/2/2024 0047 by Ingrid Rai RN  Outcome: Progressing  Flowsheets (Taken 4/1/2024 1508 by Darwin Harvey, RN)  Absence of Physical Injury: Implement safety measures based on patient

## 2024-04-03 LAB
EKG ATRIAL RATE: 70 BPM
EKG P AXIS: 66 DEGREES
EKG P-R INTERVAL: 128 MS
EKG Q-T INTERVAL: 388 MS
EKG QRS DURATION: 68 MS
EKG QTC CALCULATION (BAZETT): 419 MS
EKG R AXIS: 66 DEGREES
EKG T AXIS: 79 DEGREES
EKG VENTRICULAR RATE: 70 BPM
MICROORGANISM SPEC CULT: ABNORMAL
SPECIMEN DESCRIPTION: ABNORMAL

## 2024-04-03 PROCEDURE — 99232 SBSQ HOSP IP/OBS MODERATE 35: CPT | Performed by: STUDENT IN AN ORGANIZED HEALTH CARE EDUCATION/TRAINING PROGRAM

## 2024-04-03 PROCEDURE — 2580000003 HC RX 258: Performed by: INTERNAL MEDICINE

## 2024-04-03 PROCEDURE — 6360000002 HC RX W HCPCS

## 2024-04-03 PROCEDURE — 97530 THERAPEUTIC ACTIVITIES: CPT

## 2024-04-03 PROCEDURE — 33285 INSJ SUBQ CAR RHYTHM MNTR: CPT | Performed by: INTERNAL MEDICINE

## 2024-04-03 PROCEDURE — 6370000000 HC RX 637 (ALT 250 FOR IP)

## 2024-04-03 PROCEDURE — C1764 EVENT RECORDER, CARDIAC: HCPCS | Performed by: INTERNAL MEDICINE

## 2024-04-03 PROCEDURE — 2140000000 HC CCU INTERMEDIATE R&B

## 2024-04-03 PROCEDURE — 97535 SELF CARE MNGMENT TRAINING: CPT

## 2024-04-03 PROCEDURE — 0JH602Z INSERTION OF MONITORING DEVICE INTO CHEST SUBCUTANEOUS TISSUE AND FASCIA, OPEN APPROACH: ICD-10-PCS | Performed by: INTERNAL MEDICINE

## 2024-04-03 PROCEDURE — 6370000000 HC RX 637 (ALT 250 FOR IP): Performed by: NURSE PRACTITIONER

## 2024-04-03 PROCEDURE — 99233 SBSQ HOSP IP/OBS HIGH 50: CPT | Performed by: INTERNAL MEDICINE

## 2024-04-03 PROCEDURE — 6370000000 HC RX 637 (ALT 250 FOR IP): Performed by: INTERNAL MEDICINE

## 2024-04-03 PROCEDURE — 2500000003 HC RX 250 WO HCPCS: Performed by: INTERNAL MEDICINE

## 2024-04-03 PROCEDURE — 97161 PT EVAL LOW COMPLEX 20 MIN: CPT

## 2024-04-03 PROCEDURE — 6360000002 HC RX W HCPCS: Performed by: NURSE PRACTITIONER

## 2024-04-03 PROCEDURE — 97165 OT EVAL LOW COMPLEX 30 MIN: CPT

## 2024-04-03 PROCEDURE — 2580000003 HC RX 258

## 2024-04-03 DEVICE — ICM LNQ22 LINQ II USA
Type: IMPLANTABLE DEVICE | Site: CHEST  WALL | Status: FUNCTIONAL
Brand: LINQ II™

## 2024-04-03 RX ADMIN — POTASSIUM CHLORIDE AND SODIUM CHLORIDE: 900; 150 INJECTION, SOLUTION INTRAVENOUS at 10:44

## 2024-04-03 RX ADMIN — ASPIRIN 325 MG: 325 TABLET, COATED ORAL at 22:08

## 2024-04-03 RX ADMIN — SENNOSIDES 8.6 MG: 8.6 TABLET, FILM COATED ORAL at 22:08

## 2024-04-03 RX ADMIN — SODIUM CHLORIDE, PRESERVATIVE FREE 10 ML: 5 INJECTION INTRAVENOUS at 22:08

## 2024-04-03 RX ADMIN — WATER 2000 MG: 1 INJECTION INTRAMUSCULAR; INTRAVENOUS; SUBCUTANEOUS at 04:16

## 2024-04-03 RX ADMIN — ASPIRIN 325 MG: 325 TABLET, COATED ORAL at 07:45

## 2024-04-03 RX ADMIN — PANTOPRAZOLE SODIUM 40 MG: 40 TABLET, DELAYED RELEASE ORAL at 05:58

## 2024-04-03 NOTE — PLAN OF CARE
Problem: Safety - Adult  Goal: Free from fall injury  4/3/2024 0457 by Frank Blair RN  Outcome: Progressing     Problem: Discharge Planning  Goal: Discharge to home or other facility with appropriate resources  4/3/2024 0457 by Frank Blair, RN  Outcome: Progressing     Problem: Skin/Tissue Integrity  Goal: Absence of new skin breakdown  Description: 1.  Monitor for areas of redness and/or skin breakdown  2.  Assess vascular access sites hourly  3.  Every 4-6 hours minimum:  Change oxygen saturation probe site  4.  Every 4-6 hours:  If on nasal continuous positive airway pressure, respiratory therapy assess nares and determine need for appliance change or resting period.  4/3/2024 0457 by Frank Blair RN  Outcome: Progressing     Problem: ABCDS Injury Assessment  Goal: Absence of physical injury  4/3/2024 0457 by Frank Blair RN  Outcome: Progressing     Problem: Pain  Goal: Verbalizes/displays adequate comfort level or baseline comfort level  Outcome: Progressing

## 2024-04-03 NOTE — PROGRESS NOTES
Physical Therapy  Physical Therapy Initial Assessment     Name: Yen Plasencia  : 1940  MRN: [id number]      Date of Service: 4/3/2024    Evaluating PT:  Frank Powers, PT, DPT [id number]    Room #:  7889/6509-A  Diagnosis:  Heart block [I45.9]  PMHx/PSHx:    Past Medical History:   Diagnosis Date    Cancer (HCC)     colorectal     Cancer (HCC)     colorectal     Chronic obstructive pulmonary disease, unspecified 2023    Chronic renal disease, stage III ([facility]) [795701] 2022    History of blood transfusion     Hx of blood clots     dvt, on eliquis    Ischemic bowel disease (HCC)     Macular degeneration     MDRO (multiple drug resistant organisms) resistance     PAF (paroxysmal atrial fibrillation) ([facility])     Perforation of sigmoid colon ([facility]) 10/17/2018    SBO (small bowel obstruction) ([facility]) 2021    Subclinical hypothyroidism 2024    Tinnitus      Procedure/Surgery:   RIGHT HIP OPEN REDUCTION INTERNAL FIXATION with cephalomedullary nail   Precautions:  Falls, WBAT RLE, mild [place], colostomy   Equipment Needs:  TBD    SUBJECTIVE:    Pt lives with  in a 2 story home with 2 step(s) to enter and 2 rail(s).   Full flight of steps and 2 rails to bedroom.  Pt ambulated without device in home, cane in community PTA.    OBJECTIVE:   Initial Evaluation  Date: 4/3/24 Treatment Short Term/ Long Term   Goals   AM-PAC 6 Clicks      Was pt agreeable to Eval/treatment? Yes     Does pt have pain? No c/o pain     Bed Mobility  Rolling: NT  Supine to sit: MaxA with HOB elevated  Sit to supine: NT  Scooting: Jerrod  Mod Independent   Transfers Sit to stand: ModA bed; Jerrdo with chair  Stand to sit: Jerrod  Stand pivot: Jerrod with WW  Mod Independent with WW   Ambulation   15 feet, 10 feet with Jerrod with WW  >150 feet with Mod Independent with WW   Stair negotiation: ascended and descended NT  >4 steps with 1 rail Mod Independent   ROM BUE:  WFL  BLE:  WFL     Strength BUE:  WFL  BLE:  4/5  Increase by 1/3  ROM  Transfer training - pt was given verbal and tactile cues to facilitate proper hand placement, technique and safety during sit to stand, stand to sit and stand pivot transfers as well as provided with physical assistance.   Gait training- pt was given verbal and tactile cues to facilitate safety and balance during ambulation as well as provided with physical assistance.    Pt's/ family goals   1. Return home    Prognosis is good for reaching above PT goals.    Patient and or family understand(s) diagnosis, prognosis, and plan of care:   [x] Yes [] No      PHYSICAL THERAPY PLAN OF CARE:    PT POC is established based on physician order and patient diagnosis     Referring provider/PT Order:  Solis Diaz DO  /04/02/24 1930  PT eval and treat  Diagnosis:  Heart block [I45.9]  Specific instructions for next treatment:  Progress activity     Current Treatment Recommendations:     [x] Strengthening to improve independence with functional mobility   [x] ROM to improve independence with functional mobility   [x] Balance Training to improve static/dynamic balance and to reduce fall risk  [x] Endurance Training to improve activity tolerance during functional mobility   [x] Transfer Training to improve safety and independence with all functional transfers   [x] Gait Training to improve gait mechanics, endurance and asses need for appropriate assistive device  [x] Stair Training in preparation for safe discharge home and/or into the community   [x] Positioning to prevent skin breakdown and contractures  [x] Safety and Education Training   [x] Patient/Caregiver Education   [x] HEP  [] Other     PT long term treatment goals are located in above grid    Frequency of treatments: 2-5x/week x 1-2 weeks.    Time in  0740  Time out  0820    Total Treatment Time  25 minutes     Evaluation Time includes thorough review of current medical information, gathering information on past medical history/social history and prior level

## 2024-04-03 NOTE — PROGRESS NOTES
Occupational Therapy  OCCUPATIONAL THERAPY INITIAL EVALUATION    [facility]  [address], [place]      Date:4/3/2024                                                Patient Name: Yen Plasencia  MRN: [id number]  : 1940  Room: [address] [place] [address]-    Evaluating OT: Jensen Chicas OTR/L #[id number]     Referring Provider: Solis Diaz DO   Specific Provider Orders/Date: OT eval and treat 24    Diagnosis: Heart block [I45.9]   Pt admitted to hospital following fall    Surgery / Procedure:   24  RIGHT HIP OPEN REDUCTION INTERNAL FIXATION with cephalomedullary nail       Pertinent Medical History:  has a past medical history of Cancer (HCC), Cancer (HCC), Chronic obstructive pulmonary disease, unspecified, Chronic renal disease, stage III (HCC) [085029], History of blood transfusion, Hx of blood clots, Ischemic bowel disease (HCC), Macular degeneration, MDRO (multiple drug resistant organisms) resistance, PAF (paroxysmal atrial fibrillation) (HCC), Perforation of sigmoid colon (HCC), SBO (small bowel obstruction) (HCC), Subclinical hypothyroidism, and Tinnitus.       Precautions:  Fall Risk, WBAT R LE, [place], colostomy     Assessment of current deficits    [x] Functional mobility  [x]ADLs  [x] Strength               []Cognition    [x] Functional transfers   [x] IADLs         [x] Safety Awareness   [x]Endurance    [] Fine Coordination              [x] Balance      [] Vision/perception   []Sensation     []Gross Motor Coordination  [] ROM  [] Delirium                   [] Motor Control     OT PLAN OF CARE   OT POC based on physician orders, patient diagnosis and results of clinical assessment    Frequency/Duration 1-3 days/wk for 2 weeks PRN   Specific OT Treatment Interventions to include:   * Instruction/training on adapted ADL techniques and AE recommendations to increase functional independence within precautions       * Training on energy conservation  strategies, correct breathing pattern and techniques to improve independence/tolerance for self-care routine  * Functional transfer/mobility training/DME recommendations for increased independence, safety, and fall prevention  * Patient/Family education to increase follow through with safety techniques and functional independence  * Recommendation of environmental modifications for increased safety with functional transfers/mobility and ADLs  * Therapeutic exercise to improve motor endurance, ROM, and functional strength for ADLs/functional transfers  * Therapeutic activities to facilitate/challenge dynamic balance, stand tolerance for increased safety and independence with ADLs  * Therapeutic activities to facilitate gross/fine motor skills for increased independence with ADLs    Recommended Adaptive Equipment:  TBD     Home Living: Pt lives with  in a 2 story home with 2 ENID and B hand rails. 1/2 bath on 1st floor; full bath and bed on 2nd floor    Bathroom setup: tub/shower combo with shower chair    Equipment owned: shower chair, cane, w/w    Prior Level of Function: mod I with ADLs , mod I with IADLs; ambulated without AD in house and cane in community    Driving:no   Occupation: na    Pain Level: Pt denies pain this session    Cognition: A&O: 4/4; Follows 2 step directions   Memory:  good   Sequencing:  good   Problem solving:  fair   Judgement/safety:  fair     Functional Assessment:  AM-PAC Daily Activity Raw Score: 15/24   Initial Eval Status  Date: 4/3/24 Treatment Status  Date: STGs = LTGs  Time frame: 10-14 days   Feeding Independent      Grooming Minimal Assist   Modified [place]    UB Dressing Minimal Assist   Modified [place]    LB Dressing Dependent   Moderate Assist    Bathing Moderate Assist  Minimal Assist    Toileting Maximal Assist   Minimal Assist    Bed Mobility  Supine to sit: Maximal Assist   Sit to supine: NT   Supine to sit: Minimal Assist   Sit to supine: Minimal Assist

## 2024-04-03 NOTE — PROGRESS NOTES
Pain is very well-controlled after right hip cephalomedullary nail.  Weightbearing as tolerated.  Aspirin 325 twice daily for DVT prophylaxis.  PT and OT for discharge planning.  Follow-up in the office in 2            [facility]   Orthopaedic Surgery   4/3/2024  8:03 AM    Note: This report was completed using [employer] voiced recognition software.  Every effort has been made to ensure accuracy; however, inadvertent computerized transcription errors may be present.        Department of Orthopedic Surgery  Progress Note    Patient seen and examined. Pain controlled. No new complaints.  Denies chest pain, shortness of breath, dizziness/lightheadedness. Patient is doing very well this morning she states she is not having any pain at this point. We did discuss her surgery and plan moving forward.     VITALS:  BP (!) 122/59   Pulse 77   Temp 98.4 °F (36.9 °C) (Temporal)   Resp 20   Ht 1.702 m (5' 7\")   Wt 69.4 kg (152 lb 14.4 oz)   SpO2 92%   BMI 23.95 kg/m²     General: awake, alert cooperative in no acute distress.     MUSCULOSKELETAL:   right lower extremity:  Dressing C/D/I  Compartments soft and compressible  +PF/DF/EHL  +2/4 DP & PT pulses, Brisk Cap refill, Toes warm and perfused  Distal sensation grossly intact to Peroneals, Sural, Saphenous, and tibial nrs    CBC:   Lab Results   Component Value Date/Time    WBC 12.2 04/02/2024 05:00 AM    HGB 13.1 04/02/2024 05:00 AM    HCT 40.4 04/02/2024 05:00 AM     04/02/2024 05:00 AM     PT/INR:    Lab Results   Component Value Date/Time    PROTIME 10.4 03/31/2024 03:39 AM    INR 0.9 03/31/2024 03:39 AM           ASSESSMENT  S/P CMN for intertrochanteric femur fracture - 4/2/24    PLAN      Continue physical therapy and protocol: WBAT - RLE  24 hour abx coverage  Deep venous thrombosis prophylaxis - aspirin 325mg twice a day, early mobilization  PT/OT  Pain Control: multimodal wean narcotics as able.  Monitor H&H  D/C Plan:  per pt/ot/sw recs

## 2024-04-03 NOTE — PROGRESS NOTES
[facility]- [facility]  Inpatient progress note  PATIENT: Yen Plasencia  MEDICAL RECORD NUMBER: [id number]  DATE OF SERVICE:  4/3/2024  ATTENDING ELECTROPHYSIOLOGIST: Osmany Maria MD   PRIMARY ELECTROPHYSIOLOGIST: Shai Maria MD   REFERRING PHYSICIAN: Cecilio Edwards DO and Jared Nielson MD  CHIEF COMPLAINT: Mechanical fall, Intertrochanteric fracture of right femur.       HPI: This is a 83 y.o. female with a history of rectal cancer with prior robotic laparoscopic proctosigmoidectomy anterior right with low pelvic anastomosis with ostomy, total hysterectomy with bilateral oophorectomy and removal of the vaginal wall with vaginal with removal of paravaginal tissue at [facility] in 2017. She presented with fatigue , weakness and nausea a few days post colostomy reversal, found to be hypotensive requiring urgent resuscitation in the emergency room.  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  She is a former smoker and therefore has COPD.  History of DVT in 2018 requiring systemic anticoagulation for a short time.  The patient lives with her  and  is extremely active managing her daily activities without difficulty.  No prior history of syncope or near syncope except following her bowel surgery as noted above.  She noted symptoms of nausea and weakness approximately 7 to 10 days ago and was seen in the emergency room on 3/26/2024.    During that admission it was noted that she required 2 persons to assist her to ambulate and options of physical rehab were discussed with the family.  She is independent the majority of the time but does note limitations related to the neuropathy, ambulation and uses a cane for extended distances.  She was still weak from her persistent nausea when she was discharged home and on 3/31/2024 she got up out of bed at 2:30 in the morning to use the restroom.  She  independently saw and examined patient and reviewed all done pertinent laboratory data, imaging studies, ECGs and rhythm strips.   Thank you for allowing me to participate in your patient's care.  Please call me if there are any questions or concerns.      Theresa Maria MD  Cardiac Electrophysiology  [facility]  The Heart [facility] Vascular [place]: [place] [facility]  1:44 PM  4/3/2024

## 2024-04-03 NOTE — PATIENT CARE CONFERENCE
P Quality Flow/Interdisciplinary Rounds Progress Note        Quality Flow Rounds held on April 3, 2024    Disciplines Attending:  Bedside Nurse, , , and Nursing Unit Leadership    Yen Plasencia was admitted on 4/2/2024  1:12 AM    Anticipated Discharge Date:       Disposition:    Frank Score:  Frank Scale Score: 19    Readmission Risk              Risk of Unplanned Readmission:  22           Discussed patient goal for the day, patient clinical progression, and barriers to discharge.  The following Goal(s) of the Day/Commitment(s) have been identified:   to increase activity      Alize Tan RN  April 3, 2024

## 2024-04-03 NOTE — PROGRESS NOTES
4 Eyes Skin Assessment     NAME:  Yen Plasencia  YOB: 1940  MEDICAL RECORD NUMBER:  [id number]    The patient is being assessed for  Transfer to New Unit    I agree that at least one RN has performed a thorough Head to Toe Skin Assessment on the patient. ALL assessment sites listed below have been assessed.      Areas assessed by both nurses:    Head, Face, Ears, Shoulders, Back, Chest, Arms, Elbows, Hands, Sacrum. Buttock, Coccyx, Ischium, and Legs. Feet and Heels        Does the Patient have a Wound? No noted wound(s)       Frank Prevention initiated by RN: No already in place  Wound Care Orders initiated by RN: No    Pressure Injury (Stage 3,4, Unstageable, DTI, NWPT, and Complex wounds) if present, place Wound referral order by RN under : No    New Ostomies, if present place, Ostomy referral order under : No  existing ostomy in place    Nurse 1 eSignature: Electronically signed by Suzanne Blanco RN on 4/3/24 at 5:35 PM EDT    **SHARE this note so that the co-signing nurse can place an eSignature**    Nurse 2 eSignature: Electronically signed by Sharifa Vance RN on 4/3/24 at 8:12 PM EDT

## 2024-04-03 NOTE — CARE COORDINATION
Spoke with this very pleasant patient with daughter on speaker phone. Discussed GIANNA options, choiced for 1. [facility], 2. [facility]. Referral pending to [facility]. Also mentioned ARU at [facility] and [facility] to daughter, prefer [facility] at this point. Pasrr and ambulette on soft chart.     1417 Patient for loop recorder today. Patient accepted at [facility], bed available when stable.     For questions I can be reached at [phone]. DUSTY Mar    The Plan for Transition of Care is related to the following treatment goals: discharge planning when stable     The Patient and/or patient representative Yen Plasencia and Annette Nielson was provided with a choice of provider and agrees   with the discharge plan. [x] Yes [] No    Freedom of choice list was provided with basic dialogue that supports the patient's individualized plan of care/goals, treatment preferences and shares the quality data associated with the providers. [x] Yes [] No

## 2024-04-03 NOTE — PROGRESS NOTES
Hospitalist Progress Note      SYNOPSIS: Patient admitted on 2024 for CHB (complete heart block) (HCC)  Patient originally presented to [facility] on 3/31 with right hip pain after a fall and was admitted for right intertrochanteric femur fracture. Plan was for CMN  with orthopedic surgery.  Cardiology was consulted for preoperative cardiac evaluation.  Prior to their evaluation patient had a 7-second ventricular standstill followed by a secondary heart block associated with weakness, nausea, dizziness, and diaphoresis.  Cardiology discussed case with Dr. Maria with EP and decision was made to transfer here for evaluation for pacemaker insertion.  EP advised holding any anticoagulation, AV zuly blocking agents.     electrophysiologist cleared for surgery   patient underwent right hip open reduction internal fixation with cephalomedullary nail    SUBJECTIVE:  Stable overnight. No other overnight issues reported.   Patient seen and examined at bedside today a.m. denies any other complain, she denies any pain in her operative site  Records reviewed.         Temp (24hrs), Av.9 °F (36.6 °C), Min:97.2 °F (36.2 °C), Max:98.7 °F (37.1 °C)    DIET: ADULT DIET; Regular  CODE: Full Code    Intake/Output Summary (Last 24 hours) at 4/3/2024 1038  Last data filed at 4/3/2024 0615  Gross per 24 hour   Intake 300 ml   Output 2500 ml   Net -2200 ml       Review of Systems  All bolded are positive; please see HPI  General:  Fever, chills, diaphoresis, fatigue, malaise, night sweats, weight loss  Psychological:  Anxiety, disorientation, hallucinations.  ENT:  Epistaxis, headaches, vertigo, visual changes.  Cardiovascular:  Chest pain, irregular heartbeats, palpitations, paroxysmal nocturnal dyspnea.  Respiratory:  Shortness of breath, coughing, sputum production, hemoptysis, wheezing, orthopnea.  Gastrointestinal:  Nausea, vomiting, diarrhea, heartburn, constipation, abdominal pain, hematemesis, hematochezia,

## 2024-04-04 VITALS
HEART RATE: 72 BPM | OXYGEN SATURATION: 97 % | DIASTOLIC BLOOD PRESSURE: 51 MMHG | HEIGHT: 67 IN | WEIGHT: 152.9 LBS | BODY MASS INDEX: 24 KG/M2 | SYSTOLIC BLOOD PRESSURE: 130 MMHG | TEMPERATURE: 97.6 F | RESPIRATION RATE: 14 BRPM

## 2024-04-04 LAB
ALBUMIN SERPL-MCNC: 3.4 G/DL (ref 3.5–5.2)
ALP SERPL-CCNC: 71 U/L (ref 35–104)
ALT SERPL-CCNC: 6 U/L (ref 0–32)
ANION GAP SERPL CALCULATED.3IONS-SCNC: 13 MMOL/L (ref 7–16)
AST SERPL-CCNC: 16 U/L (ref 0–31)
BASOPHILS # BLD: 0.02 K/UL (ref 0–0.2)
BASOPHILS NFR BLD: 0 % (ref 0–2)
BILIRUB SERPL-MCNC: 0.4 MG/DL (ref 0–1.2)
BUN SERPL-MCNC: 18 MG/DL (ref 6–23)
CALCIUM SERPL-MCNC: 9.3 MG/DL (ref 8.6–10.2)
CHLORIDE SERPL-SCNC: 105 MMOL/L (ref 98–107)
CO2 SERPL-SCNC: 18 MMOL/L (ref 22–29)
CREAT SERPL-MCNC: 0.9 MG/DL (ref 0.5–1)
ECHO BSA: 1.81 M2
EOSINOPHIL # BLD: 0.07 K/UL (ref 0.05–0.5)
EOSINOPHILS RELATIVE PERCENT: 1 % (ref 0–6)
ERYTHROCYTE [DISTWIDTH] IN BLOOD BY AUTOMATED COUNT: 13.9 % (ref 11.5–15)
GFR SERPL CREATININE-BSD FRML MDRD: 66 ML/MIN/1.73M2
GLUCOSE SERPL-MCNC: 86 MG/DL (ref 74–99)
HCT VFR BLD AUTO: 34.9 % (ref 34–48)
HGB BLD-MCNC: 11.6 G/DL (ref 11.5–15.5)
IMM GRANULOCYTES # BLD AUTO: 0.05 K/UL (ref 0–0.58)
IMM GRANULOCYTES NFR BLD: 1 % (ref 0–5)
LYMPHOCYTES NFR BLD: 0.56 K/UL (ref 1.5–4)
LYMPHOCYTES RELATIVE PERCENT: 9 % (ref 20–42)
MAGNESIUM SERPL-MCNC: 1.8 MG/DL (ref 1.6–2.6)
MCH RBC QN AUTO: 30.9 PG (ref 26–35)
MCHC RBC AUTO-ENTMCNC: 33.2 G/DL (ref 32–34.5)
MCV RBC AUTO: 92.8 FL (ref 80–99.9)
MONOCYTES NFR BLD: 0.86 K/UL (ref 0.1–0.95)
MONOCYTES NFR BLD: 13 % (ref 2–12)
NEUTROPHILS NFR BLD: 76 % (ref 43–80)
NEUTS SEG NFR BLD: 4.85 K/UL (ref 1.8–7.3)
PLATELET CONFIRMATION: NORMAL
PLATELET, FLUORESCENCE: 138 K/UL (ref 130–450)
PMV BLD AUTO: 11.6 FL (ref 7–12)
POTASSIUM SERPL-SCNC: 4.2 MMOL/L (ref 3.5–5)
PROT SERPL-MCNC: 6.2 G/DL (ref 6.4–8.3)
RBC # BLD AUTO: 3.76 M/UL (ref 3.5–5.5)
SODIUM SERPL-SCNC: 136 MMOL/L (ref 132–146)
WBC OTHER # BLD: 6.4 K/UL (ref 4.5–11.5)

## 2024-04-04 PROCEDURE — 97535 SELF CARE MNGMENT TRAINING: CPT

## 2024-04-04 PROCEDURE — 99239 HOSP IP/OBS DSCHRG MGMT >30: CPT | Performed by: STUDENT IN AN ORGANIZED HEALTH CARE EDUCATION/TRAINING PROGRAM

## 2024-04-04 PROCEDURE — 6370000000 HC RX 637 (ALT 250 FOR IP): Performed by: INTERNAL MEDICINE

## 2024-04-04 PROCEDURE — 97530 THERAPEUTIC ACTIVITIES: CPT

## 2024-04-04 PROCEDURE — 80053 COMPREHEN METABOLIC PANEL: CPT

## 2024-04-04 PROCEDURE — 36415 COLL VENOUS BLD VENIPUNCTURE: CPT

## 2024-04-04 PROCEDURE — 2580000003 HC RX 258: Performed by: INTERNAL MEDICINE

## 2024-04-04 PROCEDURE — 83735 ASSAY OF MAGNESIUM: CPT

## 2024-04-04 PROCEDURE — 85025 COMPLETE CBC W/AUTO DIFF WBC: CPT

## 2024-04-04 RX ORDER — OXYCODONE HYDROCHLORIDE AND ACETAMINOPHEN 5; 325 MG/1; MG/1
1 TABLET ORAL EVERY 6 HOURS PRN
Qty: 20 TABLET | Refills: 0 | Status: SHIPPED | DISCHARGE
Start: 2024-04-04 | End: 2024-04-04

## 2024-04-04 RX ORDER — OXYCODONE HYDROCHLORIDE AND ACETAMINOPHEN 5; 325 MG/1; MG/1
1 TABLET ORAL EVERY 6 HOURS PRN
Qty: 20 TABLET | Refills: 0 | Status: SHIPPED | OUTPATIENT
Start: 2024-04-04 | End: 2024-04-09

## 2024-04-04 RX ADMIN — SODIUM CHLORIDE, PRESERVATIVE FREE 10 ML: 5 INJECTION INTRAVENOUS at 10:30

## 2024-04-04 RX ADMIN — PANTOPRAZOLE SODIUM 40 MG: 40 TABLET, DELAYED RELEASE ORAL at 06:07

## 2024-04-04 RX ADMIN — ASPIRIN 325 MG: 325 TABLET, COATED ORAL at 10:31

## 2024-04-04 RX ADMIN — ACETAMINOPHEN 500 MG: 500 TABLET ORAL at 10:31

## 2024-04-04 ASSESSMENT — PAIN SCALES - GENERAL: PAINLEVEL_OUTOF10: 0

## 2024-04-04 NOTE — PROGRESS NOTES
Department of Orthopedic Surgery  Progress Note    Patient seen and examined. Pain controlled. No new complaints.  Denies chest pain, shortness of breath, dizziness/lightheadedness. Patient is doing very well this morning. She did well with therapy. We did discuss her surgery and plan moving forward. No new concerns this morning.     VITALS:  BP (!) 167/64   Pulse 60   Temp 98.2 °F (36.8 °C) (Oral)   Resp 18   Ht 1.702 m (5' 7\")   Wt 69.4 kg (152 lb 14.4 oz)   SpO2 96%   BMI 23.95 kg/m²     General: awake, alert cooperative in no acute distress.     MUSCULOSKELETAL:   right lower extremity:  Dressing C/D/I  Compartments soft and compressible  +PF/DF/EHL  +2/4 DP & PT pulses, Brisk Cap refill, Toes warm and perfused  Distal sensation grossly intact to Peroneals, Sural, Saphenous, and tibial nrs    CBC:   Lab Results   Component Value Date/Time    WBC 6.4 04/04/2024 04:25 AM    HGB 11.6 04/04/2024 04:25 AM    HCT 34.9 04/04/2024 04:25 AM     04/02/2024 05:00 AM     PT/INR:    Lab Results   Component Value Date/Time    PROTIME 10.4 03/31/2024 03:39 AM    INR 0.9 03/31/2024 03:39 AM           ASSESSMENT  S/P CMN for intertrochanteric femur fracture - 4/2/24    PLAN      Continue physical therapy and protocol: WBAT - RLE  24 hour abx coverage  Deep venous thrombosis prophylaxis - aspirin 325mg twice a day, early mobilization  PT/OT  Pain Control: multimodal wean narcotics as able.  Monitor H&H  D/C Plan:  per pt/ot/sw recs appreciate input. Orthopedics will follow peripherally at this point please call with any questions or concerns.

## 2024-04-04 NOTE — DISCHARGE INSTR - COC
Continuity of Care Form    Patient Name: Yen Plasencia   :  1940  MRN:  [id number]    Admit date:  2024  Discharge date:  24    Code Status Order: Full Code   Advance Directives:   Advance Care Flowsheet Documentation       Date/Time Healthcare Directive Type of Healthcare Directive Copy in Chart Healthcare Agent Appointed Healthcare Agent's Name Healthcare Agent's Phone Number    24 1537 No, patient does not have an advance directive for healthcare treatment -- -- -- -- --            Admitting Physician:  Yvan Neely MD  PCP: Jared Nielson MD    Discharging Nurse: Suzanne  Discharging Hospital Unit/Room#: 6402/6402-A  Discharging Unit Phone Number: [phone]    Emergency Contact:   Extended Emergency Contact Information  Primary Emergency Contact: ReedMynor lo  Address: [address] [place] [address] [address] [place], [place] [address] [place] [address]  Home Phone: [phone]  Mobile Phone: [phone]  Relation: Spouse   needed? No  Secondary Emergency Contact: Annette Nielson   [facility]  Home Phone: [phone]  Mobile Phone: [phone]  Relation: Child   needed? No    Past Surgical History:  Past Surgical History:   Procedure Laterality Date    ABDOMEN SURGERY      colostomy & reversal    CHOLECYSTECTOMY  07    COLONOSCOPY      CYSTOSCOPY N/A 2018    CYSTOSCOPY RETROGRADE PYELOGRAM performed by Darwin Tucker MD at [facility]    DILATATION, ESOPHAGUS      HAND SURGERY      carpel tunnel rigth hand    HIP SURGERY Right 2024    RIGHT HIP OPEN REDUCTION INTERNAL FIXATION performed by Sachin Hernández DO at [facility] OR    HYSTERECTOMY (CERVIX STATUS UNKNOWN)      ILEOSTOMY OR JEJUNOSTOMY      OTHER SURGICAL HISTORY  2017    diagnostic laparoscopy with exploratory laparotomy and reduction of internal hernia and closure of mesenteric defect, oversewing of multiple serosal tears    MO LAPS COLECTOMY ABDL W/PROCTECTOMY W/ILEOSTOMY N/A 2018

## 2024-04-04 NOTE — CARE COORDINATION
Discharge order noted. Patient to discharge to [facility]. Ambulance transport set up for 2p via [facility]. Patient informed at bedside and requested her daughter, Annette be called. Call made to patient's daughter, Annette and provided update regarding discharge. Charge nurse informed. Facility liaison informed.    Electronically signed by ERMA Escobar on 4/4/2024 at 11:18 AM

## 2024-04-04 NOTE — PROGRESS NOTES
Occupational Therapy  OT BEDSIDE TREATMENT NOTE   [facility]  [address], [place]      Date:2024  Patient Name: Yen Plasencia  MRN: [id number]  : 1940  Room: [address] [place] [address]     Evaluating OT: Jensen Chicas OTR/L #[id number]      Referring Provider: Solis Daiz DO   Specific Provider Orders/Date: OT eval and treat 24     Diagnosis: Heart block [I45.9]   Pt admitted to hospital following fall     Surgery / Procedure:   24  RIGHT HIP OPEN REDUCTION INTERNAL FIXATION with cephalomedullary nail        Pertinent Medical History:  has a past medical history of Cancer (HCC), Cancer (HCC), Chronic obstructive pulmonary disease, unspecified, Chronic renal disease, stage III (HCC) [519016], History of blood transfusion, Hx of blood clots, Ischemic bowel disease (HCC), Macular degeneration, MDRO (multiple drug resistant organisms) resistance, PAF (paroxysmal atrial fibrillation) (HCC), Perforation of sigmoid colon (HCC), SBO (small bowel obstruction) (HCC), Subclinical hypothyroidism, and Tinnitus.         Precautions:  Fall Risk, WBAT R LE, Tribe, colostomy      Assessment of current deficits    [x] Functional mobility          [x]ADLs           [x] Strength                  []Cognition    [x] Functional transfers        [x] IADLs         [x] Safety Awareness   [x]Endurance    [] Fine Coordination                        [x] Balance      [] Vision/perception   []Sensation      []Gross Motor Coordination            [] ROM           [] Delirium                   [] Motor Control      OT PLAN OF CARE   OT POC based on physician orders, patient diagnosis and results of clinical assessment     Frequency/Duration 1-3 days/wk for 2 weeks PRN   Specific OT Treatment Interventions to include:   * Instruction/training on adapted ADL techniques and AE recommendations to increase functional independence within precautions       * Training on energy

## 2024-04-04 NOTE — PLAN OF CARE
Problem: Safety - Adult  Goal: Free from fall injury  Outcome: Progressing     Problem: Discharge Planning  Goal: Discharge to home or other facility with appropriate resources  Outcome: Progressing  Flowsheets (Taken 4/3/2024 1745 by Sharifa Vance, RN)  Discharge to home or other facility with appropriate resources: Identify barriers to discharge with patient and caregiver     Problem: Skin/Tissue Integrity  Goal: Absence of new skin breakdown  Description: 1.  Monitor for areas of redness and/or skin breakdown  2.  Assess vascular access sites hourly  3.  Every 4-6 hours minimum:  Change oxygen saturation probe site  4.  Every 4-6 hours:  If on nasal continuous positive airway pressure, respiratory therapy assess nares and determine need for appliance change or resting period.  Outcome: Progressing     Problem: ABCDS Injury Assessment  Goal: Absence of physical injury  Outcome: Progressing     Problem: Pain  Goal: Verbalizes/displays adequate comfort level or baseline comfort level  Outcome: Adequate for Discharge  Flowsheets (Taken 4/3/2024 1745 by Sharifa Vance, RN)  Verbalizes/displays adequate comfort level or baseline comfort level: Encourage patient to monitor pain and request assistance

## 2024-04-04 NOTE — DISCHARGE SUMMARY
Hospital Medicine Discharge Summary    Patient ID: Yen Plasencia      Patient's PCP: Jared Nielson MD    Admit Date: 4/2/2024     Discharge Date:   04/04/2024    Admitting Physician: Yvan Neely MD     Discharge Physician: Yvan Neely MD     Discharge Diagnoses:  Right intertrochanteric femur fracture S/p CMN for intertrochanteric femur fracture 04/02   Complete heart block sinus tachycardia status post implantable loop recorder     Active Hospital Problems    Diagnosis Date Noted    Chronic renal disease, stage III ([facility]) [360570] [N18.30] 06/27/2022     Priority: Medium    CHB (complete heart block) ([facility]) [I44.2] 04/02/2024    Acute cystitis without hematuria [N30.00] 04/02/2024    Hypokalemia [E87.6] 04/02/2024    Subclinical hypothyroidism [E03.8] 04/02/2024    Other constipation [K59.09] 04/02/2024    Colostomy present ([facility]) [Z93.3] 04/02/2024    Heart block [I45.9] 04/02/2024    Vasovagal syncope [R55] 04/02/2024    Closed nondisplaced intertrochanteric fracture of right femur ([facility]) [S72.144A] 03/31/2024    Chronic obstructive pulmonary disease, unspecified [J44.9] 04/04/2023    PAF (paroxysmal atrial fibrillation) ([facility]) [I48.0]        The patient was seen and examined on day of discharge and this discharge summary is in conjunction with any daily progress note from day of discharge.    Hospital Course:   Patient originally presented to [facility] on 3/31 with right hip pain after a fall and was admitted for right intertrochanteric femur fracture. Plan was for CMN 4/1 with orthopedic surgery.    Cardiology was consulted for preoperative cardiac evaluation.  Prior to their evaluation patient had a 7-second ventricular standstill followed by a secondary heart block associated with weakness, nausea, dizziness, and diaphoresis.    Cardiology discussed case with Dr. Maria with EP and decision was made to transfer for evaluation for pacemaker insertion.      EP advised holding any anticoagulation, AV

## 2024-04-04 NOTE — PROGRESS NOTES
Physical Therapy  Physical Therapy treatment note     Name: Yen Plasencia  : 1940  MRN: [id number]      Date of Service: 2024    Evaluating PT:  Frank Powers, PT, DPT [id number]    Room #:  6402/6402-A  Diagnosis:  Heart block [I45.9]  PMHx/PSHx:    Past Medical History:   Diagnosis Date    Cancer (HCC)     colorectal     Cancer (HCC)     colorectal     Chronic obstructive pulmonary disease, unspecified 2023    Chronic renal disease, stage III (HCC) [235269] 2022    History of blood transfusion     Hx of blood clots     dvt, on eliquis    Ischemic bowel disease (HCC)     Macular degeneration     MDRO (multiple drug resistant organisms) resistance     PAF (paroxysmal atrial fibrillation) ([facility])     Perforation of sigmoid colon ([facility]) 10/17/2018    SBO (small bowel obstruction) ([facility]) 2021    Subclinical hypothyroidism 2024    Tinnitus      Procedure/Surgery:   RIGHT HIP OPEN REDUCTION INTERNAL FIXATION with cephalomedullary nail   Precautions:  Falls, WBAT RLE, mild [place], colostomy, monitor BP   Equipment Needs:  TBD    SUBJECTIVE:    Pt lives with  in a 2 story home with 2 step(s) to enter and 2 rail(s).   Full flight of steps and 2 rails to bedroom.  Pt ambulated without device in home, cane in community PTA.    OBJECTIVE:   Initial Evaluation  Date: 4/3/24 Treatment  Date: 24 Short Term/ Long Term   Goals   AM-PAC 6 Clicks 13/24 15/24    Was pt agreeable to Eval/treatment? Yes Yes     Does pt have pain? No c/o pain Mild R hip pain     Bed Mobility  Rolling: NT  Supine to sit: MaxA with HOB elevated  Sit to supine: NT  Scooting: Jerrod Rolling: NT  Supine to sit: Jerrod HOB slightly elevated   Sit to supine: Jerrod  Scooting: Jerrod Mod Independent   Transfers Sit to stand: ModA bed; Jerrod with chair  Stand to sit: Jerrod  Stand pivot: Jerrod with WW Sit to stand: Jerrod  Stand to sit: Jerrod  Stand pivot: NT, pt symptomatic  Mod Independent with WW   Ambulation   15 feet, 10 feet with Jerrod

## 2024-04-11 ENCOUNTER — TELEPHONE (OUTPATIENT)
Dept: NON INVASIVE DIAGNOSTICS | Age: 84
End: 2024-04-11

## 2024-04-11 ENCOUNTER — TELEPHONE (OUTPATIENT)
Dept: ORTHOPEDIC SURGERY | Age: 84
End: 2024-04-11

## 2024-04-11 NOTE — TELEPHONE ENCOUNTER
Mrs Plasencia's daughter Annette called regarding her mom's appointment next week. Her mom is currently in rehab for recent hip surgery and will not be able to come for her appointment next week. Her daughter will check the incision next week and call me to let me know how it looks. She does have her monitor at the facility at her bedside. Will do monthly remotes in the meantime. Instructed to please call with any questions or concerns.

## 2024-04-11 NOTE — TELEPHONE ENCOUNTER
Surgery: Right Hip ORIF with cephalomedullary nail   Date:  4/2/2024    Post of appt was moved from 4/17/2024 to 4/24/2024 per daughter request.    Britni @ [facility] calling to see if we want them to remove staples right hp since change in post-op appt date.  2 weeks post op would be 4/16/2024

## 2024-04-12 NOTE — TELEPHONE ENCOUNTER
RONN Ryder at ext [phone]    Advised to remove staples post op week 2, 4/16/24    Please apply steri strips/ clean dry dressing. Call office with any questions or concerns

## 2024-04-19 ENCOUNTER — TELEPHONE (OUTPATIENT)
Dept: NON INVASIVE DIAGNOSTICS | Age: 84
End: 2024-04-19

## 2024-04-22 NOTE — PROGRESS NOTES
Physician Progress Note      PATIENT:               LESLIE DOE  [facility] #:                  [id number]  :                       1940  ADMIT DATE:       3/31/2024 3:21 AM  DISCH DATE:        2024 12:09 AM  RESPONDING  PROVIDER #:        Cecilio Edwards DO          QUERY TEXT:    Pt admitted with intertrochanteric fracture of right femur. Pt noted to have   Osteopenia and s/p fall. If possible, please document in progress notes and   discharge summary if you are evaluating and/or treating any of the following:    The medical record reflects the following:  Risk Factors: Osteopenia, s/p fall, female, age 83yrs    Clinical Indicators: H&P 3/31 ?Closed nondisplaced right intertrochanteric   fracture of right femur: Consult Orthopedics.?  Ortho CN 3/31 ?X-ray AP pelvis/right hip demonstrate comminuted right   intertrochanteric femur fracture, standard obliquity with varus and shortening   noted.  Diffuse osteopenia.? ?Right intertrochanteric femur fracture:   Nonweightbearing Right lower extremity, position of comfort, Multimodal pain   control by primary team, N.p.o. at midnight, Plan for right hip CMN tomorrow    with Dr. Rene.?  Xray Hip 3/31 ?There is a mildly comminuted, displaced fracture of the right   intertrochanteric femur. No focal soft tissue abnormality.?  Xray Femur 3/31 ?Redemonstration of the intertrochanteric fracture of the   right femur with no  additional acute abnormality present. Degenerative changes seen within the   knee.?    Treatment: Ortho consulted, Imaging studies, plan for Cephalo-medullary   nailing.    Thanks,  Anahy Troy.  Options provided:  -- Pathological intertrochanteric fracture of right femur due to osteopenia   following fall which would not usually break a normal, healthy bone  -- Traumatic intertrochanteric fracture of right femur  -- Other - I will add my own diagnosis  -- Disagree - Not applicable / Not valid  -- Disagree - Clinically unable to determine

## 2024-05-09 LAB
ALBUMIN SERPL-MCNC: 4.2 G/DL (ref 3.5–5.2)
ALP SERPL-CCNC: 150 U/L (ref 35–104)
ALT SERPL-CCNC: 7 U/L (ref 0–32)
ANION GAP SERPL CALCULATED.3IONS-SCNC: 12 MMOL/L (ref 7–16)
AST SERPL-CCNC: 13 U/L (ref 0–31)
BASOPHILS # BLD: 0.02 K/UL (ref 0–0.2)
BASOPHILS NFR BLD: 0 % (ref 0–2)
BILIRUB SERPL-MCNC: 0.3 MG/DL (ref 0–1.2)
BUN SERPL-MCNC: 11 MG/DL (ref 6–23)
CALCIUM SERPL-MCNC: 10.2 MG/DL (ref 8.6–10.2)
CHLORIDE SERPL-SCNC: 105 MMOL/L (ref 98–107)
CO2 SERPL-SCNC: 24 MMOL/L (ref 22–29)
CREAT SERPL-MCNC: 1 MG/DL (ref 0.5–1)
EOSINOPHIL # BLD: 0.04 K/UL (ref 0.05–0.5)
EOSINOPHILS RELATIVE PERCENT: 1 % (ref 0–6)
ERYTHROCYTE [DISTWIDTH] IN BLOOD BY AUTOMATED COUNT: 15.1 % (ref 11.5–15)
GFR, ESTIMATED: 54 ML/MIN/1.73M2
GLUCOSE SERPL-MCNC: 117 MG/DL (ref 74–99)
HCT VFR BLD AUTO: 36.3 % (ref 34–48)
HGB BLD-MCNC: 11.1 G/DL (ref 11.5–15.5)
IMM GRANULOCYTES # BLD AUTO: 0.05 K/UL (ref 0–0.58)
IMM GRANULOCYTES NFR BLD: 1 % (ref 0–5)
LYMPHOCYTES NFR BLD: 0.67 K/UL (ref 1.5–4)
LYMPHOCYTES RELATIVE PERCENT: 8 % (ref 20–42)
MCH RBC QN AUTO: 29.6 PG (ref 26–35)
MCHC RBC AUTO-ENTMCNC: 30.6 G/DL (ref 32–34.5)
MCV RBC AUTO: 96.8 FL (ref 80–99.9)
MONOCYTES NFR BLD: 0.68 K/UL (ref 0.1–0.95)
MONOCYTES NFR BLD: 8 % (ref 2–12)
NEUTROPHILS NFR BLD: 83 % (ref 43–80)
NEUTS SEG NFR BLD: 7.16 K/UL (ref 1.8–7.3)
PLATELET # BLD AUTO: 369 K/UL (ref 130–450)
PMV BLD AUTO: 10 FL (ref 7–12)
POTASSIUM SERPL-SCNC: 4.8 MMOL/L (ref 3.5–5)
PROT SERPL-MCNC: 6.5 G/DL (ref 6.4–8.3)
RBC # BLD AUTO: 3.75 M/UL (ref 3.5–5.5)
SODIUM SERPL-SCNC: 141 MMOL/L (ref 132–146)
WBC OTHER # BLD: 8.6 K/UL (ref 4.5–11.5)

## 2024-05-14 ENCOUNTER — TELEPHONE (OUTPATIENT)
Dept: ORTHOPEDIC SURGERY | Age: 84
End: 2024-05-14

## 2024-09-05 PROCEDURE — 93298 REM INTERROG DEV EVAL SCRMS: CPT | Performed by: INTERNAL MEDICINE

## 2024-10-08 PROCEDURE — 93298 REM INTERROG DEV EVAL SCRMS: CPT | Performed by: INTERNAL MEDICINE

## 2024-11-16 PROCEDURE — 93298 REM INTERROG DEV EVAL SCRMS: CPT | Performed by: INTERNAL MEDICINE

## 2024-12-17 PROCEDURE — 93298 REM INTERROG DEV EVAL SCRMS: CPT | Performed by: INTERNAL MEDICINE

## 2025-03-20 PROCEDURE — 93298 REM INTERROG DEV EVAL SCRMS: CPT | Performed by: INTERNAL MEDICINE

## 2025-04-20 PROCEDURE — 93298 REM INTERROG DEV EVAL SCRMS: CPT | Performed by: INTERNAL MEDICINE

## 2025-05-21 PROCEDURE — 93298 REM INTERROG DEV EVAL SCRMS: CPT | Performed by: INTERNAL MEDICINE

## 2025-07-22 PROCEDURE — 93298 REM INTERROG DEV EVAL SCRMS: CPT | Performed by: INTERNAL MEDICINE

## (undated) DEVICE — Device

## (undated) DEVICE — DRESSING,GAUZE,XEROFORM,CURAD,5"X9",ST: Brand: CURAD

## (undated) DEVICE — 40586 ADVANCED TRENDELENBURG POSITIONING KIT: Brand: 40586 ADVANCED TRENDELENBURG POSITIONING KIT

## (undated) DEVICE — BASIC SINGLE BASIN 1-LF: Brand: MEDLINE INDUSTRIES, INC.

## (undated) DEVICE — TRAP SPEC COLL 40CC MUCOUS CALIB UNIV CONN FOR OPN SUCT

## (undated) DEVICE — BIT DRL L145MM DIA4.2MM ST 3 FLUT NDL PNT QUIK CPL FOR FEM

## (undated) DEVICE — TROCAR ENDOSCP L100MM DIA12MM BLDELSS OBT RADLUC STBL SL

## (undated) DEVICE — TROCAR ENDOSCP L100MM DIA5MM BLDELSS STBL SL OBT RADLUC

## (undated) DEVICE — BANDAGE,SELF ADHRNT,COFLEX,4"X5YD,STRL: Brand: COLABEL

## (undated) DEVICE — TROCAR: Brand: KII FIOS FIRST ENTRY

## (undated) DEVICE — SHEARS LAP L45CM DIA5MM ULTRASONIC CRV TIP ADV HEMSTAS HARM

## (undated) DEVICE — SCISSORS ENDOSCP DIA5MM CRV MPLR CAUT W/ RATCH HNDL

## (undated) DEVICE — SOLUTION IRRIG 1000ML 0.9% SOD CHL USP POUR PLAS BTL

## (undated) DEVICE — HYDROPHILIC COATED RED RUBBER URETHRAL CATHETER, SMOOTH ROUNDED TIP, 20 FR (6.7 MM): Brand: DOVER

## (undated) DEVICE — TOTAL TRAY, 16FR 10ML SIL FOLEY, URN: Brand: MEDLINE

## (undated) DEVICE — CATHETER URET OLV TIP 5FRX70CM FLEXIMA

## (undated) DEVICE — BIT DRL L L266MM DIA16MM FEM FLX CANN QUIK CPL

## (undated) DEVICE — NDL CNTR 40CT FM MAG: Brand: MEDLINE INDUSTRIES, INC.

## (undated) DEVICE — SPONGE LAP W18XL18IN WHT COT 4 PLY FLD STRUNG RADPQ DISP ST

## (undated) DEVICE — CIRCON 30/70 URO LENS

## (undated) DEVICE — PLUMEPORT LAPAROSCOPIC SMOKE FILTRATION DEVICE: Brand: PLUMEPORT ACTIV

## (undated) DEVICE — TUBING, SUCTION, 1/4" X 10', STRAIGHT: Brand: MEDLINE

## (undated) DEVICE — TRAY PROCED CUSTOM GASTROINTESTINAL

## (undated) DEVICE — GLOVE ORANGE PI 7 1/2   MSG9075

## (undated) DEVICE — STAPLER SKIN L440MM 32MM LNG 12 FIRING B FRM PWR + GRIPPING

## (undated) DEVICE — YANKAUER,BULB TIP,W/O VENT,RIGID,STERILE: Brand: MEDLINE

## (undated) DEVICE — CAMERA STRYKER 1488 HD GEN

## (undated) DEVICE — BIT DRL L500MM DIA6X9MM CANN STP L QUIK CPL FOR DH DC TFN

## (undated) DEVICE — Z INACTIVE USE 2635503 SOLUTION IRRIG 3000ML ST H2O USP UROMATIC PLAS CONT

## (undated) DEVICE — MARKER,SKIN,WI/RULER AND LABELS: Brand: MEDLINE

## (undated) DEVICE — GAUZE,SPONGE,4"X4",16PLY,XRAY,STRL,LF: Brand: MEDLINE

## (undated) DEVICE — BLADE ES ELASTOMERIC COAT INSUL DURABLE BEND UPTO 90DEG

## (undated) DEVICE — NEEDLE INSUF L120MM DIA2MM DISP FOR PNEUMOPERI ENDOPATH

## (undated) DEVICE — CIRCON 21F CYSTO TRAY

## (undated) DEVICE — SYRINGE IRRIG 60ML SFT PLIABLE BLB EZ TO GRP 1 HND USE W/

## (undated) DEVICE — SUTURE BAG: Brand: DEVON

## (undated) DEVICE — DRAPE C ARM W41XL65IN UNIV W/ CLP AND RUBBERBAND

## (undated) DEVICE — Z DISCONTINUED SUGG SUB 2622703 KIT OST L12IN FLNG DIA70MM ST TRNSPAR TWO PC MOLD

## (undated) DEVICE — BLADE,STAINLESS-STEEL,10,STRL,DISPOSABLE: Brand: MEDLINE

## (undated) DEVICE — GARMENT,MEDLINE,DVT,INT,CALF,MED, GEN2: Brand: MEDLINE

## (undated) DEVICE — GOWN,SIRUS,NONRNF,SETINSLV,XL,20/CS: Brand: MEDLINE

## (undated) DEVICE — SEALER TISS L45CM ADV BPLR STR TIP LAP APPRCH ENSEAL G2

## (undated) DEVICE — TOWEL,OR,DSP,ST,BLUE,STD,6/PK,12PK/CS: Brand: MEDLINE

## (undated) DEVICE — ACCESS PLATFORM FOR MINIMALLY INVASIVE SURGERY: Brand: GELPOINT® ADVANCED ACCESS PLATFORM

## (undated) DEVICE — CHLORAPREP 26ML ORANGE

## (undated) DEVICE — BAG DRAINAGE CONTAINER 15 LT FLUID COLLCTN

## (undated) DEVICE — PACK,BASIC I: Brand: MEDLINE

## (undated) DEVICE — GLOVE ORTHO 8   MSG9480

## (undated) DEVICE — 6617 IOBAN II PATIENT ISOLATION DRAPE 5/BX,4BX/CS: Brand: STERI-DRAPE™ IOBAN™ 2

## (undated) DEVICE — CYSTO PACK: Brand: MEDLINE INDUSTRIES, INC.

## (undated) DEVICE — BAG DRNGE COMB PK

## (undated) DEVICE — YANKAUER,POOLE TIP,STERILE,50/CS: Brand: MEDLINE

## (undated) DEVICE — APPLICATOR MEDICATED 26 CC SOLUTION HI LT ORNG CHLORAPREP

## (undated) DEVICE — SET MAJOR INSTR HOUSE

## (undated) DEVICE — COVER,LIGHT HANDLE,FLX,1/PK: Brand: MEDLINE INDUSTRIES, INC.

## (undated) DEVICE — STANDARD HYPODERMIC NEEDLE,POLYPROPYLENE HUB: Brand: MONOJECT

## (undated) DEVICE — ROD RMR L950MM DIA2.5MM W/ EXTN BALL TIP

## (undated) DEVICE — COLOSTOMY/ILEOSTOMY KIT,FLEXWEAR: Brand: NEW IMAGE

## (undated) DEVICE — SOLUTION IV IRRIG WATER 1000ML POUR BRL 2F7114

## (undated) DEVICE — TROCAR: Brand: KII SLEEVE

## (undated) DEVICE — PATIENT RETURN ELECTRODE, SINGLE-USE, CONTACT QUALITY MONITORING, ADULT, WITH 9FT CORD, FOR PATIENTS WEIGING OVER 33LBS. (15KG): Brand: MEGADYNE

## (undated) DEVICE — MEDI-VAC YANKAUER SUCTION HANDLE: Brand: CARDINAL HEALTH

## (undated) DEVICE — Z INACTIVE USE 2660664 SOLUTION IRRIG 3000ML 0.9% SOD CHL USP UROMATIC PLAS CONT

## (undated) DEVICE — PACK,UNIVERSAL,NO GOWNS: Brand: MEDLINE

## (undated) DEVICE — PACK SURG LAP CHOLE CUSTOM

## (undated) DEVICE — CONTROL SYRINGE LUER-LOCK TIP: Brand: MONOJECT

## (undated) DEVICE — COVER,TABLE,44X90,STERILE: Brand: MEDLINE

## (undated) DEVICE — ELECTRODE PT RET AD L9FT HI MOIST COND ADH HYDRGEL CORDED

## (undated) DEVICE — RELOAD STPL H4.1X2MM DIA60MM THCK TISS GRN 6 ROW PWR GST B

## (undated) DEVICE — GUIDEWIRE ORTH L400MM DIA3.2MM FOR TFN

## (undated) DEVICE — GAUZE,SPONGE,4"X4",16PLY,STRL,LF,10/TRAY: Brand: MEDLINE

## (undated) DEVICE — 4-PORT MANIFOLD: Brand: NEPTUNE 2

## (undated) DEVICE — FRACTURE TABLE: Brand: MEDLINE INDUSTRIES, INC.

## (undated) DEVICE — WIPES SKIN CLOTH READYPREP 9 X 10.5 IN 2% CHLORHEX GLUCONATE CHG PREOP

## (undated) DEVICE — DOUBLE BASIN SET: Brand: MEDLINE INDUSTRIES, INC.

## (undated) DEVICE — LAPAROSCOPIC SCISSORS: Brand: EPIX LAPAROSCOPIC SCISSORS

## (undated) DEVICE — GENERATOR ELECSURG FORCETRAID

## (undated) DEVICE — BASIC DOUBLE BASIN 2-LF: Brand: MEDLINE INDUSTRIES, INC.

## (undated) DEVICE — DRAIN SURG 15FR L3/16IN DIA4.7MM SIL CHN RND HUBLESS FULL

## (undated) DEVICE — PAD,ABDOMINAL,5"X9",ST,LF,25/BX: Brand: MEDLINE INDUSTRIES, INC.

## (undated) DEVICE — GOWN,SIRUS,POLYRNF,RAGLAN,XL,ST,30/CS: Brand: MEDLINE

## (undated) DEVICE — STAPLER EXT 65MM S STL AUTO DISP PURSTRING

## (undated) DEVICE — SHEET,DRAPE,40X58,STERILE: Brand: MEDLINE

## (undated) DEVICE — PENCIL ES L3M BTTN SWCH HOLSTER W/ BLDE ELECTRD EDGE

## (undated) DEVICE — PMI PTFE COATED LAPAROSCOPIC WIRE L-HOOK 44 CM: Brand: PMI

## (undated) DEVICE — STERILE SLEEVE: Brand: CONVERTORS

## (undated) DEVICE — YANKAUER,OPEN TIP,W/O VENT,STERILE: Brand: MEDLINE INDUSTRIES, INC.

## (undated) DEVICE — SOLUTION IRRIG 1000ML STRL H2O USP PLAS POUR BTL

## (undated) DEVICE — SPONGE,LAP,12"X12",XR,ST,5/PK,40PK/CS: Brand: MEDLINE

## (undated) DEVICE — GLOVE ORANGE PI 8   MSG9080

## (undated) DEVICE — SIGMOIDOSCOPIC SUCTION INSTRUMENT 18 FR W/WINGED CAP CONTROL AND 6 FOOT (1.8M) TUBING: Brand: SIGMOIDOSCOPIC

## (undated) DEVICE — BLADE,STAINLESS-STEEL,15,STRL,DISPOSABLE: Brand: MEDLINE

## (undated) DEVICE — AIR/WATER CLEANING ADAPTER FOR OLYMPUS® GI ENDOSCOPE: Brand: BULLDOG®

## (undated) DEVICE — PREMIUM WET SKIN PREP TRAY: Brand: MEDLINE INDUSTRIES, INC.

## (undated) DEVICE — PUMP SUC IRR TBNG L10FT W/ HNDPC ASSEMB STRYKEFLOW 2

## (undated) DEVICE — [HIGH FLOW INSUFFLATOR,  DO NOT USE IF PACKAGE IS DAMAGED,  KEEP DRY,  KEEP AWAY FROM SUNLIGHT,  PROTECT FROM HEAT AND RADIOACTIVE SOURCES.]: Brand: PNEUMOSURE

## (undated) DEVICE — DRESSING HYDROFIBER AQUACEL AG ADVANTAGE 3.5X6 IN

## (undated) DEVICE — INSUFFLATION NEEDLE TO ESTABLISH PNEUMOPERITONEUM.: Brand: INSUFFLATION NEEDLE

## (undated) DEVICE — 3M™ IOBAN™ 2 ANTIMICROBIAL INCISE DRAPE 6640EZ: Brand: IOBAN™ 2